# Patient Record
Sex: FEMALE | Race: WHITE | NOT HISPANIC OR LATINO | Employment: PART TIME | URBAN - NONMETROPOLITAN AREA
[De-identification: names, ages, dates, MRNs, and addresses within clinical notes are randomized per-mention and may not be internally consistent; named-entity substitution may affect disease eponyms.]

---

## 2017-01-03 ENCOUNTER — COMMUNICATION - GICH (OUTPATIENT)
Dept: FAMILY MEDICINE | Facility: OTHER | Age: 52
End: 2017-01-03

## 2017-01-03 DIAGNOSIS — G47.00 INSOMNIA: ICD-10-CM

## 2017-01-04 ENCOUNTER — HISTORY (OUTPATIENT)
Dept: FAMILY MEDICINE | Facility: OTHER | Age: 52
End: 2017-01-04

## 2017-01-04 ENCOUNTER — HOSPITAL ENCOUNTER (OUTPATIENT)
Dept: RADIOLOGY | Facility: OTHER | Age: 52
End: 2017-01-04
Attending: FAMILY MEDICINE

## 2017-01-04 ENCOUNTER — OFFICE VISIT - GICH (OUTPATIENT)
Dept: FAMILY MEDICINE | Facility: OTHER | Age: 52
End: 2017-01-04

## 2017-01-04 DIAGNOSIS — Z23 ENCOUNTER FOR IMMUNIZATION: ICD-10-CM

## 2017-01-04 DIAGNOSIS — R05.9 COUGH: ICD-10-CM

## 2017-01-04 DIAGNOSIS — R09.81 NASAL CONGESTION: ICD-10-CM

## 2017-02-14 ENCOUNTER — OFFICE VISIT - GICH (OUTPATIENT)
Dept: INTERNAL MEDICINE | Facility: OTHER | Age: 52
End: 2017-02-14

## 2017-02-14 ENCOUNTER — HISTORY (OUTPATIENT)
Dept: INTERNAL MEDICINE | Facility: OTHER | Age: 52
End: 2017-02-14

## 2017-02-14 DIAGNOSIS — J40 BRONCHITIS: ICD-10-CM

## 2017-02-14 DIAGNOSIS — F41.9 ANXIETY DISORDER: ICD-10-CM

## 2017-02-14 ASSESSMENT — PATIENT HEALTH QUESTIONNAIRE - PHQ9: SUM OF ALL RESPONSES TO PHQ QUESTIONS 1-9: 12

## 2017-03-07 ENCOUNTER — COMMUNICATION - GICH (OUTPATIENT)
Dept: INTERNAL MEDICINE | Facility: OTHER | Age: 52
End: 2017-03-07

## 2017-03-07 DIAGNOSIS — F41.9 ANXIETY DISORDER: ICD-10-CM

## 2017-05-19 ENCOUNTER — COMMUNICATION - GICH (OUTPATIENT)
Dept: FAMILY MEDICINE | Facility: OTHER | Age: 52
End: 2017-05-19

## 2017-05-19 DIAGNOSIS — M79.7 FIBROMYALGIA: ICD-10-CM

## 2017-05-24 ENCOUNTER — COMMUNICATION - GICH (OUTPATIENT)
Dept: FAMILY MEDICINE | Facility: OTHER | Age: 52
End: 2017-05-24

## 2017-05-24 DIAGNOSIS — F41.1 GENERALIZED ANXIETY DISORDER: ICD-10-CM

## 2017-05-24 DIAGNOSIS — G43.909 MIGRAINE WITHOUT STATUS MIGRAINOSUS, NOT INTRACTABLE: ICD-10-CM

## 2017-05-24 DIAGNOSIS — F33.1 MAJOR DEPRESSIVE DISORDER, RECURRENT, MODERATE (H): ICD-10-CM

## 2017-05-27 ENCOUNTER — COMMUNICATION - GICH (OUTPATIENT)
Dept: FAMILY MEDICINE | Facility: OTHER | Age: 52
End: 2017-05-27

## 2017-05-27 DIAGNOSIS — F33.1 MAJOR DEPRESSIVE DISORDER, RECURRENT, MODERATE (H): ICD-10-CM

## 2017-05-27 DIAGNOSIS — G43.909 MIGRAINE WITHOUT STATUS MIGRAINOSUS, NOT INTRACTABLE: ICD-10-CM

## 2017-05-27 DIAGNOSIS — F41.1 GENERALIZED ANXIETY DISORDER: ICD-10-CM

## 2017-06-07 ENCOUNTER — OFFICE VISIT - GICH (OUTPATIENT)
Dept: FAMILY MEDICINE | Facility: OTHER | Age: 52
End: 2017-06-07

## 2017-06-07 ENCOUNTER — HISTORY (OUTPATIENT)
Dept: FAMILY MEDICINE | Facility: OTHER | Age: 52
End: 2017-06-07

## 2017-06-07 DIAGNOSIS — F41.9 ANXIETY DISORDER: ICD-10-CM

## 2017-06-07 DIAGNOSIS — G43.C0 PERIODIC HEADACHE SYNDROME, NOT INTRACTABLE: ICD-10-CM

## 2017-06-07 DIAGNOSIS — J39.2 OTHER DISEASES OF PHARYNX (CODE): ICD-10-CM

## 2017-06-07 DIAGNOSIS — M79.7 FIBROMYALGIA: ICD-10-CM

## 2017-06-07 DIAGNOSIS — Z02.89 ENCOUNTER FOR OTHER ADMINISTRATIVE EXAMINATIONS: ICD-10-CM

## 2017-06-07 DIAGNOSIS — Z79.899 OTHER LONG TERM (CURRENT) DRUG THERAPY: ICD-10-CM

## 2017-06-07 LAB — STREP A ANTIGEN - HISTORICAL: NEGATIVE

## 2017-06-14 ENCOUNTER — HISTORY (OUTPATIENT)
Dept: FAMILY MEDICINE | Facility: OTHER | Age: 52
End: 2017-06-14

## 2017-06-14 ENCOUNTER — OFFICE VISIT - GICH (OUTPATIENT)
Dept: FAMILY MEDICINE | Facility: OTHER | Age: 52
End: 2017-06-14

## 2017-06-14 DIAGNOSIS — J20.9 ACUTE BRONCHITIS: ICD-10-CM

## 2017-06-14 DIAGNOSIS — J01.00 ACUTE MAXILLARY SINUSITIS: ICD-10-CM

## 2017-06-14 DIAGNOSIS — J01.10 ACUTE FRONTAL SINUSITIS: ICD-10-CM

## 2017-06-20 ENCOUNTER — OFFICE VISIT - GICH (OUTPATIENT)
Dept: FAMILY MEDICINE | Facility: OTHER | Age: 52
End: 2017-06-20

## 2017-06-20 ENCOUNTER — HISTORY (OUTPATIENT)
Dept: FAMILY MEDICINE | Facility: OTHER | Age: 52
End: 2017-06-20

## 2017-06-20 DIAGNOSIS — R51.9 HEADACHE: ICD-10-CM

## 2017-06-20 DIAGNOSIS — G44.89 OTHER HEADACHE SYNDROME: ICD-10-CM

## 2017-06-21 ENCOUNTER — HOSPITAL ENCOUNTER (OUTPATIENT)
Dept: RADIOLOGY | Facility: OTHER | Age: 52
End: 2017-06-21
Attending: FAMILY MEDICINE

## 2017-06-21 ENCOUNTER — HISTORY (OUTPATIENT)
Dept: FAMILY MEDICINE | Facility: OTHER | Age: 52
End: 2017-06-21

## 2017-06-21 DIAGNOSIS — R51.9 HEADACHE: ICD-10-CM

## 2017-07-17 ENCOUNTER — COMMUNICATION - GICH (OUTPATIENT)
Dept: FAMILY MEDICINE | Facility: OTHER | Age: 52
End: 2017-07-17

## 2017-07-17 DIAGNOSIS — K21.9 GASTRO-ESOPHAGEAL REFLUX DISEASE WITHOUT ESOPHAGITIS: ICD-10-CM

## 2017-07-28 ENCOUNTER — COMMUNICATION - GICH (OUTPATIENT)
Dept: FAMILY MEDICINE | Facility: OTHER | Age: 52
End: 2017-07-28

## 2017-07-28 DIAGNOSIS — F33.1 MAJOR DEPRESSIVE DISORDER, RECURRENT, MODERATE (H): ICD-10-CM

## 2017-07-28 DIAGNOSIS — G43.909 MIGRAINE WITHOUT STATUS MIGRAINOSUS, NOT INTRACTABLE: ICD-10-CM

## 2017-07-28 DIAGNOSIS — F41.1 GENERALIZED ANXIETY DISORDER: ICD-10-CM

## 2017-07-31 ENCOUNTER — HISTORY (OUTPATIENT)
Dept: FAMILY MEDICINE | Facility: OTHER | Age: 52
End: 2017-07-31

## 2017-07-31 ENCOUNTER — OFFICE VISIT - GICH (OUTPATIENT)
Dept: FAMILY MEDICINE | Facility: OTHER | Age: 52
End: 2017-07-31

## 2017-07-31 DIAGNOSIS — M77.9 ENTHESOPATHY: ICD-10-CM

## 2017-08-10 ENCOUNTER — HOSPITAL ENCOUNTER (OUTPATIENT)
Dept: RADIOLOGY | Facility: OTHER | Age: 52
End: 2017-08-10

## 2017-08-10 ENCOUNTER — HISTORY (OUTPATIENT)
Dept: RADIOLOGY | Facility: OTHER | Age: 52
End: 2017-08-10

## 2017-08-10 DIAGNOSIS — Z12.31 ENCOUNTER FOR SCREENING MAMMOGRAM FOR MALIGNANT NEOPLASM OF BREAST: ICD-10-CM

## 2017-10-22 ENCOUNTER — HISTORY (OUTPATIENT)
Dept: EMERGENCY MEDICINE | Facility: OTHER | Age: 52
End: 2017-10-22

## 2017-10-24 ENCOUNTER — COMMUNICATION - GICH (OUTPATIENT)
Dept: FAMILY MEDICINE | Facility: OTHER | Age: 52
End: 2017-10-24

## 2017-10-24 ENCOUNTER — AMBULATORY - GICH (OUTPATIENT)
Dept: FAMILY MEDICINE | Facility: OTHER | Age: 52
End: 2017-10-24

## 2017-10-25 ENCOUNTER — HISTORY (OUTPATIENT)
Dept: FAMILY MEDICINE | Facility: OTHER | Age: 52
End: 2017-10-25

## 2017-10-25 ENCOUNTER — OFFICE VISIT - GICH (OUTPATIENT)
Dept: FAMILY MEDICINE | Facility: OTHER | Age: 52
End: 2017-10-25

## 2017-10-25 DIAGNOSIS — S39.012D STRAIN OF MUSCLE, FASCIA AND TENDON OF LOWER BACK, SUBSEQUENT ENCOUNTER: ICD-10-CM

## 2017-11-01 ENCOUNTER — OFFICE VISIT - GICH (OUTPATIENT)
Dept: FAMILY MEDICINE | Facility: OTHER | Age: 52
End: 2017-11-01

## 2017-11-01 DIAGNOSIS — Z02.89 ENCOUNTER FOR OTHER ADMINISTRATIVE EXAMINATIONS: ICD-10-CM

## 2017-11-01 DIAGNOSIS — M54.6 PAIN IN THORACIC SPINE: ICD-10-CM

## 2017-11-01 DIAGNOSIS — S39.012D STRAIN OF MUSCLE, FASCIA AND TENDON OF LOWER BACK, SUBSEQUENT ENCOUNTER: ICD-10-CM

## 2017-11-17 ENCOUNTER — HOSPITAL ENCOUNTER (OUTPATIENT)
Dept: PHYSICAL THERAPY | Facility: OTHER | Age: 52
Setting detail: THERAPIES SERIES
End: 2017-11-17
Attending: FAMILY MEDICINE

## 2017-11-17 DIAGNOSIS — Z02.89 ENCOUNTER FOR OTHER ADMINISTRATIVE EXAMINATIONS: ICD-10-CM

## 2017-11-17 DIAGNOSIS — S39.012D STRAIN OF MUSCLE, FASCIA AND TENDON OF LOWER BACK, SUBSEQUENT ENCOUNTER: ICD-10-CM

## 2017-11-20 ENCOUNTER — AMBULATORY - GICH (OUTPATIENT)
Dept: FAMILY MEDICINE | Facility: OTHER | Age: 52
End: 2017-11-20

## 2017-11-20 ENCOUNTER — HISTORY (OUTPATIENT)
Dept: FAMILY MEDICINE | Facility: OTHER | Age: 52
End: 2017-11-20

## 2017-11-20 ENCOUNTER — OFFICE VISIT - GICH (OUTPATIENT)
Dept: FAMILY MEDICINE | Facility: OTHER | Age: 52
End: 2017-11-20

## 2017-11-20 DIAGNOSIS — M79.7 FIBROMYALGIA: ICD-10-CM

## 2017-11-20 DIAGNOSIS — Z56.9 PROBLEM RELATED TO EMPLOYMENT: ICD-10-CM

## 2017-11-20 DIAGNOSIS — G43.C0 PERIODIC HEADACHE SYNDROME, NOT INTRACTABLE: ICD-10-CM

## 2017-11-20 DIAGNOSIS — S39.012D STRAIN OF MUSCLE, FASCIA AND TENDON OF LOWER BACK, SUBSEQUENT ENCOUNTER: ICD-10-CM

## 2017-11-20 SDOH — ECONOMIC STABILITY - INCOME SECURITY: UNSPECIFIED PROBLEMS RELATED TO EMPLOYMENT: Z56.9

## 2017-11-30 ENCOUNTER — HOSPITAL ENCOUNTER (OUTPATIENT)
Dept: PHYSICAL THERAPY | Facility: OTHER | Age: 52
Setting detail: THERAPIES SERIES
End: 2017-11-30
Attending: FAMILY MEDICINE

## 2017-12-04 ENCOUNTER — HOSPITAL ENCOUNTER (OUTPATIENT)
Dept: PHYSICAL THERAPY | Facility: OTHER | Age: 52
Setting detail: THERAPIES SERIES
End: 2017-12-04
Attending: FAMILY MEDICINE

## 2017-12-11 ENCOUNTER — HOSPITAL ENCOUNTER (OUTPATIENT)
Dept: PHYSICAL THERAPY | Facility: OTHER | Age: 52
Setting detail: THERAPIES SERIES
End: 2017-12-11
Attending: FAMILY MEDICINE

## 2017-12-17 ENCOUNTER — HEALTH MAINTENANCE LETTER (OUTPATIENT)
Age: 52
End: 2017-12-17

## 2017-12-18 ENCOUNTER — HISTORY (OUTPATIENT)
Dept: FAMILY MEDICINE | Facility: OTHER | Age: 52
End: 2017-12-18

## 2017-12-18 ENCOUNTER — OFFICE VISIT - GICH (OUTPATIENT)
Dept: FAMILY MEDICINE | Facility: OTHER | Age: 52
End: 2017-12-18

## 2017-12-18 DIAGNOSIS — Z56.89 OTHER PROBLEMS RELATED TO EMPLOYMENT: ICD-10-CM

## 2017-12-18 DIAGNOSIS — S39.012D STRAIN OF MUSCLE, FASCIA AND TENDON OF LOWER BACK, SUBSEQUENT ENCOUNTER: ICD-10-CM

## 2017-12-21 ENCOUNTER — HOSPITAL ENCOUNTER (OUTPATIENT)
Dept: PHYSICAL THERAPY | Facility: OTHER | Age: 52
Setting detail: THERAPIES SERIES
End: 2017-12-21
Attending: FAMILY MEDICINE

## 2017-12-27 NOTE — PROGRESS NOTES
Patient Information     Patient Name MRN El Suggs N [id number] Female 1965      Progress Notes by Mirella Whyte at 8/10/2017 10:44 AM     Author:  Mirella Whyte Service:  (none) Author Type:  (none)     Filed:  8/10/2017 10:44 AM Date of Service:  8/10/2017 10:44 AM Status:  Signed     :  Mirella Whyte            Falls Risk Criteria:    Age 65 and older or under age 4        Sensory deficits    Poor vision    Use of ambulatory aides    Impaired judgment    Unable to walk independently    Meets High Risk criteria for falls:  no

## 2017-12-27 NOTE — PROGRESS NOTES
"Patient Information     Patient Name MRN Sex El Hernandez [id number] Female 1965      Progress Notes by Keri Greenwood MD at 2017 12:45 PM     Author:  Keri Greenwood MD Service:  (none) Author Type:  Physician     Filed:  2017  2:26 PM Encounter Date:  2017 Status:  Signed     :  Keri Greenwood MD (Physician)            OCCUPATIONAL HEALTH-Date of injury 10/21/2017    SUBJECTIVE:   El Joseph is a 52 y.o. female who sustained a low back strain on above date while transferring a resident. Pain is more on the left side now and at times radiates to coccyx/rectum but overall is somewhat better. She just started physical therapy on Friday and so has had only one session with plans to have sessions twice a week over the next month. She had some questions regarding the use of a \"back brace\" although what she is really asking about is a back or lumbar belts which her son uses while at work. I discouraged this and would recommend that she try to strengthen core at physical therapy. She also wondered about a TENS unit and I think PT should progress and consider that if needed.    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 We will update her workability form today and she has still been doing patient care.    OBJECTIVE:  /80  Pulse 72  Ht 1.57 m (5' 1.81\")  Wt 59.2 kg (130 lb 9.6 oz)  LMP 2016  BMI 24.03 kg/m2   Patient appears to be in mild discomfort and rates it at a 3. She is having no significant pain with movement today    ASSESSMENT:   1. Occupational health problem    2. Strain of lumbar region, subsequent encounter          PLAN:  Continue physical therapy. Hoping that she get some input in how she can improve lifting and care of residents as well.  Workability form updated. Patient transfers can only be with assistance. I kept her at a 10 pound weight restriction for the meantime. No " imaging indicated based on symptoms and improvement today.  Follow-up in a month.  Keri Greenwood MD  2:26 PM 11/20/2017   Portions of this dictation were created using the Dragon Nuance voice recognition system. Proofreading was completed but there may be errors in text.

## 2017-12-27 NOTE — PROGRESS NOTES
Patient Information     Patient Name MRN El Suggs [id number] Female 1965      Progress Notes by Keri Greenwood MD at 2017 11:00 AM     Author:  Keri Greenwood MD Service:  (none) Author Type:  Physician     Filed:  2017  1:23 PM Encounter Date:  2017 Status:  Signed     :  Keri Greenwood MD (Physician)            Occupational health follow up:  DOI 10/21/2017.    SUBJECTIVE:   El Joseph is a 51 y.o. female who complains of low back pain and had an injury with moving a resident on above noted date and was seen in ED. Refer to last 2 visits. She has been working but clearly not in the parameters of her workability form. She works 3-11 shift and was working last evening when they needed to transfer a patient using a transfer belt and the patient required 2 people and only 2 people were working including her which means that she had to help with the transfer. I asked her if there was someone in her facility that I could speak with regarding not following the parameters as outlined and she states that there is a change in management occurring over the next day or so and the person in charge will probably be someone new.I again encouraged her to advocate for herself.   Social History     Social History        Marital status:       Spouse name: N/A     Number of children:  N/A     Years of education:  N/A     Occupational History      Not on file.     Social History Main Topics          Smoking status:   Current Every Day Smoker      Packs/day:  0.25      Years:  0.00      Types:  Cigarettes      Smokeless tobacco:   Never Used      Alcohol use   0.0 oz/week     0 Standard drinks or equivalent per week        Comment: once in a while       Drug use:   No      Sexual activity:   Yes      Partners:  Male      Birth control/ protection:  Surgical       Comment: Hysterectomy, vasectomy       Other Topics   Concern     Seat Belt  Yes     100%      Social History  "Narrative     . Spouse, Adrian.    Children, Pradeep, 02/11/92; moving to [place] for school 2016 and was using heroin but went to treatment, then a suicide attempt fall 2016 and living in [place],   Dora, 09/09/97-will be going to nursing school fall 2016 and living in [place]; Clifford, 02/20/99, will graduate from high school 2017, considering school at [employer] or [employer].  (Current  is not father of Kyrie).    Working at [employer] in [employer] part time.                       OBJECTIVE:  /80  Pulse 60  Ht 1.57 m (5' 1.81\")  Wt 59.4 kg (130 lb 15.3 oz)  LMP 11/30/2016  BMI 24.1 kg/m2   Patient appears to be in mild pain and has deep lordosis of lumbar area and some tightness of muscles.   Tender with palpation of left scapular muscles also.       ASSESSMENT:   1. Visit for occupational health examination    2. Strain of lumbar region, subsequent encounter    3. Acute left-sided thoracic back pain          PLAN:  Continue Tylenol as needed for pain and Flexeril for muscle spasms. Ice and heat are recommended.  She would benefit from physical therapy for this and also has fibromyalgia which tends to get flared up when she gets some of these mild injuries.  Workability status again updated and sent with her. If I need to contact her employer I will do so however at this point it sounds like there is transition of management. Encouraged her to advocate for herself.  She will follow up in 2 weeks.  Keri Greenwood MD  1:22 PM 11/1/2017   Portions of this dictation were created using the Dragon Nuance voice recognition system. Proofreading was completed but there may be errors in text.          "
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
## 2017-12-28 NOTE — TELEPHONE ENCOUNTER
Patient Information     Patient Name MRN El Suggs N [id number] Female 1965      Telephone Encounter by Francesco Alvares at 10/24/2017 12:46 PM     Author:  Francesco Alvares Service:  (none) Author Type:  (none)     Filed:  10/24/2017 12:52 PM Encounter Date:  10/24/2017 Status:  Signed     :  Francesco Alvares

## 2017-12-28 NOTE — PROGRESS NOTES
Patient Information     Patient Name MRN Sex El Hernandez [id number] Female 1965      Progress Notes by Ghassan Terrazas PT at 2017 11:16 AM     Author:  Ghassan Terrazas PT Service:  (none) Author Type:  PT- Physical Therapist     Filed:  2017 12:56 PM Date of Service:  2017 11:16 AM Status:  Signed     :  Ghassan Terrazas PT (PT- Physical Therapist)            [facility]  Outpatient PT - Daily Note       Date of Service: 2017     Visit 2/10    Patient Name: El Joseph   YOB: 1965   Referring MD/Provider: Dr. Greenwood   Medical and Treatment Diagnosis: Strain of lumbar region  PT Treatment Diagnosis: Low back pain, impaired ROM,   Insurance: Work Comp  Start of Service: 17  Certification Dates: Start of Service: 17  Medicare/MA Re-Cert Due: 18               Subjective      Patient states back pain has improved some.       Rates pain: 4/10  Describes pain: achy, shooting, stabbing  Locates pain: across the low back, left thoracic region.                    Objective          Today's Intervention:      Trial of Estim next session     STM/MFR to the L3-S1 region and upper gluteal region B.     Supine PPT to relax the lumbar region and focus on a neutral spine x 10 reps       Home Exercise Program:    Supine PPT to relax the lumbar region and focus on a neutral spine x 10 reps             Assessment    Therapist Assessment / Clinical Impression:  Signs and symptoms consistent with Strain of lumbar region.  The patient is appropriate for physical therapy to address their pain, functional limitations, and physical impairments.      Functional Impairment(s): See subjective on initial evaluation and Functional Assessment / Summary Report from TO.    Physical Impairment(s):   Low back pain, impaired ROM,    Patient Specific Functional and Pain Scales (PSFS):    Clinician Instructions: Complete after the history and before  the exam.    Initial Assessment: We want to know what 3 activities in your life you are unable to perform, or are having the most difficulty performing, as a result of your chief problem. Please list and score at least 3 activities that you are unable to perform, or having the most difficulty performing, because of your chief problem.   Patient Specific Activity Scoring Scheme (score one number for each activity):   Activity Score (0-10)  0= Unable to perform activity  10= Able to perform activity at same level as before injury or problem   1. Lifting 20# 4/10   2. Bending  4/10   3. Carrying  4/10   4.     5.     Totals:  12/30 = 40 % ability which relates to 60% impairment    Patient verbally states that they understand that the information they have provided above is current and complete to the best of their knowledge.    Patient Specific Functional Scale Modifier Scale Conversion: (patient's modifier that correlates with pt's score on PSFS): 4-CL (60% Impaired).    G codes and Modifier taken from pt completing a PSFS: completed at initial evaluation   Initial Primary G Code and Modifier:    Per the Patient's intake and/or assessment the Primary G Code is: Mobility .   The Patient's Impairment, Limitation or Restriction Modifier would be best described as: CL - 60% - 80% Impairment.     Goal Primary G Code and Modifier:    The Patient's G Code Goal would be: Mobility    The Patient's Impairment, Limitation or Restriction Modifier goal would be best described as: CJ - 20% - 40% Impairment.         Goals:  Functional Short Term Goals (4 weeks):   Report 50% decrease in pain at rest  Report 50% improvement in bending  Report 50% improvement in carrying      Functional Long Term Goals (8 weeks):   Develop independent management.  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  Patient verbalized understanding of HEP.     Plan    Treatment Plan:      Frequency:   12 visits    Duration of Treatment: 8 weeks    Planned Interventions:    Home Exercise Program development  Therapeutic Exercise (ROM & Strengthening)  Therapeutic Activities  Neuromuscular Re-education  Manual Therapy  Ultrasound  E-Stim  Gait Training  Hot Packs / Cold Pack Modalities  Vasopneumatic Compression with Cold Therapy ( Game Ready )  Mechanical Traction    Plan for next visit:  Progress exercises as symptoms allow.  Manual therapy and modalities as indicated.

## 2017-12-28 NOTE — PROGRESS NOTES
Patient Information     Patient Name MRN Sex El Hernandez N [id number] Female 1965      Progress Notes by Saskia Daley at 2017  8:38 AM     Author:  Saskia Daley Service:  (none) Author Type:  Other Clinical Staff     Filed:  2017  8:38 AM Date of Service:  2017  8:38 AM Status:  Signed     :  Saskia Daley (Other Clinical Staff)            Falls Risk Criteria:    Age 65 and older or under age 4        Sensory deficits    Poor vision    Use of ambulatory aides    Impaired judgment    Unable to walk independently    Meets High Risk criteria for falls:  no

## 2017-12-28 NOTE — PROGRESS NOTES
Patient Information     Patient Name MRN Sex El Hernandez [id number] Female 1965      Progress Notes by Keri Greenwood MD at 2017 11:30 AM     Author:  Keri Greenwood MD Service:  (none) Author Type:  Physician     Filed:  2017 12:33 PM Encounter Date:  2017 Status:  Signed     :  Keri Greenwood MD (Physician)            SUBJECTIVE:    El Joseph is a 51 y.o. female who presents for pain management visit and is due for some refills as well as updating her controlled substance agreement. She really has used minimal hydrocodone since last seen. Lots of stress and still conflict with her spouse. Her youngest has graduated from  and her  said he would leave after that. Had tried buspar and did seem to help but had no refills.     Has some throat irritation the last few days and unsure if allergy related and would like a strep test today.     HPI    Allergies      Allergen   Reactions     Nsaids (Non-Steroidal Anti-Inflammatory Drug)  Ecchymosis     Other [Unlisted Allergen (Include Detail In Comments)]  Runny Nose     Hayfever    ,   Current Outpatient Prescriptions:      atenolol (TENORMIN) 25 mg tablet, Take 1 tablet by mouth once daily., Disp: 90 tablet, Rfl: 0     busPIRone (BUSPAR) 15 mg tablet, TAKE 1/2 TO 1 TABLET BY MOUTH 3 TIMES DAILY, Disp: 50 tablet, Rfl: 5     esomeprazole (NEXIUM) 40 mg capsule, Take 1 capsule by mouth once daily before a meal., Disp: 30 capsule, Rfl: 11     Flaxseed Powd, Sprinkle 1 Tbsp on foods daily, Disp: 1 Bottle, Rfl: 0     FLUoxetine (PROZAC) 40 mg capsule, Take 1 capsule by mouth every morning., Disp: 90 capsule, Rfl: 0     fluticasone (50 mcg per actuation) nasal solution (FLONASE), Inhale 2 Sprays into both nostrils once daily., Disp: 1 Bottle, Rfl: 0     HYDROcodone-acetaminophen, 5-325 mg, (NORCO) per tablet, Earliest Fill Date: 17 .TAKE 1 TABLET BY MOUTH EVER Y 6 HOURS IF NEEDED FOR WAYNE N * ACETAMINOPHEN SHOULD BE  "LIMITED TO 4000MG PER DAY*, Disp: 30 tablet, Rfl: 0     LORazepam (ATIVAN) 0.5 mg tab, Take one tablet 1/2 hour before plane flights., Disp: 10 tablet, Rfl: 0     pregabalin (LYRICA) 150 mg capsule, Take 1 capsule by mouth 2 times daily., Disp: 180 capsule, Rfl: 1     rizatriptan (MAXALT) 10 mg tablet, Take 1 tablet by mouth every 2 hours if needed for Migraine. Max dose: 30mg per 24 hrs., Disp: 12 tablet, Rfl: 4  Medications have been reviewed by me and are current to the best of my knowledge and ability. and   Patient Active Problem List       Diagnosis  Date Noted     Fixed drug eruption  04/21/2015     Constipation  07/30/2014     Fibromyalgia  07/30/2013     Pain medication agreement-hydrocodone #30 every 3 months  07/30/2013     Updated 6/7/2017        INSOMNIA, CHRONIC  08/10/2012     DEPRESSION, MAJOR, RECURRENT, MODERATE  05/24/2012     CHRONIC PAIN SYNDROME  05/24/2012     BRUXISM  05/24/2012     TMJ SYNDROME  05/24/2012     FATIGUE  05/24/2012     TOBACCO ABUSE, HX OF       ATROPHY OF VULVA  01/25/2012     ANXIETY, INTERMITTENT       OSTEOARTHRITIS       PELVIC PAIN, CHRONIC       MIGRAINE HEADACHE, INTERMITTENT         REVIEW OF SYSTEMS:  ROS    OBJECTIVE:  /80  Pulse 64  Ht 1.57 m (5' 1.81\")  Wt 58.5 kg (129 lb)  LMP 11/30/2016  BMI 23.74 kg/m2    EXAM:   Physical Exam   Constitutional: She is well-developed, well-nourished, and in no distress. No distress.   HENT:   Mouth/Throat: Oropharynx is clear and moist.   Lymphadenopathy:     She has no cervical adenopathy.   Psychiatric: Mood and affect normal.   More upbeat, interactive and smiling today.   Nursing note and vitals reviewed.    Results for orders placed or performed in visit on 06/07/17      RAPID STREP WITH REFLEX CULTURE      Result  Value Ref Range    STREP A ANTIGEN           Negative Negative     I have personally reviewed the labs listed above.      ASSESSMENT/PLAN:    ICD-10-CM    1. Controlled substance agreement signed " Z79.899    2. Fibromyalgia M79.7 HYDROcodone-acetaminophen, 5-325 mg, (NORCO) per tablet   3. Periodic headache syndrome, not intractable G43.C0 HYDROcodone-acetaminophen, 5-325 mg, (NORCO) per tablet      rizatriptan (MAXALT) 10 mg tablet   4. Pain medication agreement-hydrocodone #30 every 3 months Z02.89    5. Anxiety F41.9 busPIRone (BUSPAR) 15 mg tablet   6. Throat irritation J39.2 RAPID STREP WITH REFLEX CULTURE      RAPID STREP WITH REFLEX CULTURE        Plan:    Controlled substance agreement was updated and signed. She only uses hydrocodone at a rate of about 30 tablets every 3 months. Refill provided today.  Resume BuSpar at one half to one tablet up to 3 times a day and take sparingly.  Symptomatically treatment of throat discomfort. No evidence of strep or need for antibiotic.  All other refills appear to be up-to-date. Maxalt also refilled.  Keri Greenwood MD  12:33 PM 6/7/2017   I spent approximately 25 minutes with the patient (exclusive of separately billed services/procedures), with greater than 50% spent in Counseling, Prognosis, Risks and benefits of management or follow-up, Importance of compliance with chosen management options, Instructions of management (treatment) and/or follow-up, Risk factor reduction and Patient education and Coordinating Care, Establishing and/or reviewing the patient's medical record.

## 2017-12-28 NOTE — PROGRESS NOTES
Patient Information     Patient Name MRN Sex El Hernandez [id number] Female 1965      Progress Notes by Keri Greenwood MD at 2017 11:00 AM     Author:  Keri Greenwood MD Service:  (none) Author Type:  Physician     Filed:  2017 11:26 AM Encounter Date:  2017 Status:  Signed     :  Keri Greenwood MD (Physician)            SUBJECTIVE:  El Joseph is a 51 y.o. female who complains of headaches and has both migraines(likely) and tension type headaches and at times of stress will have increasing frequency and now daily. This is a actually listed as a visit for follow-up from rapid clinic visit but she was there for bronchitis which is improving. She just happened to mention the duration of headache occurrence to them. She takes tylenol and exedrin daily and has for a long time. Medications reviewed. Her youngest graduated from high school and the graduation party is this coming weekend. She has not had imaging of brain and is concerned about that so we will proceed.      Current Outpatient Prescriptions       Medication  Sig Dispense Refill     amoxicillin-clavulanate 875-125 mg tablet (AUGMENTIN) Take 1 tablet by mouth 2 times daily with meals for 10 days. 20 tablet 0     atenolol (TENORMIN) 25 mg tablet Take 1 tablet by mouth once daily. 90 tablet 0     busPIRone (BUSPAR) 15 mg tablet TAKE 1/2 TO 1 TABLET BY MOUTH 3 TIMES DAILY 50 tablet 5     CETIRIZINE HCL/PSEUDOEPHEDRINE (ZYRTEC-D ORAL) MAY TAKE ONCE TO TWICE DAILY. TAKE 10-12 HOURS APART.  0     cetirizine-pseudoephedrine, 5-120 mg, (ZYRTEC-D) 5-120 mg tablet May take once to twice daily.  Take at least 10-12 hours apart. 20 tablet 0     esomeprazole (NEXIUM) 40 mg capsule Take 1 capsule by mouth once daily before a meal. 30 capsule 11     Flaxseed Powd Sprinkle 1 Tbsp on foods daily 1 Bottle 0     FLUoxetine (PROZAC) 40 mg capsule Take 1 capsule by mouth every morning. 90 capsule 0     fluticasone (50 mcg per  actuation) nasal solution (FLONASE) Inhale 2 Sprays into both nostrils once daily. 1 Bottle 0     HYDROcodone-acetaminophen, 5-325 mg, (NORCO) per tablet Earliest Fill Date: 6/7/17 .TAKE 1 TABLET BY MOUTH EVER Y 6 HOURS IF NEEDED FOR WAYNE N * ACETAMINOPHEN SHOULD BE LIMITED TO 4000MG PER DAY* 30 tablet 0     LORazepam (ATIVAN) 0.5 mg tab Take one tablet 1/2 hour before plane flights. 10 tablet 0     pregabalin (LYRICA) 150 mg capsule Take 1 capsule by mouth 2 times daily. 180 capsule 1     rizatriptan (MAXALT) 10 mg tablet Take 1 tablet by mouth every 2 hours if needed for Migraine. Max dose: 30mg per 24 hrs. 12 tablet 4     No current facility-administered medications for this visit.      Medications have been reviewed by me and are current to the best of my knowledge and ability.      OBJECTIVE:  Appearance: healthy, alert and cooperative.  Neurological Exam: normal without focal findings, mental status, speech normal, alert and oriented x iii, BRIAN.    ASSESSMENT:   1. Chronic daily headache    2. Chronic mixed headache syndrome      PLAN:  Again reiterated that her chronic daily headaches are actually not migraine type but that she intermittently does get migraine headaches. She has induced some of the chronic daily pattern by her use of daily OTC analgesics. Consideration of visit to a headache clinic was again discussed such as going to the [facility].  Head CT will be obtained.  No changes made in current Rx listing.  Keri Greenwood MD  11:25 AM 6/20/2017

## 2017-12-28 NOTE — PROGRESS NOTES
"Patient Information     Patient Name MRN Sex El Hernandez [id number] Female 1965      Progress Notes by Keri Greenwood MD at 10/25/2017 10:15 AM     Author:  Keri Greenwood MD Service:  (none) Author Type:  Physician     Filed:  10/25/2017  1:51 PM Encounter Date:  10/25/2017 Status:  Signed     :  Keri Greenwood MD (Physician)            Occupational Health visit:  DOI 10/21/2017    SUBJECTIVE:   El Joseph is a 51 y.o. female who complains of an injury causing low back pain on the left side and was helping someone using a transfer belt and the person collapsed and was \"going down\" and she flexed forward at the waist to keep her from doing that and felt a pop in back and had immediate pain. This occurred on 10/21/2017 and went home and did cold/heat alternating, used a TENS unit and was still in pain on 10/22/2017 and went to ED. She was placed on a reduced or light duty with parameters from ED provider. 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 Her restrictions do not allow for that. I encouraged her to make certain that this incident has actually been filed as a work comp and that they are following parameters. I will reiterate her job restrictions today.      Her pain today is upper to mid lumbar paraspinous muscles on the left side and on the right side the rib margin at approximately the lower thoracic to upper lumbar region.    OBJECTIVE:  /84  Pulse 64  Ht 1.57 m (5' 1.81\")  Wt 59.4 kg (131 lb)  LMP 2016  BMI 24.11 kg/m2    Patient appears to be in mild discomfort and moves without difficulty. She is palpably tender along the right paralumbar's and into the rib margin at the lower rib cage and on the left in the spinal muscles of the bout L1 to L4 not lower. She is not having any extremely low back pain or SI " joint tenderness.    ASSESSMENT:   1. Low back strain, subsequent encounter        PLAN:  For acute pain, rest, intermittent application of cold packs (later, may switch to heat, but do not sleep on heating pad), analgesics and muscle relaxants are recommended.   Discussed longer term treatment plan of prn NSAID's and discussed a home back care exercise program with flexion exercise routine. No lifting at this time. Consider Physical Therapy and XRay studies if not improving. Call or return to clinic prn if these symptoms worsen or fail to improve as anticipated.  Work parameters were reiterated and an updated work status form given. She should be on light duty with restrictions of less than 10 pounds lifting and other restrictions per that form scanned to chart.  Follow-up in one week.  Keri Greenwood MD  1:49 PM 10/25/2017 \

## 2017-12-28 NOTE — PATIENT INSTRUCTIONS
Patient Information     Patient Name MREl Amato N [id number] Female 1965      Patient Instructions by Keri Greenwood MD at 2017 11:54 AM     Author:  Keri Greenwood MD  Service:  (none) Author Type:  Physician     Filed:  2017 11:54 AM  Encounter Date:  2017 Status:  Addendum     :  Keri Greenwood MD (Physician)        Related Notes: Original Note by Keri Greenwood MD (Physician) filed at 2017 11:54 AM               Index Algerian   Relaxation Techniques   What are relaxation techniques?   Relaxation techniques are ways to quiet your body and calm your mind. They help you deal with stress, anxiety, and the pressures of everyday life.  What is stress?   Stress is the body's way of responding to any kind of demand or change. We all have some stress in our lives and a little may even be good for us. But when it is too much or continues for too long, it can cause both physical and emotional problems. When you feel stressed, your body releases chemicals into your blood. These chemicals give you the energy to fight or to escape. This helps if you are in physical danger. But if you have stress caused by something you cannot fight or escape, the chemicals keep building up. This raises your blood pressure and makes your heart work harder. This kind of stress can affect your physical and mental health. Many office visits to healthcare providers are for problems related to stress, such as:    Back pain, headaches, or stomachaches    Change in appetite, heartburn or upset stomach    Low energy    Tense muscles    Trouble focusing or remembering things    Trouble sleeping  Learning to relax can:    Help you focus better on work or other activities    Help you sleep better    Take your mind off what is bothering you    Help with physical symptoms by decreasing heart rate, blood pressure, and muscle tension  What can I do to relax?   Relaxation skills include:    Deep  breathing: Focusing on taking slow deep breaths    Mental imaging: Picturing yourself in a calm place and letting your muscles relax    Mindfulness: Focusing only on the present moment, without judging, and not thinking of the past or future    Progressive muscle relaxation: Tensing and relaxing your body, one muscle group at a time  If you would like to learn more about relaxation techniques, check your local community college or community center. They may offer classes in relaxation techniques.  Developed by [employer].  Adult Advisor 2016.3 published by [employer].  Last modified: 2016-01-22  Last reviewed: 2016-01-21  This content is reviewed periodically and is subject to change as new health information becomes available. The information is intended to inform and educate and is not a replacement for medical evaluation, advice, diagnosis or treatment by a healthcare professional.  References   Adult Advisor 2016.3 Index    Copyright   2016 [employer], a division of [employer] All rights reserved.

## 2017-12-28 NOTE — PROGRESS NOTES
Patient Information     Patient Name MRN El Suggs [id number] Female 1965      Progress Notes by Damaso Gonzales MD at 2017  4:15 PM     Author:  Damaso Gonzales MD Service:  (none) Author Type:  Physician     Filed:  2017  8:28 AM Encounter Date:  2017 Status:  Signed     :  Damaso Gonzales MD (Physician)            SUBJECTIVE:    El Joseph is a 51 y.o. female who presents for wrist pain    HPI Comments: Patient arrives here for wrist pain. Started about 2 months ago. 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 Patient was falling back when she had grabbed the patient and fell into her hand. She's been having pain since that. Hurts to close her fist. Hurts to move her wrist.      Allergies      Allergen   Reactions     Nsaids (Non-Steroidal Anti-Inflammatory Drug)  Ecchymosis     Other [Unlisted Allergen (Include Detail In Comments)]  Runny Nose     Hayfever    ,   Family History       Problem   Relation Age of Onset     Cancer-breast  Mother 68     Diabetes  Mother      Heart Disease  Father      CHF, pacemaker,MI at 93       Hypertension  Father      Cancer-breast  Sister 40     Cancer  Sister      Cervical       Other  Sister      Fibromyalgia       Other  Sister      Fibromyalgia     ,   Current Outpatient Prescriptions on File Prior to Visit       Medication  Sig Dispense Refill     atenolol (TENORMIN) 25 mg tablet Take 1 tablet by mouth once daily. 90 tablet 3     busPIRone (BUSPAR) 15 mg tablet TAKE 1/2 TO 1 TABLET BY MOUTH 3 TIMES DAILY 50 tablet 5     CETIRIZINE HCL/PSEUDOEPHEDRINE (ZYRTEC-D ORAL) MAY TAKE ONCE TO TWICE DAILY. TAKE 10-12 HOURS APART.  0     cetirizine-pseudoephedrine, 5-120 mg, (ZYRTEC-D) 5-120 mg tablet May take once to twice daily.  Take at least 10-12 hours apart. 20 tablet 0     Flaxseed Powd Sprinkle 1 Tbsp on foods daily 1 Bottle 0     FLUoxetine (PROZAC) 40 mg capsule Take 1 capsule by mouth every morning.  "90 capsule 3     fluticasone (50 mcg per actuation) nasal solution (FLONASE) Inhale 2 Sprays into both nostrils once daily. 1 Bottle 0     HYDROcodone-acetaminophen, 5-325 mg, (NORCO) per tablet Earliest Fill Date: 6/7/17 .TAKE 1 TABLET BY MOUTH EVER Y 6 HOURS IF NEEDED FOR WAYNE N * ACETAMINOPHEN SHOULD BE LIMITED TO 4000MG PER DAY* 30 tablet 0     LORazepam (ATIVAN) 0.5 mg tab Take one tablet 1/2 hour before plane flights. 10 tablet 0     NEXIUM 40 mg capsule TAKE 1 CAPSULE BY MOUTH ONCE DAILY BEFORE A MEAL. 90 capsule 3     pregabalin (LYRICA) 150 mg capsule Take 1 capsule by mouth 2 times daily. 180 capsule 1     rizatriptan (MAXALT) 10 mg tablet Take 1 tablet by mouth every 2 hours if needed for Migraine. Max dose: 30mg per 24 hrs. 12 tablet 4     No current facility-administered medications on file prior to visit.    ,   Social History        Substance Use Topics          Smoking status:   Current Every Day Smoker      Packs/day:  0.25      Years:  0.00      Types:  Cigarettes      Smokeless tobacco:   Never Used      Alcohol use   0.0 oz/week     0 Standard drinks or equivalent per week        Comment: once in a while      and   Social History        Substance Use Topics          Smoking status:   Current Every Day Smoker      Packs/day:  0.25      Years:  0.00      Types:  Cigarettes      Smokeless tobacco:   Never Used      Alcohol use   0.0 oz/week     0 Standard drinks or equivalent per week        Comment: once in a while         REVIEW OF SYSTEMS:  ROS    OBJECTIVE:  /70  Temp 97.9  F (36.6  C) (Temporal)  Ht 1.56 m (5' 1.42\")  Wt 58.6 kg (129 lb 3.2 oz)  LMP 11/30/2016  Breastfeeding? No  BMI 24.08 kg/m2    EXAM:   Physical Exam   Constitutional: She is well-developed, well-nourished, and in no distress.   Musculoskeletal: She exhibits tenderness (pain is very localized to the extension tendons of the wrist left).   Neurological: She is alert.   Patient has pain along the dorsum of the wrist. " No pain in the snuffbox.       ASSESSMENT/PLAN:    ICD-10-CM    1. Tendonitis M77.9 methylPREDNISolone (MEDROL, VIJAY,) 4 mg tablet      WRIST SPLINT        Plan:  Patient is intolerant to nonsteroidals. Will start a Medrol taper. She has multiple questions concerning the taper. Also a wrist splint given. She is advised to not wear it during sleeping or sitting around the house. Follow-up if not improving.

## 2017-12-28 NOTE — TELEPHONE ENCOUNTER
Patient Information     Patient Name MREl Amato N [id number] Female 1965      Telephone Encounter by Doris Turner RN at 2017  1:20 PM     Author:  Doris Turner RN Service:  (none) Author Type:  NURS- Registered Nurse     Filed:  2017  1:23 PM Encounter Date:  2017 Status:  Signed     :  Doris Turner RN (NURS- Registered Nurse)            Beta Blockers     Office visit in the past 12 months or per provider note.    Last visit with RAMAKRISHNA GREER was on: 2017 in [facility]  Next visit with RAMAKRISHNA GREER is on: No future appointment listed with this provider  Next visit with Family Practice is on: No future appointment listed in this department    Max refill for 12 months from last office visit or per provider note.    Depression-in adults 18 and over  SSRI    Office visit in the past 12 months or as indicated in chart.  Should have clinic visit 1-2 months after initial prescription.    Last visit with RAMAKRISHNA GREER was on: 2017 in [facility]  Next visit with RAMAKRISHNA GREER is on: No future appointment listed with this provider  Next visit with Family Practice is on: No future appointment listed in this department    Max refills 12 months from last office visit or per providers notes.    MILY-7 ANXIETY SCREENING 2016   MILY date (doc flow) 2016   Nervous, anxious 3   Cannot stop worrying 3   Worry about different things 3   Cannot relax 3   Feeling restless 3   Easily annoyed/irritated 2   Afraid of awful event 3   Score 20   Severity severe anxiety   Some recent data might be hidden        PHQ Depression Screening 2017   Date of PHQ exam (doc flow) 2017   1. Lack of interest/pleasure 1 - Several days 0 - Not at all   2. Feeling down/depressed 2 - More than half the days 0 - Not at all   PHQ-2 TOTAL SCORE 3 0   3. Trouble sleeping 3 - Nearly every day -   4. Decreased  energy 3 - Nearly every day -   5. Appetite change 0 - Not at all -   6. Feelings of failure 1 - Several days -   7. Trouble concentrating 1 - Several days -   8. Activity level 1 - Several days -   9. Hurting yourself 0 - Not at all -   PHQ-9 TOTAL SCORE 12 -   PHQ-9 Severity Level moderate -   Functional Impairment somewhat difficult -   Some recent data might be hidden         Prescription refilled per RN Medication Refill Policy.................... Doris Turner RN ....................  7/28/2017   1:22 PM

## 2017-12-28 NOTE — PATIENT INSTRUCTIONS
Patient Information     Patient Name MRN El Suggs N [id number] Female 1965      Patient Instructions by Keri Greenwood MD at 10/25/2017 10:52 AM     Author:  Keri Greenwood MD  Service:  (none) Author Type:  Physician     Filed:  10/25/2017 10:52 AM  Encounter Date:  10/25/2017 Status:  Addendum     :  Keri Greenwood MD (Physician)        Related Notes: Original Note by Keri Greenwood MD (Physician) filed at 10/25/2017 10:52 AM               Index Upper sorbian All languages Exercises Related topics   Low Back Pain   ________________________________________________________________________  KEY POINTS    Pain and stiffness in the lower back is a common condition.    Low back pain can be treated with exercise, ice, moist heat, and sometimes with medicine or surgery.    Keeping your muscles strong, using good posture, and learning the correct way to lift heavy objects can help prevent problems.  ________________________________________________________________________  What is low back pain?   Pain and stiffness in the lower back is a common condition. It is one of the most common reasons people miss work.   In the center of your lower back are 5 bones in the spine called lumbar vertebrae. Muscles and ligaments help keep the vertebrae in their proper position. In between the vertebrae are gel-like shock absorbers called disks. Nerves that lead to the lower body pass through the bones of the lower back.   What is the cause?  You may have pain if any part of your back is injured, strained, or affected by illness.   The most common causes of back pain include:    Frequent lifting or carrying of heavy objects    Spending a lot of time sitting or standing in one position or bending over    Being overweight    A degenerative condition (a problem that causes the bones, joints, disks, or muscles to break down, like arthritis)  Less common causes of back pain include:    A disk that bulges or  is pushed out of place by injury or a severe strain. A bulging (herniated) disk can pinch the nerves that pass through the bones, leading to pain in the legs.    Injuries caused by a fall, unusually strenuous exercise, or even violent sneezing or coughing    Swelling and irritation from an infection or an immune system problem    A congenital condition (a problem that you were born with)  What are the symptoms?  Symptoms include:    Pain in the back, buttocks, or legs    Weakness in the legs    Tingling or numbness in the legs or feet    Stiffness, spasms, or limited motion  The pain may be constant or may happen only in certain positions. It may get worse when you cough, sneeze, bend, twist, or strain during a bowel movement. The pain may be in only one spot or it may spread to other areas, most commonly down the buttocks and into the back of the thigh.  How is it diagnosed?   Your healthcare provider will review your medical history and examine you. You may have X-rays or an MRI of your back.  How is it treated?   The treatment for low back pain depends on the cause. Your healthcare provider may recommend:    Rest. It's best to try to stay active, so try not to rest in bed longer than 1 to 2 days or the time your provider recommends.    Exercise. Your provider may recommend physical therapy or exercises that you can do at home.    Medicine. Several types of medicines may help lessen back pain. Take all medicine as recommended by your healthcare provider.    Surgery. Depending on the cause of your back pain and if you keep having symptoms, you may need to have surgery. However, most common causes of back pain don t need surgery.  How can I take care of myself?   To help relieve pain:    Put an ice pack, gel pack, or package of frozen vegetables, wrapped in a cloth on the painful area every 3 to 4 hours for up to 20 minutes at a time.    Take an anti-inflammatory medicine, such as ibuprofen, or other medicine as  directed by your healthcare provider. Nonsteroidal anti-inflammatory medicines (NSAIDs), such as ibuprofen, may cause stomach bleeding and other problems. These risks increase with age. Read the label and take as directed. Unless recommended by your healthcare provider, do not take for more than 10 days.    Put a hot water bottle or electric heating pad on your back. Cover the hot water bottle with a towel or set the heating pad on low so you don t burn your skin.   Try putting moist heat on the injured area for 10 to 15 minutes at a time before you do warm-up and stretching exercises. Moist heat may help relax your muscles and make it easier to move your body. Moist heat includes heat patches or moist heating pads that you can purchase at most drugstores, a wet washcloth or towel that has been heated in the dryer, or a hot shower.   Don t use heat if you have swelling.     Get a back massage by someone trained in giving massages.    Talk with a counselor if your back pain is related to tension caused by emotional problems.  Pain is the best way to  the pace you should set for increasing your activity and exercise. Minor discomfort, stiffness, soreness, and mild aches don t need to limit your activity.   Ask your healthcare provider:    How and when you will get your test results    How long it will take to recover from this condition    If there are activities you should avoid and when you can return to your normal activities    How to take care of yourself at home    What symptoms or problems you should watch for and what to do if you have them  Make sure you know when you should come back for a checkup.  How can I help prevent low back pain?   Here are some of the things you can do so there is less strain on your back:    Keep your abdominal and back muscles strong. Get some exercise every day and include stretching and warm-up exercises suggested by your provider or physical therapist. Exercising regularly  will not only help your back. It will also help keep you healthier overall.    Practice good posture.    Stand with your head up, shoulders straight, chest forward, weight balanced evenly on both feet, and pelvis tucked in.    Whenever you sit, sit in a straight-backed chair and hold your spine against the back of the chair. You may want to try a standing desk if you sit at a desk for a long time.    Use a footrest for one foot when you stand or sit in one spot for a long time. This keeps your back straight.    Protect your back.    When you need to move a heavy object, don't face the object and push with your arms. Turn around and use your back to push backwards so the strain is taken by your legs.    When you lift a heavy object, bend your knees and hips and keep your back straight. If you do a lot of heavy lifting, wear a belt designed to support your back. Avoid lifting heavy objects higher than your waist.    Carry packages close to your body, with your arms bent.    Lie on your side with your knees bent when you sleep or rest. It may help to put a pillow between your knees. Put a pillow under your knees when you sleep on your back. You may need to avoid sleeping on your stomach.    Lose weight if you are overweight.  Developed by [employer].  Adult Advisor 2016.3 published by [employer].  Last modified: 2016-05-19  Last reviewed: 2016-05-18  This content is reviewed periodically and is subject to change as new health information becomes available. The information is intended to inform and educate and is not a replacement for medical evaluation, advice, diagnosis or treatment by a healthcare professional.  References   Adult Advisor 2016.3 Index    Copyright   2016 [employer], a division of [employer] All rights reserved.

## 2017-12-28 NOTE — TELEPHONE ENCOUNTER
Patient Information     Patient Name MRN El Suggs N [id number] Female 1965      Telephone Encounter by Doris Turner RN at 2017 10:33 AM     Author:  Doris Turner RN Service:  (none) Author Type:  NURS- Registered Nurse     Filed:  2017 11:21 AM Encounter Date:  2017 Status:  Signed     :  Doris Turner RN (NURS- Registered Nurse)            Proton Pump Inhibitors    Office visit in the past 12 months or per provider note.    Last visit with RAMAKRISHNA GREER was on: 2017 in [facility]  Next visit with RAMAKRISHNA GREER is on: No future appointment listed with this provider  Next visit with Family Practice is on: No future appointment listed in this department    Max refill for 12 months from last office visit or per provider note.    Prescription refilled per RN Medication Refill Policy.................... Doris Turner RN ....................  2017   11:20 AM

## 2017-12-29 NOTE — PATIENT INSTRUCTIONS
Patient Information     Patient Name MREl Amato N [id number] Female 1965      Patient Instructions by Keri Greenwood MD at 2017 11:17 AM     Author:  Keri Greenwood MD  Service:  (none) Author Type:  Physician     Filed:  2017 11:18 AM  Encounter Date:  2017 Status:  Addendum     :  Keri Greenwood MD (Physician)        Related Notes: Original Note by Keri Greenwood MD (Physician) filed at 2017 11:17 AM               Index [place] Related topics   Tension Headache   ________________________________________________________________________  KEY POINTS    A tension headache is caused by tense muscles or stress. Most often the muscles are in your neck, shoulders, or scalp.    Treatment to relive pain may include rest, stretches and massage, nonprescription pain medicine, ice, or moist heat.    If taking care of yourself at home does not relieve a tension headache, your healthcare provider may recommend physical therapy, biofeedback, or a different pain reliever.  ________________________________________________________________________  What is a tension headache?  A tension headache is caused by tense muscles or stress. Most often the muscles are in your neck, shoulders, or scalp. Tension headaches are very common.  What is the cause?  Tension headaches may be caused by:    Stress, such as family, work, finances, or health problems    Anxiety or depression    Sitting or standing in one position for a long time    Injury such as from a car accident    Getting too little or too much sleep    Heavy smoking    Drinking too much alcohol, or going through alcohol withdrawal    Being somewhere that is too noisy    Pushing your body too hard at work or playing sports    Some medical conditions, such as sleep apnea or arthritis  Taking pain medicine too often for headaches can cause rebound headaches or drug-induced headaches. It can create a bad cycle: You have a  headache, so you take pain medicine. When the pain medicine wears off it causes another headache, which causes you to take more medicine, which causes another headache. Examples of nonprescription medicines that can cause rebound headaches are aspirin, acetaminophen, and ibuprofen. Some prescribed pain medicines can also cause this problem. If you often take medicine for headaches, talk with your healthcare provider.  To learn about possible triggers and help you to avoid them, it will help if you keep a record of:    When you have the headaches    What part of your head, body, or face is affected    What type of pain it is, such as sharp, dull, or pressure    How bad the pain is and how long it lasts    Other symptoms you have at the same time    What makes the headache better or worse  What are the symptoms?  Symptoms usually last a few hours to a day. Symptoms may include:    A feeling like a tight band is around your head    An ache or dull and steady pain felt at the temples or affecting the whole head that worsens through the day, sometimes with a sore or stiff neck    Trouble concentrating    Trouble sleeping    Your muscles might twitch or spasm. Sometimes your head may feel like it is throbbing.  How is it treated?  Most of the time, you don t need to see your healthcare provider for treatment. Here are some things you can do to relieve a tension headache:    Rest in a quiet, dark room until symptoms lessen or go away.    Stretch and massage your neck, shoulders, and back.    Put an ice pack, gel pack, or package of frozen vegetables wrapped in a cloth on your neck, shoulders, and back every 3 to 4 hours for up to 20 minutes at a time.    Put moist heat on tense muscles for up to 30 minutes to relieve pain. Moist heat includes heat patches or moist heating pads that you can buy at most drugstores, a warm wet washcloth, or a hot shower. To prevent burns to your skin, follow directions on the package and do  not lie on any type of hot pad. Don t use heat if you have swelling.    Take a warm bath or hot shower.    Take a walk or do some other type of physical exercise.    Take nonprescription pain medicine as soon as you notice symptoms. Recognizing early warning signs and starting treatment right away can lower the pain and shorten the time you have the headache.  If taking care of yourself at home does not relieve a tension headache, your healthcare provider may be able to help. Tell your healthcare provider about all the medicines and supplements that you take. Your provider may recommend:    Physical therapy    Biofeedback therapy, which uses a machine to help you learn to control muscle tension    A different pain reliever  Follow the full course of treatment prescribed by your healthcare provider. Ask your provider:    How long it will normally take to recover from each headache    If there are activities you should avoid and when you can return to your normal activities    How to take care of yourself at home    What symptoms or problems you should watch for and what to do if you have them  Make sure you know when you should come back for a checkup. Keep all appointments for provider visits or tests. Contact your healthcare provider if you have new or worsening symptoms.  How can I help prevent tension headaches?  A healthy lifestyle may help:    Eat a healthy diet. Don t skip meals.    Stay fit with the right kind of exercise for you.    Limit caffeine.    Learn to manage stress. Ask for help at home and work when the load is too great to handle. Find ways to relax. For example, take up a hobby, listen to music, watch movies, or take walks. Try yoga, meditation, or deep breathing exercises when you feel stressed.    If you smoke, try to quit. Talk to your healthcare provider about ways to quit smoking.    If you want to drink alcohol, ask your healthcare provider how much is safe for you to drink.    Try to get  at least 7 to 9 hours of sleep each night.  You are at risk for rebound headaches if you regularly take pain medicine for headaches 3 or more days every week or on more than 9 days per month. If you find that you are taking medicine for headaches this often, talk to your provider.  You can get more information from:    [employer] [place] for Headache Education (ACHE)  285-449- 6993  http://www.achenet.org/    National Headache Foundation  329.731.9754   http://www.headaches.org/  Developed by [employer].  Adult Advisor 2016.3 published by [employer].  Last modified: 2016-05-09  Last reviewed: 2016-05-03  This content is reviewed periodically and is subject to change as new health information becomes available. The information is intended to inform and educate and is not a replacement for medical evaluation, advice, diagnosis or treatment by a healthcare professional.  References   Adult Advisor 2016.3 Index    Copyright   2016 [employer], a division of [employer] All rights reserved.

## 2017-12-29 NOTE — PATIENT INSTRUCTIONS
Patient Information     Patient Name MRN Sex El Hernandez N [id number] Female 1965      Patient Instructions by Keri Greenwood MD at 2017 12:45 PM     Author:  Keri Greenwood MD Service:  (none) Author Type:  Physician     Filed:  2017  1:11 PM Encounter Date:  2017 Status:  Signed     :  Keri Greenwood MD (Physician)            Continue physical therapy and follow up in 4 weeks.

## 2017-12-29 NOTE — PATIENT INSTRUCTIONS
Patient Information     Patient Name MRN El Suggs N [id number] Female 1965      Patient Instructions by Keri Greenwood MD at 2017 11:25 AM     Author:  Keri Greenwood MD  Service:  (none) Author Type:  Physician     Filed:  2017 11:25 AM  Encounter Date:  2017 Status:  Addendum     :  Keri Greenwood MD (Physician)        Related Notes: Original Note by Keri Greenwood MD (Physician) filed at 2017 11:25 AM               Index [place] Exercises   Low Back Pain: Brief Version   ________________________________________________________________________  KEY POINTS    Low back pain is pain and stiffness in the lower back. You may have low back pain when you strain a muscle or if you have arthritis in your back.    Learn ways to put less strain on your back. Also make sure you know how to lift or move heavy objects without strain.  ________________________________________________________________________  What is low back pain?   Low back pain is pain and stiffness in the lower back. Most of the time, it happens if you strain a muscle in your back. This can happen when you lift a heavy object or when you sit or stand for a long time. Health problems, like arthritis, can also cause back pain.  What are the symptoms?  You may have pain in your lower back or buttocks. The pain may spread down to your legs.  How is it treated?   The treatment for low back pain depends on the cause. Your healthcare provider may suggest:    Rest. It's best to try to stay active. Try not to rest in bed longer than 1 to 2 days or as long as your provider tells you to.    Exercise. Your provider may send you to physical therapy or give you exercises to do at home.    Medicine. Take all medicine as directed by your healthcare provider.    Surgery. In some cases, you may need surgery. However, most common causes of back pain don t need surgery.  How can I take care of myself?   Here are some  ways to take care of low back pain:    Put an ice pack, gel pack, or package of frozen vegetables wrapped in a cloth on your back every 3 to 4 hours for up to 20 minutes at a time.    Put a hot water bottle or electric heating pad on your back. Cover the hot water bottle with a towel or set the heating pad on low so you don t burn your skin.    Take nonprescription pain medicine, such as acetaminophen, ibuprofen, or naproxen. Read the label and take as directed. Unless recommended by your healthcare provider, you should not take these medicines for more than 10 days.    Nonsteroidal anti-inflammatory medicines (NSAIDs), such as ibuprofen, naproxen, and aspirin, may cause stomach bleeding and other problems. These risks increase with age.    Acetaminophen may cause liver damage or other problems. Unless recommended by your provider, don't take more than 3000 milligrams (mg) in 24 hours. To make sure you don t take too much, check other medicines you take to see if they also contain acetaminophen. Ask your provider if you need to avoid drinking alcohol while taking this medicine.  When you sleep or lie down:    Lie on your side with your knees bent. It may help to put a pillow between your knees.    Put a pillow under your knees when you sleep on your back.  Here are some ways to put less strain on your back:    Lose weight if you are overweight.    Use good posture. Stand with your head up, shoulders straight, chest forward, your weight on both feet, and your pelvis tucked in.    Sit in a straight-backed chair and hold your spine against the back of the chair.    Sit close to the pedals when you drive. Use your seat belt and a hard backrest or pillow.    Use a footrest for one foot when you stand or sit in one spot for a long time. This keeps your back straight.  Here are tips when you need to lift or move heavy objects:    Don't push with your arms when you move a heavy object. Turn around and push backwards so your  legs take the strain.    Bend your knees and hips and keep your back straight when you lift something heavy.    Don't lift heavy objects higher than your waist.    Hold packages you carry close to your body, with your arms bent.  Developed by [employer].  Adult Advisor 2016.3 published by [employer].  Last modified: 2016-05-19  Last reviewed: 2016-05-18  This content is reviewed periodically and is subject to change as new health information becomes available. The information is intended to inform and educate and is not a replacement for medical evaluation, advice, diagnosis or treatment by a healthcare professional.  References   Adult Advisor 2016.3 Index    Copyright   2016 [employer], a division of [employer] All rights reserved.

## 2017-12-29 NOTE — PATIENT INSTRUCTIONS
Patient Information     Patient Name MRN Sex El Hernandez N [id number] Female 1965      Patient Instructions by Ammy Lovelace NP at 2017  1:30 PM     Author:  Ammy Lovelace NP  Service:  (none) Author Type:  PHYS- Nurse Practitioner     Filed:  2017  2:02 PM  Encounter Date:  2017 Status:  Addendum     :  Ammy Lovelace NP (PHYS- Nurse Practitioner)        Related Notes: Original Note by Ammy Lovelace NP (PHYS- Nurse Practitioner) filed at 2017  2:00 PM            Augmentin twice daily x 10 days for sinus infection    Zyrtec D - once or twice daily as needed    Follow up with primary provider next week as scheduled for persistent headaches

## 2017-12-30 NOTE — NURSING NOTE
Patient Information     Patient Name MRN El Suggs N [id number] Female 1965      Nursing Note by Janelle Norris at 2017  4:15 PM     Author:  Janelle Norris Service:  (none) Author Type:  (none)     Filed:  2017  4:38 PM Encounter Date:  2017 Status:  Signed     :  Janelle Norris            Patient presents to the clinic with right wrist pain, it's been bothering her for a couple of months.  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  Janelle Norris LPN....................2017 4:20 PM

## 2017-12-30 NOTE — NURSING NOTE
Patient Information     Patient Name MRN El Suggs [id number] Female 1965      Nursing Note by Leidy Doan at 2017 12:45 PM     Author:  Leidy Doan Service:  (none) Author Type:  (none)     Filed:  2017  1:03 PM Encounter Date:  2017 Status:  Signed     :  Leidy Doan            El Joseph is a 52 y.o. Female here for f/u work comp-back pain.  Adilene Doan LPN ...... 2017 12:56 PM

## 2017-12-30 NOTE — NURSING NOTE
Patient Information     Patient Name MRN El Suggs [id number] Female 1965      Nursing Note by Leidy Doan at 2017 11:00 AM     Author:  Leidy Doan Service:  (none) Author Type:  (none)     Filed:  2017 11:15 AM Encounter Date:  2017 Status:  Signed     :  Leidy Doan            El Joseph is a 51 y.o. Female here for work comp f/u- back pain.  Adilene Doan LPN ...... 2017 11:02 AM

## 2017-12-30 NOTE — NURSING NOTE
Patient Information     Patient Name MRN El Suggs N [id number] Female 1965      Nursing Note by Mia Cullen at 2017  1:30 PM     Author:  Mia Cullen Service:  (none) Author Type:  (none)     Filed:  2017  1:49 PM Encounter Date:  2017 Status:  Signed     :  Mia Cullen            Patient presents to clinic with cough and chest congestion x8 days  Mia Chacon ....................  2017   1:40 PM
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
## 2018-01-02 NOTE — PATIENT INSTRUCTIONS
Patient Information     Patient Name MRN El Suggs N [id number] Female 1965      Patient Instructions by Keri Greenwood MD at 2017  9:45 AM     Author:  Keri Greenwood MD Service:  (none) Author Type:  Physician     Filed:  2017 10:18 AM Encounter Date:  2017 Status:  Signed     :  Keri Greenwood MD (Physician)            Nasal saline as needed.  Humidifier recommended in bedroom.

## 2018-01-02 NOTE — PROGRESS NOTES
Patient Information     Patient Name MRN Sex El Hernandez [id number] Female 1965      Progress Notes by Keri Greenwood MD at 2017  9:45 AM     Author:  Keri Greenwood MD Service:  (none) Author Type:  Physician     Filed:  2017 12:46 PM Encounter Date:  2017 Status:  Signed     :  Keri Greenwood MD (Physician)            SUBJECTIVE:  El Joseph is a 51 y.o. female who complains of congestion or sinus pressure. Tends to have a drippy nose. They do not burn wood or have a wood fireplace.   She was seen in Nov and treated with Z pack with no changes. Having no purulent nasal discharge or blood-streaked sputum. She does note that sometimes she gets bloody noses but this is happening for her in the past during the winter months.   Has no humidifier in bedroom(has one but not hooked up). Smoking 5 cigs a day.   Has used flonase but not really having allergic issues. She does have 2 cats and a dog in her cat sleeps above her on another pillow.  Does state concerned about lung cancer due to smoking.    Current Outpatient Prescriptions:      atenolol (TENORMIN) 25 mg tablet, Take 1 tablet by mouth once daily., Disp: 90 tablet, Rfl: 4     esomeprazole (NEXIUM) 40 mg capsule, Take 1 capsule by mouth once daily before a meal., Disp: 30 capsule, Rfl: 11     Flaxseed Powd, Sprinkle 1 Tbsp on foods daily, Disp: 1 Bottle, Rfl: 0     FLUoxetine (PROZAC) 40 mg capsule, Take 1 capsule by mouth every morning., Disp: 90 capsule, Rfl: 4     fluticasone (50 mcg per actuation) nasal solution (FLONASE), Inhale 2 Sprays into both nostrils once daily., Disp: 1 Bottle, Rfl: 0     HYDROcodone-acetaminophen, 5-325 mg, (NORCO) per tablet, Earliest Fill Date: 16 .TAKE 1 TABLET BY MOUTH EVER Y 6 HOURS IF NEEDED FOR WAYNE N * ACETAMINOPHEN SHOULD BE LIMITED TO 4000MG PER DAY*, Disp: 30 tablet, Rfl: 0     lamoTRIgine (LAMICTAL) 25 mg tablet, Take 50 mg by mouth once daily., Disp: , Rfl: 0      LORazepam (ATIVAN) 0.5 mg tab, Take one tablet 1/2 hour before plane flights., Disp: 10 tablet, Rfl: 0     meclizine (ANTIVERT) 25 mg tablet, Take 1 tablet by mouth 3 times daily if needed for Nausea/Vomiting., Disp: 30 tablet, Rfl: 0     pregabalin (LYRICA) 150 mg capsule, Take 1 capsule by mouth 2 times daily., Disp: 180 capsule, Rfl: 1     rizatriptan (MAXALT) 10 mg tablet, Take 1 tablet by mouth every 2 hours if needed for Migraine. Max dose: 30mg per 24 hrs., Disp: 12 tablet, Rfl: 4     traZODone (DESYREL) 50 mg tablet, TAKE 1 TABLET BY MOUTH AT BEDTIME., Disp: 30 tablet, Rfl: 4  Medications have been reviewed by me and are current to the best of my knowledge and ability.    Allergies: Nsaids (non-steroidal anti-inflammatory drug) and Other [unlisted allergen (include detail in comments)]     OBJECTIVE:  Visit Vitals       BP 98/62     Pulse 51     Temp 96.7  F (35.9  C) (Temporal)     Wt 61.2 kg (135 lb)     LMP 11/30/2016     SpO2 96%     BMI 24.84 kg/m2     General appearance: healthy, alert and cooperative.    Left Ear: normal; external ear canal and TM clear, nontender.  Right Ear: normal; external ear canal and TM clear, nontender.  Nose: clear rhinorrhea  Sinuses: mild frontal pressure  Oropharynx: normal and no shedding   Neck: normal; supple with no masses or nodes.  Lungs: normal chest wall and respirations; clear to auscultation.  Heart: not examined  Xray:   PROCEDURE:  XR CHEST 2 VIEWS PA AND LATERAL    HISTORY: Cough.    COMPARISON:  03/21/2009, 06/17/2012.    FINDINGS:  The cardiomediastinal contours are unchanged, with perhaps minimal fullness of the pulmonary artery.  The trachea is midline.  No focal consolidation, effusion or pneumothorax.    No suspicious osseous lesion or subdiaphragmatic free air.    IMPRESSION:      No acute cardiopulmonary process.  Stable chest.    Electronically Signed By: Octaviano Henning on 1/4/2017 12:11 PM               Specimen Collected: 01/04/17 12:11 PM Last  Resulted: 01/04/17 12:11 PM                   Reviewed personally and with patient.      ASSESSMENT/PLAN:  Increase humidity in bedroom, nasal saline, stop flonase.  Symptomatic therapy suggested: use increased fluids and rest, acetaminophen, saline nasal spray OTC liberally and use humidifier prn.   Call or return to clinic if these symptoms worsen or fail to improve as anticipated.  Keri Greenwood MD  12:46 PM 1/4/2017

## 2018-01-02 NOTE — TELEPHONE ENCOUNTER
Patient Information     Patient Name MRN El Suggs N [id number] Female 1965      Telephone Encounter by Yissel Portillo RN at 1/3/2017  2:06 PM     Author:  Yissel Portillo RN Service:  (none) Author Type:  (none)     Filed:  1/3/2017  2:12 PM Encounter Date:  1/3/2017 Status:  Signed     :  Yissel Portillo RN (NURS- Registered Nurse)            This is a Refill request from: [facility]  Name of Medication: Trazodone   Quantity requested: #15 R-2  Last fill date: 2016  Due for refill: Yes  Last visit with RAMAKRISHNA GREENWOOD was on: 2016 in [facility]  PCP:  Ramakrishna Greenwood MD  Controlled Substance Agreement:  Signed 2016   Diagnosis r/t this medication request: Insomnia     Unable to complete prescription refill per RN Medication Refill Policy.................... Yissel Portillo ....................  1/3/2017   2:06 PM
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
## 2018-01-03 NOTE — NURSING NOTE
Patient Information     Patient Name MREl Amato N [id number] Female 1965      Nursing Note by Bryanna Nur LPN at 2017 10:00 AM     Author:  Bryanna Nur LPN Service:  (none) Author Type:  NURS- Licensed Practical Nurse     Filed:  2017 10:13 AM Encounter Date:  2017 Status:  Signed     :  Bryanna Nur LPN (NURS- Licensed Practical Nurse)            The patient is here today to discuss her trazodone medication. The patient also states her ears and throat have been painful, and has been coughing up thick mucus.  Bryanna Nur LPN......2017  10:06 AM

## 2018-01-03 NOTE — TELEPHONE ENCOUNTER
Patient Information     Patient Name MRN El Suggs N [id number] Female 1965      Telephone Encounter by Kezia Nevarez RN at 3/7/2017  1:39 PM     Author:  Kezia Nevarez RN Service:  (none) Author Type:  NURS- Registered Nurse     Filed:  3/7/2017  1:43 PM Encounter Date:  3/7/2017 Status:  Signed     :  Kezia Nevarez RN (NURS- Registered Nurse)            This is a Refill request from: [facility]  Name of Medication: buspirone  Quantity requested: 50  Last fill date: 17  Last visit with ANICETO CONNOLLY was on: 2017 in [facility]  PCP:  Keri Greenwood MD  Controlled Substance Agreement:  16   Diagnosis r/t this medication request: anxiety     Unable to complete prescription refill per RN Medication Refill Policy.................... Kezia Nevarez RN ....................  3/7/2017   1:39 PM

## 2018-01-05 ENCOUNTER — HOSPITAL ENCOUNTER (OUTPATIENT)
Dept: PHYSICAL THERAPY | Facility: OTHER | Age: 53
Setting detail: THERAPIES SERIES
End: 2018-01-05
Attending: FAMILY MEDICINE

## 2018-01-05 NOTE — TELEPHONE ENCOUNTER
Patient Information     Patient Name MREl Amato N [id number] Female 1965      Telephone Encounter by Ines Steele RN at 2017  9:28 AM     Author:  Ines Steele RN Service:  (none) Author Type:  NURS- Registered Nurse     Filed:  2017  9:30 AM Encounter Date:  2017 Status:  Signed     :  Ines Steele RN (NURS- Registered Nurse)            Atenolol and prozac were refilled 17, #90.  Unable to complete prescription refill per RN Medication Refill Policy.................... INES STEELE RN ....................  2017   9:29 AM

## 2018-01-05 NOTE — TELEPHONE ENCOUNTER
Patient Information     Patient Name MRN El Suggs N [id number] Female 1965      Telephone Encounter by Kezia Nevarez RN at 2017  3:48 PM     Author:  Kezia Nevarez RN Service:  (none) Author Type:  NURS- Registered Nurse     Filed:  2017  3:52 PM Encounter Date:  2017 Status:  Signed     :  Kezia Nevarez RN (NURS- Registered Nurse)            [facility]   Name of Medication: lyrica  Quantity requested: 180  Last fill date: 10/17/16  Due for refill: yes  Last visit with RAMAKRISHNA GREER was on: 2017 in [facility]  PCP:  Ramakrishna Greer MD  Controlled Substance Agreement:  16   Diagnosis r/t this medication request: fibromyalgia     Unable to complete prescription refill per RN Medication Refill Policy.................... Kezia Nevarez RN ....................  2017   3:48 PM

## 2018-01-05 NOTE — TELEPHONE ENCOUNTER
Patient Information     Patient Name El Al N [id number] Female 1965      Telephone Encounter by Yissel Portillo RN at 2017  4:28 PM     Author:  Yissel Portillo RN Service:  (none) Author Type:  (none)     Filed:  2017  4:33 PM Encounter Date:  2017 Status:  Signed     :  Yissel Portillo RN (NURS- Registered Nurse)            Beta Blockers     Office visit in the past 12 months or per provider note.    Last visit with RAMAKRISHNA GREER was on: 2017 in [facility]  Next visit with RAMAKRISHNA GREER is on: No future appointment listed with this provider  Next visit with Family Practice is on: No future appointment listed in this department    Max refill for 12 months from last office visit or per provider note.      Depression-in adults 18 and over  SSRI    Office visit in the past 12 months or as indicated in chart.  Should have clinic visit 1-2 months after initial prescription.    Last visit with RAMAKRISHNA GREER was on: 2017 in [facility]  Next visit with RAMAKRISHNA GREER is on: No future appointment listed with this provider  Next visit with Family Practice is on: No future appointment listed in this department    Max refills 12 months from last office visit or per providers notes.    PHQ Depression Screening 2016   Date of PHQ exam (doc flow) 2016   1. Lack of interest/pleasure 2 - More than half the days 1 - Several days   2. Feeling down/depressed 2 - More than half the days 2 - More than half the days   PHQ-2 TOTAL SCORE 4 3   3. Trouble sleeping 3 - Nearly every day 3 - Nearly every day   4. Decreased energy 2 - More than half the days 3 - Nearly every day   5. Appetite change 1 - Several days 0 - Not at all   6. Feelings of failure 2 - More than half the days 1 - Several days   7. Trouble concentrating 2 - More than half the days 1 - Several days   8. Activity level 1 -  Several days 1 - Several days   9. Hurting yourself 0 - Not at all 0 - Not at all   PHQ-9 TOTAL SCORE 15 12   PHQ-9 Severity Level moderately severe moderate   Functional Impairment somewhat difficult somewhat difficult     Patient is due for medication management appointment per last office visit with Lawrence Sherman MD. Limited refill provided at this time and letter sent for reminder to patient. Prescription refilled per RN Medication Refill Policy.................... Yissel Portillo ....................  5/24/2017   4:30 PM

## 2018-01-08 ENCOUNTER — COMMUNICATION - GICH (OUTPATIENT)
Dept: FAMILY MEDICINE | Facility: OTHER | Age: 53
End: 2018-01-08

## 2018-01-08 DIAGNOSIS — M79.7 FIBROMYALGIA: ICD-10-CM

## 2018-01-09 ENCOUNTER — HOSPITAL ENCOUNTER (OUTPATIENT)
Dept: PHYSICAL THERAPY | Facility: OTHER | Age: 53
Setting detail: THERAPIES SERIES
End: 2018-01-09
Attending: FAMILY MEDICINE

## 2018-01-23 ENCOUNTER — DOCUMENTATION ONLY (OUTPATIENT)
Dept: FAMILY MEDICINE | Facility: OTHER | Age: 53
End: 2018-01-23

## 2018-01-23 PROBLEM — Z87.891 PERSONAL HISTORY OF TOBACCO USE, PRESENTING HAZARDS TO HEALTH: Status: ACTIVE | Noted: 2018-01-23

## 2018-01-23 PROBLEM — G44.89 CHRONIC MIXED HEADACHE SYNDROME: Status: ACTIVE | Noted: 2017-06-20

## 2018-01-23 PROBLEM — R10.9 ABDOMINAL PAIN: Status: ACTIVE | Noted: 2018-01-23

## 2018-01-23 PROBLEM — M19.90 OSTEOARTHROSIS: Status: ACTIVE | Noted: 2018-01-23

## 2018-01-23 PROBLEM — F41.1 ANXIETY STATE: Status: ACTIVE | Noted: 2018-01-23

## 2018-01-23 RX ORDER — ACETAMINOPHEN 500 MG
1500 TABLET ORAL
COMMUNITY
End: 2019-11-21

## 2018-01-23 RX ORDER — BUSPIRONE HYDROCHLORIDE 15 MG/1
.5-1 TABLET ORAL 3 TIMES DAILY
COMMUNITY
Start: 2017-06-07 | End: 2018-08-21

## 2018-01-23 RX ORDER — PREGABALIN 150 MG/1
150 CAPSULE ORAL 2 TIMES DAILY
COMMUNITY
Start: 2018-01-08 | End: 2018-07-17

## 2018-01-23 RX ORDER — ESOMEPRAZOLE MAGNESIUM 40 MG/1
40 CAPSULE, DELAYED RELEASE ORAL DAILY
COMMUNITY
Start: 2017-07-20 | End: 2018-07-15

## 2018-01-23 RX ORDER — ATENOLOL 25 MG/1
25 TABLET ORAL DAILY
COMMUNITY
Start: 2017-07-28 | End: 2018-08-21

## 2018-01-23 RX ORDER — RIZATRIPTAN BENZOATE 10 MG/1
10 TABLET ORAL
COMMUNITY
Start: 2017-06-07 | End: 2019-11-21

## 2018-01-23 RX ORDER — FLUTICASONE PROPIONATE 50 MCG
2 SPRAY, SUSPENSION (ML) NASAL DAILY
COMMUNITY
Start: 2015-10-05 | End: 2018-08-21

## 2018-01-23 RX ORDER — LORAZEPAM 0.5 MG/1
1 TABLET ORAL
COMMUNITY
Start: 2015-08-26 | End: 2018-08-21

## 2018-01-23 RX ORDER — HYDROCODONE BITARTRATE AND ACETAMINOPHEN 5; 325 MG/1; MG/1
1 TABLET ORAL EVERY 6 HOURS PRN
COMMUNITY
Start: 2017-11-20 | End: 2018-03-22

## 2018-01-23 RX ORDER — CETIRIZINE HYDROCHLORIDE, PSEUDOEPHEDRINE HYDROCHLORIDE 5; 120 MG/1; MG/1
TABLET, FILM COATED, EXTENDED RELEASE ORAL
COMMUNITY
Start: 2017-06-14 | End: 2018-03-16

## 2018-01-23 RX ORDER — CETIRIZINE HYDROCHLORIDE, PSEUDOEPHEDRINE HYDROCHLORIDE 5; 120 MG/1; MG/1
TABLET, FILM COATED, EXTENDED RELEASE ORAL
COMMUNITY
Start: 2017-06-14 | End: 2021-02-08

## 2018-01-23 RX ORDER — CYCLOBENZAPRINE HCL 10 MG
10 TABLET ORAL 3 TIMES DAILY
COMMUNITY
Start: 2017-10-22 | End: 2018-04-22

## 2018-01-23 RX ORDER — FLAXSEED
1 POWDER (GRAM) ORAL
COMMUNITY
Start: 2012-09-05 | End: 2022-01-12

## 2018-01-23 RX ORDER — FLUOXETINE 40 MG/1
40 CAPSULE ORAL EVERY MORNING
COMMUNITY
Start: 2017-07-28 | End: 2018-08-21

## 2018-01-27 VITALS
HEIGHT: 62 IN | TEMPERATURE: 96.7 F | HEIGHT: 62 IN | SYSTOLIC BLOOD PRESSURE: 110 MMHG | BODY MASS INDEX: 24.1 KG/M2 | WEIGHT: 135 LBS | OXYGEN SATURATION: 96 % | DIASTOLIC BLOOD PRESSURE: 84 MMHG | HEART RATE: 60 BPM | WEIGHT: 131 LBS | SYSTOLIC BLOOD PRESSURE: 110 MMHG | SYSTOLIC BLOOD PRESSURE: 98 MMHG | HEART RATE: 64 BPM | DIASTOLIC BLOOD PRESSURE: 62 MMHG | HEART RATE: 51 BPM | DIASTOLIC BLOOD PRESSURE: 80 MMHG | BODY MASS INDEX: 24.69 KG/M2 | WEIGHT: 130.95 LBS | BODY MASS INDEX: 24.11 KG/M2

## 2018-01-27 VITALS
BODY MASS INDEX: 24.03 KG/M2 | HEIGHT: 62 IN | WEIGHT: 130.6 LBS | DIASTOLIC BLOOD PRESSURE: 80 MMHG | SYSTOLIC BLOOD PRESSURE: 120 MMHG | HEART RATE: 72 BPM

## 2018-01-27 VITALS
HEIGHT: 62 IN | HEART RATE: 60 BPM | BODY MASS INDEX: 24 KG/M2 | WEIGHT: 130.4 LBS | TEMPERATURE: 97 F | RESPIRATION RATE: 20 BRPM | DIASTOLIC BLOOD PRESSURE: 64 MMHG | SYSTOLIC BLOOD PRESSURE: 124 MMHG

## 2018-01-27 VITALS
SYSTOLIC BLOOD PRESSURE: 118 MMHG | WEIGHT: 129 LBS | HEART RATE: 64 BPM | BODY MASS INDEX: 24.32 KG/M2 | TEMPERATURE: 97.9 F | BODY MASS INDEX: 23.74 KG/M2 | HEIGHT: 61 IN | HEIGHT: 61 IN | HEIGHT: 62 IN | DIASTOLIC BLOOD PRESSURE: 70 MMHG | SYSTOLIC BLOOD PRESSURE: 120 MMHG | SYSTOLIC BLOOD PRESSURE: 110 MMHG | DIASTOLIC BLOOD PRESSURE: 80 MMHG | HEART RATE: 60 BPM | DIASTOLIC BLOOD PRESSURE: 80 MMHG | WEIGHT: 129.2 LBS | BODY MASS INDEX: 24.39 KG/M2 | WEIGHT: 128.8 LBS

## 2018-01-27 VITALS
WEIGHT: 135.8 LBS | SYSTOLIC BLOOD PRESSURE: 98 MMHG | OXYGEN SATURATION: 97 % | DIASTOLIC BLOOD PRESSURE: 64 MMHG | BODY MASS INDEX: 24.84 KG/M2 | HEART RATE: 60 BPM

## 2018-01-31 ASSESSMENT — PATIENT HEALTH QUESTIONNAIRE - PHQ9: SUM OF ALL RESPONSES TO PHQ QUESTIONS 1-9: 12

## 2018-02-09 VITALS
SYSTOLIC BLOOD PRESSURE: 122 MMHG | BODY MASS INDEX: 24.11 KG/M2 | WEIGHT: 131 LBS | HEART RATE: 60 BPM | HEIGHT: 62 IN | DIASTOLIC BLOOD PRESSURE: 82 MMHG

## 2018-02-12 NOTE — PROGRESS NOTES
"Patient Information     Patient Name MRN El Suggs [id number] Female 1965      Progress Notes by Ghassan Terrazas, PT at 2018  1:01 PM     Author:  Ghassan Terrazas PT Service:  (none) Author Type:  PT- Physical Therapist     Filed:  2018  1:45 PM Date of Service:  2018  1:01 PM Status:  Signed     :  Ghassan Terrazas PT (PT- Physical Therapist)            [facility]  Outpatient PT - Daily Note       Date of Service: 2018     Visit 7 (Evaluation and 12 visits approved by Work Comp.)    Patient Name: El Joseph   YOB: 1965   Referring MD/Provider: Dr. Greenwood   Medical and Treatment Diagnosis: Strain of lumbar region  PT Treatment Diagnosis: Low back pain, impaired ROM,   Insurance: Work Comp  Start of Service: 17  Certification Dates: Start of Service: 17  Medicare/MA Re-Cert Due: 18               Subjective        Patient reports symptoms vary depending on how active she is at work.        Rates pain: 2-3/10  Describes pain: achy, shooting, stabbing  Locates pain: across the low back, left thoracic region.            Objective          Today's Intervention:      Exercises:  -supine single knee to chest 3 x 30\" B  -supine PPT x 10, 5\" hold   -supine PPT with bilateral UE raise x 10  -supine PPT with alternating leg raise x 10 B    IFC x 20 minutes to the L2-upper gluteal region, cross pattern with electrodes, intensity = 9      Home Exercise Program:    -supine single knee to chest 3 x 30\" B  -supine PPT x 10, 5\" hold   -supine PPT with bilateral UE raise x 10  -supine PPT with alternating leg raise x 10 B            Assessment    Therapist Assessment / Clinical Impression:  Signs and symptoms consistent with Strain of lumbar region.  The patient is appropriate for physical therapy to address their pain, functional limitations, and physical impairments.      Functional Impairment(s): See subjective on initial evaluation " and Functional Assessment / Summary Report from [facility].    Physical Impairment(s):   Low back pain, impaired ROM,    Patient Specific Functional and Pain Scales (PSFS):    Clinician Instructions: Complete after the history and before the exam.    Initial Assessment: We want to know what 3 activities in your life you are unable to perform, or are having the most difficulty performing, as a result of your chief problem. Please list and score at least 3 activities that you are unable to perform, or having the most difficulty performing, because of your chief problem.   Patient Specific Activity Scoring Scheme (score one number for each activity):   Activity Score (0-10)  0= Unable to perform activity  10= Able to perform activity at same level as before injury or problem   1. Lifting 20# 4/10   2. Bending  4/10   3. Carrying  4/10   4.     5.     Totals:  12/30 = 40 % ability which relates to 60% impairment    Patient verbally states that they understand that the information they have provided above is current and complete to the best of their knowledge.    Patient Specific Functional Scale Modifier Scale Conversion: (patient's modifier that correlates with pt's score on PSFS): 4-CL (60% Impaired).    G codes and Modifier taken from pt completing a PSFS: completed at initial evaluation   Initial Primary G Code and Modifier:    Per the Patient's intake and/or assessment the Primary G Code is: Mobility .   The Patient's Impairment, Limitation or Restriction Modifier would be best described as: CL - 60% - 80% Impairment.     Goal Primary G Code and Modifier:    The Patient's G Code Goal would be: Mobility    The Patient's Impairment, Limitation or Restriction Modifier goal would be best described as: CJ - 20% - 40% Impairment.         Goals:  Functional Short Term Goals (4 weeks):   Report 50% decrease in pain at rest  Report 50% improvement in bending  Report 50% improvement in carrying      Functional Long Term  Goals (8 weeks):   Develop independent management.  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  Patient verbalized understanding of HEP.         Plan    Treatment Plan:      Frequency:   12 visits    Duration of Treatment: 8 weeks    Planned Interventions:    Home Exercise Program development  Therapeutic Exercise (ROM & Strengthening)  Therapeutic Activities  Neuromuscular Re-education  Manual Therapy  Ultrasound  E-Stim  Gait Training  Hot Packs / Cold Pack Modalities  Vasopneumatic Compression with Cold Therapy ( Game Ready )  Mechanical Traction    Plan for next visit:  Progress exercises as symptoms allow.  Manual therapy and modalities as indicated.

## 2018-02-12 NOTE — PROGRESS NOTES
Patient Information     Patient Name MRN Sex El Hernandez [id number] Female 1965      Progress Notes by Ghassan Terrazas, PT at 2017 10:30 AM     Author:  Ghassan Terrazas PT Service:  (none) Author Type:  PT- Physical Therapist     Filed:  2017 12:29 PM Date of Service:  2017 10:30 AM Status:  Signed     :  Ghassan Terrazas PT (PT- Physical Therapist)            [facility]  Outpatient PT - Daily Note       Date of Service: 2017     Visit 5  (Evaluation and 12 visits approved by Work Comp.)    Patient Name: El Joseph   YOB: 1965   Referring MD/Provider: Dr. Greenwood   Medical and Treatment Diagnosis: Strain of lumbar region  PT Treatment Diagnosis: Low back pain, impaired ROM,   Insurance: Work Comp  Start of Service: 17  Certification Dates: Start of Service: 17  Medicare/MA Re-Cert Due: 18               Subjective        Patient reports her back is more sore today after working last night.  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    Rates pain: 3-4/10  Describes pain: achy, shooting, stabbing  Locates pain: across the low back, left thoracic region.            Objective          Today's Intervention:      STM/MFR to the L3-S1 region and upper gluteal region bilaterally    IFC x 20 minutes to the L2-upper gluteal region, cross patter with electrodes, intensity = 9      Home Exercise Program:    Supine PPT to relax the lumbar region and focus on a neutral spine x 10 reps             Assessment    Therapist Assessment / Clinical Impression:  Signs and symptoms consistent with Strain of lumbar region.  The patient is appropriate for physical therapy to address their pain, functional limitations, and physical impairments.      Functional Impairment(s): See subjective on initial evaluation and Functional Assessment / Summary Report from [facility].    Physical Impairment(s):   Low back pain, impaired  ROM,    Patient Specific Functional and Pain Scales (PSFS):    Clinician Instructions: Complete after the history and before the exam.    Initial Assessment: We want to know what 3 activities in your life you are unable to perform, or are having the most difficulty performing, as a result of your chief problem. Please list and score at least 3 activities that you are unable to perform, or having the most difficulty performing, because of your chief problem.   Patient Specific Activity Scoring Scheme (score one number for each activity):   Activity Score (0-10)  0= Unable to perform activity  10= Able to perform activity at same level as before injury or problem   1. Lifting 20# 4/10   2. Bending  4/10   3. Carrying  4/10   4.     5.     Totals:  12/30 = 40 % ability which relates to 60% impairment    Patient verbally states that they understand that the information they have provided above is current and complete to the best of their knowledge.    Patient Specific Functional Scale Modifier Scale Conversion: (patient's modifier that correlates with pt's score on PSFS): 4-CL (60% Impaired).    G codes and Modifier taken from pt completing a PSFS: completed at initial evaluation   Initial Primary G Code and Modifier:    Per the Patient's intake and/or assessment the Primary G Code is: Mobility .   The Patient's Impairment, Limitation or Restriction Modifier would be best described as: CL - 60% - 80% Impairment.     Goal Primary G Code and Modifier:    The Patient's G Code Goal would be: Mobility    The Patient's Impairment, Limitation or Restriction Modifier goal would be best described as: CJ - 20% - 40% Impairment.         Goals:  Functional Short Term Goals (4 weeks):   Report 50% decrease in pain at rest  Report 50% improvement in bending  Report 50% improvement in carrying      Functional Long Term Goals (8 weeks):   Develop independent management.  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  Patient verbalized understanding of HEP.     Plan    Treatment Plan:      Frequency:   12 visits    Duration of Treatment: 8 weeks    Planned Interventions:    Home Exercise Program development  Therapeutic Exercise (ROM & Strengthening)  Therapeutic Activities  Neuromuscular Re-education  Manual Therapy  Ultrasound  E-Stim  Gait Training  Hot Packs / Cold Pack Modalities  Vasopneumatic Compression with Cold Therapy ( Game Ready )  Mechanical Traction    Plan for next visit:  Progress exercises as symptoms allow.  Manual therapy and modalities as indicated.

## 2018-02-12 NOTE — PROGRESS NOTES
Patient Information     Patient Name MRN Sex El Hernandez [id number] Female 1965      Progress Notes by Ghassan Terrazas PT at 2017 10:32 AM     Author:  Ghassan Terrazas PT Service:  (none) Author Type:  PT- Physical Therapist     Filed:  2017 12:30 PM Date of Service:  2017 10:32 AM Status:  Signed     :  Ghassan Terrazas PT (PT- Physical Therapist)            [facility]  Outpatient PT - Daily Note       Date of Service: 2017     Visit 3/10    Patient Name: El Joseph   YOB: 1965   Referring MD/Provider: Dr. Greenwood   Medical and Treatment Diagnosis: Strain of lumbar region  PT Treatment Diagnosis: Low back pain, impaired ROM,   Insurance: Work Comp  Start of Service: 17  Certification Dates: Start of Service: 17  Medicare/MA Re-Cert Due: 18               Subjective      Patient states back pain has improved some. She feels she more used to the pain now.  She reports benefit of last treatment.      Rates pain: 3/10  Describes pain: achy, shooting, stabbing  Locates pain: across the low back, left thoracic region.                    Objective          Today's Intervention:      STM/MFR to the L3-S1 region and upper gluteal region B.     IFC x 15 minutes to the L2-upper gluteal region, cross patter with electrodes, intensity = 8.2      Home Exercise Program:    Supine PPT to relax the lumbar region and focus on a neutral spine x 10 reps             Assessment    Therapist Assessment / Clinical Impression:  Signs and symptoms consistent with Strain of lumbar region.  The patient is appropriate for physical therapy to address their pain, functional limitations, and physical impairments.      Functional Impairment(s): See subjective on initial evaluation and Functional Assessment / Summary Report from [facility].    Physical Impairment(s):   Low back pain, impaired ROM,    Patient Specific Functional and Pain Scales (PSFS):     Clinician Instructions: Complete after the history and before the exam.    Initial Assessment: We want to know what 3 activities in your life you are unable to perform, or are having the most difficulty performing, as a result of your chief problem. Please list and score at least 3 activities that you are unable to perform, or having the most difficulty performing, because of your chief problem.   Patient Specific Activity Scoring Scheme (score one number for each activity):   Activity Score (0-10)  0= Unable to perform activity  10= Able to perform activity at same level as before injury or problem   1. Lifting 20# 4/10   2. Bending  4/10   3. Carrying  4/10   4.     5.     Totals:  12/30 = 40 % ability which relates to 60% impairment    Patient verbally states that they understand that the information they have provided above is current and complete to the best of their knowledge.    Patient Specific Functional Scale Modifier Scale Conversion: (patient's modifier that correlates with pt's score on PSFS): 4-CL (60% Impaired).    G codes and Modifier taken from pt completing a PSFS: completed at initial evaluation   Initial Primary G Code and Modifier:    Per the Patient's intake and/or assessment the Primary G Code is: Mobility .   The Patient's Impairment, Limitation or Restriction Modifier would be best described as: CL - 60% - 80% Impairment.     Goal Primary G Code and Modifier:    The Patient's G Code Goal would be: Mobility    The Patient's Impairment, Limitation or Restriction Modifier goal would be best described as: CJ - 20% - 40% Impairment.         Goals:  Functional Short Term Goals (4 weeks):   Report 50% decrease in pain at rest  Report 50% improvement in bending  Report 50% improvement in carrying      Functional Long Term Goals (8 weeks):   Develop independent management.  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  Patient verbalized understanding of HEP.     Plan    Treatment Plan:      Frequency:   12 visits    Duration of Treatment: 8 weeks    Planned Interventions:    Home Exercise Program development  Therapeutic Exercise (ROM & Strengthening)  Therapeutic Activities  Neuromuscular Re-education  Manual Therapy  Ultrasound  E-Stim  Gait Training  Hot Packs / Cold Pack Modalities  Vasopneumatic Compression with Cold Therapy ( Game Ready )  Mechanical Traction    Plan for next visit:  Progress exercises as symptoms allow.  Manual therapy and modalities as indicated.

## 2018-02-12 NOTE — NURSING NOTE
Patient Information     Patient Name MRN El Suggs [id number] Female 1965      Nursing Note by Leidy Doan at 2017 12:45 PM     Author:  Leidy Doan Service:  (none) Author Type:  (none)     Filed:  2017 12:54 PM Encounter Date:  2017 Status:  Signed     :  Leidy Doan            El Joseph is a 52 y.o. Female here for f/u work comp for back issue.  Adilene Doan LPN ...... 2017 12:48 PM

## 2018-02-12 NOTE — PROGRESS NOTES
Patient Information     Patient Name MRN Sex El Hernandez [id number] Female 1965      Progress Notes by Ghassan Terrazas PT at 2017 12:29 PM     Author:  Ghassan Terrazas PT Service:  (none) Author Type:  PT- Physical Therapist     Filed:  2017  1:12 PM Date of Service:  2017 12:29 PM Status:  Signed     :  Ghassan Terrazas PT (PT- Physical Therapist)            [facility]  Outpatient PT - Daily Note       Date of Service: 2017     Visit 4  (Evaluation and 12 visits approved by Work Comp.)    Patient Name: El Joseph   YOB: 1965   Referring MD/Provider: Dr. Greenwood   Medical and Treatment Diagnosis: Strain of lumbar region  PT Treatment Diagnosis: Low back pain, impaired ROM,   Insurance: Work Comp  Start of Service: 17  Certification Dates: Start of Service: 17  Medicare/MA Re-Cert Due: 18               Subjective      Patient reports benefit of last treatment.      Rates pain: 2/10  Describes pain: achy, shooting, stabbing  Locates pain: across the low back, left thoracic region.                    Objective          Today's Intervention:      STM/MFR to the L3-S1 region and upper gluteal region bilaterally    IFC x 20 minutes to the L2-upper gluteal region, cross patter with electrodes, intensity = 8.8      Home Exercise Program:    Supine PPT to relax the lumbar region and focus on a neutral spine x 10 reps             Assessment    Therapist Assessment / Clinical Impression:  Signs and symptoms consistent with Strain of lumbar region.  The patient is appropriate for physical therapy to address their pain, functional limitations, and physical impairments.      Functional Impairment(s): See subjective on initial evaluation and Functional Assessment / Summary Report from [facility].    Physical Impairment(s):   Low back pain, impaired ROM,    Patient Specific Functional and Pain Scales (PSFS):    Clinician Instructions:  Complete after the history and before the exam.    Initial Assessment: We want to know what 3 activities in your life you are unable to perform, or are having the most difficulty performing, as a result of your chief problem. Please list and score at least 3 activities that you are unable to perform, or having the most difficulty performing, because of your chief problem.   Patient Specific Activity Scoring Scheme (score one number for each activity):   Activity Score (0-10)  0= Unable to perform activity  10= Able to perform activity at same level as before injury or problem   1. Lifting 20# 4/10   2. Bending  4/10   3. Carrying  4/10   4.     5.     Totals:  12/30 = 40 % ability which relates to 60% impairment    Patient verbally states that they understand that the information they have provided above is current and complete to the best of their knowledge.    Patient Specific Functional Scale Modifier Scale Conversion: (patient's modifier that correlates with pt's score on PSFS): 4-CL (60% Impaired).    G codes and Modifier taken from pt completing a PSFS: completed at initial evaluation   Initial Primary G Code and Modifier:    Per the Patient's intake and/or assessment the Primary G Code is: Mobility .   The Patient's Impairment, Limitation or Restriction Modifier would be best described as: CL - 60% - 80% Impairment.     Goal Primary G Code and Modifier:    The Patient's G Code Goal would be: Mobility    The Patient's Impairment, Limitation or Restriction Modifier goal would be best described as: CJ - 20% - 40% Impairment.         Goals:  Functional Short Term Goals (4 weeks):   Report 50% decrease in pain at rest  Report 50% improvement in bending  Report 50% improvement in carrying      Functional Long Term Goals (8 weeks):   Develop independent management.  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  Patient verbalized understanding of HEP.     Plan    Treatment Plan:      Frequency:   12 visits    Duration of Treatment: 8 weeks    Planned Interventions:    Home Exercise Program development  Therapeutic Exercise (ROM & Strengthening)  Therapeutic Activities  Neuromuscular Re-education  Manual Therapy  Ultrasound  E-Stim  Gait Training  Hot Packs / Cold Pack Modalities  Vasopneumatic Compression with Cold Therapy ( Game Ready )  Mechanical Traction    Plan for next visit:  Progress exercises as symptoms allow.  Manual therapy and modalities as indicated.

## 2018-02-12 NOTE — PATIENT INSTRUCTIONS
Patient Information     Patient Name MRN El Suggs N [id number] Female 1965      Patient Instructions by Keri Greenwood MD at 2017 12:45 PM     Author:  Keri Greenwood MD Service:  (none) Author Type:  Physician     Filed:  2017  1:01 PM Encounter Date:  2017 Status:  Signed     :  Keri Greenwood MD (Physician)            Follow up visit in 4 weeks.  Continue physical therapy.

## 2018-02-12 NOTE — PROGRESS NOTES
Patient Information     Patient Name MRN El Suggs [id number] Female 1965      Progress Notes by Keri Greenwood MD at 2017 12:45 PM     Author:  Keri Greenwood MD Service:  (none) Author Type:  Physician     Filed:  2017  4:16 PM Encounter Date:  2017 Status:  Signed     :  Keri Greenwood MD (Physician)            Work Comp-DOI  10/21/2017    SUBJECTIVE:   El Joseph is a 52 y.o. female who complains of an injury causing low back pain and is now at PT. Has missed several sessions and discouraged that. She is finding physical therapy to be very helpful and especially the TENS unit use. I encouraged her to do her exercises at home. 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        Continues to have significant family stressors which we also discussed today.    Social History     Social History        Marital status:       Spouse name: N/A     Number of children:  N/A     Years of education:  N/A     Occupational History      Not on file.     Social History Main Topics          Smoking status:   Current Every Day Smoker      Packs/day:  0.25      Years:  0.00      Types:  Cigarettes      Smokeless tobacco:   Never Used      Alcohol use   0.0 oz/week     0 Standard drinks or equivalent per week        Comment: once in a while       Drug use:   No      Sexual activity:   Yes      Partners:  Male      Birth control/ protection:  Surgical       Comment: Hysterectomy, vasectomy       Other Topics   Concern     Seat Belt  Yes     100%      Social History Narrative     . Spouse, Adrian.    Children, Pradeep, 92; moving to [place] for school  and was using heroin but went to treatment, then a suicide attempt fall  and living in [place], then went to treatment, has mental health issues and applying for  "disability,   Dora, 09/09/97-will be going to nursing school fall 2016 and living in [place]; Clifford, 02/20/99, will graduate from high school 2017, considering school at [employer] or [employer].  (Current  is not father of Kyrie).    Working at [employer] in [employer] part time.                       OBJECTIVE:  /82 (Cuff Site: Right Arm, Position: Sitting, Cuff Size: Adult Regular)  Pulse 60  Ht 1.57 m (5' 1.81\")  Wt 59.4 kg (131 lb)  LMP 11/30/2016  BMI 24.11 kg/m2  Patient appears to be in no significant pain today although she still rates it as of 3 but has chronic underlying pain from fibromyalgia.  No imaging indicated. Notes from PT reviewed.      ASSESSMENT:   1. Work-related condition    2. Strain of lumbar region, subsequent encounter          PLAN:  Continue physical therapy and home program.  Work ability form was completed and restrictions are liberalized to increase weight she can lift a 25 pounds and allow transferring patients.  Follow up 1 month.  Keri Greenwood MD  4:16 PM 12/18/2017   Portions of this dictation were created using the Dragon Nuance voice recognition system. Proofreading was completed but there may be errors in text.            "

## 2018-02-12 NOTE — TELEPHONE ENCOUNTER
Patient Information     Patient Name MRN El Suggs N [id number] Female 1965      Telephone Encounter by Nenita Michael RN at 2018 11:52 AM     Author:  Nenita Michael RN Service:  (none) Author Type:  NURS- Registered Nurse     Filed:  2018 12:14 PM Encounter Date:  2018 Status:  Signed     :  Nenita Michael RN (NURS- Registered Nurse)            ,  Pharmacy is requesting a refill of Lyrica last filled 2017.  Pt was last seen 2017.  Please review and sign if appropriate.    This is a Refill request from:   Name of Medication: Lyrica/  Quantity requested: 180  Last fill date: 2017  Due for refill: yes   Last visit with RAMAKRISHNA GREENWOOD was on: 2017 in [facility]  PCP:  Ramakrishna Greenwood MD  Controlled Substance Agreement:  None signed  Diagnosis r/t this medication request: Fibromyalgia      Unable to complete prescription refill per RN Medication Refill Policy.................... Nenita Michael RN ....................  2018   11:53 AM

## 2018-02-12 NOTE — PROGRESS NOTES
"Patient Information     Patient Name MRN El Suggs [id number] Female 1965      Progress Notes by Ghassan Terrazas, PT at 2018 12:33 PM     Author:  Ghassan Terrazas PT Service:  (none) Author Type:  PT- Physical Therapist     Filed:  2018  1:21 PM Date of Service:  2018 12:33 PM Status:  Signed     :  Ghassan Terrazas PT (PT- Physical Therapist)            [facility]  Outpatient PT - Daily Note       Date of Service: 2018     Visit 6  (Evaluation and 12 visits approved by Work Comp.)    Patient Name: El Joseph   YOB: 1965   Referring MD/Provider: Dr. Greenwood   Medical and Treatment Diagnosis: Strain of lumbar region  PT Treatment Diagnosis: Low back pain, impaired ROM,   Insurance: Work Comp  Start of Service: 17  Certification Dates: Start of Service: 17  Medicare/MA Re-Cert Due: 18               Subjective        Patient reports back pain varies intensity.  States she has increased pain today.  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    Rates pain: 3/10  Describes pain: achy, shooting, stabbing  Locates pain: across the low back, left thoracic region.            Objective          Today's Intervention:      Exercises:  -supine single knee to chest 3 x 30\" B  -supine PPT x 10, 5\" hold   -supine PPT with bilateral UE raise x 10  -supine PPT with alternating leg raise x 10 B    IFC x 15 minutes to the L2-upper gluteal region, cross pattern with electrodes, intensity = 9      Home Exercise Program:    -supine single knee to chest 3 x 30\" B  -supine PPT x 10, 5\" hold   -supine PPT with bilateral UE raise x 10  -supine PPT with alternating leg raise x 10 B            Assessment    Therapist Assessment / Clinical Impression:  Signs and symptoms consistent with Strain of lumbar region.  The patient is appropriate for physical therapy to address their pain, functional limitations, and physical " impairments.      Functional Impairment(s): See subjective on initial evaluation and Functional Assessment / Summary Report from [facility].    Physical Impairment(s):   Low back pain, impaired ROM,    Patient Specific Functional and Pain Scales (PSFS):    Clinician Instructions: Complete after the history and before the exam.    Initial Assessment: We want to know what 3 activities in your life you are unable to perform, or are having the most difficulty performing, as a result of your chief problem. Please list and score at least 3 activities that you are unable to perform, or having the most difficulty performing, because of your chief problem.   Patient Specific Activity Scoring Scheme (score one number for each activity):   Activity Score (0-10)  0= Unable to perform activity  10= Able to perform activity at same level as before injury or problem   1. Lifting 20# 4/10   2. Bending  4/10   3. Carrying  4/10   4.     5.     Totals:  12/30 = 40 % ability which relates to 60% impairment    Patient verbally states that they understand that the information they have provided above is current and complete to the best of their knowledge.    Patient Specific Functional Scale Modifier Scale Conversion: (patient's modifier that correlates with pt's score on PSFS): 4-CL (60% Impaired).    G codes and Modifier taken from pt completing a PSFS: completed at initial evaluation   Initial Primary G Code and Modifier:    Per the Patient's intake and/or assessment the Primary G Code is: Mobility .   The Patient's Impairment, Limitation or Restriction Modifier would be best described as: CL - 60% - 80% Impairment.     Goal Primary G Code and Modifier:    The Patient's G Code Goal would be: Mobility    The Patient's Impairment, Limitation or Restriction Modifier goal would be best described as: CJ - 20% - 40% Impairment.         Goals:  Functional Short Term Goals (4 weeks):   Report 50% decrease in pain at rest  Report 50%  improvement in bending  Report 50% improvement in carrying      Functional Long Term Goals (8 weeks):   Develop independent management.  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  Patient verbalized understanding of HEP.         Plan    Treatment Plan:      Frequency:   12 visits    Duration of Treatment: 8 weeks    Planned Interventions:    Home Exercise Program development  Therapeutic Exercise (ROM & Strengthening)  Therapeutic Activities  Neuromuscular Re-education  Manual Therapy  Ultrasound  E-Stim  Gait Training  Hot Packs / Cold Pack Modalities  Vasopneumatic Compression with Cold Therapy ( Game Ready )  Mechanical Traction    Plan for next visit:  Progress exercises as symptoms allow.  Manual therapy and modalities as indicated.

## 2018-02-13 NOTE — DISCHARGE SUMMARY
Patient Information     Patient Name MRN Sex El Hernandez [id number] Female 1965      Discharge Summaries by Ghassan Terrazas, PT at 2018 10:03 AM     Author:  Ghassan Terrazas PT Service:  (none) Author Type:  PT- Physical Therapist     Filed:  2018 10:04 AM Date of Service:  2018 10:03 AM Status:  Signed     :  Ghassan Terrazas PT (PT- Physical Therapist)            [facility]  Outpatient PT - Discharge Summary    Patient Name: El Joseph   YOB: 1965   Referring MD/Provider: Dr. Greenwood   Medical and Treatment Diagnosis: Strain of lumbar region  PT Treatment Diagnosis: Low back pain, impaired ROM,   Insurance: Work Comp  Start of Service: 17  Certification Dates: Start of Service: 17                     Medicare/MA Re-Cert Due: 18     Date of discharge: 2018     Dates of Service: 17   to  18  Number of visits: 7          Reason for Discharge:  Patient cancelled PT appointment on 18.  No additional visits have been scheduled.     Progress from Initial to Discharge (if applicable):    Comments:  See progress note on or status at the time of final visit.     Further Recommendations:    Patient will continue with established home exercise program  Patient will return to physician for follow-up.      Patient is discharged from Physical Therapy    Thank you for your referral to [facility].  Please call with any questions, concerns or comments.  [phone]

## 2018-03-16 ENCOUNTER — HOSPITAL ENCOUNTER (OUTPATIENT)
Dept: GENERAL RADIOLOGY | Facility: OTHER | Age: 53
Discharge: HOME | End: 2018-03-16
Attending: FAMILY MEDICINE | Admitting: FAMILY MEDICINE
Payer: COMMERCIAL

## 2018-03-16 ENCOUNTER — OFFICE VISIT (OUTPATIENT)
Dept: FAMILY MEDICINE | Facility: OTHER | Age: 53
End: 2018-03-16
Attending: FAMILY MEDICINE
Payer: COMMERCIAL

## 2018-03-16 VITALS
OXYGEN SATURATION: 94 % | SYSTOLIC BLOOD PRESSURE: 112 MMHG | WEIGHT: 129 LBS | HEART RATE: 64 BPM | TEMPERATURE: 97.5 F | BODY MASS INDEX: 23.74 KG/M2 | DIASTOLIC BLOOD PRESSURE: 64 MMHG

## 2018-03-16 DIAGNOSIS — J20.9 ACUTE BRONCHITIS, UNSPECIFIED ORGANISM: ICD-10-CM

## 2018-03-16 DIAGNOSIS — R05.9 COUGH: ICD-10-CM

## 2018-03-16 DIAGNOSIS — R05.9 COUGH: Primary | ICD-10-CM

## 2018-03-16 LAB
ERYTHROCYTE [DISTWIDTH] IN BLOOD BY AUTOMATED COUNT: 12.3 % (ref 10–15)
HCT VFR BLD AUTO: 43.1 % (ref 35–47)
HGB BLD-MCNC: 15 G/DL (ref 11.7–15.7)
MCH RBC QN AUTO: 31 PG (ref 26.5–33)
MCHC RBC AUTO-ENTMCNC: 34.8 G/DL (ref 31.5–36.5)
MCV RBC AUTO: 89 FL (ref 78–100)
PLATELET # BLD AUTO: 203 10E9/L (ref 150–450)
RBC # BLD AUTO: 4.84 10E12/L (ref 3.8–5.2)
WBC # BLD AUTO: 6.5 10E9/L (ref 4–11)

## 2018-03-16 PROCEDURE — 85027 COMPLETE CBC AUTOMATED: CPT | Performed by: FAMILY MEDICINE

## 2018-03-16 PROCEDURE — 36415 COLL VENOUS BLD VENIPUNCTURE: CPT | Performed by: FAMILY MEDICINE

## 2018-03-16 PROCEDURE — 99213 OFFICE O/P EST LOW 20 MIN: CPT | Performed by: FAMILY MEDICINE

## 2018-03-16 PROCEDURE — 71046 X-RAY EXAM CHEST 2 VIEWS: CPT

## 2018-03-16 RX ORDER — AZITHROMYCIN 250 MG/1
TABLET, FILM COATED ORAL
Qty: 6 TABLET | Refills: 0 | Status: SHIPPED | OUTPATIENT
Start: 2018-03-16 | End: 2018-03-22

## 2018-03-16 RX ORDER — CODEINE PHOSPHATE AND GUAIFENESIN 10; 100 MG/5ML; MG/5ML
1 SOLUTION ORAL EVERY 4 HOURS PRN
Qty: 180 ML | Refills: 0 | Status: SHIPPED | OUTPATIENT
Start: 2018-03-16 | End: 2019-02-12

## 2018-03-16 ASSESSMENT — PAIN SCALES - GENERAL: PAINLEVEL: MODERATE PAIN (5)

## 2018-03-16 NOTE — MR AVS SNAPSHOT
After Visit Summary   3/16/2018    El Joseph    MRN: [id number]           Patient Information     Date Of Birth          1965        Visit Information        Provider Department      3/16/2018 10:45 AM Tejal Naranjo MD [facility]        Today's Diagnoses     Cough    -  1    Acute bronchitis, unspecified organism          Care Instructions      1.  Keep up on your fluids  2.  Pain medications through physician in which you have contract with, you can make an appointment [phone]   3.  zithromax is your antibiotic  4. Robitussin ac is cough syrup that contains codeine to help you reduce cough and pleuritic chest discomfort.   Please let Dr. Greenwood know I am aware of narcotic contract but as your are currently out of hydrocodone okay to use this cough syrup    Bronchitis, Antibiotic Treatment (Adult)    Bronchitis is an infection of the air passages (bronchial tubes) in your lungs. It often occurs when you have a cold. This illness is contagious during the first few days and is spread through the air by coughing and sneezing, or by direct contact (touching the sick person and then touching your own eyes, nose, or mouth).  Symptoms of bronchitis include cough with mucus (phlegm) and low-grade fever. Bronchitis usually lasts 7 to 14 days. Mild cases can be treated with simple home remedies. More severe infection is treated with an antibiotic.  Home care  Follow these guidelines when caring for yourself at home:    If your symptoms are severe, rest at home for the first 2 to 3 days. When you go back to your usual activities, don't let yourself get too tired.    Do not smoke. Also avoid being exposed to secondhand smoke.    You may use over-the-counter medicines to control fever or pain, unless another medicine was prescribed. (Note: If you have chronic liver or kidney disease or have ever had a stomach ulcer or gastrointestinal bleeding, talk with your healthcare  provider before using these medicines. Also talk to your provider if you are taking medicine to prevent blood clots.) Aspirin should never be given to anyone younger than 18 years of age who is ill with a viral infection or fever. It may cause severe liver or brain damage.    Your appetite may be poor, so a light diet is fine. Avoid dehydration by drinking 6 to 8 glasses of fluids per day (such as water, soft drinks, sports drinks, juices, tea, or soup). Extra fluids will help loosen secretions in the nose and lungs.    Over-the-counter cough, cold, and sore-throat medicines will not shorten the length of the illness, but they may be helpful to reduce symptoms. (Note: Do not use decongestants if you have high blood pressure.)    Finish all antibiotic medicine. Do this even if you are feeling better after only a few days.  Follow-up care  Follow up with your healthcare provider, or as advised. If you had an X-ray or ECG (electrocardiogram), a specialist will review it. You will be notified of any new findings that may affect your care.  Note: If you are age 65 or older, or if you have a chronic lung disease or condition that affects your immune system, or you smoke, talk to your healthcare provider about having pneumococcal vaccinations and a yearly influenza vaccination (flu shot).  When to seek medical advice  Call your healthcare provider right away if any of these occur:    Fever of 100.4 F (38 C) or higher    Coughing up increased amounts of colored sputum    Weakness, drowsiness, headache, facial pain, ear pain, or a stiff neck  Call 911, or get immediate medical care  Contact emergency services right away if any of these occur.    Coughing up blood    Worsening weakness, drowsiness, headache, or stiff neck    Trouble breathing, wheezing, or pain with breathing  Date Last Reviewed: 9/13/2015 2000-2017 [employer]. [address] [place] [address], [place] [address]. All rights reserved. This information  "is not intended as a substitute for professional medical care. Always follow your healthcare professional's instructions.                Follow-ups after your visit        Future tests that were ordered for you today     Open Future Orders        Priority Expected Expires Ordered    XR Chest 2 Views Routine 3/16/2018 3/16/2019 3/16/2018            Who to contact     If you have questions or need follow up information about today's clinic visit or your schedule please contact [facility] directly at [phone].  Normal or non-critical lab and imaging results will be communicated to you by [employer], letter or phone within 4 business days after the clinic has received the results. If you do not hear from us within 7 days, please contact the clinic through [employer] or phone. If you have a critical or abnormal lab result, we will notify you by phone as soon as possible.  Submit refill requests through [employer] or call your pharmacy and they will forward the refill request to us. Please allow 3 business days for your refill to be completed.          Additional Information About Your Visit        [employer] Information     [employer] lets you send messages to your doctor, view your test results, renew your prescriptions, schedule appointments and more. To sign up, go to [email or web address] . Click on \"Log in\" on the left side of the screen, which will take you to the Welcome page. Then click on \"Sign up Now\" on the right side of the page.     You will be asked to enter the access code listed below, as well as some personal information. Please follow the directions to create your username and password.     Your access code is: [email or web address]  Expires: 2018 12:20 PM     Your access code will  in 90 days. If you need help or a new code, please call your [place] clinic or [phone].        Care EveryWhere ID     This is your Care EveryWhere ID. This could be used by other organizations to access " your [facility] medical records  [id number]        Your Vitals Were     Pulse Temperature Pulse Oximetry Breastfeeding? BMI (Body Mass Index)       64 97.5  F (36.4  C) (Tympanic) 94% No 23.74 kg/m2        Blood Pressure from Last 3 Encounters:   03/16/18 112/64   12/18/17 122/82   11/20/17 120/80    Weight from Last 3 Encounters:   03/16/18 129 lb (58.5 kg)   12/18/17 131 lb (59.4 kg)   11/20/17 130 lb 9.6 oz (59.2 kg)              We Performed the Following     CBC with platelets          Today's Medication Changes          These changes are accurate as of 3/16/18 12:20 PM.  If you have any questions, ask your nurse or doctor.               Start taking these medicines.        Dose/Directions    azithromycin 250 MG tablet   Commonly known as:  ZITHROMAX   Used for:  Cough, Acute bronchitis, unspecified organism   Started by:  Tejal Naranjo MD        Two tablets first day, then one tablet daily for four days.   Quantity:  6 tablet   Refills:  0       guaiFENesin-codeine 100-10 MG/5ML Soln solution   Commonly known as:  ROBITUSSIN AC   Used for:  Acute bronchitis, unspecified organism   Started by:  Tejal Naranjo MD        Dose:  1 tsp.   Take 5 mLs by mouth every 4 hours as needed for cough   Quantity:  180 mL   Refills:  0            Where to get your medicines      These medications were sent to [id number] - [place], [place] - [address]  [address], [facility] [address]     Phone:  [phone]     azithromycin 250 MG tablet         Some of these will need a paper prescription and others can be bought over the counter.  Ask your nurse if you have questions.     Bring a paper prescription for each of these medications     guaiFENesin-codeine 100-10 MG/5ML Soln solution                Primary Care Provider Office Phone # Fax #    Venice Milligan [phone] [address] [facility] [address]        Equal Access to Services     [facility]  GAAR : Hadii aad ku hadleydi Velasquez, waaxda luqadaha, qaybta kaalmada ademorgan, kalyan juarezin hayaan morrisabhinva browning rajni . So [facility] [phone].    ATENCIÓN: Si habla español, tiene a frazier disposición servicios gratuitos de asistencia lingüística. Llame al [phone].    We comply with applicable federal civil rights laws and [place] laws. We do not discriminate on the basis of race, color, national origin, age, disability, sex, sexual orientation, or gender identity.            Thank you!     Thank you for choosing [facility]  for your care. Our goal is always to provide you with excellent care. Hearing back from our patients is one way we can continue to improve our services. Please take a few minutes to complete the written survey that you may receive in the mail after your visit with us. Thank you!             Your Updated Medication List - Protect others around you: Learn how to safely use, store and throw away your medicines at www.disposemymeds.org.          This list is accurate as of 3/16/18 12:20 PM.  Always use your most recent med list.                   Brand Name Dispense Instructions for use Diagnosis    acetaminophen 500 MG tablet    TYLENOL     Take 1,500 mg by mouth        atenolol 25 MG tablet    TENORMIN     Take 25 mg by mouth daily        azithromycin 250 MG tablet    ZITHROMAX    6 tablet    Two tablets first day, then one tablet daily for four days.    Cough, Acute bronchitis, unspecified organism       busPIRone 15 MG tablet    BUSPAR     Take 0.5-1 tablets by mouth 3 times daily        cetirizine-psuedoePHEDrine 5-120 MG per 12 hr tablet    zyrTEC-D     MAY TAKE ONCE TO TWICE DAILY. TAKE 10-12 HOURS APART.        cyclobenzaprine 10 MG tablet    FLEXERIL     Take 10 mg by mouth 3 times daily        Flax Seeds Powd      1 Tablespoonful Sprinkle on foods daily        FLUoxetine 40 MG capsule    PROzac     Take 40 mg by mouth every morning        fluticasone 50  MCG/ACT spray    FLONASE     Spray 2 sprays into both nostrils daily        guaiFENesin-codeine 100-10 MG/5ML Soln solution    ROBITUSSIN AC    180 mL    Take 5 mLs by mouth every 4 hours as needed for cough    Acute bronchitis, unspecified organism       HYDROcodone-acetaminophen 5-325 MG per tablet    NORCO     Take 1 tablet by mouth every 6 hours as needed for pain * ACETAMINOPHEN SHOULD BE LIMITED TO 4000MG PER DAY*        LORazepam 0.5 MG tablet    ATIVAN     Take 1 tablet by mouth Take 1 tablet 1/2 hour before plane flights.        nexIUM 40 MG CR capsule   Generic drug:  esomeprazole      Take 40 mg by mouth daily Take before meal.        pregabalin 150 MG capsule    LYRICA     Take 150 mg by mouth 2 times daily        rizatriptan 10 MG tablet    MAXALT     Take 10 mg by mouth every 2 hours as needed for Migraine. Max dose: 30mg per 24 hrs.

## 2018-03-16 NOTE — PATIENT INSTRUCTIONS
1.  Keep up on your fluids  2.  Pain medications through physician in which you have contract with, you can make an appointment [phone]   3.  zithromax is your antibiotic  4. Robitussin ac is cough syrup that contains codeine to help you reduce cough and pleuritic chest discomfort.   Please let Dr. Greenwood know I am aware of narcotic contract but as your are currently out of hydrocodone okay to use this cough syrup    Bronchitis, Antibiotic Treatment (Adult)    Bronchitis is an infection of the air passages (bronchial tubes) in your lungs. It often occurs when you have a cold. This illness is contagious during the first few days and is spread through the air by coughing and sneezing, or by direct contact (touching the sick person and then touching your own eyes, nose, or mouth).  Symptoms of bronchitis include cough with mucus (phlegm) and low-grade fever. Bronchitis usually lasts 7 to 14 days. Mild cases can be treated with simple home remedies. More severe infection is treated with an antibiotic.  Home care  Follow these guidelines when caring for yourself at home:    If your symptoms are severe, rest at home for the first 2 to 3 days. When you go back to your usual activities, don't let yourself get too tired.    Do not smoke. Also avoid being exposed to secondhand smoke.    You may use over-the-counter medicines to control fever or pain, unless another medicine was prescribed. (Note: If you have chronic liver or kidney disease or have ever had a stomach ulcer or gastrointestinal bleeding, talk with your healthcare provider before using these medicines. Also talk to your provider if you are taking medicine to prevent blood clots.) Aspirin should never be given to anyone younger than 18 years of age who is ill with a viral infection or fever. It may cause severe liver or brain damage.    Your appetite may be poor, so a light diet is fine. Avoid dehydration by drinking 6 to 8 glasses of fluids per day (such as  water, soft drinks, sports drinks, juices, tea, or soup). Extra fluids will help loosen secretions in the nose and lungs.    Over-the-counter cough, cold, and sore-throat medicines will not shorten the length of the illness, but they may be helpful to reduce symptoms. (Note: Do not use decongestants if you have high blood pressure.)    Finish all antibiotic medicine. Do this even if you are feeling better after only a few days.  Follow-up care  Follow up with your healthcare provider, or as advised. If you had an X-ray or ECG (electrocardiogram), a specialist will review it. You will be notified of any new findings that may affect your care.  Note: If you are age 65 or older, or if you have a chronic lung disease or condition that affects your immune system, or you smoke, talk to your healthcare provider about having pneumococcal vaccinations and a yearly influenza vaccination (flu shot).  When to seek medical advice  Call your healthcare provider right away if any of these occur:    Fever of 100.4 F (38 C) or higher    Coughing up increased amounts of colored sputum    Weakness, drowsiness, headache, facial pain, ear pain, or a stiff neck  Call 911, or get immediate medical care  Contact emergency services right away if any of these occur.    Coughing up blood    Worsening weakness, drowsiness, headache, or stiff neck    Trouble breathing, wheezing, or pain with breathing  Date Last Reviewed: 9/13/2015 2000-2017 [employer]. [address] [place] [address], [place] [address]. All rights reserved. This information is not intended as a substitute for professional medical care. Always follow your healthcare professional's instructions.

## 2018-03-16 NOTE — PROGRESS NOTES
Nursing Notes:   Saige Doyle LPN  3/16/2018 11:12 AM  Signed  Chest Congestion X over one week and Cough that is productive. Patient also requested us to fill her [place] if possible.  I did let patient know that that would need to be a separate apt and that she needs to get that from her PCP.   Saige Doyle LPN........................3/16/2018  10:57 AM   Subjective:  El Joseph is a 52 year old female who presents for upper respiratory infection    Patient is a 52-year-old white female who typically sees Dr. Greenwood who comes in today with URI type symptoms for over a month.  She states that the congestion has now gotten worse in the last week with tightness in her mid chest.  Cough has become more productive.  She notes that she has had sinus pain and pressure with increasing postnasal drip.  Her ears feel full.  Decreased appetite.  Cough will keep her awake at night.  She does not use a [place] pot.  Is trying to keep up on her fluids.  She apparently has a narcotics contract with Dr. Ca will be out of town next week and would like to have refill today.    No indigestion no nausea vomiting or diarrhea no rash.  No myalgias.  She does have headache off and on.  Immunization History   Administered Date(s) Administered     DTaP, Unspecified 02/28/2000     Flu, Unspecified 12/03/2010     HepB-Adult 01/28/2015     Influenza (IIV3) PF 09/05/2012     Influenza Vaccine IM 3yrs+ 4 Valent IIV4 11/11/2015, 01/04/2017     Influenza, Whole Virus 12/03/2010     MMR 02/28/2000, 02/28/2000     TDAP Vaccine (Boostrix) 09/05/2012     Td (Adult), Adsorbed 02/28/2000             Allergies   Allergen Reactions     Nsaids      Other reaction(s): Ecchymosis     Medications: reviewed in EMR  Problem List/PMH: reviewed in EMR    Social Hx:  Social History   Substance Use Topics     Smoking status: Current Every Day Smoker     Packs/day: 0.25     Years: 0.00     Types: Cigarettes     Smokeless tobacco: Never Used      Alcohol use 0.0 oz/week      Comment: Alcoholic Drinks/day: once in a while       Family Hx:   Family History   Problem Relation Age of Onset     Breast Cancer Mother 68     Cancer-breast     DIABETES Mother      Diabetes     HEART DISEASE Father      Heart Disease,CHF, pacemaker,MI at 93     Hypertension Father      Hypertension     Breast Cancer Sister      Cancer-breast     Breast Cancer Sister 40     Cancer-breast     CANCER Sister      Cancer,Cervical     Breast Cancer Sister      Cancer-breast     Ovarian Cancer Sister      Cancer-ovarian     Other - See Comments Sister      Fibromyalgia     Other - See Comments Sister      Fibromyalgia       Objective:  /64 (BP Location: Right arm, Patient Position: Sitting, Cuff Size: Adult Large)  Pulse 64  Temp 97.5  F (36.4  C) (Tympanic)  Wt 129 lb (58.5 kg)  SpO2 94%  Breastfeeding? No  BMI 23.74 kg/m2    Patient appears well, alert and oriented x 3, pleasant, cooperative.  BRIAN. TM's clear, Oral pharynx with good dentition, without lesion, erythema or exudate. Moist mucous membranes.Neck supple and free of adenopathy, or masses. No thyromegaly. Lungs are clear, without wheezes, rhonchi or rales.  Coughs with dry cough throughout exam.  Heart sounds are normal, no murmurs, clicks, gallops or rubs. Abdomen is soft, no tenderness, masses ororganomegaly. No CVAT.  Extremities are without edema. Peripheral pulses are normal. Screening neurological exam is normal without focal findings. Skin is normal without suspicious lesions noted.      Results for orders placed or performed in visit on 03/16/18   CBC with platelets   Result Value Ref Range    WBC 6.5 4.0 - 11.0 10e9/L    RBC Count 4.84 3.8 - 5.2 10e12/L    Hemoglobin 15.0 11.7 - 15.7 g/dL    Hematocrit 43.1 35.0 - 47.0 %    MCV 89 78 - 100 fl    MCH 31.0 26.5 - 33.0 pg    MCHC 34.8 31.5 - 36.5 g/dL    RDW 12.3 10.0 - 15.0 %    Platelet Count 203 150 - 450 10e9/L     PROCEDURE:  XR CHEST 2 VW     HISTORY:  ;  Cough.      COMPARISON:  1/4/2017     FINDINGS:   The cardiac silhouette is normal in size. The pulmonary vasculature is  normal.  The lungs are clear. No pleural effusion or pneumothorax.         IMPRESSION:  No acute cardiopulmonary disease.       DANNY EDUARDO MD       Assessment:    ICD-10-CM    1. Cough R05 CBC with platelets     XR Chest 2 Views     azithromycin (ZITHROMAX) 250 MG tablet   2. Acute bronchitis, unspecified organism J20.9 azithromycin (ZITHROMAX) 250 MG tablet     guaiFENesin-codeine (ROBITUSSIN AC) 100-10 MG/5ML SOLN solution     Patient was informed that she will need to see her primary care provider for hydrocodone.    Plan:   -- Expected clinical course discussed   -- Medications and their side effects discussed     Patient Instructions     1.  Keep up on your fluids  2.  Pain medications through physician in which you have contract with, you can make an appointment [phone]   3.  zithromax is your antibiotic  4. Robitussin ac is cough syrup that contains codeine to help you reduce cough and pleuritic chest discomfort.   Please let Dr. Greenwood know I am aware of narcotic contract but as your are currently out of hydrocodone okay to use this cough syrup    Bronchitis, Antibiotic Treatment (Adult)    Bronchitis is an infection of the air passages (bronchial tubes) in your lungs. It often occurs when you have a cold. This illness is contagious during the first few days and is spread through the air by coughing and sneezing, or by direct contact (touching the sick person and then touching your own eyes, nose, or mouth).  Symptoms of bronchitis include cough with mucus (phlegm) and low-grade fever. Bronchitis usually lasts 7 to 14 days. Mild cases can be treated with simple home remedies. More severe infection is treated with an antibiotic.  Home care  Follow these guidelines when caring for yourself at home:    If your symptoms are severe, rest at home for the first 2 to 3 days. When you go  back to your usual activities, don't let yourself get too tired.    Do not smoke. Also avoid being exposed to secondhand smoke.    You may use over-the-counter medicines to control fever or pain, unless another medicine was prescribed. (Note: If you have chronic liver or kidney disease or have ever had a stomach ulcer or gastrointestinal bleeding, talk with your healthcare provider before using these medicines. Also talk to your provider if you are taking medicine to prevent blood clots.) Aspirin should never be given to anyone younger than 18 years of age who is ill with a viral infection or fever. It may cause severe liver or brain damage.    Your appetite may be poor, so a light diet is fine. Avoid dehydration by drinking 6 to 8 glasses of fluids per day (such as water, soft drinks, sports drinks, juices, tea, or soup). Extra fluids will help loosen secretions in the nose and lungs.    Over-the-counter cough, cold, and sore-throat medicines will not shorten the length of the illness, but they may be helpful to reduce symptoms. (Note: Do not use decongestants if you have high blood pressure.)    Finish all antibiotic medicine. Do this even if you are feeling better after only a few days.  Follow-up care  Follow up with your healthcare provider, or as advised. If you had an X-ray or ECG (electrocardiogram), a specialist will review it. You will be notified of any new findings that may affect your care.  Note: If you are age 65 or older, or if you have a chronic lung disease or condition that affects your immune system, or you smoke, talk to your healthcare provider about having pneumococcal vaccinations and a yearly influenza vaccination (flu shot).  When to seek medical advice  Call your healthcare provider right away if any of these occur:    Fever of 100.4 F (38 C) or higher    Coughing up increased amounts of colored sputum    Weakness, drowsiness, headache, facial pain, ear pain, or a stiff neck  Call 911, or  get immediate medical care  Contact emergency services right away if any of these occur.    Coughing up blood    Worsening weakness, drowsiness, headache, or stiff neck    Trouble breathing, wheezing, or pain with breathing  Date Last Reviewed: 9/13/2015 2000-2017 [employer]. [address] [place] [address]. All rights reserved. This information is not intended as a substitute for professional medical care. Always follow your healthcare professional's instructions.

## 2018-03-16 NOTE — NURSING NOTE
Chest Congestion X over one week and Cough that is productive. Patient also requested us to fill her [place] if possible.  I did let patient know that that would need to be a separate apt and that she needs to get that from her PCP.   Saige Doyle LPN........................3/16/2018  10:57 AM

## 2018-03-22 ENCOUNTER — OFFICE VISIT (OUTPATIENT)
Dept: FAMILY MEDICINE | Facility: OTHER | Age: 53
End: 2018-03-22
Attending: FAMILY MEDICINE
Payer: COMMERCIAL

## 2018-03-22 VITALS
BODY MASS INDEX: 24.33 KG/M2 | HEART RATE: 60 BPM | SYSTOLIC BLOOD PRESSURE: 112 MMHG | HEIGHT: 62 IN | WEIGHT: 132.2 LBS | DIASTOLIC BLOOD PRESSURE: 70 MMHG

## 2018-03-22 DIAGNOSIS — M79.7 FIBROMYALGIA: ICD-10-CM

## 2018-03-22 DIAGNOSIS — M19.90 OSTEOARTHRITIS, UNSPECIFIED OSTEOARTHRITIS TYPE, UNSPECIFIED SITE: ICD-10-CM

## 2018-03-22 DIAGNOSIS — G89.29 OTHER CHRONIC PAIN: Primary | ICD-10-CM

## 2018-03-22 DIAGNOSIS — G44.89 CHRONIC MIXED HEADACHE SYNDROME: ICD-10-CM

## 2018-03-22 DIAGNOSIS — Z02.89 PAIN MEDICATION AGREEMENT: ICD-10-CM

## 2018-03-22 PROBLEM — R10.9 ABDOMINAL PAIN: Status: RESOLVED | Noted: 2018-01-23 | Resolved: 2018-03-22

## 2018-03-22 PROCEDURE — 99213 OFFICE O/P EST LOW 20 MIN: CPT | Performed by: FAMILY MEDICINE

## 2018-03-22 RX ORDER — HYDROCODONE BITARTRATE AND ACETAMINOPHEN 5; 325 MG/1; MG/1
1 TABLET ORAL EVERY 8 HOURS
Qty: 30 TABLET | Refills: 0 | Status: SHIPPED | OUTPATIENT
Start: 2018-03-22 | End: 2018-08-21

## 2018-03-22 ASSESSMENT — ENCOUNTER SYMPTOMS
PSYCHIATRIC NEGATIVE: 1
NEUROLOGICAL NEGATIVE: 1

## 2018-03-22 NOTE — MR AVS SNAPSHOT
After Visit Summary   3/22/2018    El Joseph    MRN: [id number]           Patient Information     Date Of Birth          1965        Visit Information        Provider Department      3/22/2018 1:15 PM Keri Greenwood MD [facility]        Today's Diagnoses     Other chronic pain    -  1    Pain medication agreement          Care Instructions      Chronic Pain  Pain serves an important role. It lets you know something is wrong that needs your attention. When the body heals, pain normally goes away.  When pain lasts longer than six months, it is called  chronic  pain. This is pain that is present even after the body has healed. Chronic pain can cause mood problems and get in the way of your relationships and your daily life.  A number of conditions can cause chronic pain. Some of the more common include:    Previous surgery    An old injury    Infection    Diseases such as diabetes    Nerve damage    Back injury    Arthritis    Migraine or other headaches    Fibromyalgia    Cancer  Depression and stress can make chronic pain symptoms worse. In some cases, a cause for the pain cannot be found.   Treatment  Treatment can greatly reduce pain. In many cases, pain can become less severe, occur less often, and interfere less with your daily life. Chronic pain is often treated with a combination of medicines, therapies, and lifestyle changes. You will work closely with your healthcare provider to find a treatment plan that works best for you.    Ask your healthcare provider for a referral to a pain management specialty center. These can provide the most recent and proven pain management strategies, along with emotional support and comprehensive services.    Several different types of medicines may be prescribed for chronic pain. Work with your healthcare provider to develop a medicine plan that helps manage your pain.    Physical therapy can be very effective in helping reduce  certain types of chronic pain.    Occupational therapy teaches you how to do routine tasks of daily living in ways that lessen your discomfort.    Psychological therapy can help you cope better with stress and pain.    Other therapies such as meditation, yoga, biofeedback, massage, and acupuncture can also help manage chronic pain.    Changing certain habits can help reduce chronic pain. They include:    Eating healthy    Developing an exercise routine    Getting enough sleep at night    Stopping smoking and limiting alcohol use    Losing excess weight  Follow-up care  Follow up with your healthcare provider as advised. Let your healthcare provider know if your current treatment plan is working or if changes are needed.  Resources  For more information, contact:    [employer] [place] [employer] Society www.achenet.org    American Chronic Pain Association www.theacpa.org [phone]  Date Last Reviewed: 7/26/2015 2000-2017 [employer]. [address] [place] [address]. All rights reserved. This information is not intended as a substitute for professional medical care. Always follow your healthcare professional's instructions.                Follow-ups after your visit        Who to contact     If you have questions or need follow up information about today's clinic visit or your schedule please contact [facility] directly at [phone].  Normal or non-critical lab and imaging results will be communicated to you by MyChart, letter or phone within 4 business days after the clinic has received the results. If you do not hear from us within 7 days, please contact the clinic through MyChart or phone. If you have a critical or abnormal lab result, we will notify you by phone as soon as possible.  Submit refill requests through [employer] or call your pharmacy and they will forward the refill request to us. Please allow 3 business days for your refill to be completed.           "Additional Information About Your Visit        MyChart Information     [employer] lets you send messages to your doctor, view your test results, renew your prescriptions, schedule appointments and more. To sign up, go to [email or web address] . Click on \"Log in\" on the left side of the screen, which will take you to the Welcome page. Then click on \"Sign up Now\" on the right side of the page.     You will be asked to enter the access code listed below, as well as some personal information. Please follow the directions to create your username and password.     Your access code is: [email or web address]  Expires: 2018 12:20 PM     Your access code will  in 90 days. If you need help or a new code, please call your [place] clinic or [phone].        Care EveryWhere ID     This is your Care EveryWhere ID. This could be used by other organizations to access your [place] medical records  [id number]        Your Vitals Were     Pulse Height BMI (Body Mass Index)             60 5' 2\" (1.575 m) 24.18 kg/m2          Blood Pressure from Last 3 Encounters:   18 112/70   18 112/64   17 122/82    Weight from Last 3 Encounters:   18 132 lb 3.2 oz (60 kg)   18 129 lb (58.5 kg)   17 131 lb (59.4 kg)              Today, you had the following     No orders found for display         Today's Medication Changes          These changes are accurate as of 3/22/18  1:41 PM.  If you have any questions, ask your nurse or doctor.               These medicines have changed or have updated prescriptions.        Dose/Directions    HYDROcodone-acetaminophen 5-325 MG per tablet   Commonly known as:  NORCO   This may have changed:    - when to take this  - reasons to take this   Used for:  Other chronic pain   Changed by:  Keri Greenwood MD        Dose:  1 tablet   Take 1 tablet by mouth every 8 hours * ACETAMINOPHEN SHOULD BE LIMITED TO 4000MG PER DAY*   Quantity:  30 tablet   Refills:  0          "   Where to get your medicines      Some of these will need a paper prescription and others can be bought over the counter.  Ask your nurse if you have questions.     Bring a paper prescription for each of these medications     HYDROcodone-acetaminophen 5-325 MG per tablet                Primary Care Provider Office Phone # Fax #    Keri Rosario Greenwood [phone]       [address] [facility] [address]        Equal Access to Services     YUSEF OROZCO : Hadii aad ku hadasho Soomaali, waaxda luqadaha, qaybta kaalmada adeegyada, waxay idiin hayaan adeeg odriszoniavaldemar urban . So [facility] [phone].    ATENCIÓN: Si habla español, tiene a frazier disposición servicios gratuitos de asistencia lingüística. Llame al [phone].    We comply with applicable federal civil rights laws and [place] laws. We do not discriminate on the basis of race, color, national origin, age, disability, sex, sexual orientation, or gender identity.            Thank you!     Thank you for choosing [facility] [place]  for your care. Our goal is always to provide you with excellent care. Hearing back from our patients is one way we can continue to improve our services. Please take a few minutes to complete the written survey that you may receive in the mail after your visit with us. Thank you!             Your Updated Medication List - Protect others around you: Learn how to safely use, store and throw away your medicines at www.disposemymeds.org.          This list is accurate as of 3/22/18  1:41 PM.  Always use your most recent med list.                   Brand Name Dispense Instructions for use Diagnosis    acetaminophen 500 MG tablet    TYLENOL     Take 1,500 mg by mouth        atenolol 25 MG tablet    TENORMIN     Take 25 mg by mouth daily        busPIRone 15 MG tablet    BUSPAR     Take 0.5-1 tablets by mouth 3 times daily        cetirizine-psuedoePHEDrine 5-120 MG per 12 hr tablet    zyrTEC-D     MAY TAKE  ONCE TO TWICE DAILY. TAKE 10-12 HOURS APART.        cyclobenzaprine 10 MG tablet    FLEXERIL     Take 10 mg by mouth 3 times daily        Flax Seeds Powd      1 Tablespoonful Sprinkle on foods daily        FLUoxetine 40 MG capsule    PROzac     Take 40 mg by mouth every morning        fluticasone 50 MCG/ACT spray    FLONASE     Spray 2 sprays into both nostrils daily        guaiFENesin-codeine 100-10 MG/5ML Soln solution    ROBITUSSIN AC    180 mL    Take 5 mLs by mouth every 4 hours as needed for cough    Acute bronchitis, unspecified organism       HYDROcodone-acetaminophen 5-325 MG per tablet    NORCO    30 tablet    Take 1 tablet by mouth every 8 hours * ACETAMINOPHEN SHOULD BE LIMITED TO 4000MG PER DAY*    Other chronic pain       LORazepam 0.5 MG tablet    ATIVAN     Take 1 tablet by mouth Take 1 tablet 1/2 hour before plane flights.        nexIUM 40 MG CR capsule   Generic drug:  esomeprazole      Take 40 mg by mouth daily Take before meal.        pregabalin 150 MG capsule    LYRICA     Take 150 mg by mouth 2 times daily        rizatriptan 10 MG tablet    MAXALT     Take 10 mg by mouth every 2 hours as needed for Migraine. Max dose: 30mg per 24 hrs.

## 2018-03-22 NOTE — PROGRESS NOTES
SUBJECTIVE:   El Joseph is a 52 year old female who presents to clinic today for the following health issues:  Follow-up of chronic pain management.  We will have her complete another controlled substance agreement in our new chart system.  She is currently using hydrocodone in very small amounts every 3 months.  She uses it intermittently for chronic headaches, neck pain or low back pain.  #30 lasts for at least 3 months.  She recently had some dental work and  is reviewed with her.  She is instructed that she should call if this happens in the future to let me know she is gotten outside medications.  She is accompanied today by her daughter.  She is not due yet for Lyrica refill and she takes that for fibromyalgia.    HPI    Patient Active Problem List   Diagnosis     Anxiety state     Bruxism     Chronic mixed headache syndrome     Chronic pain disorder     Constipation     Depression, major, recurrent, moderate (H)     Malaise and fatigue     Fibromyalgia     Fixed drug eruption     Chronic insomnia     Osteoarthrosis     Pain medication agreement     Temporomandibular joint disorder     Personal history of tobacco use, presenting hazards to health     Past Surgical History:   Procedure Laterality Date     COLONOSCOPY      4/2003,Normal, bleeding hemorrhoids     HYSTERECTOMY VAGINAL      5/2003,Dr Ramirez-[place], chronic pelvic pain/endometriosis     OTHER SURGICAL HISTORY      205138,DILATION AND CURETTAGE,After 4 miscarriages     OTHER SURGICAL HISTORY      1986,561406,OTHER,Ovarian cyst     OTHER SURGICAL HISTORY      6/2002,915453,OTHER,Chronic pelvic pain/hemorrhagic cyst     OTHER SURGICAL HISTORY      2/2009,671983,OTHER,Dr Ramirez-recurrent ovarian cysts       Social History   Substance Use Topics     Smoking status: Current Every Day Smoker     Packs/day: 0.25     Years: 0.00     Types: Cigarettes     Smokeless tobacco: Never Used     Alcohol use 0.0 oz/week      Comment: Alcoholic Drinks/day: once  in a while     Family History   Problem Relation Age of Onset     Breast Cancer Mother 68     Cancer-breast     DIABETES Mother      Diabetes     HEART DISEASE Father      Heart Disease,CHF, pacemaker,MI at 93     Hypertension Father      Hypertension     Breast Cancer Sister      Cancer-breast     Breast Cancer Sister 40     Cancer-breast     CANCER Sister      Cancer,Cervical     Breast Cancer Sister      Cancer-breast     Ovarian Cancer Sister      Cancer-ovarian     Other - See Comments Sister      Fibromyalgia     Other - See Comments Sister      Fibromyalgia         Current Outpatient Prescriptions   Medication Sig Dispense Refill     HYDROcodone-acetaminophen (NORCO) 5-325 MG per tablet Take 1 tablet by mouth every 8 hours * ACETAMINOPHEN SHOULD BE LIMITED TO 4000MG PER DAY* 30 tablet 0     guaiFENesin-codeine (ROBITUSSIN AC) 100-10 MG/5ML SOLN solution Take 5 mLs by mouth every 4 hours as needed for cough 180 mL 0     acetaminophen (TYLENOL) 500 MG tablet Take 1,500 mg by mouth       atenolol (TENORMIN) 25 MG tablet Take 25 mg by mouth daily       busPIRone (BUSPAR) 15 MG tablet Take 0.5-1 tablets by mouth 3 times daily       cetirizine-psuedoePHEDrine (ZYRTEC-D) 5-120 MG per 12 hr tablet MAY TAKE ONCE TO TWICE DAILY. TAKE 10-12 HOURS APART.       cyclobenzaprine (FLEXERIL) 10 MG tablet Take 10 mg by mouth 3 times daily       Flaxseed, Linseed, (FLAX SEEDS) POWD 1 Tablespoonful Sprinkle on foods daily       FLUoxetine (PROZAC) 40 MG capsule Take 40 mg by mouth every morning       fluticasone (FLONASE) 50 MCG/ACT spray Spray 2 sprays into both nostrils daily       LORazepam (ATIVAN) 0.5 MG tablet Take 1 tablet by mouth Take 1 tablet 1/2 hour before plane flights.       esomeprazole (NEXIUM) 40 MG CR capsule Take 40 mg by mouth daily Take before meal.       pregabalin (LYRICA) 150 MG capsule Take 150 mg by mouth 2 times daily       rizatriptan (MAXALT) 10 MG tablet Take 10 mg by mouth every 2 hours as needed  "for Migraine. Max dose: 30mg per 24 hrs.       Allergies   Allergen Reactions     Nsaids      Other reaction(s): Ecchymosis       Review of Systems   Neurological: Negative.    Psychiatric/Behavioral: Negative.         OBJECTIVE:     /70 (BP Location: Right arm, Patient Position: Sitting, Cuff Size: Adult Regular)  Pulse 60  Ht 5' 2\" (1.575 m)  Wt 132 lb 3.2 oz (60 kg)  BMI 24.18 kg/m2  Body mass index is 24.18 kg/(m^2).  Physical Exam   Constitutional: She appears well-developed. No distress.   Psychiatric: She has a normal mood and affect. Judgment normal.   Nursing note and vitals reviewed.      Diagnostic Test Results:  none     ASSESSMENT/PLAN:           ICD-10-CM    1. Other chronic pain G89.29 HYDROcodone-acetaminophen (NORCO) 5-325 MG per tablet   2. Pain medication agreement Z02.89    3. Chronic mixed headache syndrome G44.89    4. Osteoarthritis, unspecified osteoarthritis type, unspecified site M19.90    5. Fibromyalgia M79.7      Plan:  Pain management agreement is updated in scanned to chart.   reviewed and as noted had dental work so she did receive a small quantity of medications for pain in the last month and reviewed with her.  She is instructed to contact us if this happens in the future.  Follow-up in 3-4 months.    Keri Greenwood MD  [facility]  Portions of this dictation were created using the Dragon Nuance voice recognition system. Proofreading was completed but there may be errors in text.    "

## 2018-03-22 NOTE — PATIENT INSTRUCTIONS
Chronic Pain  Pain serves an important role. It lets you know something is wrong that needs your attention. When the body heals, pain normally goes away.  When pain lasts longer than six months, it is called  chronic  pain. This is pain that is present even after the body has healed. Chronic pain can cause mood problems and get in the way of your relationships and your daily life.  A number of conditions can cause chronic pain. Some of the more common include:    Previous surgery    An old injury    Infection    Diseases such as diabetes    Nerve damage    Back injury    Arthritis    Migraine or other headaches    Fibromyalgia    Cancer  Depression and stress can make chronic pain symptoms worse. In some cases, a cause for the pain cannot be found.   Treatment  Treatment can greatly reduce pain. In many cases, pain can become less severe, occur less often, and interfere less with your daily life. Chronic pain is often treated with a combination of medicines, therapies, and lifestyle changes. You will work closely with your healthcare provider to find a treatment plan that works best for you.    Ask your healthcare provider for a referral to a pain management specialty center. These can provide the most recent and proven pain management strategies, along with emotional support and comprehensive services.    Several different types of medicines may be prescribed for chronic pain. Work with your healthcare provider to develop a medicine plan that helps manage your pain.    Physical therapy can be very effective in helping reduce certain types of chronic pain.    Occupational therapy teaches you how to do routine tasks of daily living in ways that lessen your discomfort.    Psychological therapy can help you cope better with stress and pain.    Other therapies such as meditation, yoga, biofeedback, massage, and acupuncture can also help manage chronic pain.    Changing certain habits can help reduce chronic pain. They  include:    Eating healthy    Developing an exercise routine    Getting enough sleep at night    Stopping smoking and limiting alcohol use    Losing excess weight  Follow-up care  Follow up with your healthcare provider as advised. Let your healthcare provider know if your current treatment plan is working or if changes are needed.  Resources  For more information, contact:    [employer] [place] [employer] Society www.achenet.org    American Chronic Pain Association www.theacpa.org 093-362-6023  Date Last Reviewed: 7/26/2015 2000-2017 [employer]. [address] [place] [address]. All rights reserved. This information is not intended as a substitute for professional medical care. Always follow your healthcare professional's instructions.

## 2018-03-23 ASSESSMENT — PATIENT HEALTH QUESTIONNAIRE - PHQ9: SUM OF ALL RESPONSES TO PHQ QUESTIONS 1-9: 9

## 2018-04-22 ENCOUNTER — HOSPITAL ENCOUNTER (EMERGENCY)
Facility: OTHER | Age: 53
Discharge: HOME | End: 2018-04-22
Attending: EMERGENCY MEDICINE | Admitting: EMERGENCY MEDICINE
Payer: OTHER MISCELLANEOUS

## 2018-04-22 VITALS
OXYGEN SATURATION: 98 % | RESPIRATION RATE: 16 BRPM | TEMPERATURE: 97.9 F | DIASTOLIC BLOOD PRESSURE: 85 MMHG | SYSTOLIC BLOOD PRESSURE: 120 MMHG | WEIGHT: 132 LBS | BODY MASS INDEX: 24.29 KG/M2 | HEIGHT: 62 IN | HEART RATE: 55 BPM

## 2018-04-22 DIAGNOSIS — M54.50 ACUTE LEFT-SIDED LOW BACK PAIN WITHOUT SCIATICA: ICD-10-CM

## 2018-04-22 PROCEDURE — 25000128 H RX IP 250 OP 636: Performed by: EMERGENCY MEDICINE

## 2018-04-22 PROCEDURE — 25000132 ZZH RX MED GY IP 250 OP 250 PS 637: Performed by: EMERGENCY MEDICINE

## 2018-04-22 PROCEDURE — 99285 EMERGENCY DEPT VISIT HI MDM: CPT | Mod: 25 | Performed by: EMERGENCY MEDICINE

## 2018-04-22 PROCEDURE — 99285 EMERGENCY DEPT VISIT HI MDM: CPT | Mod: Z6 | Performed by: EMERGENCY MEDICINE

## 2018-04-22 PROCEDURE — 96372 THER/PROPH/DIAG INJ SC/IM: CPT | Performed by: EMERGENCY MEDICINE

## 2018-04-22 RX ORDER — MORPHINE SULFATE 4 MG/ML
4 INJECTION, SOLUTION INTRAMUSCULAR; INTRAVENOUS ONCE
Status: COMPLETED | OUTPATIENT
Start: 2018-04-22 | End: 2018-04-22

## 2018-04-22 RX ORDER — HYDROCODONE BITARTRATE AND ACETAMINOPHEN 5; 325 MG/1; MG/1
1 TABLET ORAL ONCE
Status: COMPLETED | OUTPATIENT
Start: 2018-04-22 | End: 2018-04-22

## 2018-04-22 RX ADMIN — HYDROCODONE BITARTRATE AND ACETAMINOPHEN 1 TABLET: 5; 325 TABLET ORAL at 22:13

## 2018-04-22 RX ADMIN — MORPHINE SULFATE 4 MG: 4 INJECTION, SOLUTION INTRAMUSCULAR; INTRAVENOUS at 22:13

## 2018-04-22 ASSESSMENT — ENCOUNTER SYMPTOMS: BACK PAIN: 1

## 2018-04-22 NOTE — ED AVS SNAPSHOT
[facility]    [address]    [place] [address]    Phone:  [phone]    Fax:  [phone]                                       El Joseph   MRN: [id number]    Department:  [facility]   Date of Visit:  4/22/2018           Patient Information     Date Of Birth          1965        Your diagnoses for this visit were:     Acute left-sided low back pain without sciatica        You were seen by Mazin Lujan MD.        Discharge Instructions       1.  Follow-up with your primary care physician in the next 1-2 days if the pain persists  2.  Having worsening pain, leg weakness, worsening altered sensation in the lower extremity or incontinence or urinary retention return to ER right away    24 Hour Appointment Hotline       To make an appointment at any [facility], call [phone]). If you don't have a family doctor or clinic, we will help you find one. [facility] are conveniently located to serve the needs of you and your family.             Review of your medicines      Our records show that you are taking the medicines listed below. If these are incorrect, please call your family doctor or clinic.        Dose / Directions Last dose taken    acetaminophen 500 MG tablet   Commonly known as:  TYLENOL   Dose:  1500 mg        Take 1,500 mg by mouth   Refills:  0        atenolol 25 MG tablet   Commonly known as:  TENORMIN   Dose:  25 mg        Take 25 mg by mouth daily   Refills:  0        busPIRone 15 MG tablet   Commonly known as:  BUSPAR   Dose:  0.5-1 tablet        Take 0.5-1 tablets by mouth 3 times daily   Refills:  0        cetirizine-psuedoePHEDrine 5-120 MG per 12 hr tablet   Commonly known as:  zyrTEC-D        MAY TAKE ONCE TO TWICE DAILY. TAKE 10-12 HOURS APART.   Refills:  0        Flax Seeds Powd   Dose:  1 Tablespoonful        1 Tablespoonful Sprinkle on foods daily   Refills:  0        FLUoxetine 40 MG  capsule   Commonly known as:  PROzac   Dose:  40 mg        Take 40 mg by mouth every morning   Refills:  0        fluticasone 50 MCG/ACT spray   Commonly known as:  FLONASE   Dose:  2 spray        Spray 2 sprays into both nostrils daily   Refills:  0        guaiFENesin-codeine 100-10 MG/5ML Soln solution   Commonly known as:  ROBITUSSIN AC   Dose:  1 tsp.   Quantity:  180 mL        Take 5 mLs by mouth every 4 hours as needed for cough   Refills:  0        HYDROcodone-acetaminophen 5-325 MG per tablet   Commonly known as:  NORCO   Dose:  1 tablet   Quantity:  30 tablet        Take 1 tablet by mouth every 8 hours * ACETAMINOPHEN SHOULD BE LIMITED TO 4000MG PER DAY*   Refills:  0        LORazepam 0.5 MG tablet   Commonly known as:  ATIVAN   Dose:  1 tablet        Take 1 tablet by mouth Take 1 tablet 1/2 hour before plane flights.   Refills:  0        nexIUM 40 MG CR capsule   Dose:  40 mg   Generic drug:  esomeprazole        Take 40 mg by mouth daily Take before meal.   Refills:  0        pregabalin 150 MG capsule   Commonly known as:  LYRICA   Dose:  150 mg        Take 150 mg by mouth 2 times daily   Refills:  0        rizatriptan 10 MG tablet   Commonly known as:  MAXALT   Dose:  10 mg        Take 10 mg by mouth every 2 hours as needed for Migraine. Max dose: 30mg per 24 hrs.   Refills:  0        VITAMIN D (CHOLECALCIFEROL) PO   Dose:  1 tablet        Take 1 tablet by mouth daily   Refills:  0                Orders Needing Specimen Collection     None      Pending Results     No orders found from 4/20/2018 to 4/23/2018.            Pending Culture Results     No orders found from 4/20/2018 to 4/23/2018.            Pending Results Instructions     If you had any lab results that were not finalized at the time of your Discharge, you can call the ED Lab Result RN at [phone]. You will be contacted by this team for any positive Lab results or changes in treatment. The nurses are available 7 days a week from 10A to  6:30P.  You can leave a message 24 hours per day and they will return your call.        Thank you for choosing [facility]       Thank you for choosing [facility] for your care. Our goal is always to provide you with excellent care. Hearing back from our patients is one way we can continue to improve our services. Please take a few minutes to complete the written survey that you may receive in the mail after you visit with us. Thank you!        [facility] Information     [facility] gives you secure access to your electronic health record. If you see a primary care provider, you can also send messages to your care team and make appointments. If you have questions, please call your primary care clinic.  If you do not have a primary care provider, please call [phone] and they will assist you.        Care EveryWhere ID     This is your Care EveryWhere ID. This could be used by other organizations to access your [facility] medical records  [id number]        Equal Access to Services     THIAGO OROZCO : Jeyson Velasquez, ryan lobo, kalyan aguiar . [facility] [phone].    ATENCIÓN: Si habla español, tiene a frazier disposición servicios gratuitos de asistencia lingüística. Llame al [phone].    We comply with applicable federal civil rights laws and [place] laws. We do not discriminate on the basis of race, color, national origin, age, disability, sex, sexual orientation, or gender identity.            After Visit Summary       This is your record. Keep this with you and show to your community pharmacist(s) and doctor(s) at your next visit.

## 2018-04-22 NOTE — ED AVS SNAPSHOT
[facility]    [address]    [place] [address]    Phone:  [phone]    Fax:  [phone]                                       El Joseph   MRN: [id number]    Department:  [facility]   Date of Visit:  4/22/2018           After Visit Summary Signature Page     I have received my discharge instructions, and my questions have been answered. I have discussed any challenges I see with this plan with the nurse or doctor.    ..........................................................................................................................................  Patient/Patient Representative Signature      ..........................................................................................................................................  Patient Representative Print Name and Relationship to Patient    ..................................................               ................................................  Date                                            Time    ..........................................................................................................................................  Reviewed by Signature/Title    ...................................................              ..............................................  Date                                                            Time

## 2018-04-23 NOTE — ED PROVIDER NOTES
History     Chief Complaint   Patient presents with     Back Pain     HPI Comments: This is a 52-year-old with history of work-related back injury coming back tonight with acute left lower back pain that she says seems to radiate to the upper left buttock which started earlier tonight.  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  Patient states in the process she twisted her lower back and started to have pain in the left side of her low back radiating to the left buttock.  These have been earlier tonight.  She denies any focal weakness or inability to walk a foot drop.  However she states when she walks the pain seems to get worse.  She states she had a similar injury involving at work while she was having another patient several months ago for which she had had physical therapy.       Problem List:    Patient Active Problem List    Diagnosis Date Noted     Anxiety state 01/23/2018     Priority: Medium     Osteoarthrosis 01/23/2018     Priority: Medium     Personal history of tobacco use, presenting hazards to health 01/23/2018     Priority: Medium     Chronic mixed headache syndrome 06/20/2017     Priority: Medium     Fixed drug eruption 04/21/2015     Priority: Medium     Constipation 07/30/2014     Priority: Medium     Fibromyalgia 07/30/2013     Priority: Medium     Pain medication agreement 07/30/2013     Priority: Medium     Overview:   Updated 3/22/2018  Hydrocodone 5/325 mg #30 every 2-3 months.                                   Lyrica 150mg   # 60 a month        Chronic insomnia 08/10/2012     Priority: Medium     Bruxism 05/24/2012     Priority: Medium     Chronic pain disorder 05/24/2012     Priority: Medium     Depression, major, recurrent, moderate (H) 05/24/2012     Priority: Medium     Malaise and fatigue 05/24/2012     Priority:  Medium     Temporomandibular joint disorder 05/24/2012     Priority: Medium        Past Medical History:    Past Medical History:   Diagnosis Date     Other constipation      Pain in knee      Personal history of other medical treatment (CODE)      Personal history of other medical treatment (CODE)      Radiculopathy        Past Surgical History:    Past Surgical History:   Procedure Laterality Date     COLONOSCOPY      4/2003,Normal, bleeding hemorrhoids     HYSTERECTOMY VAGINAL      5/2003,Dr [place], chronic pelvic pain/endometriosis     OTHER SURGICAL HISTORY      [id number],DILATION AND CURETTAGE,After 4 miscarriages     OTHER SURGICAL HISTORY      1986,524131,OTHER,Ovarian cyst     OTHER SURGICAL HISTORY      6/2002,741377,OTHER,Chronic pelvic pain/hemorrhagic cyst     OTHER SURGICAL HISTORY      2/2009,507151,OTHER,Dr Ramirez-recurrent ovarian cysts       Family History:    Family History   Problem Relation Age of Onset     Breast Cancer Mother 68     Cancer-breast     DIABETES Mother      Diabetes     HEART DISEASE Father      Heart Disease,CHF, pacemaker,MI at 93     Hypertension Father      Hypertension     Breast Cancer Sister      Cancer-breast     Breast Cancer Sister 40     Cancer-breast     CANCER Sister      Cancer,Cervical     Breast Cancer Sister      Cancer-breast     Ovarian Cancer Sister      Cancer-ovarian     Other - See Comments Sister      Fibromyalgia     Other - See Comments Sister      Fibromyalgia       Social History:  Marital Status:   [2]  Social History   Substance Use Topics     Smoking status: Current Every Day Smoker     Packs/day: 0.25     Years: 0.00     Types: Cigarettes     Smokeless tobacco: Never Used     Alcohol use 0.0 oz/week      Comment: Alcoholic Drinks/day: once in a while        Medications:      acetaminophen (TYLENOL) 500 MG tablet   atenolol (TENORMIN) 25 MG tablet   busPIRone (BUSPAR) 15 MG tablet   cetirizine-psuedoePHEDrine (ZYRTEC-D) 5-120 MG per 12 hr  "tablet   esomeprazole (NEXIUM) 40 MG CR capsule   Flaxseed, Linseed, (FLAX SEEDS) POWD   FLUoxetine (PROZAC) 40 MG capsule   fluticasone (FLONASE) 50 MCG/ACT spray   guaiFENesin-codeine (ROBITUSSIN AC) 100-10 MG/5ML SOLN solution   HYDROcodone-acetaminophen (NORCO) 5-325 MG per tablet   LORazepam (ATIVAN) 0.5 MG tablet   pregabalin (LYRICA) 150 MG capsule   rizatriptan (MAXALT) 10 MG tablet   VITAMIN D, CHOLECALCIFEROL, PO         Review of Systems   Musculoskeletal: Positive for back pain.   All other systems reviewed and are negative.      Physical Exam   BP: 112/79  Pulse: 53  Temp: 97.9  F (36.6  C)  Resp: 16  Height: 157.5 cm (5' 2\")  Weight: 59.9 kg (132 lb)  SpO2: 97 %      Physical Exam   Constitutional: She is oriented to person, place, and time. She appears well-developed and well-nourished. No distress.   Cardiovascular: Normal rate, regular rhythm and normal heart sounds.    Pulmonary/Chest: Effort normal and breath sounds normal. No respiratory distress.   Musculoskeletal:   Sitting right straight leg raise unremarkable.  However, on the left it is positive at approximately 60 .  There is mild left-sided low back tenderness on palpation without obvious anatomical abnormality or swelling.  No midline bony tenderness per   Neurological: She is oriented to person, place, and time.   Focal motor or sensory deficits       ED Course     She comes into the emergency room complaining acute left lower back pain radiating to the proximal aspect of the left buttock without focal weakness or footdrop or incontinent/urinary retention.  She has had a similar work-related symptoms.  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  She was trying to protect patient from falling she let patient sat briefly on her right thigh and in the process patient states she twisted her back and thus when the pain started.  " Patient has no focal neurologic findings on examination and straight leg raises are unremarkable except on the left is positive around 60 .  She is able to walk without foot drop or focal weakness but slowly.  Patient states she is allergic to all NSAIDs which is hard to imagine.  Received Norco 5/325 1 tablet along with morphine 4 mg IM which seems to control the pain quite well to the point where patient is asking to be discharged.  She is able to ambulate without difficulties and has no focal neurologic findings on reexamination.  Advised to consult with her primary care physician should the pain persists.  For worsening symptoms or signs of neurological red flags patient was advised to return to ER.     ED Course     Procedures               Critical Care time:  none               No results found for this or any previous visit (from the past 24 hour(s)).    Medications   HYDROcodone-acetaminophen (NORCO) 5-325 MG per tablet 1 tablet (1 tablet Oral Given 4/22/18 2213)   morphine (PF) injection 4 mg (4 mg Intramuscular Given 4/22/18 2213)       Assessments & Plan (with Medical Decision Making)     I have reviewed the nursing notes.    I have reviewed the findings, diagnosis, plan and need for follow up with the patient.       Discharge Medication List as of 4/22/2018 10:36 PM          Final diagnoses:   Acute left-sided low back pain without sciatica       4/22/2018   [facility]     Mazin Lujan MD  04/23/18 0151

## 2018-04-23 NOTE — DISCHARGE INSTRUCTIONS
1.  Follow-up with your primary care physician in the next 1-2 days if the pain persists  2.  Having worsening pain, leg weakness, worsening altered sensation in the lower extremity or incontinence or urinary retention return to ER right away

## 2018-04-23 NOTE — ED TRIAGE NOTES
Pt here with complaint of a work related injury to low back.  Pt works at [employer] and was transferring a patient and patient attempted to sit prior to getting to a chair and pulled on patients low back.  Pt also feeling pain in left buttock and left leg.  Pt has 3 tabs of Norco at home for arthritis.

## 2018-07-24 NOTE — PROGRESS NOTES
Patient Information     Patient Name  El Joseph MRN  [id number] Sex  Female   1965      Letter by Keri Greenwood MD at      Author:  Keri Greenwood MD Service:  (none) Author Type:  (none)    Filed:   Encounter Date:  2017 Status:  (Other)           El Joseph  [address]  [place] [address]          May 24, 2017    Dear Ms. Joseph:    This letter is to remind you that you are due for your annual exam with Keri Greenwood MD . Your last comprehensive medication visit was more than 12 months ago.     A LIMITED refill of atenolol (TENORMIN) 25 mg tablet and FLUoxetine (PROZAC) 40 mg capsule has been called into your pharmacy.    Additional refills require an annual medication management and depression follow-up appointment with Keri Greenwood MD. Please call the clinic at [phone] to schedule your appointment.    Please call to inform us if you no longer see Keri Greenwood MD for primary care, doing so will remove you from our call/contact list.    Thank you,    The Refill Nurse  [facility]

## 2018-08-21 ENCOUNTER — OFFICE VISIT (OUTPATIENT)
Dept: FAMILY MEDICINE | Facility: OTHER | Age: 53
End: 2018-08-21
Attending: FAMILY MEDICINE
Payer: COMMERCIAL

## 2018-08-21 VITALS
SYSTOLIC BLOOD PRESSURE: 136 MMHG | HEART RATE: 68 BPM | WEIGHT: 131.8 LBS | DIASTOLIC BLOOD PRESSURE: 80 MMHG | HEIGHT: 62 IN | BODY MASS INDEX: 24.25 KG/M2 | TEMPERATURE: 98 F

## 2018-08-21 DIAGNOSIS — G89.29 CHRONIC NONINTRACTABLE HEADACHE, UNSPECIFIED HEADACHE TYPE: ICD-10-CM

## 2018-08-21 DIAGNOSIS — J31.0 CHRONIC RHINITIS, UNSPECIFIED TYPE: ICD-10-CM

## 2018-08-21 DIAGNOSIS — G89.4 CHRONIC PAIN DISORDER: Primary | ICD-10-CM

## 2018-08-21 DIAGNOSIS — F41.1 GAD (GENERALIZED ANXIETY DISORDER): ICD-10-CM

## 2018-08-21 DIAGNOSIS — R51.9 CHRONIC NONINTRACTABLE HEADACHE, UNSPECIFIED HEADACHE TYPE: ICD-10-CM

## 2018-08-21 DIAGNOSIS — K21.9 GASTROESOPHAGEAL REFLUX DISEASE WITHOUT ESOPHAGITIS: ICD-10-CM

## 2018-08-21 DIAGNOSIS — M79.7 FIBROMYALGIA: ICD-10-CM

## 2018-08-21 DIAGNOSIS — Z71.6 ENCOUNTER FOR SMOKING CESSATION COUNSELING: ICD-10-CM

## 2018-08-21 DIAGNOSIS — G89.29 OTHER CHRONIC PAIN: ICD-10-CM

## 2018-08-21 DIAGNOSIS — F33.41 RECURRENT MAJOR DEPRESSIVE DISORDER, IN PARTIAL REMISSION (H): ICD-10-CM

## 2018-08-21 PROCEDURE — 99214 OFFICE O/P EST MOD 30 MIN: CPT | Performed by: FAMILY MEDICINE

## 2018-08-21 RX ORDER — ATENOLOL 25 MG/1
25 TABLET ORAL DAILY
Qty: 90 TABLET | Refills: 3 | Status: SHIPPED | OUTPATIENT
Start: 2018-08-21 | End: 2019-09-28

## 2018-08-21 RX ORDER — NICOTINE 21 MG/24HR
1 PATCH, TRANSDERMAL 24 HOURS TRANSDERMAL EVERY 24 HOURS
Qty: 14 PATCH | Refills: 0 | Status: SHIPPED | OUTPATIENT
Start: 2018-08-21 | End: 2019-11-21

## 2018-08-21 RX ORDER — FLUOXETINE 40 MG/1
40 CAPSULE ORAL EVERY MORNING
Qty: 90 CAPSULE | Refills: 3 | Status: SHIPPED | OUTPATIENT
Start: 2018-08-21 | End: 2019-09-28

## 2018-08-21 RX ORDER — HYDROCODONE BITARTRATE AND ACETAMINOPHEN 5; 325 MG/1; MG/1
1 TABLET ORAL EVERY 8 HOURS
Qty: 30 TABLET | Refills: 0 | Status: SHIPPED | OUTPATIENT
Start: 2018-08-21 | End: 2019-02-28

## 2018-08-21 RX ORDER — BUSPIRONE HYDROCHLORIDE 15 MG/1
7.5-15 TABLET ORAL 3 TIMES DAILY
Qty: 45 TABLET | Refills: 0 | Status: SHIPPED | OUTPATIENT
Start: 2018-08-21 | End: 2019-02-28

## 2018-08-21 RX ORDER — ESOMEPRAZOLE MAGNESIUM 40 MG/1
CAPSULE, DELAYED RELEASE ORAL
Qty: 90 CAPSULE | Refills: 3 | Status: SHIPPED | OUTPATIENT
Start: 2018-08-21 | End: 2019-09-28

## 2018-08-21 RX ORDER — PREGABALIN 150 MG/1
150 CAPSULE ORAL 2 TIMES DAILY
Qty: 60 CAPSULE | Refills: 5 | Status: CANCELLED | OUTPATIENT
Start: 2018-08-21

## 2018-08-21 RX ORDER — PREGABALIN 150 MG/1
150 CAPSULE ORAL 2 TIMES DAILY
Qty: 60 CAPSULE | Refills: 5 | Status: SHIPPED | OUTPATIENT
Start: 2018-08-21 | End: 2019-02-28

## 2018-08-21 RX ORDER — FLUTICASONE PROPIONATE 50 MCG
2 SPRAY, SUSPENSION (ML) NASAL DAILY
Qty: 1 BOTTLE | Refills: 3 | Status: SHIPPED | OUTPATIENT
Start: 2018-08-21 | End: 2020-04-21

## 2018-08-21 ASSESSMENT — ANXIETY QUESTIONNAIRES
5. BEING SO RESTLESS THAT IT IS HARD TO SIT STILL: MORE THAN HALF THE DAYS
2. NOT BEING ABLE TO STOP OR CONTROL WORRYING: NEARLY EVERY DAY
1. FEELING NERVOUS, ANXIOUS, OR ON EDGE: MORE THAN HALF THE DAYS
3. WORRYING TOO MUCH ABOUT DIFFERENT THINGS: MORE THAN HALF THE DAYS
7. FEELING AFRAID AS IF SOMETHING AWFUL MIGHT HAPPEN: MORE THAN HALF THE DAYS
IF YOU CHECKED OFF ANY PROBLEMS ON THIS QUESTIONNAIRE, HOW DIFFICULT HAVE THESE PROBLEMS MADE IT FOR YOU TO DO YOUR WORK, TAKE CARE OF THINGS AT HOME, OR GET ALONG WITH OTHER PEOPLE: SOMEWHAT DIFFICULT
6. BECOMING EASILY ANNOYED OR IRRITABLE: MORE THAN HALF THE DAYS
GAD7 TOTAL SCORE: 15

## 2018-08-21 ASSESSMENT — ENCOUNTER SYMPTOMS
STRIDOR: 0
CONSTITUTIONAL NEGATIVE: 1
ENDOCRINE NEGATIVE: 1
SLEEP DISTURBANCE: 1
NERVOUS/ANXIOUS: 1
DECREASED CONCENTRATION: 1
WHEEZING: 0
APNEA: 0
HYPERACTIVE: 0
CHEST TIGHTNESS: 0
COUGH: 1
SHORTNESS OF BREATH: 1

## 2018-08-21 ASSESSMENT — PAIN SCALES - GENERAL: PAINLEVEL: MODERATE PAIN (5)

## 2018-08-21 ASSESSMENT — PATIENT HEALTH QUESTIONNAIRE - PHQ9: 5. POOR APPETITE OR OVEREATING: MORE THAN HALF THE DAYS

## 2018-08-21 NOTE — NURSING NOTE
"Chief Complaint   Patient presents with     Refill Request     Smoking Cessation     Pt present to clinic today for medication refill and smoking cessation. She would like a chest x-ray today, she has been having thick, yellow sputum she has been coughing up for a couple months she states.  Stephanie Reilly LPN on 8/21/2018 at 11:28 AM   Initial /80 (BP Location: Right arm, Patient Position: Sitting, Cuff Size: Adult Regular)  Pulse 68  Temp 98  F (36.7  C) (Tympanic)  Ht 5' 1.5\" (1.562 m)  Wt 131 lb 12.8 oz (59.8 kg)  Breastfeeding? No  BMI 24.5 kg/m2 Estimated body mass index is 24.5 kg/(m^2) as calculated from the following:    Height as of this encounter: 5' 1.5\" (1.562 m).    Weight as of this encounter: 131 lb 12.8 oz (59.8 kg).  Medication Reconciliation: complete    Stephanie Reilly LPN  "

## 2018-08-21 NOTE — PROGRESS NOTES
SUBJECTIVE:   El Joseph is a 52 year old female who presents to clinic today for the following health issues:    Here for medication management visit and refills of medications.  queried and consistent with prescriptions from here.     Would like to quit smoking and is up to 1 PPD. She quit with pregnancies and for an interval of 7 years when children were growing up.  She has an a.m. cough that brings up some sputum and she is always worried about cancer and actually questions having another chest x-ray not realizing she had a chest x-ray in March.  Reviewed the chest x-rays are not a good screening tool for any cancer and also reinforced her need to quit.  She wondered about use of Chantix but she is on so many other meds I suggested we try a nicotine patch or product to try to wean her off cigarettes.  Her daughter is currently pregnant at about 26 weeks and wants her mom to quit smoking.      Recent increase in stress directly affects her increase in smoking and her son recently overdosed on multiple drugs and is again in outpatient treatment for his addiction.  She is disappointed because the court did not support inpatient treatment or mental health treatment.    HPI    Patient Active Problem List   Diagnosis     Anxiety state     Bruxism     Chronic mixed headache syndrome     Chronic pain disorder     Constipation     Depression, major, recurrent, moderate (H)     Malaise and fatigue     Fibromyalgia     Fixed drug eruption     Chronic insomnia     Osteoarthrosis     Pain medication agreement     Temporomandibular joint disorder     Personal history of tobacco use, presenting hazards to health     Past Surgical History:   Procedure Laterality Date     COLONOSCOPY      4/2003,Normal, bleeding hemorrhoids     HYSTERECTOMY VAGINAL      5/2003,Dr Morales, chronic pelvic pain/endometriosis     OTHER SURGICAL HISTORY      205138,DILATION AND CURETTAGE,After 4 miscarriages     OTHER SURGICAL HISTORY       1986,204756,OTHER,Ovarian cyst     OTHER SURGICAL HISTORY      6/2002,550118,OTHER,Chronic pelvic pain/hemorrhagic cyst     OTHER SURGICAL HISTORY      2/2009,953142,OTHER,Dr Ramirez-recurrent ovarian cysts       Social History   Substance Use Topics     Smoking status: Current Every Day Smoker     Packs/day: 0.25     Years: 0.00     Types: Cigarettes     Smokeless tobacco: Never Used     Alcohol use 0.0 oz/week      Comment: Alcoholic Drinks/day: once in a while     Family History   Problem Relation Age of Onset     Breast Cancer Mother 68     Cancer-breast     Diabetes Mother      Diabetes     HEART DISEASE Father      Heart Disease,CHF, pacemaker,MI at 93     Hypertension Father      Hypertension     Breast Cancer Sister      Cancer-breast     Breast Cancer Sister 40     Cancer-breast     Cancer Sister      Cancer,Cervical     Breast Cancer Sister      Cancer-breast     Ovarian Cancer Sister      Cancer-ovarian     Other - See Comments Sister      Fibromyalgia     Other - See Comments Sister      Fibromyalgia         Current Outpatient Prescriptions   Medication Sig Dispense Refill     acetaminophen (TYLENOL) 500 MG tablet Take 1,500 mg by mouth       atenolol (TENORMIN) 25 MG tablet Take 1 tablet (25 mg) by mouth daily 90 tablet 3     busPIRone (BUSPAR) 15 MG tablet Take 0.5-1 tablets (7.5-15 mg) by mouth 3 times daily 45 tablet 0     cetirizine-psuedoePHEDrine (ZYRTEC-D) 5-120 MG per 12 hr tablet MAY TAKE ONCE TO TWICE DAILY. TAKE 10-12 HOURS APART.       esomeprazole (NEXIUM) 40 MG CR capsule TAKE 1 CAPSULE BY MOUTH ONCE DAILY BEFORE A MEAL. 90 capsule 3     Flaxseed, Linseed, (FLAX SEEDS) POWD 1 Tablespoonful Sprinkle on foods daily       FLUoxetine (PROZAC) 40 MG capsule Take 1 capsule (40 mg) by mouth every morning 90 capsule 3     fluticasone (FLONASE) 50 MCG/ACT spray Spray 2 sprays into both nostrils daily 1 Bottle 3     guaiFENesin-codeine (ROBITUSSIN AC) 100-10 MG/5ML SOLN solution Take 5 mLs by mouth  "every 4 hours as needed for cough 180 mL 0     HYDROcodone-acetaminophen (NORCO) 5-325 MG per tablet Take 1 tablet by mouth every 8 hours * ACETAMINOPHEN SHOULD BE LIMITED TO 4000MG PER DAY* 30 tablet 0     nicotine (NICODERM CQ) 14 MG/24HR 24 hr patch Place 1 patch onto the skin every 24 hours 14 patch 0     nicotine (NICODERM CQ) 21 MG/24HR 24 hr patch Place 1 patch onto the skin every 24 hours 14 patch 0     nicotine (NICODERM CQ) 7 MG/24HR 24 hr patch Place 1 patch onto the skin every 24 hours 30 patch 0     pregabalin (LYRICA) 150 MG capsule Take 1 capsule (150 mg) by mouth 2 times daily 60 capsule 5     rizatriptan (MAXALT) 10 MG tablet Take 10 mg by mouth every 2 hours as needed for Migraine. Max dose: 30mg per 24 hrs.       [DISCONTINUED] atenolol (TENORMIN) 25 MG tablet Take 25 mg by mouth daily       [DISCONTINUED] FLUoxetine (PROZAC) 40 MG capsule Take 40 mg by mouth every morning       [DISCONTINUED] pregabalin (LYRICA) 150 MG capsule Take 1 capsule (150 mg) by mouth 2 times daily 60 capsule 5     Allergies   Allergen Reactions     Nsaids      Other reaction(s): Ecchymosis       Review of Systems   Constitutional: Negative.    Respiratory: Positive for cough and shortness of breath. Negative for apnea, chest tightness, wheezing and stridor.    Endocrine: Negative.    Psychiatric/Behavioral: Positive for decreased concentration and sleep disturbance. Negative for self-injury and suicidal ideas. The patient is nervous/anxious. The patient is not hyperactive.         OBJECTIVE:     /80 (BP Location: Right arm, Patient Position: Sitting, Cuff Size: Adult Regular)  Pulse 68  Temp 98  F (36.7  C) (Tympanic)  Ht 5' 1.5\" (1.562 m)  Wt 131 lb 12.8 oz (59.8 kg)  Breastfeeding? No  BMI 24.5 kg/m2  Body mass index is 24.5 kg/(m^2).  Physical Exam   Constitutional: She appears well-developed. No distress.   Cardiovascular: Normal rate and regular rhythm.    Pulmonary/Chest: Effort normal and breath sounds " normal. No respiratory distress. She has no wheezes.   No cough during visit   Nursing note and vitals reviewed.      Diagnostic Test Results:  none     ASSESSMENT/PLAN:           ICD-10-CM    1. Chronic pain disorder G89.4    2. Gastroesophageal reflux disease without esophagitis K21.9 esomeprazole (NEXIUM) 40 MG CR capsule   3. Fibromyalgia M79.7 pregabalin (LYRICA) 150 MG capsule   4. Chronic rhinitis, unspecified type J31.0 fluticasone (FLONASE) 50 MCG/ACT spray   5. MILY (generalized anxiety disorder) F41.1 busPIRone (BUSPAR) 15 MG tablet   6. Recurrent major depressive disorder, in partial remission (H) F33.41 FLUoxetine (PROZAC) 40 MG capsule   7. Chronic nonintractable headache, unspecified headache type R51 atenolol (TENORMIN) 25 MG tablet   8. Other chronic pain G89.29 HYDROcodone-acetaminophen (NORCO) 5-325 MG per tablet   9. Encounter for smoking cessation counseling Z71.6 nicotine (NICODERM CQ) 21 MG/24HR 24 hr patch    Z72.0 nicotine (NICODERM CQ) 14 MG/24HR 24 hr patch     nicotine (NICODERM CQ) 7 MG/24HR 24 hr patch     Plan:    queried and consistent with previous prescribing of Lyrica and hydrocodone.  She is using minimal pain medication and hydrocodone is refilled ×1.  Lyrica refilled for 6 month interval.  Smoking cessation counseling completed and encouraged and will start on nicotine patch at her convenience.  Encourage her to set a quit date.  All other medications that appeared to be need updating were renewed.    Keri Greenwood MD  [facility] spent approximately 25 minutes with the patient (exclusive of separately billed services/procedures), with greater than 50% spent in Counseling,Prognosis, Risks and benefits of management or follow-up, Importance of compliance with chosen management options, Instructions of management (treatment) and/or follow-up, Risk factor reduction and Patient education andCoordinating Care, Establishing and/or reviewing the  patient's medical record.    Greater than 3 minutes was spent inconsultation and education regarding tobacco cessation due to the associated long-term complications of continued tobacco use.

## 2018-08-21 NOTE — MR AVS SNAPSHOT
After Visit Summary   8/21/2018    El Joseph    MRN: [id number]           Patient Information     Date Of Birth          1965        Visit Information        Provider Department      8/21/2018 11:15 AM Keri Greenwood MD [facility]        Today's Diagnoses     Chronic pain disorder    -  1    Gastroesophageal reflux disease without esophagitis        Fibromyalgia        Chronic rhinitis, unspecified type        MILY (generalized anxiety disorder)        Recurrent major depressive disorder, in partial remission (H)        Chronic nonintractable headache, unspecified headache type        Other chronic pain        Encounter for smoking cessation counseling          Care Instructions      Coping with Smoking Withdrawal    For the first few days after you quit smoking, you may feel cranky, restless, depressed, or low on energy. These are symptoms of withdrawal. Your body needs time to recover from smoking. Your symptoms should lessen within a few days.  Coping with the urge to smoke    Deep-breathe. Inhale through your nose. Count to 5. Slowly exhale through your mouth.    Drink water. Try to drink 8 or more 8-ounce glasses of water a day.    Keep your hands busy. Wash your car. Draw. Do a puzzle. Build a [employer].    Delay. The urge to smoke lasts only 3 to 5 minutes.  Get support  Individual, group, and telephone counseling can help keep you on track. Ask your healthcare provider for more information about resources available to you.  Control stress  After you quit, you may feel irritable and stressed. Try taking a warm bath or shower. Listen to music. Go for a walk or to the gym. Try yoga or meditate. Call friends or talk with a professional.  Exercise  Exercise helps your body and mind feel better. There are many ways to be more active. Find something you enjoy doing. See if a friend will join you for a walk or a bike ride.  Sleep better  You may feel tired but have  trouble falling asleep. Try to relax before bed. Do a few stretching exercises. Read for a while. Also, avoid caffeine for at least a few hours before bedtime.  Get fit, not fat  You may notice an increased appetite. Many people who quit smoking gain a few pounds. To limit weight gain, try to watch what you eat. Cut back on fat in your diet. Snack on low-calorie foods, like fresh fruits and vegetables. Drink low-calorie liquids, especially water. Regular exercise can also help you stay fit. And remember: Your main goal is to be a nonsmoker. Stay focused on that goal.  Quit-smoking products  There are a number of products that can help you quit smoking including medicines and nicotine replacement products. They are available over-the-counter or by prescription. Ask your healthcare provider if any of these could help you quit smoking.        For more information    https://smokefree.gov/qsdr-ur-hq-expert    National [employer] [place] Smoking Quitline: 877-44U-QUIT (048-805-1473)   Date Last Reviewed: 2/1/2017 2000-2017 [employer]. [address] [place] [address]. All rights reserved. This information is not intended as a substitute for professional medical care. Always follow your healthcare professional's instructions.                Follow-ups after your visit        Who to contact     If you have questions or need follow up information about today's clinic visit or your schedule please contact [facility] directly at [phone].  Normal or non-critical lab and imaging results will be communicated to you by MyChart, letter or phone within 4 business days after the clinic has received the results. If you do not hear from us within 7 days, please contact the clinic through [employer] or phone. If you have a critical or abnormal lab result, we will notify you by phone as soon as possible.  Submit refill requests through [employer] or call your pharmacy and they will forward  "the refill request to us. Please allow 3 business days for your refill to be completed.          Additional Information About Your Visit        [employer] Information     [employer] gives you secure access to your electronic health record. If you see a primary care provider, you can also send messages to your care team and make appointments. If you have questions, please call your primary care clinic.  If you do not have a primary care provider, please call [phone] and they will assist you.        Care EveryWhere ID     This is your Care EveryWhere ID. This could be used by other organizations to access your [facility] medical records  [id number]        Your Vitals Were     Pulse Temperature Height Breastfeeding? BMI (Body Mass Index)       68 98  F (36.7  C) (Tympanic) 5' 1.5\" (1.562 m) No 24.5 kg/m2        Blood Pressure from Last 3 Encounters:   08/21/18 136/80   04/22/18 120/85   03/22/18 112/70    Weight from Last 3 Encounters:   08/21/18 131 lb 12.8 oz (59.8 kg)   04/22/18 132 lb (59.9 kg)   03/22/18 132 lb 3.2 oz (60 kg)              We Performed the Following     SMOKING CESSATION COUNSELING 3-10 MIN          Today's Medication Changes          These changes are accurate as of 8/21/18 12:37 PM.  If you have any questions, ask your nurse or doctor.               Start taking these medicines.        Dose/Directions    * nicotine 21 MG/24HR 24 hr patch   Commonly known as:  NICODERM CQ   Used for:  Encounter for smoking cessation counseling   Started by:  Keri Greenwood MD        Dose:  1 patch   Place 1 patch onto the skin every 24 hours   Quantity:  14 patch   Refills:  0       * nicotine 14 MG/24HR 24 hr patch   Commonly known as:  NICODERM CQ   Used for:  Encounter for smoking cessation counseling   Started by:  Keri Greenwood MD        Dose:  1 patch   Place 1 patch onto the skin every 24 hours   Quantity:  14 patch   Refills:  0       * nicotine 7 MG/24HR 24 hr patch   Commonly known as:  " NICODERM CQ   Used for:  Encounter for smoking cessation counseling   Started by:  Keri Greenwood MD        Dose:  1 patch   Place 1 patch onto the skin every 24 hours   Quantity:  30 patch   Refills:  0       * Notice:  This list has 3 medication(s) that are the same as other medications prescribed for you. Read the directions carefully, and ask your doctor or other care provider to review them with you.         Where to get your medicines      These medications were sent to [id number] - [place], [place] - [address]  [address], [facility] [id number]     Phone:  [phone]     atenolol 25 MG tablet    busPIRone 15 MG tablet    esomeprazole 40 MG CR capsule    FLUoxetine 40 MG capsule    fluticasone 50 MCG/ACT spray    nicotine 14 MG/24HR 24 hr patch    nicotine 21 MG/24HR 24 hr patch    nicotine 7 MG/24HR 24 hr patch         Some of these will need a paper prescription and others can be bought over the counter.  Ask your nurse if you have questions.     Bring a paper prescription for each of these medications     HYDROcodone-acetaminophen 5-325 MG per tablet    pregabalin 150 MG capsule               Information about OPIOIDS     PRESCRIPTION OPIOIDS: WHAT YOU NEED TO KNOW   We gave you an opioid (narcotic) pain medicine. It is important to manage your pain, but opioids are not always the best choice. You should first try all the other options your care team gave you. Take this medicine for as short a time (and as few doses) as possible.    Some activities can increase your pain, such as bandage changes or therapy sessions. It may help to take your pain medicine 30 to 60 minutes before these activities. Reduce your stress by getting enough sleep, working on hobbies you enjoy and practicing relaxation or meditation. Talk to your care team about ways to manage your pain beyond prescription opioids.    These medicines have risks:    DO NOT drive when on new or higher  doses of pain medicine. These medicines can affect your alertness and reaction times, and you could be arrested for driving under the influence (DUI). If you need to use opioids long-term, talk to your care team about driving.    DO NOT operate heavy machinery    DO NOT do any other dangerous activities while taking these medicines.    DO NOT drink any alcohol while taking these medicines.     If the opioid prescribed includes acetaminophen, DO NOT take with any other medicines that contain acetaminophen. Read all labels carefully. Look for the word  acetaminophen  or  Tylenol.  Ask your pharmacist if you have questions or are unsure.    You can get addicted to pain medicines, especially if you have a history of addiction (chemical, alcohol or substance dependence). Talk to your care team about ways to reduce this risk.    All opioids tend to cause constipation. Drink plenty of water and eat foods that have a lot of fiber, such as fruits, vegetables, prune juice, apple juice and high-fiber cereal. Take a laxative (Miralax, milk of magnesia, Colace, Senna) if you don t move your bowels at least every other day. Other side effects include upset stomach, sleepiness, dizziness, throwing up, tolerance (needing more of the medicine to have the same effect), physical dependence and slowed breathing.    Store your pills in a secure place, locked if possible. We will not replace any lost or stolen medicine. If you don t finish your medicine, please throw away (dispose) as directed by your pharmacist. The [place] Pollution Control Agency has more information about safe disposal: https://www.pca.[facility].mn.us/living-green/managing-unwanted-medications         Primary Care Provider Office Phone # Fax #    Keri Rosario Greenwood [phone] [address] [facility] [address]        Equal Access to Services     THIAGO OROZCO AH: ryan Nicholas qaybta kaalmada adeegyada,  kalyan caceresabhinav alonzo'aan ah. So [facility] [phone].    ATENCIÓN: Si jeffla fabrice, tiene a frazier disposición servicios gratuitos de asistencia lingüística. Ady hennessy [phone].    We comply with applicable federal civil rights laws and [place] laws. We do not discriminate on the basis of race, color, national origin, age, disability, sex, sexual orientation, or gender identity.            Thank you!     Thank you for choosing [facility]  for your care. Our goal is always to provide you with excellent care. Hearing back from our patients is one way we can continue to improve our services. Please take a few minutes to complete the written survey that you may receive in the mail after your visit with us. Thank you!             Your Updated Medication List - Protect others around you: Learn how to safely use, store and throw away your medicines at www.disposemymeds.org.          This list is accurate as of 8/21/18 12:37 PM.  Always use your most recent med list.                   Brand Name Dispense Instructions for use Diagnosis    acetaminophen 500 MG tablet    TYLENOL     Take 1,500 mg by mouth        atenolol 25 MG tablet    TENORMIN    90 tablet    Take 1 tablet (25 mg) by mouth daily    Chronic nonintractable headache, unspecified headache type       busPIRone 15 MG tablet    BUSPAR    45 tablet    Take 0.5-1 tablets (7.5-15 mg) by mouth 3 times daily    MILY (generalized anxiety disorder)       cetirizine-pseudoePHEDrine 5-120 MG per 12 hr tablet    zyrTEC-D     MAY TAKE ONCE TO TWICE DAILY. TAKE 10-12 HOURS APART.        esomeprazole 40 MG CR capsule    nexIUM    90 capsule    TAKE 1 CAPSULE BY MOUTH ONCE DAILY BEFORE A MEAL.    Gastroesophageal reflux disease without esophagitis       Flax Seeds Powd      1 Tablespoonful Sprinkle on foods daily        FLUoxetine 40 MG capsule    PROzac    90 capsule    Take 1 capsule (40 mg) by mouth every morning    Recurrent major depressive  disorder, in partial remission (H)       fluticasone 50 MCG/ACT spray    FLONASE    1 Bottle    Spray 2 sprays into both nostrils daily    Chronic rhinitis, unspecified type       guaiFENesin-codeine 100-10 MG/5ML Soln solution    ROBITUSSIN AC    180 mL    Take 5 mLs by mouth every 4 hours as needed for cough    Acute bronchitis, unspecified organism       HYDROcodone-acetaminophen 5-325 MG per tablet    NORCO    30 tablet    Take 1 tablet by mouth every 8 hours * ACETAMINOPHEN SHOULD BE LIMITED TO 4000MG PER DAY*    Other chronic pain       * nicotine 21 MG/24HR 24 hr patch    NICODERM CQ    14 patch    Place 1 patch onto the skin every 24 hours    Encounter for smoking cessation counseling       * nicotine 14 MG/24HR 24 hr patch    NICODERM CQ    14 patch    Place 1 patch onto the skin every 24 hours    Encounter for smoking cessation counseling       * nicotine 7 MG/24HR 24 hr patch    NICODERM CQ    30 patch    Place 1 patch onto the skin every 24 hours    Encounter for smoking cessation counseling       pregabalin 150 MG capsule    LYRICA    60 capsule    Take 1 capsule (150 mg) by mouth 2 times daily    Fibromyalgia       rizatriptan 10 MG tablet    MAXALT     Take 10 mg by mouth every 2 hours as needed for Migraine. Max dose: 30mg per 24 hrs.        * Notice:  This list has 3 medication(s) that are the same as other medications prescribed for you. Read the directions carefully, and ask your doctor or other care provider to review them with you.

## 2018-08-21 NOTE — PATIENT INSTRUCTIONS
Coping with Smoking Withdrawal    For the first few days after you quit smoking, you may feel cranky, restless, depressed, or low on energy. These are symptoms of withdrawal. Your body needs time to recover from smoking. Your symptoms should lessen within a few days.  Coping with the urge to smoke    Deep-breathe. Inhale through your nose. Count to 5. Slowly exhale through your mouth.    Drink water. Try to drink 8 or more 8-ounce glasses of water a day.    Keep your hands busy. Wash your car. Draw. Do a puzzle. Build a [employer].    Delay. The urge to smoke lasts only 3 to 5 minutes.  Get support  Individual, group, and telephone counseling can help keep you on track. Ask your healthcare provider for more information about resources available to you.  Control stress  After you quit, you may feel irritable and stressed. Try taking a warm bath or shower. Listen to music. Go for a walk or to the gym. Try yoga or meditate. Call friends or talk with a professional.  Exercise  Exercise helps your body and mind feel better. There are many ways to be more active. Find something you enjoy doing. See if a friend will join you for a walk or a bike ride.  Sleep better  You may feel tired but have trouble falling asleep. Try to relax before bed. Do a few stretching exercises. Read for a while. Also, avoid caffeine for at least a few hours before bedtime.  Get fit, not fat  You may notice an increased appetite. Many people who quit smoking gain a few pounds. To limit weight gain, try to watch what you eat. Cut back on fat in your diet. Snack on low-calorie foods, like fresh fruits and vegetables. Drink low-calorie liquids, especially water. Regular exercise can also help you stay fit. And remember: Your main goal is to be a nonsmoker. Stay focused on that goal.  Quit-smoking products  There are a number of products that can help you quit smoking including medicines and nicotine replacement products. They are available  over-the-counter or by prescription. Ask your healthcare provider if any of these could help you quit smoking.        For more information    https://smokefree.gov/dzjw-mv-yh-expert    National Cancer [place] Smoking Quitline: 877-44U-QUIT (819-735-0546)   Date Last Reviewed: 2/1/2017 2000-2017 [employer]. [address] [place] [address]. All rights reserved. This information is not intended as a substitute for professional medical care. Always follow your healthcare professional's instructions.

## 2018-08-22 ASSESSMENT — PATIENT HEALTH QUESTIONNAIRE - PHQ9: SUM OF ALL RESPONSES TO PHQ QUESTIONS 1-9: 10

## 2018-08-22 ASSESSMENT — ANXIETY QUESTIONNAIRES: GAD7 TOTAL SCORE: 15

## 2018-11-09 ENCOUNTER — HOSPITAL ENCOUNTER (OUTPATIENT)
Dept: MAMMOGRAPHY | Facility: OTHER | Age: 53
Discharge: HOME | End: 2018-11-09
Attending: FAMILY MEDICINE | Admitting: FAMILY MEDICINE
Payer: COMMERCIAL

## 2018-11-09 DIAGNOSIS — Z12.39 SCREENING BREAST EXAMINATION: ICD-10-CM

## 2018-11-09 PROCEDURE — 77067 SCR MAMMO BI INCL CAD: CPT

## 2019-01-31 DIAGNOSIS — M79.7 FIBROMYALGIA: ICD-10-CM

## 2019-01-31 RX ORDER — PREGABALIN 150 MG/1
150 CAPSULE ORAL 2 TIMES DAILY
Qty: 60 CAPSULE | OUTPATIENT
Start: 2019-01-31

## 2019-01-31 NOTE — LETTER
January 31, 2019      El Joseph  [address]   [facility] [address]        Dear El,       A refill ofpregabalin (LYRICA) 150 MG capsule has been requested by your pharmacy and we noticed that you will soon be due for a 6 month follow-up with Dr. Greenwood as your last visit was on 8/21/2018.  According to our records, you should have approximately 2-3 weeks of medication remaining at this time.      Your health is very important to us.  Please call the clinic at [phone] to schedule your medication management appointment.  This refill request can then be discussed at this visit.    Thank you for choosing [facility] and [facility] for your health care needs.    Sincerely,    Refill KINGSTON  [facility]

## 2019-01-31 NOTE — TELEPHONE ENCOUNTER
Refill request from [id number] for:  pregabalin (LYRICA) 150 MG capsule  Last filled 8/21/2018 for 60 with 5 refills-6 month supply    LOV with PCP 8/21/2018  Lyrica refilled for 6 month interval    No upcoming appt noted at this time.      Pt should have several weeks of medication left.      Will refuse at this time, sent a letter and add appt reminder note to Rx    Requested Prescriptions   Pending Prescriptions Disp Refills     pregabalin (LYRICA) 150 MG capsule 60 capsule      Sig: Take 1 capsule (150 mg) by mouth 2 times daily    There is no refill protocol information for this order        Unable to complete prescription refill per RN Medication Refill Policy.................... Kim Mclaughlin ....................  1/31/2019   9:05 AM

## 2019-02-12 ENCOUNTER — HOSPITAL ENCOUNTER (OUTPATIENT)
Dept: GENERAL RADIOLOGY | Facility: OTHER | Age: 54
Discharge: HOME | End: 2019-02-12
Attending: FAMILY MEDICINE | Admitting: FAMILY MEDICINE
Payer: COMMERCIAL

## 2019-02-12 ENCOUNTER — OFFICE VISIT (OUTPATIENT)
Dept: FAMILY MEDICINE | Facility: OTHER | Age: 54
End: 2019-02-12
Attending: FAMILY MEDICINE
Payer: COMMERCIAL

## 2019-02-12 VITALS
HEART RATE: 80 BPM | TEMPERATURE: 100.5 F | OXYGEN SATURATION: 97 % | DIASTOLIC BLOOD PRESSURE: 82 MMHG | HEIGHT: 62 IN | BODY MASS INDEX: 24.44 KG/M2 | WEIGHT: 132.8 LBS | SYSTOLIC BLOOD PRESSURE: 128 MMHG

## 2019-02-12 DIAGNOSIS — R05.9 COUGH: ICD-10-CM

## 2019-02-12 DIAGNOSIS — J10.1 INFLUENZA A: Primary | ICD-10-CM

## 2019-02-12 LAB
DEPRECATED S PYO AG THROAT QL EIA: NORMAL
FLUAV+FLUBV RNA SPEC QL NAA+PROBE: NEGATIVE
FLUAV+FLUBV RNA SPEC QL NAA+PROBE: POSITIVE
RSV RNA SPEC NAA+PROBE: NEGATIVE
SPECIMEN SOURCE: ABNORMAL
SPECIMEN SOURCE: NORMAL

## 2019-02-12 PROCEDURE — 87631 RESP VIRUS 3-5 TARGETS: CPT | Performed by: FAMILY MEDICINE

## 2019-02-12 PROCEDURE — 99213 OFFICE O/P EST LOW 20 MIN: CPT | Performed by: FAMILY MEDICINE

## 2019-02-12 PROCEDURE — 87880 STREP A ASSAY W/OPTIC: CPT | Performed by: FAMILY MEDICINE

## 2019-02-12 PROCEDURE — 71046 X-RAY EXAM CHEST 2 VIEWS: CPT

## 2019-02-12 RX ORDER — CODEINE PHOSPHATE AND GUAIFENESIN 10; 100 MG/5ML; MG/5ML
1-2 SOLUTION ORAL EVERY 6 HOURS PRN
Qty: 150 ML | Refills: 1 | Status: SHIPPED | OUTPATIENT
Start: 2019-02-12 | End: 2019-11-21

## 2019-02-12 ASSESSMENT — MIFFLIN-ST. JEOR: SCORE: 1170.63

## 2019-02-12 NOTE — NURSING NOTE
Patient presents to clinic with cough, sore throat, ear pain. She states that this started Saturday pm.  Mia Chacon ....................  2/12/2019   9:41 AM

## 2019-02-12 NOTE — PROGRESS NOTES
Nursing Notes:   Mia Cullen LPN  2/12/2019  9:51 AM  Signed  Patient presents to clinic with cough, sore throat, ear pain. She states that this started Saturday pm.  Mia Chacon ....................  2/12/2019   9:41 AM      SUBJECTIVE:  El Joseph is a 53 year old female who complains of a cough.  Duration: 3 days  Severity: moderate  Modifiers: Tried crystal seltzer  Trend of symptoms: Gradually getting worse.  Fever: Has not taken at home.   Cough is persistent, slightly productive  Other symptoms include: Headache, body aches and ears and throat hurt  Smoker but has not been smoking as much with this illness.  Ill contacts: Sibling had a cough and she was with her over the weekend when she was in the [facility] as her mother is currently ill with end-stage kidney disease    Current Outpatient Medications   Medication Sig Dispense Refill     acetaminophen (TYLENOL) 500 MG tablet Take 1,500 mg by mouth       atenolol (TENORMIN) 25 MG tablet Take 1 tablet (25 mg) by mouth daily 90 tablet 3     busPIRone (BUSPAR) 15 MG tablet Take 0.5-1 tablets (7.5-15 mg) by mouth 3 times daily 45 tablet 0     cetirizine-psuedoePHEDrine (ZYRTEC-D) 5-120 MG per 12 hr tablet MAY TAKE ONCE TO TWICE DAILY. TAKE 10-12 HOURS APART.       esomeprazole (NEXIUM) 40 MG CR capsule TAKE 1 CAPSULE BY MOUTH ONCE DAILY BEFORE A MEAL. 90 capsule 3     Flaxseed, Linseed, (FLAX SEEDS) POWD 1 Tablespoonful Sprinkle on foods daily       FLUoxetine (PROZAC) 40 MG capsule Take 1 capsule (40 mg) by mouth every morning 90 capsule 3     fluticasone (FLONASE) 50 MCG/ACT spray Spray 2 sprays into both nostrils daily 1 Bottle 3     guaiFENesin-codeine (ROBITUSSIN AC) 100-10 MG/5ML solution Take 5-10 mLs by mouth every 6 hours as needed for cough 150 mL 1     HYDROcodone-acetaminophen (NORCO) 5-325 MG per tablet Take 1 tablet by mouth every 8 hours * ACETAMINOPHEN SHOULD BE LIMITED TO 4000MG PER DAY* 30 tablet 0     nicotine (NICODERM CQ) 14  MG/24HR 24 hr patch Place 1 patch onto the skin every 24 hours 14 patch 0     nicotine (NICODERM CQ) 21 MG/24HR 24 hr patch Place 1 patch onto the skin every 24 hours 14 patch 0     nicotine (NICODERM CQ) 7 MG/24HR 24 hr patch Place 1 patch onto the skin every 24 hours 30 patch 0     pregabalin (LYRICA) 150 MG capsule Take 1 capsule (150 mg) by mouth 2 times daily 60 capsule 5     rizatriptan (MAXALT) 10 MG tablet Take 10 mg by mouth every 2 hours as needed for Migraine. Max dose: 30mg per 24 hrs.          Allergies   Allergen Reactions     Nsaids      Other reaction(s): Ecchymosis       Social History     Socioeconomic History     Marital status:      Spouse name: None     Number of children: None     Years of education: None     Highest education level: None   Social Needs     Financial resource strain: None     Food insecurity - worry: None     Food insecurity - inability: None     Transportation needs - medical: None     Transportation needs - non-medical: None   Occupational History     None   Tobacco Use     Smoking status: Current Every Day Smoker     Packs/day: 0.25     Years: 0.00     Pack years: 0.00     Types: Cigarettes     Smokeless tobacco: Never Used   Substance and Sexual Activity     Alcohol use: Yes     Alcohol/week: 0.0 oz     Comment: Alcoholic Drinks/day: once in a while     Drug use: No     Comment: Drug use: No     Sexual activity: Yes     Partners: Male     Birth control/protection: Surgical     Comment: Birth control method: Hysterectomy, vasectomy   Other Topics Concern     Parent/sibling w/ CABG, MI or angioplasty before 65F 55M? Not Asked   Social History Narrative    . Spouse, Adrian.  Children, Pradeep, 02/11/92; moving to [place] for school 2016 and was using heroin but went to treatment, then a suicide attempt fall 2016 and living in [place], then went to treatment, has mental health issues and applying for disability,   Dora, 09/09/97-living in  and expecting baby  "2018; Clifford, 02/20/99, will graduate from high school 2017, considering school at [employer] or [employer]  (Current  is not father of Kyrie).  Working at [employer] in [employer] part time.        OBJECTIVE:  /82   Pulse 80   Temp 100.5  F (38.1  C)   Ht 1.575 m (5' 2\")   Wt 60.2 kg (132 lb 12.8 oz)   SpO2 97%   Breastfeeding? No   BMI 24.29 kg/m    General appearance: alert and mild distress.  Appears fatigued.  Ear exam: External ears normal. Canals clear. TM's normal.  Nose: clear rhinorrhea  Sinuses: not tender  Oropharynx: moist, pink, mild erythema and no exudates present  Neck: supple, non-tender, free range of motion, no adenopathy  Lungs: clear to auscultation  Heart: regular rate and rhythm  CXR: Patient insisted on having chest x-ray completed.  HISTORY: Cough     COMPARISONS: 2016 2018.     TECHNIQUE: 2 views.     FINDINGS: Heart and pulmonary vasculature are normal. No acute  infiltrate or effusion is seen.                                                                        IMPRESSION: No acute disease.     AN FLETCHER MD  Results for orders placed or performed in visit on 02/12/19   Strep, Rapid Screen   Result Value Ref Range    Specimen Description Throat     Rapid Strep A Screen       Negative presumptive for Group A Beta Streptococcus   Influenza A and B and RSV PCR   Result Value Ref Range    Specimen Description Nasopharyngeal     Influenza A PCR Positive (A) NEG^Negative    Influenza B PCR Negative NEG^Negative    Resp Syncytial Virus Negative NEG^Negative     I have personally reviewed the labs listed above.    ASSESSMENT/PLAN:  1. Influenza A    2. Cough          Plan:  She is already 3-4 days into this illness of Tamiflu not recommended.  Symptomatic therapy suggested: use Tylenol or ibuprofen and given cough medication with codeine prn.  Avoid contact with granddaughter who is only 2 months old.  Call or return to clinic prn if these symptoms worsen or fail to improve as " anticipated.  Keri Greenwood MD  10:38 AM 2/12/2019     Portions of this dictation were created using the Dragon Nuance voice recognition system. Proofreading was completed but there may be errors in text.

## 2019-02-12 NOTE — PATIENT INSTRUCTIONS
Patient Education     Influenza (Adult)    Influenza is also called the flu. It is a viral illness that affects the air passages of your lungs. It is different from the common cold. The flu can easily be passed from one to person to another. It may be spread through the air by coughing and sneezing. Or it can be spread by touching the sick person and then touching your own eyes, nose, or mouth.  The flu starts 1 to 3 days after you are exposed to the flu virus. It may last for 1 to 2 weeks but many people feel tired or fatigued for many weeks afterward. You usually don t need to take antibiotics unless you have a complication. This might be an ear or sinus infection or pneumonia.  Symptoms of the flu may be mild or severe. They can include extreme tiredness (wanting to stay in bed all day), chills, fevers, muscle aches, soreness with eye movement, headache, and a dry, hacking cough.  Home care  Follow these guidelines when caring for yourself at home:    Avoid being around cigarette smoke, whether yours or other people s.    Acetaminophen or ibuprofen will help ease your fever, muscle aches, and headache. Don t give aspirin to anyone younger than 18 who has the flu. Aspirin can harm the liver.    Nausea and loss of appetite are common with the flu. Eat light meals. Drink 6 to 8 glasses of liquids every day. Good choices are water, sport drinks, soft drinks without caffeine, juices, tea, and soup. Extra fluids will also help loosen secretions in your nose and lungs.    Over-the-counter cold medicines will not make the flu go away faster. But the medicines may help with coughing, sore throat, and congestion in your nose and sinuses. Don t use a decongestant if you have high blood pressure.    Stay home until your fever has been gone for at least 24 hours without using medicine to reduce fever.  Follow-up care  Follow up with your healthcare provider, or as advised, if you are not getting better over the next  week.  If you are age 65 or older, talk with your provider about getting a pneumococcal vaccine every 5 years. You should also get this vaccine if you have chronic asthma or COPD. All adults should get a flu vaccine every fall. Ask your provider about this.  When to seek medical advice  Call your healthcare provider right away if any of these occur:    Cough with lots of colored mucus (sputum) or blood in your mucus    Chest pain, shortness of breath, wheezing, or trouble breathing    Severe headache, or face, neck, or ear pain    New rash with fever    Fever of 100.4 F (38 C) or higher, or as directed by your healthcare provider    Confusion, behavior change, or seizure    Severe weakness or dizziness    You get a new fever or cough after getting better for a few days  Date Last Reviewed: 1/1/2017 2000-2018 [employer]. [address] [place] [address], [place] [address]. All rights reserved. This information is not intended as a substitute for professional medical care. Always follow your healthcare professional's instructions.

## 2019-02-13 ENCOUNTER — TELEPHONE (OUTPATIENT)
Dept: FAMILY MEDICINE | Facility: OTHER | Age: 54
End: 2019-02-13

## 2019-02-13 NOTE — TELEPHONE ENCOUNTER
Notified patient of positive Influenza A results.  Mia Chacon ....................  2/13/2019   10:18 AM

## 2019-02-28 ENCOUNTER — OFFICE VISIT (OUTPATIENT)
Dept: FAMILY MEDICINE | Facility: OTHER | Age: 54
End: 2019-02-28
Attending: FAMILY MEDICINE
Payer: COMMERCIAL

## 2019-02-28 VITALS
RESPIRATION RATE: 20 BRPM | BODY MASS INDEX: 24.51 KG/M2 | SYSTOLIC BLOOD PRESSURE: 102 MMHG | WEIGHT: 134 LBS | TEMPERATURE: 97.1 F | DIASTOLIC BLOOD PRESSURE: 70 MMHG | HEART RATE: 62 BPM

## 2019-02-28 DIAGNOSIS — F41.1 GAD (GENERALIZED ANXIETY DISORDER): ICD-10-CM

## 2019-02-28 DIAGNOSIS — M79.7 FIBROMYALGIA: ICD-10-CM

## 2019-02-28 DIAGNOSIS — G89.29 OTHER CHRONIC PAIN: Primary | ICD-10-CM

## 2019-02-28 PROCEDURE — 99214 OFFICE O/P EST MOD 30 MIN: CPT | Performed by: PHYSICIAN ASSISTANT

## 2019-02-28 PROCEDURE — 80307 DRUG TEST PRSMV CHEM ANLYZR: CPT | Performed by: PHYSICIAN ASSISTANT

## 2019-02-28 RX ORDER — HYDROCODONE BITARTRATE AND ACETAMINOPHEN 5; 325 MG/1; MG/1
1 TABLET ORAL EVERY 8 HOURS
Qty: 30 TABLET | Refills: 0 | Status: SHIPPED | OUTPATIENT
Start: 2019-02-28 | End: 2019-11-21

## 2019-02-28 RX ORDER — BUSPIRONE HYDROCHLORIDE 15 MG/1
7.5-15 TABLET ORAL 3 TIMES DAILY
Qty: 45 TABLET | Refills: 1 | Status: SHIPPED | OUTPATIENT
Start: 2019-02-28 | End: 2019-11-21

## 2019-02-28 RX ORDER — PREGABALIN 150 MG/1
150 CAPSULE ORAL 2 TIMES DAILY
Qty: 60 CAPSULE | Refills: 5 | Status: SHIPPED | OUTPATIENT
Start: 2019-02-28 | End: 2019-09-05

## 2019-02-28 ASSESSMENT — PATIENT HEALTH QUESTIONNAIRE - PHQ9: SUM OF ALL RESPONSES TO PHQ QUESTIONS 1-9: 0

## 2019-02-28 NOTE — PROGRESS NOTES
Nursing Notes:   Leidy Doan LPN  2/28/2019  3:01 PM  Signed  Chief Complaint   Patient presents with     Medication Therapy Management     Cough         Medication Reconciliation: complete    Leidy Doan LPN      HPI:    El Joseph is a 53 year old female who presents for medication management and cough.    Patient was recently diagnosed with influenza A on 2/12/2019.  Patient states that her cough is slowly resolving.  No acute infection concerns at this time.  No further fevers or chills.  No sinus pain or pressure.  No sore throat, ear pain.  Keeping food and fluids down.    Hx buspar for anxiety.  Patient did not take a lot of this medication.  Patient's mother is currently passing away.  The family members are dealing with spending time with her and taking care of her at home.  She is wondering if the buspirone would be a good medication to take or if she should try different medication.  Patient has family history of drug abuse.  She wants to be very careful with medications.  No suicidal or homicidal ideation.    Patient has history of chronic pain disorder, fibromyalgia.  Last refill was given on 8/21/2018.  Patient was given hydrocodone/acetaminophen 5/325 mg tablets quantity 30 with 0 refills on 8/21/2018.  Patient was also given Lyrica 150 mg capsules quantity 60 with 5 refills on 8/21/2018.  Patient needs a refill of the hydrocodone and Lyrica.  Uses hydrocodone very sparingly.  Typically takes half a tablet when she has increased pain.  Taking Lyrica twice daily.  Tolerating medication well.  No acute concerns.  Patient needs to complete a drug toxassure for screening.  Patient tolerated the medications.  No acute concerns or side effects.    Past Medical History:   Diagnosis Date     Other constipation     No Comments Provided     Pain in knee     No Comments Provided     Personal history of other medical treatment (CODE)     G7, P3-0-4-3 (3 vag. 4 spon miscarriages-one  in the second trimester and one daughter was twin and lost the twin).     Personal history of other medical treatment (CODE)     in prior relationship and childhood     Radiculopathy     2011,Benign hypermobility per [facility]       Past Surgical History:   Procedure Laterality Date     COLONOSCOPY      4/2003,Normal, bleeding hemorrhoids     HYSTERECTOMY VAGINAL      5/2003,Dr Ramirez-Jean, chronic pelvic pain/endometriosis     OTHER SURGICAL HISTORY      705862,DILATION AND CURETTAGE,After 4 miscarriages     OTHER SURGICAL HISTORY      1986,688924,OTHER,Ovarian cyst     OTHER SURGICAL HISTORY      6/2002,916296,OTHER,Chronic pelvic pain/hemorrhagic cyst     OTHER SURGICAL HISTORY      2/2009,309395,OTHER,Dr Ramirez-recurrent ovarian cysts       Family History   Problem Relation Age of Onset     Breast Cancer Mother 68        Cancer-breast     Diabetes Mother         Diabetes     Heart Disease Father         Heart Disease,CHF, pacemaker,MI at 93     Hypertension Father         Hypertension     Breast Cancer Sister         Cancer-breast     Breast Cancer Sister 40        Cancer-breast     Cancer Sister         Cancer,Cervical     Breast Cancer Sister         Cancer-breast     Ovarian Cancer Sister         Cancer-ovarian     Other - See Comments Sister         Fibromyalgia     Other - See Comments Sister         Fibromyalgia       Social History     Tobacco Use     Smoking status: Current Every Day Smoker     Packs/day: 0.25     Years: 0.00     Pack years: 0.00     Types: Cigarettes     Smokeless tobacco: Never Used   Substance Use Topics     Alcohol use: Yes     Alcohol/week: 0.0 oz     Comment: Alcoholic Drinks/day: once in a while       Current Outpatient Medications   Medication Sig Dispense Refill     acetaminophen (TYLENOL) 500 MG tablet Take 1,500 mg by mouth       atenolol (TENORMIN) 25 MG tablet Take 1 tablet (25 mg) by mouth daily 90 tablet 3     busPIRone (BUSPAR) 15 MG tablet Take 0.5-1 tablets (7.5-15 mg) by  mouth 3 times daily 45 tablet 1     cetirizine-psuedoePHEDrine (ZYRTEC-D) 5-120 MG per 12 hr tablet MAY TAKE ONCE TO TWICE DAILY. TAKE 10-12 HOURS APART.       esomeprazole (NEXIUM) 40 MG CR capsule TAKE 1 CAPSULE BY MOUTH ONCE DAILY BEFORE A MEAL. 90 capsule 3     Flaxseed, Linseed, (FLAX SEEDS) POWD 1 Tablespoonful Sprinkle on foods daily       FLUoxetine (PROZAC) 40 MG capsule Take 1 capsule (40 mg) by mouth every morning 90 capsule 3     fluticasone (FLONASE) 50 MCG/ACT spray Spray 2 sprays into both nostrils daily 1 Bottle 3     guaiFENesin-codeine (ROBITUSSIN AC) 100-10 MG/5ML solution Take 5-10 mLs by mouth every 6 hours as needed for cough 150 mL 1     HYDROcodone-acetaminophen (NORCO) 5-325 MG tablet Take 1 tablet by mouth every 8 hours * ACETAMINOPHEN SHOULD BE LIMITED TO 4000MG PER DAY* 30 tablet 0     nicotine (NICODERM CQ) 14 MG/24HR 24 hr patch Place 1 patch onto the skin every 24 hours 14 patch 0     nicotine (NICODERM CQ) 21 MG/24HR 24 hr patch Place 1 patch onto the skin every 24 hours 14 patch 0     nicotine (NICODERM CQ) 7 MG/24HR 24 hr patch Place 1 patch onto the skin every 24 hours 30 patch 0     pregabalin (LYRICA) 150 MG capsule Take 1 capsule (150 mg) by mouth 2 times daily 60 capsule 5     rizatriptan (MAXALT) 10 MG tablet Take 10 mg by mouth every 2 hours as needed for Migraine. Max dose: 30mg per 24 hrs.         Allergies   Allergen Reactions     Nsaids      Other reaction(s): Ecchymosis       REVIEW OF SYSTEMS:  Refer to HPI.    EXAM:   Vitals:    /70 (BP Location: Right arm, Patient Position: Sitting, Cuff Size: Adult Regular)   Pulse 62   Temp 97.1  F (36.2  C)   Resp 20   Wt 60.8 kg (134 lb)   BMI 24.51 kg/m      General Appearance: Pleasant, alert, appropriate appearance for age. No acute distress  Chest/Respiratory Exam: Normal chest wall and respirations. Clear to auscultation.  Cardiovascular Exam: Regular rate and rhythm. S1, S2, no murmur, click, gallop, or  rubs.  Musculoskeletal Exam: Back is straight and non-tender, full ROM of upper and lower extremities.  Skin: no rash or abnormalities  Neurologic Exam: Nonfocal, normal gross motor, tone coordination and no tremor.  Psychiatric Exam: Alert and oriented - appropriate affect.    PHQ Depression Screen  PHQ-9 SCORE 3/22/2018 8/21/2018 2/28/2019   PHQ-9 Total Score 9 10 0       ASSESSMENT AND PLAN:      ICD-10-CM    1. Other chronic pain G89.29 HYDROcodone-acetaminophen (NORCO) 5-325 MG tablet     Drug  Screen Comprehensive , Urine with Reported Meds ([facility]) (Pain Care Package)   2. Fibromyalgia M79.7 pregabalin (LYRICA) 150 MG capsule     Drug  Screen Comprehensive , Urine with Reported Meds ([facility]) (Pain Care Package)   3. MILY (generalized anxiety disorder) F41.1 busPIRone (BUSPAR) 15 MG tablet     Drug  Screen Comprehensive , Urine with Reported Meds ([facility]) (Pain Care Package)       Recheck in 6 months with Dr. Greenwood or sooner as needed.      Chronic pain and fibromyalgia:  Refilled hydrocodone 5/325 mg quantity 30 with 0 refills.  Refilled Lyrica 150 mg quantity 60 with 5 refills.  Patient was refills of the controlled medication to use prior to having a medication management appointment with the primary care provider.   Patient was given the side effect profile and the safety concerns with using the controlled medication prescription.   Patient should not share the controlled medication with other people.   Patient was given a random urine drug test to ensure that they are appropriately using their medication.   I did not have the patient sign a new drug contract today since I will not be managing the medication in the future. The patient was told that a new contract agreement needs to be signed with the primary care provider prior to further refills.    website was reviewed and printed.     General anxiety disorder:  Discussed symptoms at length.  Patient previously received a prescription for buspirone.   Encouraged her to start taking the medication to calm down her anxiety.  Return if symptoms are not calming down or worsening as needed.    Greater than 25 minutes were spent in counseling and coordination of care.     Patient Instructions   Recheck in 6 months with Dr. Greenwood or sooner as needed.      Charlee Cordero PA-C..................2/28/2019 2:50 PM

## 2019-02-28 NOTE — NURSING NOTE
Chief Complaint   Patient presents with     Medication Therapy Management     Cough         Medication Reconciliation: complete    Leidy Doan, LPN

## 2019-03-07 LAB — PAIN DRUG SCR UR W RPTD MEDS: NORMAL

## 2019-04-22 ENCOUNTER — HOSPITAL ENCOUNTER (EMERGENCY)
Facility: OTHER | Age: 54
Discharge: HOME | End: 2019-04-23
Attending: FAMILY MEDICINE | Admitting: FAMILY MEDICINE
Payer: COMMERCIAL

## 2019-04-22 VITALS
OXYGEN SATURATION: 98 % | DIASTOLIC BLOOD PRESSURE: 74 MMHG | BODY MASS INDEX: 23 KG/M2 | SYSTOLIC BLOOD PRESSURE: 143 MMHG | HEART RATE: 65 BPM | WEIGHT: 125 LBS | HEIGHT: 62 IN | TEMPERATURE: 96.9 F | RESPIRATION RATE: 16 BRPM

## 2019-04-22 DIAGNOSIS — R07.89 CHEST WALL PAIN: ICD-10-CM

## 2019-04-22 PROCEDURE — 99285 EMERGENCY DEPT VISIT HI MDM: CPT | Mod: 25 | Performed by: FAMILY MEDICINE

## 2019-04-22 PROCEDURE — 93010 ELECTROCARDIOGRAM REPORT: CPT | Performed by: INTERNAL MEDICINE

## 2019-04-22 PROCEDURE — 96374 THER/PROPH/DIAG INJ IV PUSH: CPT | Performed by: FAMILY MEDICINE

## 2019-04-22 PROCEDURE — 93005 ELECTROCARDIOGRAM TRACING: CPT | Performed by: FAMILY MEDICINE

## 2019-04-22 PROCEDURE — 99284 EMERGENCY DEPT VISIT MOD MDM: CPT | Mod: Z6 | Performed by: FAMILY MEDICINE

## 2019-04-22 ASSESSMENT — MIFFLIN-ST. JEOR: SCORE: 1127.31

## 2019-04-22 NOTE — ED AVS SNAPSHOT
[facility]  [address]  [place] [address]  Phone:  [phone]  Fax:  [phone]                                    El Joseph   MRN: [id number]    Department:  [facility]   Date of Visit:  4/22/2019           After Visit Summary Signature Page    I have received my discharge instructions, and my questions have been answered. I have discussed any challenges I see with this plan with the nurse or doctor.    ..........................................................................................................................................  Patient/Patient Representative Signature      ..........................................................................................................................................  Patient Representative Print Name and Relationship to Patient    ..................................................               ................................................  Date                                   Time    ..........................................................................................................................................  Reviewed by Signature/Title    ...................................................              ..............................................  Date                                               Time          22EPIC Rev 08/18

## 2019-04-23 ENCOUNTER — APPOINTMENT (OUTPATIENT)
Dept: GENERAL RADIOLOGY | Facility: OTHER | Age: 54
End: 2019-04-23
Attending: FAMILY MEDICINE
Payer: COMMERCIAL

## 2019-04-23 LAB
ALBUMIN SERPL-MCNC: 4.3 G/DL (ref 3.5–5.7)
ALP SERPL-CCNC: 70 U/L (ref 34–104)
ALT SERPL W P-5'-P-CCNC: 23 U/L (ref 7–52)
ANION GAP SERPL CALCULATED.3IONS-SCNC: 6 MMOL/L (ref 3–14)
AST SERPL W P-5'-P-CCNC: 21 U/L (ref 13–39)
BASOPHILS # BLD AUTO: 0 10E9/L (ref 0–0.2)
BASOPHILS NFR BLD AUTO: 0.4 %
BILIRUB SERPL-MCNC: 0.3 MG/DL (ref 0.3–1)
BUN SERPL-MCNC: 16 MG/DL (ref 7–25)
CALCIUM SERPL-MCNC: 9.5 MG/DL (ref 8.6–10.3)
CHLORIDE SERPL-SCNC: 102 MMOL/L (ref 98–107)
CO2 SERPL-SCNC: 28 MMOL/L (ref 21–31)
CREAT SERPL-MCNC: 0.69 MG/DL (ref 0.6–1.2)
CRP SERPL-MCNC: 0.7 MG/L
D DIMER PPP DDU-MCNC: <200 NG/ML D-DU (ref 0–230)
DIFFERENTIAL METHOD BLD: NORMAL
EOSINOPHIL # BLD AUTO: 0.4 10E9/L (ref 0–0.7)
EOSINOPHIL NFR BLD AUTO: 5.1 %
ERYTHROCYTE [DISTWIDTH] IN BLOOD BY AUTOMATED COUNT: 12.2 % (ref 10–15)
ERYTHROCYTE [SEDIMENTATION RATE] IN BLOOD BY WESTERGREN METHOD: 6 MM/H (ref 1–15)
GFR SERPL CREATININE-BSD FRML MDRD: 90 ML/MIN/{1.73_M2}
GLUCOSE SERPL-MCNC: 91 MG/DL (ref 70–105)
HCG UR QL: NEGATIVE
HCT VFR BLD AUTO: 42.4 % (ref 35–47)
HGB BLD-MCNC: 14.6 G/DL (ref 11.7–15.7)
IMM GRANULOCYTES # BLD: 0 10E9/L (ref 0–0.4)
IMM GRANULOCYTES NFR BLD: 0.4 %
LIPASE SERPL-CCNC: 9 U/L (ref 11–82)
LYMPHOCYTES # BLD AUTO: 1.5 10E9/L (ref 0.8–5.3)
LYMPHOCYTES NFR BLD AUTO: 19.6 %
MCH RBC QN AUTO: 30.5 PG (ref 26.5–33)
MCHC RBC AUTO-ENTMCNC: 34.4 G/DL (ref 31.5–36.5)
MCV RBC AUTO: 89 FL (ref 78–100)
MONOCYTES # BLD AUTO: 0.6 10E9/L (ref 0–1.3)
MONOCYTES NFR BLD AUTO: 8.4 %
NEUTROPHILS # BLD AUTO: 4.9 10E9/L (ref 1.6–8.3)
NEUTROPHILS NFR BLD AUTO: 66.1 %
PLATELET # BLD AUTO: 208 10E9/L (ref 150–450)
POTASSIUM SERPL-SCNC: 4.8 MMOL/L (ref 3.5–5.1)
PROT SERPL-MCNC: 6.6 G/DL (ref 6.4–8.9)
RBC # BLD AUTO: 4.78 10E12/L (ref 3.8–5.2)
SODIUM SERPL-SCNC: 136 MMOL/L (ref 134–144)
TROPONIN I SERPL-MCNC: <0.03 UG/L (ref 0–0.03)
TROPONIN I SERPL-MCNC: <0.03 UG/L (ref 0–0.03)
TSH SERPL DL<=0.05 MIU/L-ACNC: 4.8 IU/ML (ref 0.34–5.6)
WBC # BLD AUTO: 7.5 10E9/L (ref 4–11)

## 2019-04-23 PROCEDURE — 85652 RBC SED RATE AUTOMATED: CPT | Performed by: FAMILY MEDICINE

## 2019-04-23 PROCEDURE — 25000128 H RX IP 250 OP 636: Performed by: FAMILY MEDICINE

## 2019-04-23 PROCEDURE — 36415 COLL VENOUS BLD VENIPUNCTURE: CPT | Performed by: FAMILY MEDICINE

## 2019-04-23 PROCEDURE — 85025 COMPLETE CBC W/AUTO DIFF WBC: CPT | Performed by: FAMILY MEDICINE

## 2019-04-23 PROCEDURE — 86140 C-REACTIVE PROTEIN: CPT | Performed by: FAMILY MEDICINE

## 2019-04-23 PROCEDURE — 25000132 ZZH RX MED GY IP 250 OP 250 PS 637: Performed by: FAMILY MEDICINE

## 2019-04-23 PROCEDURE — 84484 ASSAY OF TROPONIN QUANT: CPT | Performed by: FAMILY MEDICINE

## 2019-04-23 PROCEDURE — 96374 THER/PROPH/DIAG INJ IV PUSH: CPT | Performed by: FAMILY MEDICINE

## 2019-04-23 PROCEDURE — 80053 COMPREHEN METABOLIC PANEL: CPT | Performed by: FAMILY MEDICINE

## 2019-04-23 PROCEDURE — 81025 URINE PREGNANCY TEST: CPT | Performed by: FAMILY MEDICINE

## 2019-04-23 PROCEDURE — 71045 X-RAY EXAM CHEST 1 VIEW: CPT

## 2019-04-23 PROCEDURE — 83690 ASSAY OF LIPASE: CPT | Performed by: FAMILY MEDICINE

## 2019-04-23 PROCEDURE — 85379 FIBRIN DEGRADATION QUANT: CPT | Performed by: FAMILY MEDICINE

## 2019-04-23 PROCEDURE — 84443 ASSAY THYROID STIM HORMONE: CPT | Performed by: FAMILY MEDICINE

## 2019-04-23 RX ORDER — CYCLOBENZAPRINE HCL 10 MG
10 TABLET ORAL 3 TIMES DAILY PRN
Qty: 12 TABLET | Refills: 0 | Status: SHIPPED | OUTPATIENT
Start: 2019-04-23 | End: 2020-04-21

## 2019-04-23 RX ORDER — CYCLOBENZAPRINE HCL 10 MG
10 TABLET ORAL ONCE
Status: COMPLETED | OUTPATIENT
Start: 2019-04-23 | End: 2019-04-23

## 2019-04-23 RX ORDER — KETOROLAC TROMETHAMINE 30 MG/ML
30 INJECTION, SOLUTION INTRAMUSCULAR; INTRAVENOUS ONCE
Status: COMPLETED | OUTPATIENT
Start: 2019-04-23 | End: 2019-04-23

## 2019-04-23 RX ADMIN — KETOROLAC TROMETHAMINE 30 MG: 30 INJECTION, SOLUTION INTRAMUSCULAR at 00:55

## 2019-04-23 RX ADMIN — CYCLOBENZAPRINE HYDROCHLORIDE 10 MG: 10 TABLET, FILM COATED ORAL at 01:34

## 2019-04-23 NOTE — ED TRIAGE NOTES
States chest pain stated 2210 tonight.  Has never had anything like this before.  States also now has a headache, not a migraine.

## 2019-04-23 NOTE — DISCHARGE INSTRUCTIONS
Recommend avoid heavy lifting , household chores ,  Ice to sore area may be beneficial , Muscle relaxer for muscle spasm .

## 2019-04-23 NOTE — ED PROVIDER NOTES
History   No chief complaint on file.    HPI  El Joseph is a 53 year old female who presents with concern of chest pain that started at 10 pm this evening  . Patient was just laying down . Patient describes the pain as sharp.  Constant . Has not changes. Has tried walking , tried different breathing , different postitions Nothing changes the symptoms. 8/10 on pain scale. Did not try taking ibuprofen or tylenol . Has had a slight cough , not major per patient. Thinks allergies are acting up .   Nothing seems to make the pain worse or better.   Carried some heavy bags today but did not feel anything at the time.  NO fever NO chills. .  Family history of heart diease in parents but when they were older. No personal history of heart disease     Allergies:  Allergies   Allergen Reactions     Nsaids      Other reaction(s): Ecchymosis       Problem List:    Patient Active Problem List    Diagnosis Date Noted     Anxiety state 01/23/2018     Priority: Medium     Osteoarthrosis 01/23/2018     Priority: Medium     Personal history of tobacco use, presenting hazards to health 01/23/2018     Priority: Medium     Chronic mixed headache syndrome 06/20/2017     Priority: Medium     Fixed drug eruption 04/21/2015     Priority: Medium     Constipation 07/30/2014     Priority: Medium     Fibromyalgia 07/30/2013     Priority: Medium     Pain medication agreement 07/30/2013     Priority: Medium     Overview:   Updated 3/22/2018  Hydrocodone 5/325 mg #30 every 2-3 months.                                   Lyrica 150mg   # 60 a month        Chronic insomnia 08/10/2012     Priority: Medium     Bruxism 05/24/2012     Priority: Medium     Chronic pain disorder 05/24/2012     Priority: Medium     Depression, major, recurrent, moderate (H) 05/24/2012     Priority: Medium     Malaise and fatigue 05/24/2012     Priority: Medium     Temporomandibular joint disorder 05/24/2012     Priority: Medium        Past Medical History:    Past  Medical History:   Diagnosis Date     Other constipation      Pain in knee      Personal history of other medical treatment (CODE)      Personal history of other medical treatment (CODE)      Radiculopathy        Past Surgical History:    Past Surgical History:   Procedure Laterality Date     COLONOSCOPY      4/2003,Normal, bleeding hemorrhoids     HYSTERECTOMY VAGINAL      5/2003,Dr Ramirez-Hornell, chronic pelvic pain/endometriosis     OTHER SURGICAL HISTORY      [id number],DILATION AND CURETTAGE,After 4 miscarriages     OTHER SURGICAL HISTORY      1986,600000,OTHER,Ovarian cyst     OTHER SURGICAL HISTORY      6/2002,600000,OTHER,Chronic pelvic pain/hemorrhagic cyst     OTHER SURGICAL HISTORY      2/2009,223498,OTHER,Dr Ramirez-recurrent ovarian cysts       Family History:    Family History   Problem Relation Age of Onset     Breast Cancer Mother 68        Cancer-breast     Diabetes Mother         Diabetes     Heart Disease Father         Heart Disease,CHF, pacemaker,MI at 93     Hypertension Father         Hypertension     Breast Cancer Sister         Cancer-breast     Breast Cancer Sister 40        Cancer-breast     Cancer Sister         Cancer,Cervical     Breast Cancer Sister         Cancer-breast     Ovarian Cancer Sister         Cancer-ovarian     Other - See Comments Sister         Fibromyalgia     Other - See Comments Sister         Fibromyalgia       Social History:  Marital Status:   [2]  Social History     Tobacco Use     Smoking status: Current Every Day Smoker     Packs/day: 0.25     Years: 0.00     Pack years: 0.00     Types: Cigarettes     Smokeless tobacco: Never Used   Substance Use Topics     Alcohol use: Yes     Alcohol/week: 0.0 oz     Comment: Alcoholic Drinks/day: once in a while     Drug use: No     Comment: Drug use: No        Medications:      atenolol (TENORMIN) 25 MG tablet   busPIRone (BUSPAR) 15 MG tablet   esomeprazole (NEXIUM) 40 MG CR capsule   FLUoxetine (PROZAC) 40 MG capsule  "  fluticasone (FLONASE) 50 MCG/ACT spray   HYDROcodone-acetaminophen (NORCO) 5-325 MG tablet   nicotine (NICODERM CQ) 14 MG/24HR 24 hr patch   nicotine (NICODERM CQ) 21 MG/24HR 24 hr patch   nicotine (NICODERM CQ) 7 MG/24HR 24 hr patch   pregabalin (LYRICA) 150 MG capsule   rizatriptan (MAXALT) 10 MG tablet   acetaminophen (TYLENOL) 500 MG tablet   cetirizine-psuedoePHEDrine (ZYRTEC-D) 5-120 MG per 12 hr tablet   Flaxseed, Linseed, (FLAX SEEDS) POWD   guaiFENesin-codeine (ROBITUSSIN AC) 100-10 MG/5ML solution         Review of Systems   Constitutional: Negative.    HENT: Negative.    Eyes: Negative.    Respiratory: Negative for apnea, cough, choking, chest tightness, shortness of breath, wheezing and stridor.    Cardiovascular: Positive for chest pain.   Gastrointestinal: Negative.    Endocrine: Negative.    Genitourinary: Negative.    Musculoskeletal: Negative.    Neurological: Negative.    Hematological: Negative.    Psychiatric/Behavioral: Negative.        Physical Exam   BP: 143/74  Pulse: 65  Temp: 96.9  F (36.1  C)  Resp: 16  Height: 156.2 cm (5' 1.5\")  Weight: 56.7 kg (125 lb)  SpO2: 98 %      Physical Exam   Constitutional: She is oriented to person, place, and time. She appears well-developed and well-nourished.   Uncomfortable female holding the front of her chest    HENT:   Head: Normocephalic and atraumatic.   Eyes: Pupils are equal, round, and reactive to light.   Cardiovascular: Normal rate, regular rhythm and normal heart sounds. Exam reveals no gallop and no friction rub.   No murmur heard.  Pulmonary/Chest: Effort normal and breath sounds normal. No stridor. No respiratory distress. She has no wheezes. She has no rales. She exhibits no tenderness.   Abdominal: Soft.   Musculoskeletal: Normal range of motion.   Neurological: She is alert and oriented to person, place, and time. No cranial nerve deficit.   Skin: Skin is warm. She is diaphoretic.   Psychiatric: She has a normal mood and affect. "   Nursing note and vitals reviewed.      ED Course        Procedures       Patient presents to ER with concern of chest pain for the last hour which started at rest Patient triaged to exam room . Vital signs reviewed. History and exam completed. Intial EKG showing some t wave inversions that when compared to previous EKG from 2012 were not significantly changed. Toradol given with slight improvement in pain from 10-6 . Labs and diagnostics ordered. Lab results reassuring . Patient admits to lifting lots of heavy bags today so expect etiology is musculoskeltal . Flexeril 10 mg given with almost complete resolution of symptoms  SEcond of serial troponin negative. Discussed with patient CT is possible for further work up but patient feels comfortable with discharge given pain significantly improved. She will return if symptoms should improve at which time would recommend CTA to further evaluate   Results for orders placed or performed during the hospital encounter of 04/22/19   XR Chest Port 1 View    Narrative    PROCEDURE:  XR CHEST PORT 1 VW    HISTORY: chest pain. .    COMPARISON:  2/12/19    FINDINGS:    The cardiomediastinal contours are stable.  No focal consolidation, effusion or pneumothorax.      Impression    IMPRESSION:  Stable portable chest.      ESCOBAR GILL MD   CBC with platelets differential   Result Value Ref Range    WBC 7.5 4.0 - 11.0 10e9/L    RBC Count 4.78 3.8 - 5.2 10e12/L    Hemoglobin 14.6 11.7 - 15.7 g/dL    Hematocrit 42.4 35.0 - 47.0 %    MCV 89 78 - 100 fl    MCH 30.5 26.5 - 33.0 pg    MCHC 34.4 31.5 - 36.5 g/dL    RDW 12.2 10.0 - 15.0 %    Platelet Count 208 150 - 450 10e9/L    Diff Method Automated Method     % Neutrophils 66.1 %    % Lymphocytes 19.6 %    % Monocytes 8.4 %    % Eosinophils 5.1 %    % Basophils 0.4 %    % Immature Granulocytes 0.4 %    Absolute Neutrophil 4.9 1.6 - 8.3 10e9/L    Absolute Lymphocytes 1.5 0.8 - 5.3 10e9/L    Absolute Monocytes 0.6 0.0 - 1.3 10e9/L     Absolute Eosinophils 0.4 0.0 - 0.7 10e9/L    Absolute Basophils 0.0 0.0 - 0.2 10e9/L    Abs Immature Granulocytes 0.0 0 - 0.4 10e9/L   Troponin I   Result Value Ref Range    Troponin I ES <0.030 0.000 - 0.034 ug/L   Comprehensive metabolic panel   Result Value Ref Range    Sodium 136 134 - 144 mmol/L    Potassium 4.8 3.5 - 5.1 mmol/L    Chloride 102 98 - 107 mmol/L    Carbon Dioxide 28 21 - 31 mmol/L    Anion Gap 6 3 - 14 mmol/L    Glucose 91 70 - 105 mg/dL    Urea Nitrogen 16 7 - 25 mg/dL    Creatinine 0.69 0.60 - 1.20 mg/dL    GFR Estimate 90 >60 mL/min/[1.73_m2]    GFR Estimate If Black >90 >60 mL/min/[1.73_m2]    Calcium 9.5 8.6 - 10.3 mg/dL    Bilirubin Total 0.3 0.3 - 1.0 mg/dL    Albumin 4.3 3.5 - 5.7 g/dL    Protein Total 6.6 6.4 - 8.9 g/dL    Alkaline Phosphatase 70 34 - 104 U/L    ALT 23 7 - 52 U/L    AST 21 13 - 39 U/L   Lipase   Result Value Ref Range    Lipase 9 (L) 11 - 82 U/L   TSH Reflex GH   Result Value Ref Range    TSH Reflex 4.80 0.34 - 5.60 IU/mL   CRP inflammation   Result Value Ref Range    CRP Inflammation 0.7 (H) <0.5 mg/L   Erythrocyte sedimentation rate auto   Result Value Ref Range    Sed Rate 6 1 - 15 mm/h   D-Dimer (HI,GH)   Result Value Ref Range    D-Dimer ng/mL <200 0 - 230 ng/ml D-DU   HCG qualitative urine   Result Value Ref Range    HCG Qual Urine Negative NEG^Negative   Troponin I (now)   Result Value Ref Range    Troponin I ES <0.030 0.000 - 0.034 ug/L      Results for orders placed or performed during the hospital encounter of 04/22/19   XR Chest Port 1 View    Narrative    PROCEDURE:  XR CHEST PORT 1 VW    HISTORY: chest pain. .    COMPARISON:  2/12/19    FINDINGS:    The cardiomediastinal contours are stable.  No focal consolidation, effusion or pneumothorax.      Impression    IMPRESSION:  Stable portable chest.      ESCOBAR GILL MD   CBC with platelets differential   Result Value Ref Range    WBC 7.5 4.0 - 11.0 10e9/L    RBC Count 4.78 3.8 - 5.2 10e12/L     Hemoglobin 14.6 11.7 - 15.7 g/dL    Hematocrit 42.4 35.0 - 47.0 %    MCV 89 78 - 100 fl    MCH 30.5 26.5 - 33.0 pg    MCHC 34.4 31.5 - 36.5 g/dL    RDW 12.2 10.0 - 15.0 %    Platelet Count 208 150 - 450 10e9/L    Diff Method Automated Method     % Neutrophils 66.1 %    % Lymphocytes 19.6 %    % Monocytes 8.4 %    % Eosinophils 5.1 %    % Basophils 0.4 %    % Immature Granulocytes 0.4 %    Absolute Neutrophil 4.9 1.6 - 8.3 10e9/L    Absolute Lymphocytes 1.5 0.8 - 5.3 10e9/L    Absolute Monocytes 0.6 0.0 - 1.3 10e9/L    Absolute Eosinophils 0.4 0.0 - 0.7 10e9/L    Absolute Basophils 0.0 0.0 - 0.2 10e9/L    Abs Immature Granulocytes 0.0 0 - 0.4 10e9/L   Troponin I   Result Value Ref Range    Troponin I ES <0.030 0.000 - 0.034 ug/L   Comprehensive metabolic panel   Result Value Ref Range    Sodium 136 134 - 144 mmol/L    Potassium 4.8 3.5 - 5.1 mmol/L    Chloride 102 98 - 107 mmol/L    Carbon Dioxide 28 21 - 31 mmol/L    Anion Gap 6 3 - 14 mmol/L    Glucose 91 70 - 105 mg/dL    Urea Nitrogen 16 7 - 25 mg/dL    Creatinine 0.69 0.60 - 1.20 mg/dL    GFR Estimate 90 >60 mL/min/[1.73_m2]    GFR Estimate If Black >90 >60 mL/min/[1.73_m2]    Calcium 9.5 8.6 - 10.3 mg/dL    Bilirubin Total 0.3 0.3 - 1.0 mg/dL    Albumin 4.3 3.5 - 5.7 g/dL    Protein Total 6.6 6.4 - 8.9 g/dL    Alkaline Phosphatase 70 34 - 104 U/L    ALT 23 7 - 52 U/L    AST 21 13 - 39 U/L   Lipase   Result Value Ref Range    Lipase 9 (L) 11 - 82 U/L   TSH Reflex GH   Result Value Ref Range    TSH Reflex 4.80 0.34 - 5.60 IU/mL   CRP inflammation   Result Value Ref Range    CRP Inflammation 0.7 (H) <0.5 mg/L   Erythrocyte sedimentation rate auto   Result Value Ref Range    Sed Rate 6 1 - 15 mm/h   D-Dimer (HI,GH)   Result Value Ref Range    D-Dimer ng/mL <200 0 - 230 ng/ml D-DU   HCG qualitative urine   Result Value Ref Range    HCG Qual Urine Negative NEG^Negative   Troponin I (now)   Result Value Ref Range    Troponin I ES <0.030 0.000 - 0.034 ug/L              Results for orders placed or performed during the hospital encounter of 04/22/19 (from the past 24 hour(s))   CBC with platelets differential   Result Value Ref Range    WBC 7.5 4.0 - 11.0 10e9/L    RBC Count 4.78 3.8 - 5.2 10e12/L    Hemoglobin 14.6 11.7 - 15.7 g/dL    Hematocrit 42.4 35.0 - 47.0 %    MCV 89 78 - 100 fl    MCH 30.5 26.5 - 33.0 pg    MCHC 34.4 31.5 - 36.5 g/dL    RDW 12.2 10.0 - 15.0 %    Platelet Count 208 150 - 450 10e9/L    Diff Method Automated Method     % Neutrophils 66.1 %    % Lymphocytes 19.6 %    % Monocytes 8.4 %    % Eosinophils 5.1 %    % Basophils 0.4 %    % Immature Granulocytes 0.4 %    Absolute Neutrophil 4.9 1.6 - 8.3 10e9/L    Absolute Lymphocytes 1.5 0.8 - 5.3 10e9/L    Absolute Monocytes 0.6 0.0 - 1.3 10e9/L    Absolute Eosinophils 0.4 0.0 - 0.7 10e9/L    Absolute Basophils 0.0 0.0 - 0.2 10e9/L    Abs Immature Granulocytes 0.0 0 - 0.4 10e9/L   D-Dimer (HI,GH)   Result Value Ref Range    D-Dimer ng/mL <200 0 - 230 ng/ml D-DU       Medications - No data to display    Assessments & Plan (with Medical Decision Making)     I have reviewed the nursing notes.    I have reviewed the findings, diagnosis, plan and need for follow up with the patient.         Medication List      There are no discharge medications for this visit.         Final diagnoses:   None   (R07.89) Chest wall pain        4/22/2019   [facility] Iliana Loja MD  04/24/19 [id number]

## 2019-04-23 NOTE — ED NOTES
Pt asleep when RN into room. When awoken, pt reports he pain level is 2/10, she feels comfortable being discharged to home.

## 2019-04-24 ASSESSMENT — ENCOUNTER SYMPTOMS
GASTROINTESTINAL NEGATIVE: 1
CHOKING: 0
MUSCULOSKELETAL NEGATIVE: 1
APNEA: 0
NEUROLOGICAL NEGATIVE: 1
COUGH: 0
WHEEZING: 0
STRIDOR: 0
HEMATOLOGIC/LYMPHATIC NEGATIVE: 1
ENDOCRINE NEGATIVE: 1
CHEST TIGHTNESS: 0
PSYCHIATRIC NEGATIVE: 1
EYES NEGATIVE: 1
SHORTNESS OF BREATH: 0
CONSTITUTIONAL NEGATIVE: 1

## 2019-08-07 NOTE — NURSING NOTE
Patient presents to clinic for medication management.  Adilene Doan LPN ...... 3/22/2018 1:21 PM      
Detail Level: Simple

## 2019-08-29 DIAGNOSIS — M79.7 FIBROMYALGIA: ICD-10-CM

## 2019-08-29 NOTE — LETTER
September 5, 2019      El Joseph  [address] [facility] [address]        A refill request was received from your pharmacy for Lyrica.    Additional refills require an office. Dr. Greenwood is no longer seeing patients in clinic.  Please call and schedule appointment with one of our other physicians to establish care.    Please call [phone] to schedule appointment.      Sincerely,      Refill Nurse

## 2019-09-05 RX ORDER — PREGABALIN 150 MG/1
150 CAPSULE ORAL 2 TIMES DAILY
Qty: 60 CAPSULE | Refills: 0 | Status: SHIPPED | OUTPATIENT
Start: 2019-09-05 | End: 2019-10-23

## 2019-09-05 NOTE — TELEPHONE ENCOUNTER
Routing refill request to provider for review/approval because:  Drug not on the [facility] refill protocol     Patients PCP was Dr. Greenwood.  LOV: 2/28/19 with Charlee Cordero  Patient to follow up in 6 months    letter and my chart message sent.  Michaela Townsend RN on 9/5/2019 at 2:20 PM

## 2019-09-05 NOTE — TELEPHONE ENCOUNTER
Refilled for 1 month. Needs to be seen for recheck prior to future refills.   Charlee Cordero PA-C PA-C..................9/5/2019 3:47 PM

## 2019-09-28 DIAGNOSIS — K21.9 GASTROESOPHAGEAL REFLUX DISEASE WITHOUT ESOPHAGITIS: ICD-10-CM

## 2019-09-28 DIAGNOSIS — F33.41 RECURRENT MAJOR DEPRESSIVE DISORDER, IN PARTIAL REMISSION (H): ICD-10-CM

## 2019-09-28 DIAGNOSIS — G89.29 CHRONIC NONINTRACTABLE HEADACHE, UNSPECIFIED HEADACHE TYPE: ICD-10-CM

## 2019-09-28 DIAGNOSIS — R51.9 CHRONIC NONINTRACTABLE HEADACHE, UNSPECIFIED HEADACHE TYPE: ICD-10-CM

## 2019-09-28 NOTE — LETTER
October 2, 2019      El Garciaalexandriasawyer  [address]  [facility] [address]        Dear Ms. Joseph,    Your pharmacy has requested a refill of some medications.  A limited supply of these medications was sent.     According to our records, you are overdue for annual medication management and labs. Your last medication management visit and labs were on 8/21/2018 with Dr. Greenwood.  Since she is no longer working in the clinic, it is recommended you choose another provider.    Your health is very important to us. Please contact our scheduling line at [phone] to set up this appointment at your earliest convenience, and before your next medication refills are needed.     Thank you for choosing [facility] for your health care needs.     Sincerely,        The Refill Nurse  [facility]

## 2019-10-02 RX ORDER — FLUOXETINE 40 MG/1
CAPSULE ORAL
Qty: 90 CAPSULE | Refills: 0 | Status: SHIPPED | OUTPATIENT
Start: 2019-10-02 | End: 2019-12-20

## 2019-10-02 RX ORDER — ATENOLOL 25 MG/1
TABLET ORAL
Qty: 90 TABLET | Refills: 0 | Status: SHIPPED | OUTPATIENT
Start: 2019-10-02 | End: 2019-12-20

## 2019-10-02 RX ORDER — ESOMEPRAZOLE MAGNESIUM 40 MG/1
CAPSULE, DELAYED RELEASE ORAL
Qty: 90 CAPSULE | Refills: 0 | Status: SHIPPED | OUTPATIENT
Start: 2019-10-02 | End: 2019-12-20

## 2019-10-02 NOTE — TELEPHONE ENCOUNTER
Prescription approved per [facility] Refill Protocol.  Patient is now due for annual medication management and labs.   Reminder letter sent, and herve refill given. Elevated BP in ED with pain. Last PHQ9 0 per flowsheet.  Kezia Shahid RN on 10/2/2019 at 1:09 PM

## 2019-10-23 DIAGNOSIS — M79.7 FIBROMYALGIA: ICD-10-CM

## 2019-10-23 RX ORDER — PREGABALIN 150 MG/1
150 CAPSULE ORAL 2 TIMES DAILY
Qty: 60 CAPSULE | Refills: 0 | Status: SHIPPED | OUTPATIENT
Start: 2019-10-23 | End: 2019-11-21

## 2019-10-23 NOTE — TELEPHONE ENCOUNTER
Please call.  Refill for 30 days.  Needs to be seen for clinic recheck prior to future refills.  Charlee Cordero PA-C.......... 10/23/2019 3:12 PM

## 2019-11-11 ENCOUNTER — TELEPHONE (OUTPATIENT)
Dept: FAMILY MEDICINE | Facility: OTHER | Age: 54
End: 2019-11-11

## 2019-11-11 NOTE — TELEPHONE ENCOUNTER
Patient requesting a refill through the [facility] for pregabalin 150 mg cap bid. The last refill patient was informed to schedule appointment prior to further refills. Left message for patient to call back. Angle Johnston LPN .......................11/11/2019  4:01 PM

## 2019-11-13 NOTE — TELEPHONE ENCOUNTER
Patient scheduled appointment 11/21/2019. Angle Johnston LPN .......................11/13/2019  8:05 AM

## 2019-11-21 ENCOUNTER — OFFICE VISIT (OUTPATIENT)
Dept: FAMILY MEDICINE | Facility: OTHER | Age: 54
End: 2019-11-21
Attending: FAMILY MEDICINE
Payer: COMMERCIAL

## 2019-11-21 VITALS
HEART RATE: 74 BPM | OXYGEN SATURATION: 98 % | DIASTOLIC BLOOD PRESSURE: 80 MMHG | TEMPERATURE: 97.6 F | SYSTOLIC BLOOD PRESSURE: 138 MMHG | WEIGHT: 132.8 LBS | HEIGHT: 61 IN | BODY MASS INDEX: 25.07 KG/M2 | RESPIRATION RATE: 16 BRPM

## 2019-11-21 DIAGNOSIS — M79.7 FIBROMYALGIA: Primary | ICD-10-CM

## 2019-11-21 DIAGNOSIS — Z82.49 FAMILY HISTORY OF ANEURYSM OF BLOOD VESSEL OF BRAIN: ICD-10-CM

## 2019-11-21 DIAGNOSIS — G89.29 OTHER CHRONIC PAIN: ICD-10-CM

## 2019-11-21 DIAGNOSIS — F41.1 GAD (GENERALIZED ANXIETY DISORDER): ICD-10-CM

## 2019-11-21 PROCEDURE — 99214 OFFICE O/P EST MOD 30 MIN: CPT | Performed by: FAMILY MEDICINE

## 2019-11-21 RX ORDER — PREGABALIN 150 MG/1
150 CAPSULE ORAL 2 TIMES DAILY
Qty: 60 CAPSULE | Refills: 2 | Status: SHIPPED | OUTPATIENT
Start: 2019-11-21 | End: 2020-02-14

## 2019-11-21 RX ORDER — BUSPIRONE HYDROCHLORIDE 15 MG/1
7.5-15 TABLET ORAL 3 TIMES DAILY PRN
Qty: 45 TABLET | Refills: 3 | Status: SHIPPED | OUTPATIENT
Start: 2019-11-21 | End: 2022-01-21

## 2019-11-21 RX ORDER — HYDROCODONE BITARTRATE AND ACETAMINOPHEN 5; 325 MG/1; MG/1
1 TABLET ORAL EVERY 8 HOURS PRN
Qty: 30 TABLET | Refills: 0 | Status: SHIPPED | OUTPATIENT
Start: 2019-11-21 | End: 2020-05-20

## 2019-11-21 ASSESSMENT — ANXIETY QUESTIONNAIRES
7. FEELING AFRAID AS IF SOMETHING AWFUL MIGHT HAPPEN: SEVERAL DAYS
5. BEING SO RESTLESS THAT IT IS HARD TO SIT STILL: MORE THAN HALF THE DAYS
GAD7 TOTAL SCORE: 15
2. NOT BEING ABLE TO STOP OR CONTROL WORRYING: NEARLY EVERY DAY
1. FEELING NERVOUS, ANXIOUS, OR ON EDGE: MORE THAN HALF THE DAYS
6. BECOMING EASILY ANNOYED OR IRRITABLE: SEVERAL DAYS
IF YOU CHECKED OFF ANY PROBLEMS ON THIS QUESTIONNAIRE, HOW DIFFICULT HAVE THESE PROBLEMS MADE IT FOR YOU TO DO YOUR WORK, TAKE CARE OF THINGS AT HOME, OR GET ALONG WITH OTHER PEOPLE: SOMEWHAT DIFFICULT
3. WORRYING TOO MUCH ABOUT DIFFERENT THINGS: NEARLY EVERY DAY

## 2019-11-21 ASSESSMENT — PATIENT HEALTH QUESTIONNAIRE - PHQ9
SUM OF ALL RESPONSES TO PHQ QUESTIONS 1-9: 15
5. POOR APPETITE OR OVEREATING: NEARLY EVERY DAY

## 2019-11-21 ASSESSMENT — PAIN SCALES - GENERAL: PAINLEVEL: SEVERE PAIN (6)

## 2019-11-21 ASSESSMENT — MIFFLIN-ST. JEOR: SCORE: 1149.76

## 2019-11-21 NOTE — NURSING NOTE
"Chief Complaint   Patient presents with     Recheck Medication     Hydrocodone & Lyrica     Headache     has chronic migraines and wants to be checked     Wants to be checked for a brain aneurysm.    Initial /80   Pulse 74   Temp 97.6  F (36.4  C) (Temporal)   Resp 16   Ht 1.549 m (5' 1\")   Wt 60.2 kg (132 lb 12.8 oz)   SpO2 98%   BMI 25.09 kg/m   Estimated body mass index is 25.09 kg/m  as calculated from the following:    Height as of this encounter: 1.549 m (5' 1\").    Weight as of this encounter: 60.2 kg (132 lb 12.8 oz).    Medication Reconciliation: complete      Norma J. Gosselin, LPN  "

## 2019-11-21 NOTE — PROGRESS NOTES
"  SUBJECTIVE:   El Joseph is a 54 year old female who presents to clinic today for the following health issues:    HPI  Fibromyalgia:  Diagnosed at the [facility], approximately 10 years ago.  Reports diffuse pain, throughout her back, hips, ankles, neck, shoulders, elbows, and wrists, but states that her legs are the worst.  Describes pain as diffuse muscle cramping, \"like a julisa horse.\"  Symptoms are made worse by rainy and cold weather.  Sleep improves her symptoms, though she reports poor sleep (typically 3-5 hours per night).  Symptoms are also improved by stress relief, massage, heating pads, and strengthening exercises.  She takes care of her 1-year-old granddaughter and gets some physical activity throughout the day due to this but does not do any formal exercise.  She drinks a small amount of caffeine in the morning.  She tries to go to bed at 10 PM every night but usually does not fall asleep until around 3 AM.  She wakes between 5 and 6 AM every morning.  She does not nap throughout the day.  Melatonin has not helped with her sleep issues.  Prior to going to bed she will watch TV or play games on her phone.  If she struggles to fall back asleep she will read books or anamaria.  Lyrica has greatly helped controlled her fibromyalgia symptoms.  Norco used as needed for very severe pain has also helped.  One prescription for Norco typically lasts 6 months.    Family History of Brain Aneurysm: Reports that 3 maternal aunts, 2 maternal uncles, and 3 siblings have had brain aneurysms.  Another 1 of her siblings is currently being tested.  She denies headaches and vision changes.    Anxiety:  Feels that her symptoms are currently inadequately controlled.  Her symptoms have worsened since the death of her mother.  She is having anxiety attacks approximately 1-2 times per week.  She has only used 1/3 of a tablet of her Buspar and has not noticed an improvement in her anxiety.  She has had Xanax " before for anxiety and is interested in trying this medication again.  She has done family counseling in the past but has not recently participated in counseling for herself.    Past Medical History:   Diagnosis Date     Other constipation     No Comments Provided     Pain in knee     No Comments Provided     Personal history of other medical treatment (CODE)     G7, P3-0-4-3 (3 vag. 4 spon miscarriages-one in the second trimester and one daughter was twin and lost the twin).     Personal history of other medical treatment (CODE)     in prior relationship and childhood     Radiculopathy     2011,Benign hypermobility per [facility]      Past Surgical History:   Procedure Laterality Date     COLONOSCOPY      4/2003,Normal, bleeding hemorrhoids     HYSTERECTOMY VAGINAL      5/2003,Dr [place], chronic pelvic pain/endometriosis     OTHER SURGICAL HISTORY      040513,DILATION AND CURETTAGE,After 4 miscarriages     OTHER SURGICAL HISTORY      1986,[id number],OTHER,Ovarian cyst     OTHER SURGICAL HISTORY      6/2002,[id number],OTHER,Chronic pelvic pain/hemorrhagic cyst     OTHER SURGICAL HISTORY      2/2009,[id number],OTHER,Dr Ramirez-recurrent ovarian cysts     Family History   Problem Relation Age of Onset     Breast Cancer Mother 68        Cancer-breast     Diabetes Mother         Diabetes     Heart Disease Father         Heart Disease,CHF, pacemaker,MI at 93     Hypertension Father         Hypertension     Aneurysm Sister         Brain      Fibromyalgia Sister      Arthritis Sister      Migraines Sister         Chronic     Arthritis Brother      Aneurysm Sister         Brain     Migraines Sister      Migraines Sister      Breast Cancer Sister         Cancer-breast     Tumor Sister         Brain     Breast Cancer Sister      Ovarian Cancer Sister      Arthritis Sister      Aneurysm Brother         Brain     Arthritis Brother      Arthritis Brother      Social History     Tobacco Use     Smoking status: Current Every Day Smoker      Packs/day: 0.25     Years: 0.00     Pack years: 0.00     Types: Cigarettes     Smokeless tobacco: Never Used   Substance Use Topics     Alcohol use: Yes     Alcohol/week: 0.0 standard drinks     Comment: Alcoholic Drinks/day: once in a while     Current Outpatient Medications   Medication Sig Dispense Refill     aspirin-acetaminophen-caffeine (EXCEDRIN MIGRAINE) 250-250-65 MG tablet Take 2 tablets by mouth every 8 hours as needed for headaches       atenolol (TENORMIN) 25 MG tablet TAKE 1 TABLET (25MG) BY MOUTH EVERY DAY 90 tablet 0     busPIRone (BUSPAR) 15 MG tablet Take 0.5-1 tablets (7.5-15 mg) by mouth 3 times daily as needed (Anxiety) 45 tablet 3     cetirizine-psuedoePHEDrine (ZYRTEC-D) 5-120 MG per 12 hr tablet MAY TAKE ONCE TO TWICE DAILY. TAKE 10-12 HOURS APART.       cyclobenzaprine (FLEXERIL) 10 MG tablet Take 1 tablet (10 mg) by mouth 3 times daily as needed for muscle spasms 12 tablet 0     esomeprazole (NEXIUM) 40 MG DR capsule TAKE 1 CAPSULE BY MOUTH EVERY DAY BEFORE A MEAL 90 capsule 0     FLUoxetine (PROZAC) 40 MG capsule TAKE 1 CAPSULE BY MOUTH EVERY MORNING 90 capsule 0     fluticasone (FLONASE) 50 MCG/ACT spray Spray 2 sprays into both nostrils daily 1 Bottle 3     HYDROcodone-acetaminophen (NORCO) 5-325 MG tablet Take 1 tablet by mouth every 8 hours as needed for severe pain * ACETAMINOPHEN SHOULD BE LIMITED TO 4000MG PER DAY* 30 tablet 0     pregabalin (LYRICA) 150 MG capsule Take 1 capsule (150 mg) by mouth 2 times daily 60 capsule 2     Flaxseed, Linseed, (FLAX SEEDS) POWD 1 Tablespoonful Sprinkle on foods daily       Allergies   Allergen Reactions     Nsaids      Other reaction(s): Ecchymosis       Review of Systems   Constitutional: Negative for chills and fever.   Respiratory: Negative for shortness of breath.    Cardiovascular: Negative for chest pain.   Musculoskeletal: Positive for myalgias.   Psychiatric/Behavioral: Positive for sleep disturbance.      OBJECTIVE:     /80    "Pulse 74   Temp 97.6  F (36.4  C) (Temporal)   Resp 16   Ht 1.549 m (5' 1\")   Wt 60.2 kg (132 lb 12.8 oz)   SpO2 98%   BMI 25.09 kg/m    Body mass index is 25.09 kg/m .  Physical Exam  Constitutional:       General: She is not in acute distress.     Appearance: Normal appearance. She is not ill-appearing.   HENT:      Head: Normocephalic and atraumatic.      Right Ear: Tympanic membrane normal.      Left Ear: Tympanic membrane normal.      Nose: Nose normal.      Mouth/Throat:      Mouth: Mucous membranes are moist.      Pharynx: Oropharynx is clear. No oropharyngeal exudate or posterior oropharyngeal erythema.   Eyes:      General:         Right eye: No discharge.         Left eye: No discharge.      Pupils: Pupils are equal, round, and reactive to light.   Neck:      Musculoskeletal: Neck supple.   Cardiovascular:      Rate and Rhythm: Normal rate and regular rhythm.      Heart sounds: No murmur. No gallop.    Pulmonary:      Effort: Pulmonary effort is normal.      Breath sounds: Normal breath sounds.   Musculoskeletal: Normal range of motion.         General: No swelling or deformity.   Lymphadenopathy:      Cervical: No cervical adenopathy.   Neurological:      General: No focal deficit present.      Mental Status: She is alert.      Motor: No weakness.   Psychiatric:         Mood and Affect: Mood normal.         ASSESSMENT/PLAN:     1. Fibromyalgia  Symptoms not optimally controlled but greatly improved on Lyrica.  Discussed the importance of the roles of good sleep hygiene, exercise, stress reduction, and smoking cessation incomprehensively treating fibromyalgia.  Detailed sleep hygiene techniques and discussed need for moderate intensity aerobic exercise for 150 minutes/week.  She would likely also benefit from strengthening and stretching exercises.  Encouraged her to continue consideration of smoking cessation.  Continue stress reduction techniques.  Refill Lyrica at current dose and frequency.  - " pregabalin (LYRICA) 150 MG capsule; Take 1 capsule (150 mg) by mouth 2 times daily  Dispense: 60 capsule; Refill: 2    2. Other chronic pain  Used for breakthrough fibromyalgia pain.  Discussed abuse potential with opiate medications as they can be habit forming and addicting.  Counseled on comprehensive fibromyalgia treatment as above.   reviewed and appropriate.  Refill Norco #30.  - HYDROcodone-acetaminophen (NORCO) 5-325 MG tablet; Take 1 tablet by mouth every 8 hours as needed for severe pain * ACETAMINOPHEN SHOULD BE LIMITED TO 4000MG PER DAY*  Dispense: 30 tablet; Refill: 0    3. MILY (generalized anxiety disorder)  Uncontrolled with recent stressor of her mother's death.  Discussed that benzodiazepines are both habit-forming and addicting and not safe for long-term use.  Will attempt to control symptoms through use of SSRI and BuSpar as needed this is a safer therapy.  Continue Fluoxetine at current dose.  Dictated her on taking BuSpar 0.5-1 tablet up to 3 times daily as needed for anxiety.  Discussed counseling.  She would like to pursue this with a [facility].  Reassess on follow-up.  - busPIRone (BUSPAR) 15 MG tablet; Take 0.5-1 tablets (7.5-15 mg) by mouth 3 times daily as needed (Anxiety)  Dispense: 45 tablet; Refill: 3    4. Family history of aneurysm of blood vessel of brain  Given multiple family members with history of brain aneurysms will order screening with MRI.        Marisa Dubon, [facility]

## 2019-11-21 NOTE — PATIENT INSTRUCTIONS
Patient Education     Managing Fibromyalgia    SMALL: BIG: Fibromyalgia is a chronic illness that causes pain in specific points on the body, stiffness, and constant tiredness (fatigue). But it doesn t have to keep you from doing what you enjoy. You can feel better. You and your healthcare provider can work together to develop a plan.  Take medicines as directed  You may be given medicines to help reduce pain and improve sleep.  Several medications are approved to treat fibromyalgia. Two were originally designed to treat depression. They are duloxetine, and milnacipran. A third, called pregaballin, was developed to treat nerve pain. Other medicines include pain relievers such as acetaminophen or stronger opioids. These may be prescribed short term.  Nonsteroidal anti-inflammatory drugs (NSAIDs) are used to relieve pain. These include ibuprofen and naproxen.  Get exercise  Gentle exercise can help lessen your pain. Try these tips:    Choose activities that are gentle on your joints, such as walking, biking, and swimming or other water exercises.    Don t push yourself too hard. Build up your strength and endurance slowly, over time.    Stick to it. For the most relief, exercise should become part of your daily life.  Get a good night s rest  To help you get more sleep, try the tips below:    Sleep only in a bed, not on a couch or chair.    Don t watch TV, read, or work in bed.    Go to bed and get up at the same time each day.    Try not to take naps.    Don t use alcohol, caffeine, or tobacco for at least 3 hours before going to bed.    Don t drink fluids in the evening to avoid having to get up to urinate.  Other things you can do  No one knows what causes fibromyalgia. But stress, poor eating habits, and extra weight can make it worse. These tips may help you feel better:    Eat a balanced diet with plenty of fruits and vegetables, whole grains, lean protein, and low-fat or nonfat dairy products.    Maintain a  healthy weight.    Learn ways to reduce or manage the stress in your life.    Ask your healthcare provider for resources to help you make changes. Or check out the resources below.  Resources    Arthritis Foundation  www.arthritis.org    National Fibromyalgia Association  www.fmaware.org    American Fibromyalgia Syndrome Association  www.afsafund.org  Date Last Reviewed: 6/1/2018 2000-2018 [employer]. [address] [place] [address], [place] [address]. All rights reserved. This information is not intended as a substitute for professional medical care. Always follow your healthcare professional's instructions.

## 2019-11-22 ASSESSMENT — ENCOUNTER SYMPTOMS
SLEEP DISTURBANCE: 1
SHORTNESS OF BREATH: 0
CHILLS: 0
FEVER: 0
MYALGIAS: 1

## 2019-11-22 ASSESSMENT — ANXIETY QUESTIONNAIRES: GAD7 TOTAL SCORE: 15

## 2019-12-03 ENCOUNTER — HOSPITAL ENCOUNTER (OUTPATIENT)
Dept: MRI IMAGING | Facility: OTHER | Age: 54
Discharge: HOME | End: 2019-12-03
Attending: FAMILY MEDICINE | Admitting: FAMILY MEDICINE
Payer: COMMERCIAL

## 2019-12-03 DIAGNOSIS — Z82.49 FAMILY HISTORY OF ANEURYSM OF BLOOD VESSEL OF BRAIN: ICD-10-CM

## 2019-12-03 PROCEDURE — 70544 MR ANGIOGRAPHY HEAD W/O DYE: CPT

## 2019-12-17 DIAGNOSIS — R51.9 CHRONIC NONINTRACTABLE HEADACHE, UNSPECIFIED HEADACHE TYPE: ICD-10-CM

## 2019-12-17 DIAGNOSIS — F33.41 RECURRENT MAJOR DEPRESSIVE DISORDER, IN PARTIAL REMISSION (H): ICD-10-CM

## 2019-12-17 DIAGNOSIS — K21.9 GASTROESOPHAGEAL REFLUX DISEASE WITHOUT ESOPHAGITIS: ICD-10-CM

## 2019-12-17 DIAGNOSIS — G89.29 CHRONIC NONINTRACTABLE HEADACHE, UNSPECIFIED HEADACHE TYPE: ICD-10-CM

## 2019-12-20 RX ORDER — FLUOXETINE 40 MG/1
CAPSULE ORAL
Qty: 90 CAPSULE | Refills: 0 | Status: SHIPPED | OUTPATIENT
Start: 2019-12-20 | End: 2020-03-19

## 2019-12-20 RX ORDER — ESOMEPRAZOLE MAGNESIUM 40 MG/1
CAPSULE, DELAYED RELEASE ORAL
Qty: 90 CAPSULE | Refills: 3 | Status: SHIPPED | OUTPATIENT
Start: 2019-12-20 | End: 2020-11-25

## 2019-12-20 RX ORDER — ATENOLOL 25 MG/1
TABLET ORAL
Qty: 90 TABLET | Refills: 3 | Status: SHIPPED | OUTPATIENT
Start: 2019-12-20 | End: 2020-05-20

## 2019-12-20 NOTE — TELEPHONE ENCOUNTER
[id number] sent Rx request for the following:      ATENOLOL 25MG TABLET  Sig: TAKE 1 TABLET (25MG) BY MOUTH EVERY DAY     Last Prescription Date:   10/2/2019  Last Fill Qty/Refills:         90, R-0    Last Office Visit:              11/21/2019   Future Office visit:           none    ESOMEPRAZOLE 40MG DR CAPSULE   Sig: TAKE 1 CAPSULE BY MOUTH EVERY DAY BEFORE A MEAL     Last Prescription Date:   10/2/2019  Last Fill Qty/Refills:         90, R-0      Prescription refilled per RN Medication Refill Policy.................... Prateek Harvey RN ....................  12/20/2019   8:36 AM      FLUOXETINE 40MG CAPSULE  Sig: TAKE 1 CAPSULE BY MOUTH EVERY MORNING    Last Prescription Date:   10/2/2019  Last Fill Qty/Refills:         90, R-0       SSRIs Protocol [id number] 1:03 AM   PHQ-9 score less than 5 in past 6 months    Medication is active on med list    Patient is age 18 or older    No active pregnancy on record    No positive pregnancy test in last 12 months    Recent (6 mo) or future (30 days) visit within the authorizing provider's specialty        Routing refill request to provider for review/approval because:  Fluoxetine did not meet refill protocol, therefore will route to PCP for further review. Prateek Harvey RN, BSN  ....................  12/20/2019   8:37 AM

## 2020-03-06 ENCOUNTER — OFFICE VISIT (OUTPATIENT)
Dept: FAMILY MEDICINE | Facility: OTHER | Age: 55
End: 2020-03-06
Attending: NURSE PRACTITIONER
Payer: COMMERCIAL

## 2020-03-06 VITALS
DIASTOLIC BLOOD PRESSURE: 78 MMHG | RESPIRATION RATE: 16 BRPM | SYSTOLIC BLOOD PRESSURE: 126 MMHG | BODY MASS INDEX: 24.62 KG/M2 | OXYGEN SATURATION: 98 % | TEMPERATURE: 98.6 F | HEIGHT: 62 IN | HEART RATE: 73 BPM | WEIGHT: 133.8 LBS

## 2020-03-06 DIAGNOSIS — H66.002 NON-RECURRENT ACUTE SUPPURATIVE OTITIS MEDIA OF LEFT EAR WITHOUT SPONTANEOUS RUPTURE OF TYMPANIC MEMBRANE: Primary | ICD-10-CM

## 2020-03-06 DIAGNOSIS — J02.9 SORE THROAT: ICD-10-CM

## 2020-03-06 LAB
SPECIMEN SOURCE: NORMAL
STREP GROUP A PCR: NOT DETECTED

## 2020-03-06 PROCEDURE — 99214 OFFICE O/P EST MOD 30 MIN: CPT | Performed by: PHYSICIAN ASSISTANT

## 2020-03-06 PROCEDURE — 87651 STREP A DNA AMP PROBE: CPT | Mod: ZL | Performed by: PHYSICIAN ASSISTANT

## 2020-03-06 ASSESSMENT — MIFFLIN-ST. JEOR: SCORE: 1162.22

## 2020-03-06 ASSESSMENT — PAIN SCALES - GENERAL: PAINLEVEL: WORST PAIN (10)

## 2020-03-07 NOTE — PROGRESS NOTES
SUBJECTIVE:  El Joseph is a 54 year old female presents to [facility] for evaluation of left ear pain, sore throat. Ear pain is 10/10. She felt her ear pop and has had severe pain since this time. Muffled hearing.   Onset a few hours PTA  Associated symptoms: runny nose, congestion onset about a week ago,     OM history: no prior exposures  Exposure to strep - her grand daughter was diagnosed last week  Treatments: tylenol 3 hours PTA  Eating and drinking    Past Medical History:   Diagnosis Date     Other constipation     No Comments Provided     Pain in knee     No Comments Provided     Personal history of other medical treatment (CODE)     G7, P3-0-4-3 (3 vag. 4 spon miscarriages-one in the second trimester and one daughter was twin and lost the twin).     Personal history of other medical treatment (CODE)     in prior relationship and childhood     Radiculopathy     2011,Benign hypermobility per [facility]     Current Outpatient Medications   Medication     atenolol (TENORMIN) 25 MG tablet     busPIRone (BUSPAR) 15 MG tablet     cyclobenzaprine (FLEXERIL) 10 MG tablet     esomeprazole (NEXIUM) 40 MG DR capsule     FLUoxetine (PROZAC) 40 MG capsule     pregabalin (LYRICA) 150 MG capsule     aspirin-acetaminophen-caffeine (EXCEDRIN MIGRAINE) 250-250-65 MG tablet     cetirizine-psuedoePHEDrine (ZYRTEC-D) 5-120 MG per 12 hr tablet     Flaxseed, Linseed, (FLAX SEEDS) POWD     fluticasone (FLONASE) 50 MCG/ACT spray     HYDROcodone-acetaminophen (NORCO) 5-325 MG tablet     No current facility-administered medications for this visit.         Allergies   Allergen Reactions     Nsaids      Other reaction(s): Ecchymosis       ROS  General: no fever  HENT: sinus congestion, left ear pain  Respiratory barky, productive cough  Abdomen - negative  Skin - negative    OBJECTIVE:  Vitals:    03/06/20 1914   BP: 126/78   Pulse: 73   Resp: 16   Temp: 98.6  F (37  C)   TempSrc: Tympanic   SpO2: 98%   Weight: 60.7 kg (133 lb 12.8  "oz)   Height: 1.562 m (5' 1.5\")     General appearance: healthy, alert, mild distress and mild distress,crying.    Eye: no injection or drainage  Ears: abnormal: R TM mild effusion; L TM erythematous and bulging  Nose: mucosal erythema, no sinus tenderness  Oropharynx: moderate erythema  Neck: supple and mild adenopathy  Cardiac: normal RR, no murmur  Lungs: normal respiration, clear to ausculation  Abdomen: soft, non tender  Skin: no rash    Results for orders placed or performed in visit on 03/06/20   Group A Streptococcus PCR Throat Swab     Status: None   Result Value Ref Range    Specimen Description Throat     Strep Group A PCR Not Detected NDET^Not Detected       ASSESSMENT:  (H66.002) Non-recurrent acute suppurative otitis media of left ear without spontaneous rupture of tympanic membrane  (primary encounter diagnosis)  Plan: amoxicillin-clavulanate (AUGMENTIN) 875-125 MG         tablet      (J02.9) Sore throat  Plan: Group A Streptococcus PCR Throat Swab    Sinus congestion and left ear pain, sore throat  Strep exposure - requesting strep test today - this was negative  On exam left ear is erythematous and bulging  Will treat for suppurative OM left ear  Start augmentin 875 mg oral tablet, take twice daily for 7 days  Symptomatic treatments per AVS  Follow-up with primary care if symptoms persist or worsen  Patient received verbal and written instruction including review of warning signs    Karine Banerjee PA-C on 3/6/2020 at 8:12 PM      "

## 2020-03-07 NOTE — NURSING NOTE
"Chief Complaint   Patient presents with     Otalgia     Patient is here for pain in the left ear that has worsened in the last couple hours. Patient states she also has pain in the neck. Patient states her ear popped when she was swallowing. Patient has tried Tylenol with the last dose at 4:30pm with no relief. Patient would also like flonase if possible. Patient states she had strep exposure last week.     Initial /78   Pulse 73   Temp 98.6  F (37  C) (Tympanic)   Resp 16   Ht 1.562 m (5' 1.5\")   Wt 60.7 kg (133 lb 12.8 oz)   SpO2 98%   BMI 24.87 kg/m   Estimated body mass index is 24.87 kg/m  as calculated from the following:    Height as of this encounter: 1.562 m (5' 1.5\").    Weight as of this encounter: 60.7 kg (133 lb 12.8 oz).  Medication Reconciliation: complete    Jessica Allen LPN  "

## 2020-03-07 NOTE — PATIENT INSTRUCTIONS
Sinus congestion and left ear pain, sore throat  Strep exposure - requesting strep test today  Left ear infection, will treat when I known results of strep test    Middle ear infection  Water precautions, do not submerge head in pool or tub until symptoms are resolved and medication is completed.   Rest and fluids  Ibuprofen or tylenol as needed for discomfort or fever  For cough - humidified air, warm fluids, honey, throat lozenges, OTC robitussin   Return to clinic if symptoms persist or worsen      Patient Education     Middle Ear Infection (Otitis Media) in Adults  What is a middle ear infection?  A middle ear infection occurs behind the eardrum. It is most often caused by a virus or bacteria. Most kids have at least one middle ear infection by the time they are 3 years old. But adults can also get them.  What causes middle ear infections?  Inflammation in the middle ear most often starts after you ve had a sore throat, cold, or other upper respiratory problem. The infection spreads to the middle ear and causes fluid buildup behind the eardrum.   What are the symptoms of a middle ear infection?  These are the most common symptoms of middle ear infections in adults:    Ear pain    Feeling of fullness in the hear    Fluid draining from the ear    Fever    Hearing loss  These symptoms may look like other conditions or health problems. Always talk with your healthcare provider for a diagnosis.  How is a middle ear infection diagnosed?  Your healthcare provider will review your health history and do a physical exam. He or she will check the outer ear and the eardrum using an otoscope. The otoscope is a lighted tool that lets the healthcare provider see inside the ear. A pneumatic otoscope blows a puff of air into the ear to test eardrum movement. When there is fluid or infection in the middle ear, movement is decreased.  Your provider may also do a tympanometry. This is a test that directs air and sound to the middle  ear.  If you have ear infections often, your healthcare provider may suggest having a hearing test.  How is a middle ear infection treated?  Treatment will depend on your symptoms, age, and general health. It will also depend on how severe the condition is.  Treatment may include:    Antibiotics    Pain relievers    Placing small tubes in the eardrum for chronic ear infections   What are possible complications of a middle ear infection?  Untreated ear infections can lead to:    Infection in other parts of the head    Lasting (permanent) hearing loss    Speech and language problems  Can middle ear infections be prevented?  Cold and allergy medicines don't seem to prevent ear infections. And currently there is no vaccine that can prevent the disease. But check with your healthcare provider and make sure your vaccines are up-to-date. Living in a home where cigarettes are smoked can increase the chances of ear infections.  Key points about middle ear infections    Middle ear infections can affect both children and adults.    Pain and fever can be the most common symptoms.    Without treatment, permanent hearing loss may happen.    Take antibiotics as prescribed and finish all of the prescription. This can help prevent antibiotic-resistant infections or incomplete treatment with the infection returning.    Next steps  Tips to help you get the most from a visit to your healthcare provider:    Know the reason for your visit and what you want to happen.    Before your visit, write down questions you want answered.    Bring someone with you to help you ask questions and remember what your provider tells you.    At the visit, write down the name of a new diagnosis, and any new medicines, treatments, or tests. Also write down any new instructions your provider gives you.    Know why a new medicine or treatment is prescribed, and how it will help you. Also know what the side effects are.    Ask if your condition can be treated  in other ways.    Know why a test or procedure is recommended and what the results could mean.    Know what to expect if you do not take the medicine or have the test or procedure.    If you have a follow-up appointment, write down the date, time, and purpose for that visit.    Know how you can contact your provider if you have questions.      0562-6131 [employer]. [address] [place] [address], [place] [address]. All rights reserved. This information is not intended as a substitute for professional medical care. Always follow your healthcare professional's instructions.

## 2020-03-20 ENCOUNTER — NURSE TRIAGE (OUTPATIENT)
Dept: FAMILY MEDICINE | Facility: OTHER | Age: 55
End: 2020-03-20

## 2020-03-20 ENCOUNTER — ALLIED HEALTH/NURSE VISIT (OUTPATIENT)
Dept: FAMILY MEDICINE | Facility: OTHER | Age: 55
End: 2020-03-20
Payer: COMMERCIAL

## 2020-03-20 VITALS
HEART RATE: 70 BPM | SYSTOLIC BLOOD PRESSURE: 130 MMHG | DIASTOLIC BLOOD PRESSURE: 90 MMHG | OXYGEN SATURATION: 97 % | TEMPERATURE: 98.2 F

## 2020-03-20 DIAGNOSIS — J01.21 ACUTE RECURRENT ETHMOIDAL SINUSITIS: Primary | ICD-10-CM

## 2020-03-20 DIAGNOSIS — J01.11 ACUTE RECURRENT FRONTAL SINUSITIS: ICD-10-CM

## 2020-03-20 DIAGNOSIS — J01.01 ACUTE RECURRENT MAXILLARY SINUSITIS: ICD-10-CM

## 2020-03-20 PROCEDURE — 99213 OFFICE O/P EST LOW 20 MIN: CPT

## 2020-03-20 RX ORDER — CEFDINIR 300 MG/1
300 CAPSULE ORAL 2 TIMES DAILY
Qty: 20 CAPSULE | Refills: 0 | Status: SHIPPED | OUTPATIENT
Start: 2020-03-20 | End: 2020-04-21

## 2020-03-20 RX ORDER — FLUTICASONE PROPIONATE 50 MCG
2 SPRAY, SUSPENSION (ML) NASAL DAILY
Qty: 16 G | Refills: 1 | Status: SHIPPED | OUTPATIENT
Start: 2020-03-20 | End: 2023-08-28

## 2020-03-20 NOTE — PATIENT INSTRUCTIONS
Sinus infection - Antibiotic has been sent to pharmacy. Please take full course of antibiotic even if symptoms have completely resolved. This helps prevent against antibiotic resistance.     May use symptomatic care with tylenol or ibuprofen. May use cough syrup or cough drops. Using a humidifier works well to break up the congestion. You can also sleep propped up on a couple pillows to decrease symptoms at night.     Use a Neti Pot/sinusflush ([employer] Med Sinus Rinse) 3 times daily to irrigate sinuses/mucosal tissue.     Sudafed or mucinex work well for congestion.   If you choose pseudoephedrine, use for only 5-7 days AS DIRECTED. Speak to your pharmacist if you have any concerns about your medications. Mayalso use decongestant nasal spray, but only for 3 days MAXIMUM.    Please take tylenol as needed up to 4 times daily.  Treat symptomatically with warm salt water gargles.  Lozenges, Tylenol, Advil or Aleve as needed. Frequent swallows of cool liquid.  Oatmeal coats the throat and some patients find it soothes the pain.     Monitor for any fevers or chills. Return in 7-10 days if not feeling better. Please call clinic with any questions or concerns. Please take in a lot of fluids and get rest.     You will need to be evaluated if you start to experience:  Fever higher than 102.5 F (39.2 C)   Sudden and severe pain in the face and head   Trouble seeing or seeing double   Trouble thinking clearly   Swelling or redness around 1 or both eyes   Trouble breathing or a stiff neck    * If you are a smoker, try to quit *    Call 9-1-1 or go to the emergency room if you:  Have trouble breathing   Are drooling because you cannot swallow your saliva   Have swelling of the neck or tongue   Cannot move your neck or have trouble opening your mouth

## 2020-03-20 NOTE — PROGRESS NOTES
There are no exam notes on file for this visit.    HPI:    El Joseph is a 54 year old female who presents for persistent sinus symptoms.  Patient was recently diagnosed with a left ear infection.  Treated with Augmentin.  She was also having sinus symptoms at the time.  Diagnosed on 3/6/2020.  Completed a course of Augmentin.  She is still having a left earache and sinus pain and pressure throughout the sinuses.  She also now has a dry cough.  Coughing up phlegm.  Having some wheezing and rattling in the chest.  History of pneumonia.  Having some chest congestion and sore throat.  Exposure to strep throat.  Keeping food and fluids down.  No dehydration concerns.  No GI or urinary symptoms.  No known coronavirus exposures or recent travel.      Past Medical History:   Diagnosis Date     Other constipation     No Comments Provided     Pain in knee     No Comments Provided     Personal history of other medical treatment (CODE)     G7, P3-0-4-3 (3 vag. 4 spon miscarriages-one in the second trimester and one daughter was twin and lost the twin).     Personal history of other medical treatment (CODE)     in prior relationship and childhood     Radiculopathy     2011,Benign hypermobility per [facility]       Past Surgical History:   Procedure Laterality Date     COLONOSCOPY      4/2003,Normal, bleeding hemorrhoids     HYSTERECTOMY VAGINAL      5/2003,Dr Ramirez-Sunol, chronic pelvic pain/endometriosis     OTHER SURGICAL HISTORY      132192,DILATION AND CURETTAGE,After 4 miscarriages     OTHER SURGICAL HISTORY      1986,693940,OTHER,Ovarian cyst     OTHER SURGICAL HISTORY      6/2002,894648,OTHER,Chronic pelvic pain/hemorrhagic cyst     OTHER SURGICAL HISTORY      2/2009,[id number],OTHER,Dr Ramirez-recurrent ovarian cysts       Family History   Problem Relation Age of Onset     Breast Cancer Mother 68        Cancer-breast     Diabetes Mother         Diabetes     Heart Disease Father         Heart Disease,CHF, pacemaker,MI at 93      Hypertension Father         Hypertension     Aneurysm Sister         Brain      Fibromyalgia Sister      Arthritis Sister      Migraines Sister         Chronic     Arthritis Brother      Aneurysm Sister         Brain     Migraines Sister      Migraines Sister      Breast Cancer Sister         Cancer-breast     Tumor Sister         Brain     Breast Cancer Sister      Ovarian Cancer Sister      Arthritis Sister      Aneurysm Brother         Brain     Arthritis Brother      Arthritis Brother        Social History     Tobacco Use     Smoking status: Current Every Day Smoker     Packs/day: 0.25     Years: 0.00     Pack years: 0.00     Types: Cigarettes     Smokeless tobacco: Never Used   Substance Use Topics     Alcohol use: Yes     Alcohol/week: 0.0 standard drinks     Comment: Alcoholic Drinks/day: once in a while       Current Outpatient Medications   Medication Sig Dispense Refill     cefdinir (OMNICEF) 300 MG capsule Take 1 capsule (300 mg) by mouth 2 times daily for 10 days 20 capsule 0     fluticasone (FLONASE) 50 MCG/ACT nasal spray Spray 2 sprays into both nostrils daily 16 g 1     amoxicillin-clavulanate (AUGMENTIN) 875-125 MG tablet Take 1 tablet by mouth 2 times daily 14 tablet 0     aspirin-acetaminophen-caffeine (EXCEDRIN MIGRAINE) 250-250-65 MG tablet Take 2 tablets by mouth every 8 hours as needed for headaches       atenolol (TENORMIN) 25 MG tablet TAKE 1 TABLET (25MG) BY MOUTH EVERY DAY 90 tablet 3     busPIRone (BUSPAR) 15 MG tablet Take 0.5-1 tablets (7.5-15 mg) by mouth 3 times daily as needed (Anxiety) 45 tablet 3     cetirizine-psuedoePHEDrine (ZYRTEC-D) 5-120 MG per 12 hr tablet MAY TAKE ONCE TO TWICE DAILY. TAKE 10-12 HOURS APART.       cyclobenzaprine (FLEXERIL) 10 MG tablet Take 1 tablet (10 mg) by mouth 3 times daily as needed for muscle spasms 12 tablet 0     esomeprazole (NEXIUM) 40 MG DR capsule TAKE 1 CAPSULE BY MOUTH EVERY DAY BEFORE A MEAL 90 capsule 3     Flaxseed, Linseed, (FLAX SEEDS)  POWD 1 Tablespoonful Sprinkle on foods daily       FLUoxetine (PROZAC) 40 MG capsule TAKE 1 CAPSULE BY MOUTH EVERY MORNING 90 capsule 0     fluticasone (FLONASE) 50 MCG/ACT spray Spray 2 sprays into both nostrils daily (Patient not taking: Reported on 3/6/2020) 1 Bottle 3     HYDROcodone-acetaminophen (NORCO) 5-325 MG tablet Take 1 tablet by mouth every 8 hours as needed for severe pain * ACETAMINOPHEN SHOULD BE LIMITED TO 4000MG PER DAY* (Patient not taking: Reported on 3/6/2020) 30 tablet 0     pregabalin (LYRICA) 150 MG capsule Take 1 capsule (150 mg) by mouth 2 times daily 60 capsule 2       Allergies   Allergen Reactions     Nsaids      Other reaction(s): Ecchymosis       REVIEW OF SYSTEMS:  Refer to HPI.    EXAM:   Vitals:    BP (!) 130/90 (BP Location: Left arm, Patient Position: Sitting, Cuff Size: Adult Regular)   Pulse 70   Temp 98.2  F (36.8  C) (Tympanic)   SpO2 97%     General Appearance: Pleasant, alert, appropriate appearance for age. No acute distress  Ear Exam: Normal TM's bilaterally, grey, translucent, bony landmarks appreciated.   Left/Right TM: Effusion is not present. TM is not bulging. There is no pus appreciated.    Normal auditory canals and external ears. Non-tender.  OroPharynx Exam:  Erythematous posterior pharynx with no exudates. No sinus pain upon palpation of frontal, ethmoid, and maxillary sinuses.  Chest/Respiratory Exam: Normal chest wall and respirations. Clear to auscultation.  No wheezing or crackling appreciated.  No retractions appreciated.  Cardiovascular Exam: Regular rate and rhythm. S1, S2, no murmur, click, gallop, or rubs.  Lymphatic Exam: ACLN.  Skin: no rash or abnormalities  Psychiatric Exam: Alert and oriented - appropriate affect.    PHQ Depression Screen  PHQ-9 SCORE 8/21/2018 2/28/2019 11/21/2019   PHQ-9 Total Score 10 0 15       LABS:    No results found for any visits on 03/20/20.      ASSESSMENT AND PLAN:      ICD-10-CM    1. Acute recurrent ethmoidal  sinusitis  J01.21 fluticasone (FLONASE) 50 MCG/ACT nasal spray     cefdinir (OMNICEF) 300 MG capsule   2. Acute recurrent maxillary sinusitis  J01.01 fluticasone (FLONASE) 50 MCG/ACT nasal spray     cefdinir (OMNICEF) 300 MG capsule   3. Acute recurrent frontal sinusitis  J01.11 fluticasone (FLONASE) 50 MCG/ACT nasal spray     cefdinir (OMNICEF) 300 MG capsule         Patient was started on cefdinir for an acute recurrent frontal, ethmoidal and maxillary sinus infection.  Also started on Flonase nasal spray.  Encourage fluids and rest.  Vital signs are stable.  No imaging is warranted at this time.  Gave warning signs and symptoms.  Return if symptoms are changing or worsening.  Gave coronavirus information.  Encouraged to self quarantine for 14 days due to current cough and chest symptoms.    Patient Instructions   Sinus infection - Antibiotic has been sent to pharmacy. Please take full course of antibiotic even if symptoms have completely resolved. This helps prevent against antibiotic resistance.     May use symptomatic care with tylenol or ibuprofen. May use cough syrup or cough drops. Using a humidifier works well to break up the congestion. You can also sleep propped up on a couple pillows to decrease symptoms at night.     Use a Neti Pot/sinusflush ([employer] Med Sinus Rinse) 3 times daily to irrigate sinuses/mucosal tissue.     Sudafed or mucinex work well for congestion.   If you choose pseudoephedrine, use for only 5-7 days AS DIRECTED. Speak to your pharmacist if you have any concerns about your medications. Mayalso use decongestant nasal spray, but only for 3 days MAXIMUM.    Please take tylenol as needed up to 4 times daily.  Treat symptomatically with warm salt water gargles.  Lozenges, Tylenol, Advil or Aleve as needed. Frequent swallows of cool liquid.  Oatmeal coats the throat and some patients find it soothes the pain.     Monitor for any fevers or chills. Return in 7-10 days if not feeling better.  Please call clinic with any questions or concerns. Please take in a lot of fluids and get rest.     You will need to be evaluated if you start to experience:  Fever higher than 102.5 F (39.2 C)   Sudden and severe pain in the face and head   Trouble seeing or seeing double   Trouble thinking clearly   Swelling or redness around 1 or both eyes   Trouble breathing or a stiff neck    * If you are a smoker, try to quit *    Call 9-1-1 or go to the emergency room if you:  Have trouble breathing   Are drooling because you cannot swallow your saliva   Have swelling of the neck or tongue   Cannot move your neck or have trouble opening your mouth         Charlee Cordero PA-C PA-C..................3/20/2020 2:03 PM

## 2020-03-20 NOTE — TELEPHONE ENCOUNTER
"Pt has concerns of ongoing sore throat, sinus congestion/drainage, sinus pressure, cough-dry and more constant, left ear pain and requesting medication over the phone as she does not have a compute.    Pt was seen in  3/6/2020 and prescribed Augmentin for supportive otitis media of left ear.  She stated that she did not feel any better after completing abx regimen      States she baby sits granddaughter and is concerned of transferring illness to her.  States her daughter tested positive for strep from what she could recall but was not sure.      Symptoms:    Left ear pain-describes \"pounding\" and \"muffled\" sounding    Mild SOB with mild intermittent wheezing    Afebrile-states she rarely gets a fever and normally runs around 96.1    Intermittent chills    \"sinus\" type headache-pressure in cheeks and behind eyes    Denies:    Chest pain    V/D     Normal Urine output     visit 3/6/2020: Pt was advised to return to clinic if symptoms did not improve/worsened.    Advised Pt to present to front of clinic to be assessed by clinic staff.  Pt in agreement with plan and will present to clinic    Kim Mclaughlin RN  ....................  3/20/2020   2:09 PM      "

## 2020-04-21 ENCOUNTER — VIRTUAL VISIT (OUTPATIENT)
Dept: FAMILY MEDICINE | Facility: OTHER | Age: 55
End: 2020-04-21
Attending: PHYSICIAN ASSISTANT
Payer: COMMERCIAL

## 2020-04-21 ENCOUNTER — TELEPHONE (OUTPATIENT)
Dept: FAMILY MEDICINE | Facility: OTHER | Age: 55
End: 2020-04-21

## 2020-04-21 VITALS — HEIGHT: 62 IN | BODY MASS INDEX: 23.74 KG/M2 | WEIGHT: 129 LBS

## 2020-04-21 DIAGNOSIS — G44.89 CHRONIC MIXED HEADACHE SYNDROME: ICD-10-CM

## 2020-04-21 DIAGNOSIS — G89.29 OTHER CHRONIC PAIN: ICD-10-CM

## 2020-04-21 DIAGNOSIS — G43.009 MIGRAINE WITHOUT AURA AND WITHOUT STATUS MIGRAINOSUS, NOT INTRACTABLE: Primary | ICD-10-CM

## 2020-04-21 PROCEDURE — 99213 OFFICE O/P EST LOW 20 MIN: CPT | Mod: 95 | Performed by: PHYSICIAN ASSISTANT

## 2020-04-21 RX ORDER — CYCLOBENZAPRINE HCL 10 MG
10 TABLET ORAL 3 TIMES DAILY PRN
Qty: 12 TABLET | Refills: 0 | Status: SHIPPED | OUTPATIENT
Start: 2020-04-21 | End: 2020-05-20

## 2020-04-21 RX ORDER — RIZATRIPTAN BENZOATE 10 MG/1
10 TABLET ORAL
Qty: 20 TABLET | Refills: 1 | Status: SHIPPED | OUTPATIENT
Start: 2020-04-21 | End: 2020-05-20

## 2020-04-21 RX ORDER — RIZATRIPTAN BENZOATE 10 MG/1
10 TABLET ORAL
COMMUNITY
End: 2020-04-21

## 2020-04-21 ASSESSMENT — PAIN SCALES - GENERAL: PAINLEVEL: EXTREME PAIN (8)

## 2020-04-21 ASSESSMENT — ANXIETY QUESTIONNAIRES
5. BEING SO RESTLESS THAT IT IS HARD TO SIT STILL: NEARLY EVERY DAY
2. NOT BEING ABLE TO STOP OR CONTROL WORRYING: NEARLY EVERY DAY
IF YOU CHECKED OFF ANY PROBLEMS ON THIS QUESTIONNAIRE, HOW DIFFICULT HAVE THESE PROBLEMS MADE IT FOR YOU TO DO YOUR WORK, TAKE CARE OF THINGS AT HOME, OR GET ALONG WITH OTHER PEOPLE: SOMEWHAT DIFFICULT
3. WORRYING TOO MUCH ABOUT DIFFERENT THINGS: NEARLY EVERY DAY
GAD7 TOTAL SCORE: 16
1. FEELING NERVOUS, ANXIOUS, OR ON EDGE: NEARLY EVERY DAY
7. FEELING AFRAID AS IF SOMETHING AWFUL MIGHT HAPPEN: NOT AT ALL
6. BECOMING EASILY ANNOYED OR IRRITABLE: SEVERAL DAYS

## 2020-04-21 ASSESSMENT — PATIENT HEALTH QUESTIONNAIRE - PHQ9
SUM OF ALL RESPONSES TO PHQ QUESTIONS 1-9: 16
5. POOR APPETITE OR OVEREATING: NEARLY EVERY DAY

## 2020-04-21 ASSESSMENT — MIFFLIN-ST. JEOR: SCORE: 1140.45

## 2020-04-21 NOTE — TELEPHONE ENCOUNTER
Patient has not had her medication for migraines for about a year. She is trying to get a refill on the medication she had in the past for migraines. Dr. Dubon was the last physician to fill her prescription. Response to last telephone message, was that could not be refilled due to RN refill policy. Patient leaving town this afternoon for  and would like to speak to [facility].     Kassandra Jones on 2020 at 10:32 AM

## 2020-04-21 NOTE — PROGRESS NOTES
"El Joseph is a 54 year old female who is being evaluated via a billable telephone visit.      The patient has been notified of following:     \"This telephone visit will be conducted via a call between you and your physician/provider. We have found that certain health care needs can be provided without the need for a physical exam.  This service lets us provide the care you need with a short phone conversation.  If a prescription is necessary we can send it directly to your pharmacy.  If lab work is needed we can place an order for that and you can then stop by our lab to have the test done at a later time.    Telephone visits are billed at different rates depending on your insurance coverage. During this emergency period, for some insurers they may be billed the same as an in-person visit.  Please reach out to your insurance provider with any questions.    If during the course of the call the physician/provider feels a telephone visit is not appropriate, you will not be charged for this service.\"    Patient has given verbal consent for Telephone visit?  Yes    How would you like to obtain your AVS? MyChart    Subjective     El Joseph is a 54 year old female who presents to clinic today for the following health issues:    HPI     Patient is following up regarding migraine medications. Has been taking atenolol for migraine prophylaxis but states this is no longer working. Has taken rizatriptan in the past with some improvement. Requesting to try that again.  States she has been having more frequent migraines up to 3 times a week. Describes her migraines as bilateral pain behind her eyes associated with photophobia, phonophobia, and vomiting. Has tried using essential oils with minimal relief. Has had previous head CT and MRI which were both negative.      Also requesting a refill of Flexeril for chronic muscle spasms. States it is mostly in her legs and notices it mostly at night when lying down. Has been " using Flexeril for a while and that seems to help with her symptoms. Notes no side effects.        PAST MEDICAL HISTORY:   Past Medical History:   Diagnosis Date     Other constipation     No Comments Provided     Pain in knee     No Comments Provided     Personal history of other medical treatment (CODE)     G7, P3-0-4-3 (3 vag. 4 spon miscarriages-one in the second trimester and one daughter was twin and lost the twin).     Personal history of other medical treatment (CODE)     in prior relationship and childhood     Radiculopathy     2011,Benign hypermobility per [facility]       PAST SURGICAL HISTORY:   Past Surgical History:   Procedure Laterality Date     COLONOSCOPY      4/2003,Normal, bleeding hemorrhoids     HYSTERECTOMY VAGINAL      5/2003,Dr Ramirez-Jean, chronic pelvic pain/endometriosis     OTHER SURGICAL HISTORY      [id number],DILATION AND CURETTAGE,After 4 miscarriages     OTHER SURGICAL HISTORY      1986,221031,OTHER,Ovarian cyst     OTHER SURGICAL HISTORY      6/2002,027385,OTHER,Chronic pelvic pain/hemorrhagic cyst     OTHER SURGICAL HISTORY      2/2009,024788,OTHER,Dr Ramirez-recurrent ovarian cysts       FAMILY HISTORY:   Family History   Problem Relation Age of Onset     Breast Cancer Mother 68        Cancer-breast     Diabetes Mother         Diabetes     Heart Disease Father         Heart Disease,CHF, pacemaker,MI at 93     Hypertension Father         Hypertension     Aneurysm Sister         Brain      Fibromyalgia Sister      Arthritis Sister      Migraines Sister         Chronic     Arthritis Brother      Aneurysm Sister         Brain     Migraines Sister      Migraines Sister      Breast Cancer Sister         Cancer-breast     Tumor Sister         Brain     Breast Cancer Sister      Ovarian Cancer Sister      Arthritis Sister      Aneurysm Brother         Brain     Arthritis Brother      Arthritis Brother        SOCIAL HISTORY:   Social History     Tobacco Use     Smoking status: Current Every Day  "Smoker     Packs/day: 0.25     Years: 0.00     Pack years: 0.00     Types: Cigarettes     Smokeless tobacco: Never Used   Substance Use Topics     Alcohol use: Not Currently     Alcohol/week: 0.0 standard drinks        Allergies   Allergen Reactions     Nsaids      Other reaction(s): Ecchymosis     Current Outpatient Medications   Medication     aspirin-acetaminophen-caffeine (EXCEDRIN MIGRAINE) 250-250-65 MG tablet     atenolol (TENORMIN) 25 MG tablet     busPIRone (BUSPAR) 15 MG tablet     esomeprazole (NEXIUM) 40 MG DR capsule     FLUoxetine (PROZAC) 40 MG capsule     fluticasone (FLONASE) 50 MCG/ACT nasal spray     HYDROcodone-acetaminophen (NORCO) 5-325 MG tablet     pregabalin (LYRICA) 150 MG capsule     rizatriptan (MAXALT) 10 MG tablet     cetirizine-psuedoePHEDrine (ZYRTEC-D) 5-120 MG per 12 hr tablet     cyclobenzaprine (FLEXERIL) 10 MG tablet     Flaxseed, Linseed, (FLAX SEEDS) POWD     No current facility-administered medications for this visit.          Reviewed and updated as needed this visit by Provider         Review of Systems   ROS COMP: Constitutional, HEENT, cardiovascular, pulmonary, gi and gu systems are negative, except as otherwise noted.       Objective   Reported vitals:  Ht 1.562 m (5' 1.5\")   Wt 58.5 kg (129 lb)   BMI 23.98 kg/m     healthy, alert and no distress  PSYCH: Alert and oriented times 3; coherent speech, normal   rate and volume, able to articulate logical thoughts, able   to abstract reason, no tangential thoughts, no hallucinations   or delusions  Her affect is normal  RESP: No cough, no audible wheezing, able to talk in full sentences  Remainder of exam unable to be completed due to telephone visits            Assessment/Plan:  1. Migraine without aura and without status migrainosus, not intractable    2. Chronic mixed headache syndrome    3. Other chronic pain      1, 2. Discussed with patient that her symptoms do seem to fit with a migraine but that she also might be " suffering from tension headaches as well. Prescribed rizatriptan as that has helped control her migraines well in the past. Educated on medication, use, and side effects. Discussed neck and shoulder massages and relaxation to help with tension headaches. If no improvement then consider referral to neurology or headache specialist.   3. Refilled Flexeril for muscle spasms.     Call or return to the clinic if symptoms persist, worsen, or new symptoms arise.         Phone call duration:  12 minutes    Lisa Crisostomo PA-C

## 2020-04-21 NOTE — TELEPHONE ENCOUNTER
After verifying pts name and date of birth with pt, pt offered appointment today @ 1600 with Dr. Burns but pt declined wants something sooner. Writer offered appointment today with Lisa AVILA @ 1240 and accepted..  Ivory Looney LPN

## 2020-04-21 NOTE — NURSING NOTE
"Chief Complaint   Patient presents with     Recheck Medication     Patient following up on medications. She states she was put on Atenolol for migraine maintenance but that is not working. She has taken Rizatriptan in the past and would like a prescription sent for that. She would also like a refill on Cyclobenzaprine. She states her depression has been worse due to her mother's passing. They are in the process of cleaning out her house.     Initial Ht 1.562 m (5' 1.5\")   Wt 58.5 kg (129 lb)   BMI 23.98 kg/m   Estimated body mass index is 23.98 kg/m  as calculated from the following:    Height as of this encounter: 1.562 m (5' 1.5\").    Weight as of this encounter: 58.5 kg (129 lb).  Medication Reconciliation: complete    Lenore Campa MA  "

## 2020-04-22 ASSESSMENT — ANXIETY QUESTIONNAIRES: GAD7 TOTAL SCORE: 16

## 2020-05-17 DIAGNOSIS — M79.7 FIBROMYALGIA: Primary | ICD-10-CM

## 2020-05-19 NOTE — TELEPHONE ENCOUNTER
[id number] sent Rx request for the following:    From pharmacy: Patient enrolled in our Rx Med Sync service to improveadherence. We are requesting a refill authorization inadvance to ensure an active prescription is on file.      PREGABALIN 150MG CAPSULE     Sig: TAKE 1 CAPSULE (150MG) BY MOUTH TWICE A DAY    Last Prescription Date:   2/17/20  Last Fill Qty/Refills:         60, R-2    Last Office Visit:              4/21/20 (Lisa SAMUELS)  Future Office visit:           None  Routing refill request to provider for review/approval because:  Drug not on the [facility], [facility] or [facility] refill protocol or controlled substance    Unable to complete prescription refill per RN Medication Refill Policy. Kezia Gale RN .............. 5/19/2020  11:25 AM   32y  s/p  PPD #1 of a viable female infant.   Post-partum CBC pending collection this AM. VSS.    Plan:  1. Routine post-partum care.  2. Encourage ambulation - if pt is not ambulating please use SCDs for DVT ppx.  3. Regular diet.  4. Encourage mother-baby interaction.  5. Plan for discharge today pending Attending's approval.

## 2020-05-20 ENCOUNTER — VIRTUAL VISIT (OUTPATIENT)
Dept: FAMILY MEDICINE | Facility: OTHER | Age: 55
End: 2020-05-20
Attending: FAMILY MEDICINE
Payer: COMMERCIAL

## 2020-05-20 VITALS — HEIGHT: 66 IN | WEIGHT: 130 LBS | BODY MASS INDEX: 20.89 KG/M2

## 2020-05-20 DIAGNOSIS — G44.89 CHRONIC MIXED HEADACHE SYNDROME: ICD-10-CM

## 2020-05-20 DIAGNOSIS — G89.29 OTHER CHRONIC PAIN: ICD-10-CM

## 2020-05-20 DIAGNOSIS — G43.009 MIGRAINE WITHOUT AURA AND WITHOUT STATUS MIGRAINOSUS, NOT INTRACTABLE: ICD-10-CM

## 2020-05-20 DIAGNOSIS — M79.7 FIBROMYALGIA: ICD-10-CM

## 2020-05-20 DIAGNOSIS — F33.41 RECURRENT MAJOR DEPRESSIVE DISORDER, IN PARTIAL REMISSION (H): ICD-10-CM

## 2020-05-20 PROCEDURE — 99212 OFFICE O/P EST SF 10 MIN: CPT | Mod: 95 | Performed by: FAMILY MEDICINE

## 2020-05-20 RX ORDER — FLUOXETINE 40 MG/1
CAPSULE ORAL
Qty: 90 CAPSULE | Refills: 3 | Status: SHIPPED | OUTPATIENT
Start: 2020-05-20 | End: 2021-06-14

## 2020-05-20 RX ORDER — PREGABALIN 150 MG/1
CAPSULE ORAL
Qty: 60 CAPSULE | Refills: 4 | OUTPATIENT
Start: 2020-05-20

## 2020-05-20 RX ORDER — PREGABALIN 150 MG/1
150 CAPSULE ORAL 2 TIMES DAILY
Qty: 180 CAPSULE | Refills: 1 | Status: SHIPPED | OUTPATIENT
Start: 2020-05-20 | End: 2022-01-21

## 2020-05-20 RX ORDER — HYDROCODONE BITARTRATE AND ACETAMINOPHEN 5; 325 MG/1; MG/1
1 TABLET ORAL EVERY 8 HOURS PRN
Qty: 30 TABLET | Refills: 0 | Status: SHIPPED | OUTPATIENT
Start: 2020-05-20 | End: 2020-12-18

## 2020-05-20 RX ORDER — RIZATRIPTAN BENZOATE 10 MG/1
10 TABLET ORAL
Qty: 18 TABLET | Refills: 1 | Status: SHIPPED | OUTPATIENT
Start: 2020-05-20 | End: 2020-12-18

## 2020-05-20 RX ORDER — CYCLOBENZAPRINE HCL 10 MG
10 TABLET ORAL 3 TIMES DAILY PRN
Qty: 90 TABLET | Refills: 1 | Status: SHIPPED | OUTPATIENT
Start: 2020-05-20 | End: 2020-12-18

## 2020-05-20 ASSESSMENT — MIFFLIN-ST. JEOR: SCORE: 1208.49

## 2020-05-20 ASSESSMENT — PAIN SCALES - GENERAL: PAINLEVEL: MODERATE PAIN (5)

## 2020-05-20 NOTE — TELEPHONE ENCOUNTER
Attempt made to reach Pt, to assist her in scheduling appointment. Left message on machine to call back. Kezia Gale RN .............. 5/20/2020  1:47 PM

## 2020-05-20 NOTE — TELEPHONE ENCOUNTER
called stating Pt scheduled the following appointment:    Next 5 appointments (look out 90 days)    May 20, 2020  3:40 PM CDT  Telephone Visit with Venice Milligan MD  [facility] ([facility] [address]  [place] [id number] [phone]        Patient was unsure why medication refill was needed, but wanted a call back from Dr. Milligan regarding this. According to last fill information, Pt would be due for refill of her Lyrica.  states Pt was unable to answer any of the questions she asked. Routing to Dr. Milligan, as TRINO.    Kezia Gale RN .............. 5/20/2020  2:18 PM

## 2020-05-20 NOTE — PROGRESS NOTES
"El Joseph is a 54 year old female who is being evaluated via a billable telephone visit.      The patient has been notified of following:     \"This telephone visit will be conducted via a call between you and your physician/provider. We have found that certain health care needs can be provided without the need for a physical exam.  This service lets us provide the care you need with a short phone conversation.  If a prescription is necessary we can send it directly to your pharmacy.  If lab work is needed we can place an order for that and you can then stop by our lab to have the test done at a later time.    Telephone visits are billed at different rates depending on your insurance coverage. During this emergency period, for some insurers they may be billed the same as an in-person visit.  Please reach out to your insurance provider with any questions.    If during the course of the call the physician/provider feels a telephone visit is not appropriate, you will not be charged for this service.\"    Patient has given verbal consent for Telephone visit?  Yes    What phone number would you like to be contacted at? [phone]    How would you like to obtain your AVS? Emiliehart    Subjective     El Joseph is a 54 year old female who presents via phone visit today for the following health issues:      HPI    #Migraines  Needs refill on rizatriptan. Concerned that the last fill she got only had 18.  She has been getting migraines \"almost every day\". She notes that she has been getting migraines since 3rd grade when she fractured her skull. The headache is behind the eyes with \"flickery\" light sensation as a warning. Sometimes no warning. She will then need quiet, dark space. She has associated nausea. Her last migraine lasted from Sunday to Tuesday. If she takes something right away, the medication usually works, but other times it just takes the edge off. Previously had been on atenolol for migraine prevention " "and for many years it had not made a difference so she recently stopped it. She has never worked with PT for headaches. Per chart review--has had MRI done in Dec 2019 and wnl.  She used to take Lortab (1/2 tablet) when pain is really bad.       [facility] 01/01/2020- 5/20/2020   Fill Date ID Written Drug Qty Days Prescriber Rx # Pharmacy Refill Daily Dose * Pymt Type     04/26/2020 1 02/17/2020 Pregabalin 150 Mg Capsule  60.00 30  Dave [id number] Thr (9128) 2/2 2.01 [facility] [place]    03/26/2020 1 02/17/2020 Pregabalin 150 Mg Capsule  60.00 30  Dave [id number] Thr (9128) 1/2 2.01 [place]    02/23/2020 1 02/17/2020 Pregabalin 150 Mg Capsule  60.00 30  Dave [id number] Thr (9128) 0/2 2.01 [place]    01/30/2020 1 01/30/2020 Tramadol Hcl 50 Mg Tablet  4.00 1 [place] [id number] Thr (9128) 0/0 20.00 [facility] [place]    01/24/2020 1 11/21/2019 Pregabalin 150 Mg Capsule  60.00 30  Dave [id number] Thr (9128) 2/2 2.01 [place] [place]           Review of Systems   Constitutional, HEENT, cardiovascular, pulmonary, gi and gu systems are negative, except as otherwise noted.       Objective   Reported vitals:  Ht 1.664 m (5' 5.5\")   Wt 59 kg (130 lb)   LMP  (LMP Unknown)   Breastfeeding No   BMI 21.30 kg/m     healthy, alert and no distress  PSYCH: Alert and oriented times 3; coherent speech, normal   rate and volume, able to articulate logical thoughts, able   to abstract reason, no tangential thoughts, no hallucinations   or delusions  Her affect is normal  RESP: No cough, no audible wheezing, able to talk in full sentences  Remainder of exam unable to be completed due to telephone visits    Diagnostic Test Results: none         Assessment/Plan:  1. Other chronic pain  - cyclobenzaprine (FLEXERIL) 10 MG tablet; Take 1 tablet (10 mg) by mouth 3 times daily as needed for muscle spasms  Dispense: 90 tablet; Refill: 1  - HYDROcodone-acetaminophen (NORCO) 5-325 MG tablet; Take 1 tablet by mouth every 8 hours as needed for severe " pain * ACETAMINOPHEN SHOULD BE LIMITED TO 4000MG PER DAY*  Dispense: 30 tablet; Refill: 0    2. Recurrent major depressive disorder, in partial remission (H)  - FLUoxetine (PROZAC) 40 MG capsule; TAKE 1 CAPSULE BY MOUTH EVERY MORNING  Dispense: 90 capsule; Refill: 3    3. Fibromyalgia  - pregabalin (LYRICA) 150 MG capsule; Take 1 capsule (150 mg) by mouth 2 times daily  Dispense: 180 capsule; Refill: 1    4. Chronic mixed headache syndrome  - rizatriptan (MAXALT) 10 MG tablet; Take 1 tablet (10 mg) by mouth every 2 hours as needed for migraine  Dispense: 18 tablet; Refill: 1  - PHYSICAL THERAPY REFERRAL; Future    5. Migraine without aura and without status migrainosus, not intractable  - rizatriptan (MAXALT) 10 MG tablet; Take 1 tablet (10 mg) by mouth every 2 hours as needed for migraine  Dispense: 18 tablet; Refill: 1  - PHYSICAL THERAPY REFERRAL; Future    --Discussed that migraine management and goals for the future like attending physical therapy, neurology consult in future, trying a different preventative medications. Plan to start with physical therapy. Patient would also like to pursue accupuncture.   --Refilled medication. Reviewed [place] .     1557 to 1607  Phone call duration:  10 minutes    Note has been created by Resident Yissel Zambrano MD PGY2. Please see additional comments below by attending Dr. Milligan. Note has been sent for her review.     Yissel Zambrano MD on 5/20/2020 at 4:07 PM      Above reviewed and discussed with 2nd year FR Resident, Dr. Yissel Zambrano. Patient interviewed and examined by myself. Agree with above plan.       Venice Milligan MD

## 2020-06-16 ENCOUNTER — HOSPITAL ENCOUNTER (OUTPATIENT)
Dept: PHYSICAL THERAPY | Facility: OTHER | Age: 55
Setting detail: THERAPIES SERIES
End: 2020-06-16
Attending: FAMILY MEDICINE
Payer: COMMERCIAL

## 2020-06-16 DIAGNOSIS — G43.009 MIGRAINE WITHOUT AURA AND WITHOUT STATUS MIGRAINOSUS, NOT INTRACTABLE: ICD-10-CM

## 2020-06-16 DIAGNOSIS — G44.89 CHRONIC MIXED HEADACHE SYNDROME: ICD-10-CM

## 2020-06-16 PROCEDURE — 97140 MANUAL THERAPY 1/> REGIONS: CPT | Mod: GP

## 2020-06-16 PROCEDURE — 97161 PT EVAL LOW COMPLEX 20 MIN: CPT | Mod: GP

## 2020-06-16 NOTE — PROGRESS NOTES
"   06/16/20 0900   General Information   Type of Visit Initial OP Ortho PT Evaluation   Start of Care Date 06/16/20   Referring Physician Venice Milligan MD    Patient/Family Goals Statement To reduce her headaches/migraines   Orders Evaluate and Treat   Date of Order 05/20/20   Certification Required? No   Medical Diagnosis Chronic mixed headache syndrome G44.89, Migraine without aura and without status migrainosus, not intractable G43.009     Surgical/Medical history reviewed Yes   Precautions/Limitations no known precautions/limitations   General Information Comments Patient is a 54 year old female referred to physical therapy with migraines. She reports that she has been getting migraines about 4-5 times per week. She fractured her skull back in 3rd grade and knows that they started around that time. They weren't always this bad but in the past year they have gotten much more frequent. She has to find a quiet, dark room to help it. She has a prescription for migrains and if she takes this soon enough it will help her pain. At times, she will have to go into the ER for a \"migraine cocktail\". Notes that she will get most of her pain primarily behind her eyes. Has gotten some relief with work to the back of her neck. 5 years ago, her father passed away. 1 year ago her mother passed asway as well and most recently she has sold their house. Has been an emotional time lately. Does smoke.         Present No   Body Part(s)   Body Part(s) Cervical Spine   Presentation and Etiology   Pertinent history of current problem (include personal factors and/or comorbidities that impact the POC) Reports past medical history of depression, fibromyalgia, menopause, arthritis and tobacco use. Also notes that she has had surgeries due to pregnancies, endometriosis, miscarriages, and a hysterectomy.    Impairments A. Pain;E. Decreased flexibility;N. Headaches   Functional Limitations perform activities of daily " "living;perform desired leisure / sports activities   Symptom Location head/neck   How/Where did it occur From insidious onset   Onset date of current episode/exacerbation 05/20/20  (\"All my life\")   Chronicity Chronic   Pain rating (0-10 point scale) Best (/10);Worst (/10)   Best (/10) 2   Worst (/10) 10   Pain quality A. Sharp;C. Aching;E. Shooting;F. Stabbing   Frequency of pain/symptoms D. Other   Pain frequency comment Gets migraines 4-5 times per day   Pain/symptoms are: Other   Pain symptoms comment Variability when and where these migraines will occur   Pain/symptoms exacerbated by M. Other   Pain exacerbation comment \"Anything\" - reports that not one thing in particular sets her migrains off   Pain/symptoms eased by K. 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 No   Have you fallen and had an injury in the past year? No   Is patient a fall risk? No   Cervical Spine   Observation Patient resting comfortably in chair. Notes that she does not have a headache or migraine at this time   Integumentary  No significant findings   Posture Mildly protracted shoulders, slightly slouched   Cervical Flexion ROM WFL - pulling through her neck and mid back   Cervical Extension ROM Min limited with pulling in the front of her neck   Cervical Right Side Bending ROM Min limited secondary to " "stiffness and pain on the left   Cervical Left Side Bending ROM Min limted secondary to pain on the right   Cervical Right Rotation ROM WFL   Cervical Left Rotation ROM WFL with pulling on the right   Shoulder Shrug (C2-C4) Strength 5/5   Shoulder Abd (C5) Strength 5/5   Shoulder ER (C5, C6) Strength 5/5   Shoulder IR (C5, C6) Strength 5/5   Elbow Flexion (C5, C6) Strength 5/5   Elbow Extension (C7) Strength 5/5   Upper Trapezius Flexibility Mod limited on left, min imited on right   Levator Scapula Flexibility Min limited bilaterally   Spurling Test Negative   Cervical Distraction Test \"feels good\" with cervical manual distraction   Neer Impingement Test negative   [place] Impingement Test negative   Sulcus Sign negative   Segmental Mobility-Cervical Stiffness noted with PA glides to spinous and tranvserse processes primarily in upper cervical regon   Palpation Discomfort noted with palpation to cervical paraspinals, suboccipital muscles, SCM (R>L), upper trapezius (L>R) and levator scap bilaterally   Dermatome/Sensory Testing intact to light touch   Neurological Testing Comments Negative   Planned Therapy Interventions   Planned Therapy Interventions joint mobilization;manual therapy;neuromuscular re-education;ROM;strengthening;stretching   Planned Modality Interventions   Planned Modality Interventions Cryotherapy;Ultrasound;Traction;Hot packs   Clinical Impression   Criteria for Skilled Therapeutic Interventions Met yes, treatment indicated   PT Diagnosis Impaired mobility, migraines effecting function   Influenced by the following impairments migraines, headaches   Functional limitations due to impairments any activity when migraine is present   Clinical Presentation Stable/Uncomplicated   Clinical Presentation Rationale Clinical judgement   Clinical Decision Making (Complexity) Low complexity   Therapy Frequency other (see comments)  (1-2 times per week)   Predicted Duration of Therapy Intervention (days/wks) " "8 weeks   Risk & Benefits of therapy have been explained Yes   Patient, Family & other staff in agreement with plan of care Yes   Clinical Impression Comments Signs and symptoms consistent with migrains with no consistency when coming on. Notes that she has been dealing with them for quite some time and have worsened most recently. She reports having relief with previous treatments such as chiropractic and what she described as \"applied kinesiology\". She would benefit from skilled PT services in order to reduce her frequency and intensity of her migraines.    Education Assessment   Barriers to Learning No barriers   ORTHO GOALS   PT Ortho Eval Goals 1;3;2   Ortho Goal 1   Goal Identifier HA/Migraines   Goal Description Patient will report a reduction in her headaches/migraines from 4-5 a week to 1-2 per week in order to improve her function and quality of life.    Target Date 07/14/20   Ortho Goal 2   Goal Identifier Migraines   Goal Description Patient will reports a reduction of insensity and duration of her migraines when they do arise in order to improve her pain and symptoms   Target Date 08/11/20   Ortho Goal 3   Goal Identifier house/yard work   Goal Description Patient will be able to complete all house and yard work consistently without increasing or creating new migraine   Target Date 08/11/20   Total Evaluation Time   PT Jennifer, Low Complexity Minutes ([id number]) 40     "

## 2020-06-23 ENCOUNTER — HOSPITAL ENCOUNTER (OUTPATIENT)
Dept: PHYSICAL THERAPY | Facility: OTHER | Age: 55
Setting detail: THERAPIES SERIES
End: 2020-06-23
Attending: FAMILY MEDICINE
Payer: COMMERCIAL

## 2020-06-23 PROCEDURE — 97140 MANUAL THERAPY 1/> REGIONS: CPT | Mod: GP

## 2020-06-23 PROCEDURE — 97110 THERAPEUTIC EXERCISES: CPT | Mod: GP

## 2020-06-23 NOTE — PROGRESS NOTES
Outpatient Physical Therapy Progress Note     Patient: El Joseph  : 1965    Beginning/End Dates of Reporting Period:  2020 to 2020    Referring Provider: Venice Milligan MD    Therapy Diagnosis: Impaired mobility, migraines effecting function. Hx of multiple pelvic surgeries     Client Self Report: El is seen for continued PT for migraine headaches, chronic mixed headache syndrome. She was evaluated by Zachariah Wu and is seen today by this therapist for further manual assessment and treatment. Refer to evaluation note dated 2020. Notes headache frequency is daily. Migraine frequency: 4-5 times per weeks. Severity of migraines: severe. Periodicity: Prescription medication will make her headache worse before it gets better-goes away. Notes if she cannot take it right away, it will only ease her headache. Location behind eyes, in her neck up over her ears to her eyes. Gets really tense with the migraine.  Worse on the right. Will get an aura sometimes with lights flashing. Has sensitivity to noise, light and smells. Interventions patient has tried to help her headaches: essential oils, prescription meds (Rizatriptan, anti-nausea medication), Benadryl and excedrin, lying in dark quiet room, lying down helps sometimes, squeezing head. Having someone press on her upper neck.   Used to take atenolol and this did not really help. Has tried meditation. Has not tried any diet changes. Would travel to the [facility] to see a chiropractor/kinesiologist Dr. Lai to have treatments which really helped. Does have a headache today. Did take Excedrin and Benadryl today. Pain today 5/10. Worse pain is 10/10. Has a strong family hx of migraines.  Did see a neurologist many years ago. Did recently have and MRA completed as she has 3 siblings with brain aneurysms-findings were negative. Has had increased stresses in her life with her Mom passing away a year ago, her dad 5 years ago and the recent sale  of her parents' home. Notes she has had her headaches since she was a child, after a skull fracture. Notes that she has a past medical hx of chronic constipation, lower back pain, hysterectomy, surgeries related to pregnancies, miscarriages and endometriosis.  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 HIT-6 scorin/78.    Objective Measurements:  Objective Measure: Postural loading  Details: General listening to the L lateral cranium with extended listening to the upper thoracic region and the pelvis. Postural loading: Head decreased L/L and L/R. Shoulders decreased in general with most noteable R/R. Pelvis decreased R/R adn L/R.  Objective Measure: Cervical segmental mobility  Details: Good motion C12. Limited R to L lateral glides C0-1 in extension more than flexion, C23 to C67 R to L lateral glides in both flexion and extension. L to R lateral glide C45, C56.   Objective Measure: Myofascial  Details: Local listening at the abdomen and pelvis: upper ascending colon and liver, lower descending colon, rectum, deep to the R side of the bladder with specific fascial restricitons noted in the fascia of toldt R and L, endopelvic fascia--will require further assessment. Significant spinal dural tightness noted bilaterally--not able to perform pelvic clock 12<>6: significant limit in available motion and painful. Restriction in the thoracic inlet with limited inferior loading of the thorax as well. Significant tightness in the R>L deep and middle anterior cervical fascia, pharyngobasilar fascia. Generalized cervical fascial tightness noted, decreased distraction.   Objective Measure: Cranial assessment  Details: Listening at the vertex: posterolateral L cranium. Limited motion noted in general with cranial  screen: Restriction posterior L and anterior R at the coronal suture, anterior R at the sagittal suture, all planes with frontal bone assessment.  Limited temporal motion in general with  almost no IR/ER, superior traction and PA lift. Sphenobasilar synchondrosis (SBS) noted to be in L lateral strain pattern with limited decompression. Mild limit in parietal decompression L. Frontal lift limited bilaterally. Limited occipital mobility. Restricted in both cranial and spinal dura with assessment at the rectus capitus posterior minor with generalized tightness noted. Further cranial assessment deferred due to increased migraine symptoms.        Outcome Measures (most recent score):  HIT-6: 66/78    Goals:  Goal Identifier HA   Goal Description Patient will report a reduction in her non-migraine headaches from daily to 1-2 per week in order to improve her function and quality of life.    Target Date 08/11/20   Date Met      Progress:     Goal Identifier Migraines   Goal Description Patient will decreased intensity of migraines to all improvement of 50 % or better in her ability to concentrate when headache is present   Target Date 08/11/20   Date Met      Progress:     Goal Identifier house/yard work   Goal Description Patient to display improved myofascial/dural and joint mobility to allow her to complete house and yard work consistently with minimal headache 50-75% of the time   Target Date 08/11/20   Date Met      Progress:     Goal Identifier Pelvic mobility   Goal Description Pt to display symmetrical alignment, improved myofascial mobilty at the pelvis and good sacral distraction to allow for better L-S mobility and less influence on headaches through regional interdependence.    Target Date 08/11/20   Date Met      Progress:     Goal Identifier HEP   Goal Description Pt to display independence and compliance with a home exercise program to assist in self management.    Target Date 08/11/20   Date Met      Progress:       Progress Toward Goals:   Goals updated on this date after further assessment.      Plan:  Continue therapy per current plan of care. Continue per POC established on 6-.  Will also need to address thoracic spine, lumbosacral and abdominal/pelvic areas due to regional interdependence. Continued assessment, Manual therapy, N-M re-ed, therapeutic exercise and home program.    Discharge:  Samantha Srinivasan, PT on 6/23/2020 at 2:34 PM

## 2020-06-30 ENCOUNTER — HOSPITAL ENCOUNTER (OUTPATIENT)
Dept: PHYSICAL THERAPY | Facility: OTHER | Age: 55
Setting detail: THERAPIES SERIES
End: 2020-06-30
Attending: FAMILY MEDICINE
Payer: COMMERCIAL

## 2020-06-30 PROCEDURE — 97140 MANUAL THERAPY 1/> REGIONS: CPT | Mod: GP

## 2020-07-06 ENCOUNTER — HOSPITAL ENCOUNTER (OUTPATIENT)
Dept: PHYSICAL THERAPY | Facility: OTHER | Age: 55
Setting detail: THERAPIES SERIES
End: 2020-07-06
Attending: FAMILY MEDICINE
Payer: COMMERCIAL

## 2020-07-06 PROCEDURE — 97140 MANUAL THERAPY 1/> REGIONS: CPT | Mod: GP

## 2020-07-06 PROCEDURE — 97112 NEUROMUSCULAR REEDUCATION: CPT | Mod: GP

## 2020-07-06 PROCEDURE — 97110 THERAPEUTIC EXERCISES: CPT | Mod: GP

## 2020-07-08 ENCOUNTER — HOSPITAL ENCOUNTER (OUTPATIENT)
Dept: PHYSICAL THERAPY | Facility: OTHER | Age: 55
Setting detail: THERAPIES SERIES
End: 2020-07-08
Attending: FAMILY MEDICINE
Payer: COMMERCIAL

## 2020-07-08 PROCEDURE — 97140 MANUAL THERAPY 1/> REGIONS: CPT | Mod: GP

## 2020-07-08 PROCEDURE — 97112 NEUROMUSCULAR REEDUCATION: CPT | Mod: GP

## 2020-08-06 ENCOUNTER — HOSPITAL ENCOUNTER (OUTPATIENT)
Dept: PHYSICAL THERAPY | Facility: OTHER | Age: 55
Setting detail: THERAPIES SERIES
End: 2020-08-06
Attending: FAMILY MEDICINE
Payer: COMMERCIAL

## 2020-08-06 PROCEDURE — 97140 MANUAL THERAPY 1/> REGIONS: CPT | Mod: GP

## 2020-08-06 PROCEDURE — 97112 NEUROMUSCULAR REEDUCATION: CPT | Mod: GP

## 2020-08-06 NOTE — PROGRESS NOTES
"Outpatient Physical Therapy Progress Note     Patient: El Joseph  : 1965    Beginning/End Dates of Reporting Period:  20 to 2020    Referring Provider: Venice Milligan MD    Therapy Diagnosis:  Impaired mobility, migraines effecting function. Hx of multiple pelvic surgeries     Client Self Report: Pt returns after last being seen 4 weeks ago. This is her 6th PT session for migraines, mixed headache syndrome. Notes that she has not been in as one of her family members  was exposed to COVID so she chose to self isolate.  Stress level is very high with this as well as other family issues. Notes that she is struggling with her headaches. Notes that they are daily for the last 2 1/2 weeks--does not matter what she does or eats. \"I cannot get away from them.\" Notes the last 3 days she has had her granduaguhter  so she has not more than  3 hours sleep per night. Has a mild headache right now--just started between the eyes, above the ears and in the neck. Still struggling with her GI issues, constipation with report of a weekly painful bowel movement. Notes that her lower back is sore as well.      Objective Measurements:  Objective Measure: Postural loading  Details: General listening to the L posterolateral cranium. Postural loading: Head decreased L/L and L/R. Shoulders decreased  in all planes but worse R/L, R/R. Pelvis decreased L/L and L/R.  Objective Measure: Cervical segmental mobility  Details: Neural restriction R C3, C4, C5 with associated posterior tender points noted. Noted to have significant stiffness throughout the cervical spine.  Objective Measure: Myofascial  Details: Significant increased tightness noted in the cervical region in general with much difficulty relaxing neck muscles. Very restricted B phayrngobasilar fascia, deep anterior cervical fascia, cervical paraspinals, SCM, UT, LS  Objective Measure: Cranial assessment  Details: Listening at the vertex posterolateral L. Restriction " in temporal motion, most notable L superior traction. ER/IR limited as well R and L. Restrictions at the L parietal notch, occipitomastoid suture. L tentorium cerebelli tightness noted.   Objective Measure: abdominal/pelvic screen  Details: Not completed on this date but at last assessment noted to have limited sacral mobility, significant fascial tightness associated with the colon.      Goals:  Goal Identifier HA   Goal Description Patient will report a reduction in her non-migraine headaches from daily to 1-2 per week in order to improve her function and quality of life.    Target Date 02/03/21   Date Met      Progress: headaches daily in the last several weeks due to increased stress levels.      Goal Identifier Migraines   Goal Description Patient will decreased intensity of migraines to all improvement of 50 % or better in her ability to concentrate when headache is present   Target Date 02/03/21   Date Met      Progress:  in progress with flare in headaches the last several weeks so no functional improvement yet.     Goal Identifier house/yard work   Goal Description Patient to display improved myofascial/dural and joint mobility to allow her to complete house and yard work consistently with minimal headache 50-75% of the time   Target Date 02/03/21   Date Met      Progress: in progress with flare in headaches the last several weeks so no functional improvement yet.     Goal Identifier Pelvic mobility   Goal Description Pt to display symmetrical alignment, improved myofascial mobilty at the pelvis and good sacral distraction to allow for better L-S mobility and less influence on headaches through regional interdependence.    Target Date 02/03/21   Date Met      Progress: limited treatment to lumbo pelvic area and abdominal area to date     Goal Identifier HEP   Goal Description Pt to display independence and compliance with a home exercise program to assist in self management.    Target Date 12/05/20   Date  Met      Progress: continue to build HEP           Progress Toward Goals:   Limited attendance in PT due to family/COVID-19 issues.      Plan:  Changes to therapy plan of care: Continue PT 1-2 times per week for 4-6 additional months, tapering frequency as indicated. Continue to address upper back, neck and cranium as well as lumbopelvic and abdominal regions due to regional interdependence. Continue with manual therapy, N-M re-ed, therapeutic exercise, home program and modalities as indicated.     Discharge:  Samantha Srinivasan, PT on 8/6/2020 at 3:09 PM

## 2020-08-11 ENCOUNTER — HOSPITAL ENCOUNTER (OUTPATIENT)
Dept: PHYSICAL THERAPY | Facility: OTHER | Age: 55
Setting detail: THERAPIES SERIES
End: 2020-08-11
Attending: FAMILY MEDICINE
Payer: COMMERCIAL

## 2020-08-11 PROCEDURE — 97112 NEUROMUSCULAR REEDUCATION: CPT | Mod: GP

## 2020-08-11 PROCEDURE — 97140 MANUAL THERAPY 1/> REGIONS: CPT | Mod: GP

## 2020-08-13 ENCOUNTER — HOSPITAL ENCOUNTER (OUTPATIENT)
Dept: PHYSICAL THERAPY | Facility: OTHER | Age: 55
Setting detail: THERAPIES SERIES
End: 2020-08-13
Attending: FAMILY MEDICINE
Payer: COMMERCIAL

## 2020-08-13 ENCOUNTER — TELEPHONE (OUTPATIENT)
Dept: PHYSICAL THERAPY | Facility: OTHER | Age: 55
End: 2020-08-13

## 2020-08-13 DIAGNOSIS — G44.89 CHRONIC MIXED HEADACHE SYNDROME: Primary | ICD-10-CM

## 2020-08-13 PROCEDURE — 97140 MANUAL THERAPY 1/> REGIONS: CPT | Mod: GP

## 2020-08-13 PROCEDURE — 97112 NEUROMUSCULAR REEDUCATION: CPT | Mod: GP

## 2020-08-13 NOTE — TELEPHONE ENCOUNTER
Dr. Milligan--  I have been working with El in PT for her migraines for several weeks now. She continues to deal with almost daily headaches. She is using the generic for Excedrin and benadryl most days and will use Maxalt if she is able to just rest/sleep. Notes this week that she had a severe migraine from Monday afternoon until early this morning. It looks like she may benefit from some type of preventative medication. Notes that she did use atenolol in the past. I would suggest a neurology consult as she states she has never been followed in neurology for her headaches. She was going to talk to some family members in the [facility] area to see if anyone has suggestions as to whom she should see. I talked with her about Dr. Morales at [facility] or Dr. Gabriela Hill at the [facility] as I think either one would be a good fit for El. Do you want to go ahead with the referral or would you like to see her again in clinic?  Thanks for the referral--  Coco Srinivasan, PT

## 2020-08-14 NOTE — TELEPHONE ENCOUNTER
Coco: thanks for the update. I will place a neurology referral and patient can decide who she wants to see. Our schedulers will call her to sort this out. Thanks! JENNIFERT

## 2020-08-25 ENCOUNTER — HOSPITAL ENCOUNTER (OUTPATIENT)
Dept: PHYSICAL THERAPY | Facility: OTHER | Age: 55
Setting detail: THERAPIES SERIES
End: 2020-08-25
Attending: FAMILY MEDICINE
Payer: COMMERCIAL

## 2020-08-25 PROCEDURE — 97112 NEUROMUSCULAR REEDUCATION: CPT | Mod: GP

## 2020-08-25 PROCEDURE — 97140 MANUAL THERAPY 1/> REGIONS: CPT | Mod: GP

## 2020-08-27 ENCOUNTER — HOSPITAL ENCOUNTER (OUTPATIENT)
Dept: PHYSICAL THERAPY | Facility: OTHER | Age: 55
Setting detail: THERAPIES SERIES
End: 2020-08-27
Attending: FAMILY MEDICINE
Payer: COMMERCIAL

## 2020-08-27 PROCEDURE — 97140 MANUAL THERAPY 1/> REGIONS: CPT | Mod: GP

## 2020-08-27 PROCEDURE — 97112 NEUROMUSCULAR REEDUCATION: CPT | Mod: GP

## 2020-09-23 NOTE — PROGRESS NOTES
Outpatient Physical Therapy Discharge Note     Patient: El Joseph  : 1965    Beginning/End Dates of Reporting Period:  2020 to 2020    Referring Provider: Venice Milligan MD    Therapy Diagnosis: Impaired mobility, migraines effecting function. Hx of multiple pelvic surgeries     Client Self Report: Pt was last seen in PT on 2020. She has failed to schedule further PT since that time. Current status unknown. Subjective report at the time of her last session as follows:    Notes that she is having a bad day-- severe headache. Notes that this started about an hour ago. Had her grandaughter last night, overnight and this went ok. Not sure of the trigger. Did not take anything yet as she has to drive home.     Objective Measurements: Current status unknown. Clinical findings on 20 as follows:    Objective Measure: Postural loading  Details: General listening to the cranium.  Postural loading: Limited excursion in general. Head decreased, absent loading in all planes. Shoulders decreased  in all planes but worse R/L, R/R. Pelvis decreased L/L and L/R.  Objective Measure: Cervical segmental mobility  Details: not tested  Objective Measure: Myofascial  Details: Significant increase in overall myofascial tightness. Very guarded in the neck and upper back. Tight at the pelvis. Poor neural mobility noted at the superficial cardiac plexus, celiac plexus.   Objective Measure: Cranial assessment  Details: Listening at the vertex: deep to cranium. Restriction in general and poor tolerance to palpation. Absent mobility at the temporal bones, very limited at the occiput. Both spinal dural and cranial dural tightness. Restriction at the pelvis, thoracic inlet with limited C-S rhythm noted.   Objective Measure: abdominal/pelvic screen  Not tested      Goals:  Goal Identifier HA   Goal Description Patient will report a reduction in her non-migraine headaches from daily to 1-2 per week in order to improve  her function and quality of life.    Target Date 02/03/21   Date Met      Progress:     Goal Identifier Migraines   Goal Description Patient will decreased intensity of migraines to all improvement of 50 % or better in her ability to concentrate when headache is present   Target Date 02/03/21   Date Met      Progress:     Goal Identifier house/yard work   Goal Description Patient to display improved myofascial/dural and joint mobility to allow her to complete house and yard work consistently with minimal headache 50-75% of the time   Target Date 02/03/21   Date Met      Progress:     Goal Identifier Pelvic mobility   Goal Description Pt to display symmetrical alignment, improved myofascial mobilty at the pelvis and good sacral distraction to allow for better L-S mobility and less influence on headaches through regional interdependence.    Target Date 02/03/21   Date Met      Progress:     Goal Identifier HEP   Goal Description Pt to display independence and compliance with a home exercise program to assist in self management.    Target Date 12/05/20   Date Met      Progress:     Progress Toward Goals:  Current progress toward goals unknown as pt has failed to return.     Home program: Shoulder retraction: x10, three times per day, cervical rotation supported: 2 minutes, twice per day, LTR, supine/seated chin nods, supine thoracic extension stretch progressing to towel roll. Self release suboccipital with racquet balls. Deep breathing supine        Plan:  Discharge from therapy.    Discharge:    Reason for Discharge: Patient has failed to schedule further appointments.    Equipment Issued: racquet balls    Discharge Plan: Patient to continue home program.  Other services: Pt did note that she had planned to see a chiropractor in the [facility] area: family members have had good headache management with his intervention.    Coco Srinivasan, PT on 9/23/2020 at 10:58 AM

## 2020-11-24 DIAGNOSIS — K21.9 GASTROESOPHAGEAL REFLUX DISEASE WITHOUT ESOPHAGITIS: ICD-10-CM

## 2020-11-25 RX ORDER — ESOMEPRAZOLE MAGNESIUM 40 MG/1
CAPSULE, DELAYED RELEASE ORAL
Qty: 90 CAPSULE | Refills: 1 | Status: SHIPPED | OUTPATIENT
Start: 2020-11-25 | End: 2020-12-18

## 2020-11-25 NOTE — TELEPHONE ENCOUNTER
"Requested Prescriptions   Pending Prescriptions Disp Refills     esomeprazole (NEXIUM) 40 MG DR capsule [Pharmacy Med Name: ESOMEPRAZOLE 40MG DR CAPSULE] 90 capsule 3     Sig: TAKE 1 CAPSULE BY MOUTH EVERY DAY BEFORE A MEAL       PPI Protocol Passed - 11/24/2020  2:32 PM        Passed - Not on Clopidogrel (unless Pantoprazole ordered)        Passed - No diagnosis of osteoporosis on record        Passed - Recent (12 mo) or future (30 days) visit within the authorizing provider's specialty     Patient has had an office visit with the authorizing provider or a provider within the authorizing providers department within the previous 12 mos or has a future within next 30 days. See \"Patient Info\" tab in inbasket, or \"Choose Columns\" in Meds & Orders section of the refill encounter.              Passed - Medication is active on med list        Passed - Patient is age 18 or older        Passed - No active pregnacy on record        Passed - No positive pregnancy test in past 12 months           LOV 4/21/20 virtual visit [place]  Prescription approved per [facility] Refill Protocol.  Brenda J. Goodell, RN on 11/25/2020 at 11:02 AM    "

## 2020-12-17 DIAGNOSIS — G89.29 OTHER CHRONIC PAIN: ICD-10-CM

## 2020-12-18 ENCOUNTER — VIRTUAL VISIT (OUTPATIENT)
Dept: FAMILY MEDICINE | Facility: OTHER | Age: 55
End: 2020-12-18
Attending: FAMILY MEDICINE
Payer: COMMERCIAL

## 2020-12-18 VITALS — TEMPERATURE: 96.4 F

## 2020-12-18 DIAGNOSIS — F41.1 ANXIETY STATE: ICD-10-CM

## 2020-12-18 DIAGNOSIS — G43.009 MIGRAINE WITHOUT AURA AND WITHOUT STATUS MIGRAINOSUS, NOT INTRACTABLE: ICD-10-CM

## 2020-12-18 DIAGNOSIS — K21.9 GASTROESOPHAGEAL REFLUX DISEASE WITHOUT ESOPHAGITIS: ICD-10-CM

## 2020-12-18 DIAGNOSIS — G89.29 OTHER CHRONIC PAIN: Primary | ICD-10-CM

## 2020-12-18 PROCEDURE — 99214 OFFICE O/P EST MOD 30 MIN: CPT | Mod: 95 | Performed by: FAMILY MEDICINE

## 2020-12-18 RX ORDER — TOPIRAMATE 25 MG/1
TABLET, FILM COATED ORAL
Qty: 21 TABLET | Refills: 0 | Status: SHIPPED | OUTPATIENT
Start: 2021-01-01 | End: 2021-04-08

## 2020-12-18 RX ORDER — ESOMEPRAZOLE MAGNESIUM 40 MG/1
CAPSULE, DELAYED RELEASE ORAL
Qty: 90 CAPSULE | Refills: 1 | Status: SHIPPED | OUTPATIENT
Start: 2020-12-18 | End: 2021-02-11

## 2020-12-18 RX ORDER — HYDROCODONE BITARTRATE AND ACETAMINOPHEN 5; 325 MG/1; MG/1
1 TABLET ORAL EVERY 8 HOURS PRN
Qty: 30 TABLET | Refills: 0 | OUTPATIENT
Start: 2020-12-18

## 2020-12-18 RX ORDER — TOPIRAMATE 50 MG/1
50 TABLET, FILM COATED ORAL 2 TIMES DAILY
Qty: 14 TABLET | Refills: 0 | Status: SHIPPED | OUTPATIENT
Start: 2021-01-09 | End: 2021-01-19

## 2020-12-18 RX ORDER — HYDROCODONE BITARTRATE AND ACETAMINOPHEN 5; 325 MG/1; MG/1
1 TABLET ORAL EVERY 8 HOURS PRN
Qty: 30 TABLET | Refills: 0 | Status: SHIPPED | OUTPATIENT
Start: 2020-12-18 | End: 2021-04-08

## 2020-12-18 RX ORDER — TOPIRAMATE 25 MG/1
TABLET, FILM COATED ORAL
Qty: 21 TABLET | Refills: 0 | Status: SHIPPED | OUTPATIENT
Start: 2020-12-18 | End: 2021-01-04

## 2020-12-18 RX ORDER — CYCLOBENZAPRINE HCL 10 MG
10 TABLET ORAL 3 TIMES DAILY PRN
Qty: 90 TABLET | Refills: 1 | Status: SHIPPED | OUTPATIENT
Start: 2020-12-18 | End: 2021-04-08

## 2020-12-18 RX ORDER — RIZATRIPTAN BENZOATE 10 MG/1
10 TABLET ORAL
Qty: 18 TABLET | Refills: 1 | Status: SHIPPED | OUTPATIENT
Start: 2020-12-18 | End: 2021-04-08

## 2020-12-18 RX ORDER — HYDROXYZINE HYDROCHLORIDE 10 MG/1
10 TABLET, FILM COATED ORAL EVERY 8 HOURS PRN
Qty: 90 TABLET | Refills: 0 | Status: SHIPPED | OUTPATIENT
Start: 2020-12-18 | End: 2022-10-25

## 2020-12-18 ASSESSMENT — ENCOUNTER SYMPTOMS
CHILLS: 0
FEVER: 0
CONSTIPATION: 1

## 2020-12-18 NOTE — TELEPHONE ENCOUNTER
[id number] sent Rx request for the following:      Requested Prescriptions   Pending Prescriptions Disp Refills     HYDROcodone-acetaminophen (NORCO) 5-325 MG tablet [Pharmacy Med Name: HYDROCODONE/APAP 5MG-325MG TAB] 30 tablet      Sig: TAKE 1 TABLET BY MOUTH EVERY 8 HOURS AS NEEDED FOR SEVERE PAIN * ACETAMINOPHEN SHOULD BE LIMITED TO 4000MG PER DAY*       There is no refill protocol information for this order          Last Prescription Date:   5/20/20  Last Fill Qty/Refills:         30, R-0  Last Office Visit:              5/20/20 (VV, AET)   Future Office visit:            Next 5 appointments (look out 90 days)    Dec 18, 2020  3:20 PM  Office Visit with Marisa Dubon DO  [facility] ([facility] [address]  [place] [address] [phone]           Routing refill request to provider for review/approval because:  Drug not on the [facility], [facility] or  Health refill protocol or controlled substance. Assisted Pt in scheduling appointmnt noted above. Unable to complete prescription refill per RN Medication Refill Policy.   Rosio Meyers RN on 12/18/2020 at 1:22 PM

## 2020-12-18 NOTE — PROGRESS NOTES
"El Joseph is a 55 year old female who is being evaluated via a billable telephone visit.      The patient has been notified of following:     \"This telephone visit will be conducted via a call between you and your physician/provider. We have found that certain health care needs can be provided without the need for a physical exam.  This service lets us provide the care you need with a short phone conversation.  If a prescription is necessary we can send it directly to your pharmacy.  If lab work is needed we can place an order for that and you can then stop by our lab to have the test done at a later time.    Telephone visits are billed at different rates depending on your insurance coverage. During this emergency period, for some insurers they may be billed the same as an in-person visit.  Please reach out to your insurance provider with any questions.    If during the course of the call the physician/provider feels a telephone visit is not appropriate, you will not be charged for this service.\"    Patient has given verbal consent for Telephone visit?  Yes    What phone number would you like to be contacted at? [phone]    How would you like to obtain your AVS? Shubham Riggs     El Joseph is a 55 year old female who presents via phone visit today for the following health issues:    HPI  GERD:  Reports accidentally misplacing her medication.  She was provided with a short supply from her pharmacy but was told she would need a new prescription for any additional medication.  Since she has had a gap in her medication she has noticed acid reflux and indigestion, regardless of what she is eating.  This was well-controlled when she was taking the medication.    Migraine Headaches:  Requesting a refill of her Rizatriptan.  She had been seeing a physical therapist for her migraines but therapist did not feel this was significantly helping her.  She went to see a specialist in the [facility] and was " receiving acupuncture which significantly helped.  She contracted COVID-19 which triggered her migraines and made them worse again.  She would like to follow-up with the specialist again for repeat treatment but has not yet been able to schedule this appointment.  Currently experiencing frequent migraines.  She reports migraines 8/10 days.  She has been on Atenolol in the past for prophylaxis which did not help her.  She would be willing to try an alternative prophylactic medication.    Anxiety:  Symptoms have been worse during the pandemic and the loss of some loved ones.  She has been having some episodes of anxiety involving heart racing, shortness of breath, and sensation of hyperventilation.  She has been taking Buspirone as needed but reports that this made her drowsy without seeming to help significantly with her symptoms.  She does not want to be on a daily medication to help with her anxiety.    Chronic Pain:  History of fibromyalgia and muscle spasms.  She reports diffuse pain, located throughout her back, hips, ankles, neck, shoulders, elbows, wrists, and legs.  She describes her pain as severe muscle cramping and will also experience muscle twitching.  She has been managed on Norco without complications, and she uses this medication sparingly.  Her last prescription was filled in May.  She also uses Cyclobenzaprine as needed for muscle spasms and twitches.  Her last prescription of this medication was also in May.    Review of Systems   Constitutional: Negative for chills and fever.   Cardiovascular: Negative for chest pain.   Gastrointestinal: Positive for constipation.     Objective      Vitals:  No vitals were obtained today due to virtual visit.  alert and no distress  PSYCH: Alert and oriented times 3; coherent speech, normal   rate and volume, able to articulate logical thoughts, able   to abstract reason, no tangential thoughts, no hallucinations   or delusions  Her affect is normal  RESP: No  cough, no audible wheezing, able to talk in full sentences  Remainder of exam unable to be completed due to telephone visits    Assessment/Plan:    Assessment & Plan     Other chronic pain   reviewed and appropriate.  She uses these medications sparingly and denies adverse effects.  Refills provided.  - cyclobenzaprine (FLEXERIL) 10 MG tablet; Take 1 tablet (10 mg) by mouth 3 times daily as needed for muscle spasms  - HYDROcodone-acetaminophen (NORCO) 5-325 MG tablet; Take 1 tablet by mouth every 8 hours as needed for severe pain * ACETAMINOPHEN SHOULD BE LIMITED TO 4000MG PER DAY*    Migraine without aura and without status migrainosus, not intractable  Migraines poorly controlled on abortive therapy.  Discussed prophylactic medications, and she would be willing to try Topiramate.  Start Topiramate and ramp to goal dose of 50 mg twice daily over the course of four weeks.  Will refill abortive therapy to be used while ramping up prophylactic therapy.  Follow-up in 4 weeks for reassessment.  - rizatriptan (MAXALT) 10 MG tablet; Take 1 tablet (10 mg) by mouth every 2 hours as needed for migraine  - topiramate (TOPAMAX) 25 MG tablet; Take 1 tablet (25 mg) by mouth At Bedtime for 7 days, THEN 1 tablet (25 mg) 2 times daily for 7 days.  - topiramate (TOPAMAX) 25 MG tablet; Take 1 tablet (25 mg) by mouth every morning AND 2 tablets (50 mg) At Bedtime. Do all this for 7 days.  - topiramate (TOPAMAX) 50 MG tablet; Take 1 tablet (50 mg) by mouth 2 times daily for 7 days    Gastroesophageal reflux disease without esophagitis  Well-controlled on current medication regimen.  Refill provided.  - esomeprazole (NEXIUM) 40 MG DR capsule; Take 30-60 minutes before eating.    Anxiety state  Buspirone has not provided significant relief.  She does not want to be on a daily medication.  Trial Hydroxyzine.  - hydrOXYzine (ATARAX) 10 MG tablet; Take 1 tablet (10 mg) by mouth every 8 hours as needed for anxiety       Follow-up in 1  month for reassessment of migraines.    DO [facility]    Phone call duration:  31 minutes

## 2021-01-04 ENCOUNTER — OFFICE VISIT (OUTPATIENT)
Dept: FAMILY MEDICINE | Facility: OTHER | Age: 56
End: 2021-01-04
Attending: NURSE PRACTITIONER
Payer: COMMERCIAL

## 2021-01-04 ENCOUNTER — HOSPITAL ENCOUNTER (OUTPATIENT)
Dept: GENERAL RADIOLOGY | Facility: OTHER | Age: 56
End: 2021-01-04
Attending: NURSE PRACTITIONER
Payer: COMMERCIAL

## 2021-01-04 VITALS
SYSTOLIC BLOOD PRESSURE: 132 MMHG | RESPIRATION RATE: 18 BRPM | BODY MASS INDEX: 20.32 KG/M2 | DIASTOLIC BLOOD PRESSURE: 86 MMHG | HEART RATE: 63 BPM | TEMPERATURE: 98.5 F | OXYGEN SATURATION: 97 % | WEIGHT: 124 LBS

## 2021-01-04 DIAGNOSIS — J01.90 ACUTE SINUSITIS WITH SYMPTOMS > 10 DAYS: Primary | ICD-10-CM

## 2021-01-04 DIAGNOSIS — R05.8 PRODUCTIVE COUGH: ICD-10-CM

## 2021-01-04 DIAGNOSIS — Z87.891 PERSONAL HISTORY OF TOBACCO USE, PRESENTING HAZARDS TO HEALTH: ICD-10-CM

## 2021-01-04 PROCEDURE — 99214 OFFICE O/P EST MOD 30 MIN: CPT | Performed by: NURSE PRACTITIONER

## 2021-01-04 PROCEDURE — 71046 X-RAY EXAM CHEST 2 VIEWS: CPT

## 2021-01-04 RX ORDER — CETIRIZINE HYDROCHLORIDE, PSEUDOEPHEDRINE HYDROCHLORIDE 5; 120 MG/1; MG/1
1 TABLET, FILM COATED, EXTENDED RELEASE ORAL 2 TIMES DAILY PRN
Qty: 14 TABLET | Refills: 0 | Status: SHIPPED | OUTPATIENT
Start: 2021-01-04 | End: 2022-01-28

## 2021-01-04 ASSESSMENT — PAIN SCALES - GENERAL: PAINLEVEL: SEVERE PAIN (6)

## 2021-01-04 NOTE — PROGRESS NOTES
HPI:    El Joseph is a 55 year old female  who presents to [facility] today for     Started with sneezing, sinus pressure, and cough a week ago.  Productive cough of colored phlegm.  Sore throat, ear pain, loss of taste and smell the past 2 days.  Chronic headaches.  Light and noise sensitivity with headaches.  Productive cough.  Chest tightness and heaviness.  Mild shortness of breath.  Rattling in chest when laying down. Decreased energy ongoing.  Appetite decreased some due to loss of taste and smell.  No nausea or vomiting.  No diarrhea.  No known fevers but has had chills for the past week.   Taking Mucinex, Nyquil.  She has used Zyrtec D in the past but none with current symptoms.    Daily smoker about 0.3 ppd      Past Medical History:   Diagnosis Date     Other constipation     No Comments Provided     Pain in knee     No Comments Provided     Personal history of other medical treatment (CODE)     G7, P3-0-4-3 (3 vag. 4 spon miscarriages-one in the second trimester and one daughter was twin and lost the twin).     Personal history of other medical treatment (CODE)     in prior relationship and childhood     Radiculopathy     2011,Benign hypermobility per [facility]     Past Surgical History:   Procedure Laterality Date     COLONOSCOPY      4/2003,Normal, bleeding hemorrhoids     HYSTERECTOMY VAGINAL      5/2003,Dr Ramirez[place], chronic pelvic pain/endometriosis     OTHER SURGICAL HISTORY      590957,DILATION AND CURETTAGE,After 4 miscarriages     OTHER SURGICAL HISTORY      1986,639413,OTHER,Ovarian cyst     OTHER SURGICAL HISTORY      6/2002,241673,OTHER,Chronic pelvic pain/hemorrhagic cyst     OTHER SURGICAL HISTORY      2/2009,238938,OTHER,Dr Ramirez-recurrent ovarian cysts     Social History     Tobacco Use     Smoking status: Current Every Day Smoker     Packs/day: 0.25     Years: 0.00     Pack years: 0.00     Types: Cigarettes     Smokeless tobacco: Never Used   Substance Use Topics     Alcohol use: Not  Currently     Alcohol/week: 0.0 standard drinks     Current Outpatient Medications   Medication Sig Dispense Refill     aspirin-acetaminophen-caffeine (EXCEDRIN MIGRAINE) 250-250-65 MG tablet Take 2 tablets by mouth every 8 hours as needed for headaches       busPIRone (BUSPAR) 15 MG tablet Take 0.5-1 tablets (7.5-15 mg) by mouth 3 times daily as needed (Anxiety) 45 tablet 3     cetirizine-psuedoePHEDrine (ZYRTEC-D) 5-120 MG per 12 hr tablet MAY TAKE ONCE TO TWICE DAILY. TAKE 10-12 HOURS APART.       esomeprazole (NEXIUM) 40 MG DR capsule Take 30-60 minutes before eating. 90 capsule 1     Flaxseed, Linseed, (FLAX SEEDS) POWD 1 Tablespoonful Sprinkle on foods daily       FLUoxetine (PROZAC) 40 MG capsule TAKE 1 CAPSULE BY MOUTH EVERY MORNING 90 capsule 3     fluticasone (FLONASE) 50 MCG/ACT nasal spray Spray 2 sprays into both nostrils daily 16 g 1     HYDROcodone-acetaminophen (NORCO) 5-325 MG tablet Take 1 tablet by mouth every 8 hours as needed for severe pain * ACETAMINOPHEN SHOULD BE LIMITED TO 4000MG PER DAY* 30 tablet 0     hydrOXYzine (ATARAX) 10 MG tablet Take 1 tablet (10 mg) by mouth every 8 hours as needed for anxiety 90 tablet 0     pregabalin (LYRICA) 150 MG capsule Take 1 capsule (150 mg) by mouth 2 times daily 180 capsule 1     rizatriptan (MAXALT) 10 MG tablet Take 1 tablet (10 mg) by mouth every 2 hours as needed for migraine 18 tablet 1     topiramate (TOPAMAX) 25 MG tablet Take 1 tablet (25 mg) by mouth every morning AND 2 tablets (50 mg) At Bedtime. Do all this for 7 days. 21 tablet 0     [START ON 1/9/2021] topiramate (TOPAMAX) 50 MG tablet Take 1 tablet (50 mg) by mouth 2 times daily for 7 days 14 tablet 0     cyclobenzaprine (FLEXERIL) 10 MG tablet Take 1 tablet (10 mg) by mouth 3 times daily as needed for muscle spasms (Patient not taking: Reported on 1/4/2021) 90 tablet 1     Allergies   Allergen Reactions     Nsaids      Other reaction(s): Ecchymosis         Past medical history, past  surgical history, current medications and allergies reviewed and accurate to the best of my knowledge.        ROS:  Refer to HPI    /86   Pulse 63   Temp 98.5  F (36.9  C) (Tympanic)   Resp 18   Wt 56.2 kg (124 lb)   LMP  (LMP Unknown)   SpO2 97%   BMI 20.32 kg/m      EXAM:  General Appearance: Miserable appearing adult female, appears in pain, appropriate appearance for age. No acute distress  Ears: Left TM intact, translucent with bony landmarks appreciated, no erythema, no effusion, no bulging, no purulence.  Right TM intact, translucent with bony landmarks appreciated, no erythema, no effusion, no bulging, no purulence.  Left auditory canal clear.  Right auditory canal clear.  Normal external ears, non tender.  Eyes: light sensitivity, shielding eyes against the light, no drainage or erythema   Orophayrnx: moist mucous membranes, posterior pharynx without erythema, tonsils without hypertrophy, no erythema, no exudates or petechiae, no post nasal drip seen, no trismus, voice clear.    Sinuses:  Significant bilateral sinus tenderness upon palpation of the frontal and maxillary sinuses  Nose:  Bilateral nares: no erythema, no edema, no noted drainage   Neck: supple without adenopathy  Respiratory: normal chest wall and respirations.  Normal effort.  Clear to auscultation bilaterally, no wheezing, crackles or rhonchi.  No increased work of breathing.  Occasional congested cough appreciated.  Cardiac: RRR with no murmurs  Musculoskeletal:  Equal movement of bilateral upper extremities.  Equal movement of bilateral lower extremities.  Normal gait.   Psychological: normal affect, alert, oriented, and pleasant.           Xray:  Results for orders placed or performed during the hospital encounter of 01/04/21   XR Chest 2 Views     Status: None    Narrative    Procedure:XR CHEST 2 VW    Clinical history:Female, 55 years, Productive cough    Technique: Two views are submitted.    Comparison:  4/23/2019    Findings: The cardiac silhouette is normal. The pulmonary vasculature  is normal.    The lungs are clear. Bony structures are unremarkable.      Impression    Impression:   No acute abnormality. No evidence of acute or active cardiopulmonary  disease.    JESSICA DONNELLY MD           ASSESSMENT/PLAN:    I have reviewed the nursing notes.  I have reviewed the findings, diagnosis, plan and need for follow up with the patient.    1. Productive cough    - XR Chest 2 Views; Future    2. Personal history of tobacco use, presenting hazards to health    CXR completed and personally reviewed, no obvious infiltrates appreciated, radiologist over read:  No acute abnormality.  No evidence of acute or active cardiopulmonary disease.    Continue Mucinex PRN    3. Acute sinusitis with symptoms > 10 days    - amoxicillin-clavulanate (AUGMENTIN) 875-125 MG tablet; Take 1 tablet by mouth 2 times daily for 7 days  Dispense: 14 tablet; Refill: 0    - cetirizine-pseudoePHEDrine ER (ZYRTEC-D) 5-120 MG 12 hr tablet; Take 1 tablet by mouth 2 times daily as needed for congestion  Dispense: 14 tablet; Refill: 0    Symptomatic treatment - Encouraged fluids, saline nasal spray, salt water gargles, honey, elevation, humidifier, sinus rinse/netti pot, lozenges, tea, topical vapor rub, popsicles, rest, etc     May use over-the-counter Tylenol PRN    Discussed warning signs/symptoms indicative of need to f/u  Follow up if symptoms persist or worsen or concerns      I explained my diagnostic considerations and recommendations to the patient, who voiced understanding and agreement with the treatment plan. All questions were answered. We discussed potential side effects of any prescribed or recommended therapies, as well as expectations for response to treatments.    Disclaimer:  This note consists of words and symbols derived from keyboarding, dictation, or using voice recognition software. As a result, there may be errors in the script  that have gone undetected. Please consider this when interpreting information found in this note.

## 2021-01-04 NOTE — NURSING NOTE
"Chief Complaint   Patient presents with     Nasal Congestion     Nasal congestion, sinus pressure, ear pain , sore throat, loss of taste and smell since Saturday. She states that her ears hurt more when she lays down.  tested positive around 2 months ago she states that she had symptoms first prior to .     Initial LMP  (LMP Unknown)  Estimated body mass index is 21.3 kg/m  as calculated from the following:    Height as of 5/20/20: 1.664 m (5' 5.5\").    Weight as of 5/20/20: 59 kg (130 lb).         Medication Reconciliation: Complete      Rafa Sanchez LPN     "

## 2021-01-09 DIAGNOSIS — G43.009 MIGRAINE WITHOUT AURA AND WITHOUT STATUS MIGRAINOSUS, NOT INTRACTABLE: ICD-10-CM

## 2021-01-11 ENCOUNTER — ALLIED HEALTH/NURSE VISIT (OUTPATIENT)
Dept: FAMILY MEDICINE | Facility: OTHER | Age: 56
End: 2021-01-11
Attending: FAMILY MEDICINE
Payer: COMMERCIAL

## 2021-01-11 DIAGNOSIS — R05.9 COUGH: Primary | ICD-10-CM

## 2021-01-11 PROCEDURE — U0005 INFEC AGEN DETEC AMPLI PROBE: HCPCS | Mod: ZL | Performed by: FAMILY MEDICINE

## 2021-01-11 PROCEDURE — C9803 HOPD COVID-19 SPEC COLLECT: HCPCS

## 2021-01-11 PROCEDURE — U0003 INFECTIOUS AGENT DETECTION BY NUCLEIC ACID (DNA OR RNA); SEVERE ACUTE RESPIRATORY SYNDROME CORONAVIRUS 2 (SARS-COV-2) (CORONAVIRUS DISEASE [COVID-19]), AMPLIFIED PROBE TECHNIQUE, MAKING USE OF HIGH THROUGHPUT TECHNOLOGIES AS DESCRIBED BY CMS-2020-01-R: HCPCS | Mod: ZL | Performed by: FAMILY MEDICINE

## 2021-01-11 RX ORDER — TOPIRAMATE 25 MG/1
TABLET, FILM COATED ORAL
Qty: 21 TABLET | Refills: 0 | OUTPATIENT
Start: 2021-01-11 | End: 2021-01-18

## 2021-01-11 NOTE — PROGRESS NOTES
Chief Complaint   Patient presents with     Covid 19 Testing     Cough    Medication Reconciliation: complete    Caroline Rosario, FARIBAN

## 2021-01-11 NOTE — TELEPHONE ENCOUNTER
[id number] of [place] sent Rx request for the following:      Requested Prescriptions   Pending Prescriptions Disp Refills   topiramate (TOPAMAX) 25 MG tablet [Pharmacy Med Name: TOPIRAMATE 25MG TABLET] 21 tablet 0    Sig: TAKE 1 TABLET (25 MG) BY MOUTH EVERY MORNING AND 2 TABLETS (50 MG) AT BEDTIME. DO ALL THIS FOR 7 DAYS.   Last Prescription Date:   1/9/21  Last Fill Qty/Refills:         14, R-0 (End: 1/16/21)    Last Office Visit:              12/18/20 (VV)  Future Office visit:           None     Anti-Seizure Meds Protocol  Failed - 1/11/2021  9:01 AM       Failed - Review Authorizing provider's last note.      Patient was to take for 7 days; between 1/9 and 1/16. Today is 1/11. Refill request refused, with note to pharmacy. Unable to complete prescription refill per RN Medication Refill Policy. Kezia Gale RN .............. 1/11/2021  9:03 AM

## 2021-01-12 LAB
LABORATORY COMMENT REPORT: NORMAL
SARS-COV-2 RNA RESP QL NAA+PROBE: NEGATIVE
SARS-COV-2 RNA RESP QL NAA+PROBE: NORMAL
SPECIMEN SOURCE: NORMAL
SPECIMEN SOURCE: NORMAL

## 2021-01-19 DIAGNOSIS — G43.009 MIGRAINE WITHOUT AURA AND WITHOUT STATUS MIGRAINOSUS, NOT INTRACTABLE: Primary | ICD-10-CM

## 2021-01-19 RX ORDER — TOPIRAMATE 50 MG/1
50 TABLET, FILM COATED ORAL 2 TIMES DAILY
Qty: 14 TABLET | Refills: 0 | Status: SHIPPED | OUTPATIENT
Start: 2021-01-19 | End: 2021-04-08

## 2021-01-19 NOTE — TELEPHONE ENCOUNTER
[id number] sent Rx request for the following:      Requested Prescriptions   Pending Prescriptions Disp Refills   topiramate (TOPAMAX) 25 MG tablet [Pharmacy Med Name: TOPIRAMATE 25MG TABLET] 21 tablet 0      Sig: TAKE 1 TABLET (25 MG) BY MOUTH EVERY MORNING AND 2 TABLETS (50 MG) AT BEDTIME. DO ALL THIS FOR 7 DAYS.    Last Prescription Date:   1/9/21  Last Fill Qty/Refills:         14, R-0 (End: 1/16/21)    Last Office Visit:              12/18/20 (VV)  Future Office visit:           None  Routing refill request to provider for review/approval because:  Patient was to take for 7 days; between 1/9 and 1/16. Previously refused, as was prescribed for limited time. 2nd request received with note stating Pt is in need of refill.      Anti-Seizure Meds Protocol  Failed - 1/11/2021  9:01 AM          Failed - Review Authorizing provider's last note.       Unable to complete prescription refill per RN Medication Refill Policy. Kezia Gale RN .............. 1/19/2021  9:24 AM

## 2021-01-20 ENCOUNTER — MYC MEDICAL ADVICE (OUTPATIENT)
Dept: FAMILY MEDICINE | Facility: OTHER | Age: 56
End: 2021-01-20

## 2021-02-02 DIAGNOSIS — G43.009 MIGRAINE WITHOUT AURA AND WITHOUT STATUS MIGRAINOSUS, NOT INTRACTABLE: ICD-10-CM

## 2021-02-02 RX ORDER — TOPIRAMATE 50 MG/1
50 TABLET, FILM COATED ORAL 2 TIMES DAILY
Qty: 14 TABLET | Refills: 0 | OUTPATIENT
Start: 2021-02-02

## 2021-02-02 NOTE — TELEPHONE ENCOUNTER
"[id number] of [place] sent Rx request for the following:      Requested Prescriptions   Pending Prescriptions Disp Refills   topiramate (TOPAMAX) 50 MG tablet 14 tablet 0    Sig: Take 1 tablet (50 mg) by mouth 2 times daily   Last Prescription Date:   1/19/21  Last Fill Qty/Refills:         14, R-0    Last Office Visit:              12/18/20 (VV)  Future Office visit:           None     Anti-Seizure Meds Protocol  Failed - 2/2/2021  8:36 AM       Failed - Review Authorizing provider's last note.      Per [employer] message, dated 1/20/21, Pt was informed that Dr. Dubon noted Pt needed to be seen before she ran out of medication. Pt scheduled appointment, and then was a \"no show.\" Unable to complete prescription refill per RN Medication Refill Policy. Kezia Gale RN .............. 2/2/2021  8:40 AM    "

## 2021-02-04 ENCOUNTER — TELEPHONE (OUTPATIENT)
Dept: FAMILY MEDICINE | Facility: OTHER | Age: 56
End: 2021-02-04

## 2021-02-04 DIAGNOSIS — G43.009 MIGRAINE WITHOUT AURA AND WITHOUT STATUS MIGRAINOSUS, NOT INTRACTABLE: ICD-10-CM

## 2021-02-04 RX ORDER — TOPIRAMATE 50 MG/1
50 TABLET, FILM COATED ORAL 2 TIMES DAILY
Qty: 14 TABLET | Refills: 0 | OUTPATIENT
Start: 2021-02-04

## 2021-02-04 NOTE — TELEPHONE ENCOUNTER
Patient requested a refill for Topiramate and it was denied. Patient would like to know why?    Kassandra Jones on 2/4/2021 at 12:09 PM

## 2021-02-04 NOTE — TELEPHONE ENCOUNTER
Patient would like to be seen in person to also have her ears checked too.    Patient placed with Dr. Dubon tomorrow at 1120.      Michelle Llanes LPN 2/4/2021 1:41 PM

## 2021-02-04 NOTE — TELEPHONE ENCOUNTER
Patient needs a follow up visit, does it have to be in person or is a telephone/video visit okay?    Michelle Llanes LPN 2/4/2021 12:57 PM

## 2021-02-04 NOTE — TELEPHONE ENCOUNTER
[id number] sent Rx request for the following:      Requested Prescriptions   Pending Prescriptions Disp Refills   topiramate (TOPAMAX) 50 MG tablet [Pharmacy Med Name: TOPIRAMATE 50MG TABLET] 14 tablet 0    Sig: TAKE 1 TABLET (50 MG) BY MOUTH 2 TIMES DAILY   Last Prescription Date:   1/19/21  Last Fill Qty/Refills:         14, R-0    Last Office Visit:              12/18/20 (VV)  Future Office visit:           None     Anti-Seizure Meds Protocol  Failed - 2/4/2021 11:16 AM       Failed - Review Authorizing provider's last note.      See refill encounter, dated 2/2/21. Unable to complete prescription refill per RN Medication Refill Policy. PATIENT NEEDS APPOINTMENT. Unable to complete prescription refill per RN Medication Refill Policy. Kezia Gale RN .............. 2/4/2021  11:17 AM

## 2021-02-08 ENCOUNTER — OFFICE VISIT (OUTPATIENT)
Dept: FAMILY MEDICINE | Facility: OTHER | Age: 56
End: 2021-02-08
Attending: FAMILY MEDICINE
Payer: COMMERCIAL

## 2021-02-08 VITALS
RESPIRATION RATE: 16 BRPM | OXYGEN SATURATION: 99 % | HEART RATE: 63 BPM | HEIGHT: 61 IN | BODY MASS INDEX: 23.9 KG/M2 | SYSTOLIC BLOOD PRESSURE: 120 MMHG | WEIGHT: 126.6 LBS | TEMPERATURE: 97.8 F | DIASTOLIC BLOOD PRESSURE: 78 MMHG

## 2021-02-08 DIAGNOSIS — J32.0 CHRONIC MAXILLARY SINUSITIS: Primary | ICD-10-CM

## 2021-02-08 DIAGNOSIS — G43.009 MIGRAINE WITHOUT AURA AND WITHOUT STATUS MIGRAINOSUS, NOT INTRACTABLE: ICD-10-CM

## 2021-02-08 PROCEDURE — 99212 OFFICE O/P EST SF 10 MIN: CPT | Performed by: FAMILY MEDICINE

## 2021-02-08 RX ORDER — AZITHROMYCIN 500 MG/1
500 TABLET, FILM COATED ORAL DAILY
Qty: 3 TABLET | Refills: 0 | Status: SHIPPED | OUTPATIENT
Start: 2021-02-08 | End: 2021-02-11

## 2021-02-08 RX ORDER — ECHINACEA PURPUREA EXTRACT 125 MG
1 TABLET ORAL DAILY
Qty: 104 ML | Refills: 0 | Status: SHIPPED | OUTPATIENT
Start: 2021-02-08 | End: 2021-04-08

## 2021-02-08 ASSESSMENT — MIFFLIN-ST. JEOR: SCORE: 1116.63

## 2021-02-08 ASSESSMENT — ENCOUNTER SYMPTOMS
CHILLS: 0
FEVER: 0

## 2021-02-08 ASSESSMENT — PAIN SCALES - GENERAL: PAINLEVEL: MODERATE PAIN (4)

## 2021-02-08 NOTE — NURSING NOTE
"Chief Complaint   Patient presents with     RECHECK     Headache/ears       Patient presented with recheck of Headaches and Ears.  Sinuses, ears, throat have been throbbing at night.  Whistling and wheezing at night. Amoxicllin having no change on patient.      Initial /78 (BP Location: Right arm, Patient Position: Sitting, Cuff Size: Adult Regular)   Pulse 63   Temp 97.8  F (36.6  C) (Temporal)   Resp 16   Ht 1.549 m (5' 1\")   Wt 57.4 kg (126 lb 9.6 oz)   LMP  (LMP Unknown)   SpO2 99%   BMI 23.92 kg/m   Estimated body mass index is 23.92 kg/m  as calculated from the following:    Height as of this encounter: 1.549 m (5' 1\").    Weight as of this encounter: 57.4 kg (126 lb 9.6 oz).  Medication Reconciliation: complete    Caroline Rosario LPN  "

## 2021-02-08 NOTE — PROGRESS NOTES
"  Assessment & Plan     Chronic maxillary sinusitis  Symptoms persistent, lasting > 7-10 days.  No significant change in her symptoms with Amoxicillin, but she not has some wheezing in her lungs.  Will use Azithromycin for sinusitis and concomitant benefit of being anti-inflammatory for her lungs.  Counseled on nasal saline rinse use.  Continue OTC medications as needed.  Follow-up if symptoms worsening or failing to improve.  - azithromycin (ZITHROMAX) 500 MG tablet; Take 1 tablet (500 mg) by mouth daily for 3 days  - sodium chloride (OCEAN) 0.65 % nasal spray; Spray 1 spray in nostril daily    Migraine without aura and without status migrainosus, not intractable  No significant improvement on Topiramate for prophylactic therapy, and she reports adverse effect of feeling \"spaced out.\"  Discussed other potential medication therapies for prophylaxis, but she is hesitant to try another medication at this time.  Encouraged follow-up with her acupuncture specialist; she has seen great benefit from this for treatment of her headaches in the past.  Follow-up if she changes her mind and would like to try an alternative medication for prophylaxis.    Marisa Dubon, [facility] is a 55 year old who presents to clinic today for the following health issues:    HPI   Migraine Headaches:  Followed ramping instructions for Topiramate and did not notice a significant difference.  She reports having the same number of headaches, which were still as intense and lasted the typical length with the usual symptoms.  She reports adverse effect of feeling \"spaced out\" on the medication.  Her  and daughter could notice a change in her behavior too.  She last took the medication one week ago.  She did not experience any side effects with stopping the medicine.  She has not been able to go back to see the acupuncture specialist as of yet.  She is hoping to make the trip once the " "weather warms some.  She is hesitant to try any other prophylactic medications at this time.    Chronic Sinusitis:  She has been having pressure behind her eyes, nose, and throughout her ears.  Symptoms are worse when lying down.  She has noticed some wheezing in her lungs.  She is trying to quit smoking, and has only smoked about three cigarettes in the past couple of weeks.  She was evaluated for similar symptoms in [facility] at the beginning of January.  She was treated with Zyrtec-D and Amoxicillin at that time, but her symptoms have persisted.  She has been using Benadryl with a decongestant and Nyquil as needed.  She has been using Flonase regularly.    Review of Systems   Constitutional: Negative for chills and fever.         Objective    /78 (BP Location: Right arm, Patient Position: Sitting, Cuff Size: Adult Regular)   Pulse 63   Temp 97.8  F (36.6  C) (Temporal)   Resp 16   Ht 1.549 m (5' 1\")   Wt 57.4 kg (126 lb 9.6 oz)   LMP  (LMP Unknown)   SpO2 99%   BMI 23.92 kg/m    Body mass index is 23.92 kg/m .  Physical Exam  Constitutional:       General: She is not in acute distress.     Appearance: Normal appearance. She is not ill-appearing.   HENT:      Right Ear: Tympanic membrane normal.      Left Ear: Tympanic membrane normal.      Nose: Congestion present.      Mouth/Throat:      Mouth: Mucous membranes are moist.      Pharynx: Oropharynx is clear. No posterior oropharyngeal erythema.   Neck:      Musculoskeletal: Neck supple. No muscular tenderness.   Cardiovascular:      Rate and Rhythm: Normal rate and regular rhythm.      Heart sounds: No murmur. No friction rub. No gallop.    Pulmonary:      Effort: Pulmonary effort is normal.      Breath sounds: Wheezing present.   Lymphadenopathy:      Cervical: No cervical adenopathy.   Neurological:      Mental Status: She is alert.   Psychiatric:         Mood and Affect: Mood normal.           "

## 2021-02-22 ENCOUNTER — PATIENT OUTREACH (OUTPATIENT)
Dept: FAMILY MEDICINE | Facility: OTHER | Age: 56
End: 2021-02-22

## 2021-02-22 DIAGNOSIS — F11.90 CHRONIC, CONTINUOUS USE OF OPIOIDS: Primary | ICD-10-CM

## 2021-02-22 NOTE — LETTER
[facility]  [address]  [place] [id number] [phone]       February 22, 2021    El Joseph  [address]  [place] [address]    Dear El,    We care about your health and have reviewed your health plan and are making recommendations based on this review, to optimize your health.    You are in particular need of attention regarding:  -Depression  -Breast Cancer Screening  -Colon Cancer Screening  -Wellness (Physical) Visit   -Tobacco use  -Anxiety    We are recommending that you:  -schedule a FOLLOWUP OFFICE APPOINTMENT with Marisa Dubon.       -schedule a WELLNESS (Physical) APPOINTMENT with Marisa Dubon.   I will check fasting labs the same day - nothing to eat except water and meds for 8-10 hours prior.    -schedule a MAMMOGRAM which is due.    1 in 8 women will develop invasive breast cancer during her lifetime and it is the most common non-skin cancer in American women.  EARLY detection, new treatments, and a better understanding of the disease have increased survival rates - the 5 year survival rate in the 1960s was 63% and today it is close to 90%.    If you are under/uninsured, we recommend you contact the [facility]. They offer mammograms at no charge or on a sliding fee charge. You can schedule with them at [phone]. Please have them send us the results.      Please disregard this reminder if you have had this exam elsewhere within the last year.  It would be helpful for us to have a copy of your mammogram report in your file so that we can best coordinate your care - please contact us with when your test was done so we can update your record.             -schedule a COLONOSCOPY to look for colon cancer (due every 10 years or 5 years in higher risk situations.)        Colon cancer is now the second leading cause of cancer-related deaths in the [place] for both men and women and there are over 130,000 new cases and 50,000 deaths  per year from colon cancer.  Colonoscopies can prevent 90-95% of these deaths.  Problem lesions can be removed before they ever become cancer.  This test is not only looking for cancer, but also getting rid of precancerious lesions.    If you are under/uninsured, we recommend you contact the [employer] program. [employer] is a free colorectal cancer screening program that provides colonoscopies for eligible under/uninsured [place] men and women. If you are interested in receiving a free colonoscopy, please call [employer] at [phone] (mention code [employer]) to see if you re eligible.      If you do not wish to do a colonoscopy or cannot afford to do one, at this time, there is another option. It is called a FIT test or Fecal Immunochemical Occult Blood Test (take home stool sample kit).  It does not replace the colonoscopy for colorectal cancer screening, but it can detect hidden bleeding in the lower colon.  It does need to be repeated every year and if a positive result is obtained, you would be referred for a colonoscopy.          If you have completed either one of these tests at another facility, please call with the details of when and where the tests were done and if they were normal or not. Or have the records sent to our clinic so that we can best coordinate your care.    Also, if you are smoking still and would like some help, then please contact us or make an appointment to discuss your options ([email or web address] has very good free information.)                                                               In addition, here is a list of due or overdue Health Maintenance reminders.    Health Maintenance Due   Topic Date Due     Preventive Care Visit  1965     Discuss Advance Care Planning  1965     Pneumococcal Vaccine (1 of 2 - PPSV23) 11/12/1973     HIV Screening  11/12/1982     Hepatitis C Screening  11/12/1985     Cholesterol Lab  11/12/2012     Colorectal Cancer Screening   04/01/2013     Zoster (Shingles) Vaccine (1 of 2) 11/12/2017     Mammogram  11/09/2019     URINE DRUG SCREEN  02/28/2020     Flu Vaccine (1) 09/01/2020       To address the above recommendations, we encourage you to contact us at [phone] or via [employer]. They will assist you with finding the most convenient time and location.    Thank you for trusting [facility] AND [facility] and we appreciate the opportunity to serve you.  We look forward to supporting your healthcare needs in the future.    Healthy Regards,    Your [facility] HOSPITAL Team

## 2021-02-22 NOTE — TELEPHONE ENCOUNTER
Patient Quality Outreach      Summary:    Patient has the following on her problem list/HM:   Depression / Dysthymia review    6 Month Remission: 4-8 month window range:   12 Month Remission: 10-14 month window range:        PHQ-9 SCORE 2/28/2019 11/21/2019 4/21/2020   PHQ-9 Total Score 0 15 16       If PHQ-9 recheck is 5 or more, route to provider for next steps.    Immunizations       Health Maintenance Due   Topic     Flu Vaccine (1)         Patient is due/failing the following:   Cologuard, Breast Cancer Screening - Mammogram, PHQ-9 Needed and Depression follow-up visit, Adult/Adolescent physical, date due: now and Immunizations    Type of outreach:    Sent letter.    Questions for provider review:    Chronic pain agreement and urine drug test.                                                                                                                                     Norma J. Gosselin, LPN       Chart routed to Provider.

## 2021-03-04 ENCOUNTER — OFFICE VISIT (OUTPATIENT)
Dept: FAMILY MEDICINE | Facility: OTHER | Age: 56
End: 2021-03-04
Attending: FAMILY MEDICINE
Payer: COMMERCIAL

## 2021-03-04 VITALS
BODY MASS INDEX: 24.58 KG/M2 | WEIGHT: 130.2 LBS | SYSTOLIC BLOOD PRESSURE: 126 MMHG | TEMPERATURE: 98.4 F | HEIGHT: 61 IN | HEART RATE: 76 BPM | DIASTOLIC BLOOD PRESSURE: 70 MMHG | OXYGEN SATURATION: 98 % | RESPIRATION RATE: 16 BRPM

## 2021-03-04 DIAGNOSIS — J20.9 ACUTE BRONCHITIS, UNSPECIFIED ORGANISM: Primary | ICD-10-CM

## 2021-03-04 PROCEDURE — 99213 OFFICE O/P EST LOW 20 MIN: CPT | Performed by: FAMILY MEDICINE

## 2021-03-04 RX ORDER — ALBUTEROL SULFATE 90 UG/1
2 AEROSOL, METERED RESPIRATORY (INHALATION) EVERY 6 HOURS
Qty: 1 INHALER | Refills: 1 | Status: SHIPPED | OUTPATIENT
Start: 2021-03-04 | End: 2021-10-05

## 2021-03-04 RX ORDER — DOXYCYCLINE 100 MG/1
100 CAPSULE ORAL 2 TIMES DAILY
Qty: 20 CAPSULE | Refills: 0 | Status: SHIPPED | OUTPATIENT
Start: 2021-03-04 | End: 2021-03-14

## 2021-03-04 RX ORDER — PREDNISONE 10 MG/1
40 TABLET ORAL DAILY
Qty: 20 TABLET | Refills: 0 | Status: SHIPPED | OUTPATIENT
Start: 2021-03-04 | End: 2021-03-09

## 2021-03-04 SDOH — HEALTH STABILITY: MENTAL HEALTH: HOW OFTEN DO YOU HAVE 6 OR MORE DRINKS ON ONE OCCASION?: NOT ASKED

## 2021-03-04 SDOH — HEALTH STABILITY: MENTAL HEALTH: HOW MANY STANDARD DRINKS CONTAINING ALCOHOL DO YOU HAVE ON A TYPICAL DAY?: NOT ASKED

## 2021-03-04 SDOH — HEALTH STABILITY: MENTAL HEALTH: HOW OFTEN DO YOU HAVE A DRINK CONTAINING ALCOHOL?: MONTHLY OR LESS

## 2021-03-04 ASSESSMENT — ENCOUNTER SYMPTOMS
SINUS PAIN: 0
CHILLS: 0
RHINORRHEA: 0
COUGH: 1
FEVER: 0
SORE THROAT: 0
SHORTNESS OF BREATH: 1
SINUS PRESSURE: 0

## 2021-03-04 ASSESSMENT — ANXIETY QUESTIONNAIRES
3. WORRYING TOO MUCH ABOUT DIFFERENT THINGS: MORE THAN HALF THE DAYS
6. BECOMING EASILY ANNOYED OR IRRITABLE: NOT AT ALL
IF YOU CHECKED OFF ANY PROBLEMS ON THIS QUESTIONNAIRE, HOW DIFFICULT HAVE THESE PROBLEMS MADE IT FOR YOU TO DO YOUR WORK, TAKE CARE OF THINGS AT HOME, OR GET ALONG WITH OTHER PEOPLE: NOT DIFFICULT AT ALL
GAD7 TOTAL SCORE: 11
5. BEING SO RESTLESS THAT IT IS HARD TO SIT STILL: NEARLY EVERY DAY
1. FEELING NERVOUS, ANXIOUS, OR ON EDGE: SEVERAL DAYS
7. FEELING AFRAID AS IF SOMETHING AWFUL MIGHT HAPPEN: NOT AT ALL
2. NOT BEING ABLE TO STOP OR CONTROL WORRYING: MORE THAN HALF THE DAYS

## 2021-03-04 ASSESSMENT — PATIENT HEALTH QUESTIONNAIRE - PHQ9
5. POOR APPETITE OR OVEREATING: NEARLY EVERY DAY
SUM OF ALL RESPONSES TO PHQ QUESTIONS 1-9: 0

## 2021-03-04 ASSESSMENT — PAIN SCALES - GENERAL: PAINLEVEL: MODERATE PAIN (4)

## 2021-03-04 ASSESSMENT — MIFFLIN-ST. JEOR: SCORE: 1132.96

## 2021-03-04 NOTE — PROGRESS NOTES
"Assessment & Plan     Acute bronchitis, unspecified organism  History and physical exam consistent with acute bronchitis.  Will treat with Doxycycline for anti-inflammatory effect as well as steroid burst.  Albuterol inhaler provided for use as needed.  Follow-up if symptoms worsening or failing to improve.  Encouraged continued attempts towards smoking cessation.  - doxycycline hyclate (VIBRAMYCIN) 100 MG capsule; Take 1 capsule (100 mg) by mouth 2 times daily for 10 days  - predniSONE (DELTASONE) 10 MG tablet; Take 4 tablets (40 mg) by mouth daily for 5 days  - albuterol (PROAIR HFA/PROVENTIL HFA/VENTOLIN HFA) 108 (90 Base) MCG/ACT inhaler; Inhale 2 puffs into the lungs every 6 hours    20 minutes spent on the date of the encounter doing chart review, history and exam, documentation and further activities as noted above    Marisa Dubon, [facility]    Oneil Gallegos is a 55 year old who presents for the following health issues:    HPI   She has been having difficulty breathing.  She has had bronchitis and pneumonia in the past and current symptoms feel similar.  She reports an intermittent cough productive of phlegm which is associated with chest \"heaviness.\"  Symptoms started over the past couple of weeks and have worsened.  She has tried Mucinex, Nyquil, and another over the counter cough and cold medicine without significant improvement.  She is still trying to quit smoking.  Currently smoking about one pack per week.    Review of Systems   Constitutional: Negative for chills and fever.   HENT: Positive for congestion. Negative for ear pain, postnasal drip, rhinorrhea, sinus pressure, sinus pain and sore throat.    Respiratory: Positive for cough and shortness of breath.          Objective    /70   Pulse 76   Temp 98.4  F (36.9  C) (Tympanic)   Resp 16   Ht 1.549 m (5' 1\")   Wt 59.1 kg (130 lb 3.2 oz)   LMP  (LMP Unknown)   SpO2 98%   BMI 24.60 kg/m    Body mass " index is 24.6 kg/m .  Physical Exam  Constitutional:       General: She is not in acute distress.     Appearance: Normal appearance. She is not ill-appearing.   HENT:      Right Ear: Tympanic membrane normal.      Left Ear: Tympanic membrane normal.      Nose: Congestion present.      Mouth/Throat:      Mouth: Mucous membranes are moist.      Pharynx: Oropharynx is clear. No oropharyngeal exudate or posterior oropharyngeal erythema.   Neck:      Musculoskeletal: Neck supple. No muscular tenderness.   Cardiovascular:      Rate and Rhythm: Normal rate and regular rhythm.      Heart sounds: No murmur. No friction rub. No gallop.    Pulmonary:      Effort: Pulmonary effort is normal.      Breath sounds: Wheezing present. No rhonchi or rales.   Lymphadenopathy:      Cervical: No cervical adenopathy.   Neurological:      Mental Status: She is alert.

## 2021-03-04 NOTE — NURSING NOTE
"Chief Complaint   Patient presents with     Cough     wheezing, SOB x 2 weeks     Headache         Initial /70   Pulse 76   Temp 98.4  F (36.9  C) (Tympanic)   Resp 16   Ht 1.549 m (5' 1\")   Wt 59.1 kg (130 lb 3.2 oz)   LMP  (LMP Unknown)   SpO2 98%   BMI 24.60 kg/m   Estimated body mass index is 24.6 kg/m  as calculated from the following:    Height as of this encounter: 1.549 m (5' 1\").    Weight as of this encounter: 59.1 kg (130 lb 3.2 oz).         Medication Reconciliation: Complete      Norma J. Gosselin, LPN   "

## 2021-03-05 ASSESSMENT — ANXIETY QUESTIONNAIRES: GAD7 TOTAL SCORE: 11

## 2021-03-31 DIAGNOSIS — F40.243 ANXIETY WITH FLYING: Primary | ICD-10-CM

## 2021-03-31 NOTE — TELEPHONE ENCOUNTER
[facility] of [place] sent Rx request for the following:      Requested Prescriptions   Pending Prescriptions Disp Refills   LORazepam (ATIVAN) 0.5 MG tablet [Pharmacy Med Name: LORAZEPAM 0.5MG TAB] 10 tablet     Sig: TAKE 1 TABLET BY MOUTH 1/2 HOUR BEFORE PLANE FLIGHTS.   Last Office Visit:              3/4/21  Future Office visit:           None  Routing refill request to provider for review/approval because:    Drug not on the [facility] or [facility] refill protocol or controlled substance    Previously historically reported 8/26/15 and discontinued 8/21/18.    Not on active medication list.    Unable to complete prescription refill per RN Medication Refill Policy. Kezia Gale RN .............. 3/31/2021  3:24 PM

## 2021-04-02 RX ORDER — LORAZEPAM 0.5 MG/1
TABLET ORAL
Qty: 10 TABLET | Refills: 0 | OUTPATIENT
Start: 2021-04-02

## 2021-04-05 NOTE — TELEPHONE ENCOUNTER
Patient notified to schedule an appointment to discuss medications prior to refills.   Angle Johnston LPN     4/5/2021 10:37 AM

## 2021-04-06 ENCOUNTER — TELEPHONE (OUTPATIENT)
Dept: FAMILY MEDICINE | Facility: OTHER | Age: 56
End: 2021-04-06

## 2021-04-06 DIAGNOSIS — G89.29 OTHER CHRONIC PAIN: ICD-10-CM

## 2021-04-06 RX ORDER — HYDROCODONE BITARTRATE AND ACETAMINOPHEN 5; 325 MG/1; MG/1
1 TABLET ORAL EVERY 8 HOURS PRN
Qty: 30 TABLET | Refills: 0 | Status: CANCELLED | OUTPATIENT
Start: 2021-04-06

## 2021-04-06 NOTE — TELEPHONE ENCOUNTER
[facility] sent Rx request for the following:      Requested Prescriptions   Pending Prescriptions Disp Refills     HYDROcodone-acetaminophen (NORCO) 5-325 MG tablet 30 tablet 0     Sig: Take 1 tablet by mouth every 8 hours as needed for severe pain * ACETAMINOPHEN SHOULD BE LIMITED TO 4000MG PER DAY*       There is no refill protocol information for this order          Last Prescription Date:   12/18/20  Last Fill Qty/Refills:         30, R-0    Last Office Visit:              3/4/21  Future Office visit:           None  Routing refill request to provider for review/approval because:  Drug not on the [facility] or [facility] refill protocol or controlled substance.     Does not meet RN refill protocol criteria. Will route to MD for review and consideration. Rochelle Rivas RN on 4/6/2021 at 12:02 PM

## 2021-04-06 NOTE — TELEPHONE ENCOUNTER
Pt would like to get a refill on her Hydrocodone Rx. States she will be heading out of town on Thursday.

## 2021-04-07 ENCOUNTER — TELEPHONE (OUTPATIENT)
Dept: FAMILY MEDICINE | Facility: OTHER | Age: 56
End: 2021-04-07

## 2021-04-07 NOTE — TELEPHONE ENCOUNTER
Patient would like a refill of her HYDROcodone (1 left), cyclobenzaprine (4 left) and rizatriptan (2 left).  She states she is sick with some kind of flu and they are leaving for [place] tomorrow around 3:00    She uses [facility], [place]    Please call to advise

## 2021-04-07 NOTE — TELEPHONE ENCOUNTER
Transferred to schedule a telephone visit.  Norma J. Gosselin, LPN .......  4/7/2021  12:02 PM

## 2021-04-08 ENCOUNTER — VIRTUAL VISIT (OUTPATIENT)
Dept: FAMILY MEDICINE | Facility: OTHER | Age: 56
End: 2021-04-08
Attending: FAMILY MEDICINE
Payer: COMMERCIAL

## 2021-04-08 DIAGNOSIS — G89.29 OTHER CHRONIC PAIN: ICD-10-CM

## 2021-04-08 DIAGNOSIS — G43.009 MIGRAINE WITHOUT AURA AND WITHOUT STATUS MIGRAINOSUS, NOT INTRACTABLE: ICD-10-CM

## 2021-04-08 PROCEDURE — 99212 OFFICE O/P EST SF 10 MIN: CPT | Mod: 95 | Performed by: FAMILY MEDICINE

## 2021-04-08 RX ORDER — HYDROCODONE BITARTRATE AND ACETAMINOPHEN 5; 325 MG/1; MG/1
1 TABLET ORAL EVERY 8 HOURS PRN
Qty: 30 TABLET | Refills: 0 | Status: SHIPPED | OUTPATIENT
Start: 2021-04-08 | End: 2022-01-28

## 2021-04-08 RX ORDER — RIZATRIPTAN BENZOATE 10 MG/1
10 TABLET ORAL
Qty: 18 TABLET | Refills: 1 | Status: SHIPPED | OUTPATIENT
Start: 2021-04-08 | End: 2022-01-21

## 2021-04-08 RX ORDER — CYCLOBENZAPRINE HCL 10 MG
10 TABLET ORAL 3 TIMES DAILY PRN
Qty: 90 TABLET | Refills: 1 | Status: SHIPPED | OUTPATIENT
Start: 2021-04-08 | End: 2022-01-21

## 2021-04-08 ASSESSMENT — ANXIETY QUESTIONNAIRES
6. BECOMING EASILY ANNOYED OR IRRITABLE: NOT AT ALL
GAD7 TOTAL SCORE: 9
IF YOU CHECKED OFF ANY PROBLEMS ON THIS QUESTIONNAIRE, HOW DIFFICULT HAVE THESE PROBLEMS MADE IT FOR YOU TO DO YOUR WORK, TAKE CARE OF THINGS AT HOME, OR GET ALONG WITH OTHER PEOPLE: SOMEWHAT DIFFICULT
5. BEING SO RESTLESS THAT IT IS HARD TO SIT STILL: SEVERAL DAYS
3. WORRYING TOO MUCH ABOUT DIFFERENT THINGS: NEARLY EVERY DAY
2. NOT BEING ABLE TO STOP OR CONTROL WORRYING: MORE THAN HALF THE DAYS
1. FEELING NERVOUS, ANXIOUS, OR ON EDGE: SEVERAL DAYS
7. FEELING AFRAID AS IF SOMETHING AWFUL MIGHT HAPPEN: SEVERAL DAYS

## 2021-04-08 ASSESSMENT — ENCOUNTER SYMPTOMS
FEVER: 0
CHILLS: 0

## 2021-04-08 ASSESSMENT — PATIENT HEALTH QUESTIONNAIRE - PHQ9
SUM OF ALL RESPONSES TO PHQ QUESTIONS 1-9: 1
5. POOR APPETITE OR OVEREATING: SEVERAL DAYS

## 2021-04-08 NOTE — PROGRESS NOTES
El is a 55 year old who is being evaluated via a billable telephone visit.      What phone number would you like to be contacted at? [phone]  How would you like to obtain your AVS? MyChart    Assessment & Plan     Other chronic pain  Symptoms historically well-controlled with Flexeril and Norco used sparingly, though have been less well-controlled recently.  PDMP reviewed and appropriate.  Continue current medications; refills provided.  If symptoms persistent or worsening, she will follow-up in clinic for assessment.  - HYDROcodone-acetaminophen (NORCO) 5-325 MG tablet; Take 1 tablet by mouth every 8 hours as needed for severe pain * ACETAMINOPHEN SHOULD BE LIMITED TO 4000MG PER DAY*  - cyclobenzaprine (FLEXERIL) 10 MG tablet; Take 1 tablet (10 mg) by mouth 3 times daily as needed for muscle spasms    Migraine without aura and without status migrainosus, not intractable  Controlled with acupuncture.  Follow-up as able.  Continue Rizatriptan as needed for abortive therapy.  Refill provided.  - rizatriptan (MAXALT) 10 MG tablet; Take 1 tablet (10 mg) by mouth every 2 hours as needed for migraine    Marisa Dubon DO  [facility]    Subjective   El is a 55 year old who presents for the following health issues:    HPI     Chronic Pain:  Secondary to a history of fibromyalgia and muscle spasms.  She has a has diffuse pain located throughout her back, hips, ankles, neck, shoulders, elbows, and wrists.  Symptoms have been worse over the past four months.  She reports experiencing extreme cramping and muscle twitching.  She cannot pinpoint any diet or lifestyle changes which can account for her worsening symptoms and has attributed it to age.  She has continued to take Norco and Flexeril as needed without complications and uses these medications sparingly.    Migraine Headaches:  She has been seen by her acupuncture provider, which relieved her migraines for two weeks.  She just started  getting the headaches again which she attributes to an upper respiratory viral infection she contracted.  She reports that she will not be able to make it back to him for some time.  She uses Rizatriptan and Benadryl as needed for her headaches with good relief.    Review of Systems   Constitutional: Negative for chills and fever.          Objective       Vitals:  No vitals were obtained today due to virtual visit.  Physical Exam   alert and no distress  PSYCH: Alert and oriented times 3; coherent speech, normal   rate and volume, able to articulate logical thoughts, able   to abstract reason, no tangential thoughts, no hallucinations   or delusions  Her affect is normal  RESP: No cough, no audible wheezing, able to talk in full sentences  Remainder of exam unable to be completed due to telephone visits    Phone call duration: 9 minutes and 55 seconds

## 2021-04-09 ASSESSMENT — ANXIETY QUESTIONNAIRES: GAD7 TOTAL SCORE: 9

## 2021-06-12 DIAGNOSIS — F33.41 RECURRENT MAJOR DEPRESSIVE DISORDER, IN PARTIAL REMISSION (H): Primary | ICD-10-CM

## 2021-06-14 NOTE — TELEPHONE ENCOUNTER
[id number] sent Rx request for the following:    Patient enrolled in our Rx Med Sync service to improveadherence. We are requesting a refill authorization inadvance to ensure an active prescription is on file.     Requested Prescriptions   Pending Prescriptions Disp Refills   FLUoxetine (PROZAC) 40 MG capsule [Pharmacy Med Name: FLUOXETINE 40MG CAPSULE] 90 capsule 3    Sig: TAKE 1 CAPSULE BY MOUTH EVERY MORNING   Last Prescription Date:   5/20/20  Last Fill Qty/Refills:         90, R-3    Last Office Visit:              4/8/21 (VV)  Future Office visit:           None    Prescription approved per [facility] Refill Protocol for 90 days at this time. Kezia Gale RN .............. 6/14/2021  9:17 AM

## 2021-06-16 RX ORDER — FLUOXETINE 40 MG/1
CAPSULE ORAL
Qty: 90 CAPSULE | Refills: 0 | Status: SHIPPED | OUTPATIENT
Start: 2021-06-16 | End: 2021-09-17

## 2021-06-22 DIAGNOSIS — K21.9 GASTROESOPHAGEAL REFLUX DISEASE WITHOUT ESOPHAGITIS: ICD-10-CM

## 2021-06-24 RX ORDER — ESOMEPRAZOLE MAGNESIUM 40 MG/1
CAPSULE, DELAYED RELEASE ORAL
Qty: 90 CAPSULE | Refills: 1 | Status: SHIPPED | OUTPATIENT
Start: 2021-06-24 | End: 2021-06-25

## 2021-06-24 NOTE — TELEPHONE ENCOUNTER
[id number] sent Rx request for the following:      ESOMEPRAZOLE 40MG DR CAPSULE  Sig: Take 30-60 minutes before eating.      Last Prescription Date:   2/11/2021  Last Fill Qty/Refills:         90, R-1    Last Office Visit:              4/8/2021   Future Office visit:           none    Prescription refilled per RN Medication Refill Policy.................... Prateek Harvey RN ....................  6/24/2021   8:24 AM

## 2021-06-25 ENCOUNTER — TELEPHONE (OUTPATIENT)
Dept: FAMILY MEDICINE | Facility: OTHER | Age: 56
End: 2021-06-25

## 2021-06-25 DIAGNOSIS — K21.9 GASTROESOPHAGEAL REFLUX DISEASE WITHOUT ESOPHAGITIS: ICD-10-CM

## 2021-06-25 RX ORDER — ESOMEPRAZOLE MAGNESIUM 40 MG/1
CAPSULE, DELAYED RELEASE ORAL
Qty: 90 CAPSULE | Refills: 1 | Status: SHIPPED | OUTPATIENT
Start: 2021-06-25 | End: 2022-01-03

## 2021-07-01 NOTE — TELEPHONE ENCOUNTER
After verifying patient's name and date of birth, pharmacy was given the below information.  Neha Berger....7/1/2021 2:05 PM

## 2021-08-04 ENCOUNTER — TELEPHONE (OUTPATIENT)
Dept: FAMILY MEDICINE | Facility: OTHER | Age: 56
End: 2021-08-04

## 2021-08-04 ENCOUNTER — OFFICE VISIT (OUTPATIENT)
Dept: FAMILY MEDICINE | Facility: OTHER | Age: 56
End: 2021-08-04
Attending: NURSE PRACTITIONER
Payer: COMMERCIAL

## 2021-08-04 VITALS
TEMPERATURE: 98.1 F | OXYGEN SATURATION: 96 % | DIASTOLIC BLOOD PRESSURE: 76 MMHG | SYSTOLIC BLOOD PRESSURE: 124 MMHG | HEIGHT: 62 IN | WEIGHT: 129 LBS | BODY MASS INDEX: 23.74 KG/M2 | RESPIRATION RATE: 16 BRPM | HEART RATE: 70 BPM

## 2021-08-04 DIAGNOSIS — R05.9 COUGH: ICD-10-CM

## 2021-08-04 DIAGNOSIS — G43.009 MIGRAINE WITHOUT AURA AND WITHOUT STATUS MIGRAINOSUS, NOT INTRACTABLE: ICD-10-CM

## 2021-08-04 DIAGNOSIS — H65.02 NON-RECURRENT ACUTE SEROUS OTITIS MEDIA OF LEFT EAR: Primary | ICD-10-CM

## 2021-08-04 LAB — SARS-COV-2 RNA RESP QL NAA+PROBE: NEGATIVE

## 2021-08-04 PROCEDURE — C9803 HOPD COVID-19 SPEC COLLECT: HCPCS

## 2021-08-04 PROCEDURE — 99213 OFFICE O/P EST LOW 20 MIN: CPT | Performed by: PHYSICIAN ASSISTANT

## 2021-08-04 PROCEDURE — U0003 INFECTIOUS AGENT DETECTION BY NUCLEIC ACID (DNA OR RNA); SEVERE ACUTE RESPIRATORY SYNDROME CORONAVIRUS 2 (SARS-COV-2) (CORONAVIRUS DISEASE [COVID-19]), AMPLIFIED PROBE TECHNIQUE, MAKING USE OF HIGH THROUGHPUT TECHNOLOGIES AS DESCRIBED BY CMS-2020-01-R: HCPCS | Mod: ZL | Performed by: PHYSICIAN ASSISTANT

## 2021-08-04 RX ORDER — CEFDINIR 300 MG/1
300 CAPSULE ORAL 2 TIMES DAILY
Qty: 14 CAPSULE | Refills: 0 | Status: SHIPPED | OUTPATIENT
Start: 2021-08-04 | End: 2021-08-11

## 2021-08-04 RX ORDER — RIZATRIPTAN BENZOATE 10 MG/1
10 TABLET ORAL
Qty: 10 TABLET | Refills: 0 | Status: SHIPPED | OUTPATIENT
Start: 2021-08-04 | End: 2021-09-24

## 2021-08-04 ASSESSMENT — MIFFLIN-ST. JEOR: SCORE: 1135.45

## 2021-08-04 ASSESSMENT — PAIN SCALES - GENERAL: PAINLEVEL: EXTREME PAIN (8)

## 2021-08-04 NOTE — TELEPHONE ENCOUNTER
Patient states she was just seen in the [facility] and prescribed amoxicillin. She states the last couple times she was put on amoxicillin it hasn't worked for her and she has needed to come back in. She was wondering if something else could be prescribed for her? She hasn't picked up the prescription yet.

## 2021-08-04 NOTE — PROGRESS NOTES
ASSESSMENT/PLAN:    I have reviewed the nursing notes.  I have reviewed the findings, diagnosis, plan and need for follow up with the patient.    (H65.02) Non-recurrent acute serous otitis media of left ear  (primary encounter diagnosis)  (R05) Cough  Comment: see below  Plan: Symptomatic COVID-19 Virus (Coronavirus) by PCR  Nasopharyngeal, amoxicillin-clavulanate (AUGMENTIN) 875-125 MG tablet        Vital signs stable. PE consistent with OME/ear infection of left ears. Treatment of choice includes antibiotic regimen (Augmentin, alternating tylenol and ibuprofen every 4-6 hours as needed (if able, daily limits reviewed in AVS and verbally with patient), warm compresses, other symptomatic remedies. COVID test pending, if positive, quarantine 10-14 days from symptom onset, if negative, quarantine until symptom free. Avoid trauma to ear(s) such as Q-tips. If symptoms change or worsen, recommend follow up for reevaluation (high fevers, worsening pain, abnormal drainage or odor from ear, etc.). Discussed if ear infections become increasingly common, as this point, a referral to ENT can be made, typically by PCP. Patient is in agreement and understanding of the above treatment plan. All questions and concerns were addressed and answered to patient's satisfaction. AVS reviewed with patient.     (G43.009) Migraine without aura and without status migrainosus, not intractable  Comment: see below  Plan: rizatriptan (MAXALT) 10 MG tablet        Discussed only short term refill of medication as unable to get in to see PCP. All further refills to come from family practice.     Discussed warning signs/symptoms indicative of need to f/u    Follow up if symptoms persist or worsen or concerns    I explained my diagnostic considerations and recommendations to the patient, who voiced understanding and agreement with the treatment plan. All questions were answered. We discussed potential side effects of any prescribed or recommended  therapies, as well as expectations for response to treatments.    Stephanie Lewis PA-C  8/4/2021  3:22 PM    HPI:    El Joseph is a 55 year old female  who presents to [facility] today for concerns of URI, x a few days ago    Symptoms:  No fevers or chills.  Yes sore throat/pharyngitis/tonsillitis.   Yes allergy/URI Symptoms  No Muffled Sounds/Change in Hearing  Yes Sensation of Fullness in Ear(s)  No Ringing in Ears/Tinnitus  No Balance Changes  No Dizziness  Yes Congestion (head/nasal/chest)  Yes Cough/Productive Cough - productive - thick, yellow  Yes Post Nasal Drip  Yes Headache  Yes Sinus Pain/Pressure  Yes Myalgias  Yes Otalgia  Activity Level Changes: Yes: fatigue  Appetite/Liquid Intake Changes: Yes: less  Changes to Bowel Habits: No  Changes to Bladder Habits: No  Additional Symptoms to Report: No  History of similar symptoms: No  Prior workup: No    Treatments tried: Tylenol/Ibuprofen    Site of exposure: son  Type of exposure: colds    Cardiopulmonary History:  Smoker: Yes  Asthma: No  COPD: No    Other Pertinent History: no recurrent OME, tonsillitis, or other HEENT history    PCP: DO Jagdish    Past Medical History:   Diagnosis Date     Other constipation     No Comments Provided     Pain in knee     No Comments Provided     Personal history of other medical treatment (CODE)     G7, P3-0-4-3 (3 vag. 4 spon miscarriages-one in the second trimester and one daughter was twin and lost the twin).     Personal history of other medical treatment (CODE)     in prior relationship and childhood     Radiculopathy     2011,Benign hypermobility per [facility]     Past Surgical History:   Procedure Laterality Date     COLONOSCOPY      4/2003,Normal, bleeding hemorrhoids     HYSTERECTOMY VAGINAL      5/2003,Dr Morales, chronic pelvic pain/endometriosis     OTHER SURGICAL HISTORY      [id number],DILATION AND CURETTAGE,After 4 miscarriages     OTHER SURGICAL HISTORY      1986,600000,OTHER,Ovarian cyst     OTHER  SURGICAL HISTORY      6/2002,209269,OTHER,Chronic pelvic pain/hemorrhagic cyst     OTHER SURGICAL HISTORY      2/2009,[id number],OTHER,Dr Ramirez-recurrent ovarian cysts     Social History     Tobacco Use     Smoking status: Current Every Day Smoker     Packs/day: 0.25     Years: 10.00     Pack years: 2.50     Types: Cigarettes     Smokeless tobacco: Never Used   Substance Use Topics     Alcohol use: Yes     Comment: occ     Current Outpatient Medications   Medication Sig Dispense Refill     albuterol (PROAIR HFA/PROVENTIL HFA/VENTOLIN HFA) 108 (90 Base) MCG/ACT inhaler Inhale 2 puffs into the lungs every 6 hours 1 Inhaler 1     cyclobenzaprine (FLEXERIL) 10 MG tablet Take 1 tablet (10 mg) by mouth 3 times daily as needed for muscle spasms 90 tablet 1     esomeprazole (NEXIUM) 40 MG DR capsule Take 30-60 minutes before eating. 90 capsule 1     Flaxseed, Linseed, (FLAX SEEDS) POWD 1 Tablespoonful Sprinkle on foods daily       FLUoxetine (PROZAC) 40 MG capsule TAKE 1 CAPSULE BY MOUTH EVERY MORNING 90 capsule 0     fluticasone (FLONASE) 50 MCG/ACT nasal spray Spray 2 sprays into both nostrils daily 16 g 1     HYDROcodone-acetaminophen (NORCO) 5-325 MG tablet Take 1 tablet by mouth every 8 hours as needed for severe pain * ACETAMINOPHEN SHOULD BE LIMITED TO 4000MG PER DAY* 30 tablet 0     hydrOXYzine (ATARAX) 10 MG tablet Take 1 tablet (10 mg) by mouth every 8 hours as needed for anxiety 90 tablet 0     pregabalin (LYRICA) 150 MG capsule Take 1 capsule (150 mg) by mouth 2 times daily 180 capsule 1     rizatriptan (MAXALT) 10 MG tablet Take 1 tablet (10 mg) by mouth every 2 hours as needed for migraine 18 tablet 1     busPIRone (BUSPAR) 15 MG tablet Take 0.5-1 tablets (7.5-15 mg) by mouth 3 times daily as needed (Anxiety) (Patient not taking: Reported on 8/4/2021) 45 tablet 3     cetirizine-pseudoePHEDrine ER (ZYRTEC-D) 5-120 MG 12 hr tablet Take 1 tablet by mouth 2 times daily as needed for congestion 14 tablet 0     Allergies  "  Allergen Reactions     Nsaids      Other reaction(s): Ecchymosis     Past medical history, past surgical history, current medications and allergies reviewed and accurate to the best of my knowledge.      ROS:  Refer to HPI    /76   Pulse 70   Temp 98.1  F (36.7  C) (Tympanic)   Resp 16   Ht 1.562 m (5' 1.5\")   Wt 58.5 kg (129 lb)   LMP  (LMP Unknown)   SpO2 96%   Breastfeeding No   BMI 23.98 kg/m      EXAM:  General Appearance: Well appearing 55-year old female, appropriate appearance for age. No acute distress  Ears: Left TM intact, erythematous with bony landmarks appreciated, no effusion, mild bulging, no purulence.  Right TM intact, translucent with bony landmarks appreciated, no erythema, no effusion, no bulging, no purulence.  Left auditory canal clear.  Right auditory canal clear.  Normal external ears, non tender.  Eyes: conjunctivae normal without erythema or irritation, corneas clear, no drainage or crusting, no eyelid swelling, pupils equal   Orophayrnx: moist mucous membranes, posterior pharynx without erythema, tonsils without hypertrophy, no erythema, no exudates or petechiae, no post nasal drip seen, no trismus, voice clear.    Sinuses:  No sinus tenderness upon palpation of the frontal or maxillary sinuses  Nose:  Bilateral nares: no erythema, no edema, no drainage or congestion   Neck: supple without adenopathy  Respiratory: normal chest wall and respirations.  Normal effort.  Clear to auscultation bilaterally, no wheezing, crackles or rhonchi.  No increased work of breathing.  No cough appreciated.  Cardiac: RRR with no murmurs  Psychological: normal affect, alert, oriented, and pleasant.     Labs:  COVID pending    Xray:  None     "

## 2021-08-04 NOTE — PATIENT INSTRUCTIONS
"Please refer to your AVS for follow up and pain/symptoms management recommendations (I.e.: medications, helpful conservative treatment modalities, appropriate follow up if need to a specialist or family practice, etc.). Please return to urgent care if your symptoms change or worsen.     Discharge instructions:  -If you were prescribed a medication(s), please take this as prescribed/directed  -Monitor your symptoms, if changing/worsening, return to UC/ER or PCP for follow up    - For ear infection. Take entire course of antibiotics to ensure this clears (even if feeling better).  - Tylenol or ibuprofen for pain and fevers.   - Eat yogurt, kefir or take over-the-counter \"probiotic\" at least 2 hours before or after a dose of antibiotic. This will replace good bacteria that may have been lost due to the antibiotic. (This may also help to prevent yeast infections and upset stomach during the course of antibiotic.)  - In the future at onset of congestion: Blow nose or use bulb syringe to keep nasal congestion cleared and use saline nasal spray/flush.  -Alternative ibuprofen and tylenol as needed.   -Rest/relaxation and keeping hydrated with clear liquids (ie: water or gatorade). Using a humidifier may be beneficial as well.     * Recheck with family practice as needed or ER sooner with worsening or concerns.     -Flonase, Zyrtec (Benadyl, Claritin) over the counter for congestion    You were prescribed an antibiotic, please take into consideration the following information:  - Take entire course of antibiotic even if you start to feel better.  - Antibiotics can cause stomach upset including nausea and diarrhea. Read your bottle or ask the pharmacist if antibiotic can be taken with food to help prevent nausea. If you have symptoms of diarrhea you can take an over-the-counter probiotic and/or increase foods with probiotics such as yogurt, [place], sauerkraut.  -Use caution in sunlight as can lead to increased risk of sunburn " while on ABX (antibiotics).

## 2021-08-04 NOTE — TELEPHONE ENCOUNTER
Patient called in regards to antibiotic, she states that she feels Amoxicillin is not effective for her and requesting ABX change. No adverse side effects to RX. Appears patient has been on Augmentin quite a few times in the past. Refer to nurse phone encounter.     Discussed Omnicef for patient, did discuss that Augmentin is first line - patient declined RX and accepted RX for Omnicef PO BID x 7 days. She understands that if symptoms do not resolve that she should be reevaluated.     Stephanie Lewis PA-C  8/4/2021  4:30 PM

## 2021-08-04 NOTE — NURSING NOTE
Patient presents to the clinic today with a cough, left ear ache, and loss of taste and smell that started a couple days ago.  Charlee Nolan LPN

## 2021-09-08 DIAGNOSIS — G43.009 MIGRAINE WITHOUT AURA AND WITHOUT STATUS MIGRAINOSUS, NOT INTRACTABLE: ICD-10-CM

## 2021-09-14 DIAGNOSIS — F33.41 RECURRENT MAJOR DEPRESSIVE DISORDER, IN PARTIAL REMISSION (H): ICD-10-CM

## 2021-09-17 RX ORDER — FLUOXETINE 40 MG/1
CAPSULE ORAL
Qty: 90 CAPSULE | Refills: 0 | Status: SHIPPED | OUTPATIENT
Start: 2021-09-17 | End: 2021-12-06

## 2021-09-17 NOTE — TELEPHONE ENCOUNTER
"Requested Prescriptions   Pending Prescriptions Disp Refills     FLUoxetine (PROZAC) 40 MG capsule [Pharmacy Med Name: FLUOXETINE 40MG CAPSULE] 90 capsule 0     Sig: TAKE 1 CAPSULE BY MOUTH EVERY MORNING       SSR[facility] Protocol Passed - 9/14/2021  3:16 PM        Passed - PHQ-9 score less than 5 in past 6 months     Please review last PHQ-9 score.           Passed - Medication is active on med list        Passed - Patient is age 18 or older        Passed - No active pregnancy on record        Passed - No positive pregnancy test in last 12 months        Passed - Recent (6 mo) or future (30 days) visit within the authorizing provider's specialty     Patient had office visit in the last 6 months or has a visit in the next 30 days with authorizing provider or within the authorizing provider's specialty.  See \"Patient Info\" tab in inbasket, or \"Choose Columns\" in Meds & Orders section of the refill encounter.               LOV: 04/08/2021 (Jagdish)  Prescription approved per [facility] Refill Protocol.  Yajaira Guaman RN ....................  9/17/2021   8:31 AM      "

## 2021-09-20 ENCOUNTER — ALLIED HEALTH/NURSE VISIT (OUTPATIENT)
Dept: FAMILY MEDICINE | Facility: OTHER | Age: 56
End: 2021-09-20
Attending: FAMILY MEDICINE
Payer: COMMERCIAL

## 2021-09-20 DIAGNOSIS — R43.2 LOSS OF TASTE: ICD-10-CM

## 2021-09-20 DIAGNOSIS — R50.9 FEVER AND CHILLS: Primary | ICD-10-CM

## 2021-09-20 PROCEDURE — U0003 INFECTIOUS AGENT DETECTION BY NUCLEIC ACID (DNA OR RNA); SEVERE ACUTE RESPIRATORY SYNDROME CORONAVIRUS 2 (SARS-COV-2) (CORONAVIRUS DISEASE [COVID-19]), AMPLIFIED PROBE TECHNIQUE, MAKING USE OF HIGH THROUGHPUT TECHNOLOGIES AS DESCRIBED BY CMS-2020-01-R: HCPCS | Mod: ZL

## 2021-09-20 PROCEDURE — C9803 HOPD COVID-19 SPEC COLLECT: HCPCS

## 2021-09-21 LAB — SARS-COV-2 RNA RESP QL NAA+PROBE: NEGATIVE

## 2021-09-24 RX ORDER — RIZATRIPTAN BENZOATE 10 MG/1
10 TABLET ORAL
Qty: 10 TABLET | Refills: 0 | Status: SHIPPED | OUTPATIENT
Start: 2021-09-24 | End: 2021-10-05

## 2021-09-24 NOTE — TELEPHONE ENCOUNTER
[id number] sent Rx request for the following:      Requested Prescriptions   Pending Prescriptions Disp Refills     rizatriptan (MAXALT) 10 MG tablet [Pharmacy Med Name: RIZATRIPTAN 10MG TABLET] 10 tablet 0     Sig: TAKE 1 TABLET (10 MG) BY MOUTH AT ONSET OF HEADACHE FOR MIGRAINE MAY REPEAT IN 2 HOURS. MAX 3 TABLETS/24 HOURS.   Last Prescription Date:   8/4/21  Last Fill Qty/Refills:         10, R-0    Last Office Visit:              4/8/21   Future Office visit:           None  Routing refill request to provider for review/approval because:    Serotonin Agonists Failed - 9/24/2021 10:00 AM        Failed - Serotonin Agonist request needs review.     Unable to complete prescription refill per RN Medication Refill Policy. Kezia Gale RN .............. 9/24/2021  10:01 AM

## 2021-10-04 NOTE — PROGRESS NOTES
Assessment & Plan     1. Hair loss  Given onset after bleaching hair, balding is most likely related to this however will complete blood work as outlined below for additional evaluation. Will notify with results and treat if indicated. If labs return normal, consider dermatology follow up for possible microscopic or biopsy evaluation.   - CBC and Differential; Future  - Basic Metabolic Panel; Future  - TSH Reflex GH; Future  - Iron Binding Panel; Future  - Ferritin; Future  - CBC and Differential  - Basic Metabolic Panel  - TSH Reflex GH  - Iron Binding Panel  - Ferritin    2. Acute bronchitis, unspecified organism  Stable. Refilled as below. Discussed tobacco cessation, patient is not interested at this time.   - albuterol (PROAIR HFA/PROVENTIL HFA/VENTOLIN HFA) 108 (90 Base) MCG/ACT inhaler; Inhale 2 puffs into the lungs every 6 hours  Dispense: 6.7 g; Refill: 1    3. Migraine without aura and without status migrainosus, not intractable  Stable. Refilled as below.   - rizatriptan (MAXALT) 10 MG tablet; Take 1 tablet (10 mg) by mouth at onset of headache for migraine May repeat in 2 hours. Max 3 tablets/24 hours.  Dispense: 10 tablet; Refill: 0    4. Encounter for screening mammogram for breast cancer  - MA Screen Bilateral w/Gera; Future    5. Screening cholesterol level  Lipid panel pending. Will notify with results and treat if indicated. Encourage healthy diet and exercise.   - Lipid Panel; Future  - Lipid Panel    6. Special screening for malignant neoplasms, colon  - Adult General Surg Referral; Future      Return if symptoms worsen or fail to improve.    Lisa Crisostomo PA-C  [facility]    ADDENDUM:  Cholesterol quite elevated. Script for Lipitor sent. Recheck in 6-12 months. Iron deficient. Recommend daily supplement, patient will purchase OTC.   Lisa Crisostomo PA-C on 10/6/2021 at 8:33 AM      Oneil Gallegos is a 55 year old who presents for the following health  issues    HPI   Here for evaluation of hair loss concerns. Reports about three months ago she was bleaching her hair and then developed two small bald spots on the back of her head. The spots have been getting larger. Initially the size of a finger tip but now about 2-3 inches in diameter. No associated pain, wound, bleeding, drainage. Using Pantene hair products which has decreased hair loss with combing. Requesting refills of albuterol inhaler and Maxalt. Doing well with both medications. No side effects noted. Due for screening mammogram and colonoscopy.         PAST MEDICAL HISTORY:   Past Medical History:   Diagnosis Date     Other constipation     No Comments Provided     Pain in knee     No Comments Provided     Personal history of other medical treatment (CODE)     G7, P3-0-4-3 (3 vag. 4 spon miscarriages-one in the second trimester and one daughter was twin and lost the twin).     Personal history of other medical treatment (CODE)     in prior relationship and childhood     Radiculopathy     2011,Benign hypermobility per [facility]       PAST SURGICAL HISTORY:   Past Surgical History:   Procedure Laterality Date     COLONOSCOPY      4/2003,Normal, bleeding hemorrhoids     HYSTERECTOMY VAGINAL      5/2003,Dr Ramirez-[place], chronic pelvic pain/endometriosis     OTHER SURGICAL HISTORY      [id number],DILATION AND CURETTAGE,After 4 miscarriages     OTHER SURGICAL HISTORY      1986,[id number],OTHER,Ovarian cyst     OTHER SURGICAL HISTORY      6/2002,165048,OTHER,Chronic pelvic pain/hemorrhagic cyst     OTHER SURGICAL HISTORY      2/2009,400862,OTHER,Dr Ramirez-recurrent ovarian cysts       FAMILY HISTORY:   Family History   Problem Relation Age of Onset     Breast Cancer Mother 68        Cancer-breast     Diabetes Mother         Diabetes     Heart Disease Father         Heart Disease,CHF, pacemaker,MI at 93     Hypertension Father         Hypertension     Aneurysm Sister         Brain      Fibromyalgia Sister      Arthritis  "Sister      Migraines Sister         Chronic     Arthritis Brother      Aneurysm Sister         Brain     Migraines Sister      Migraines Sister      Breast Cancer Sister         Cancer-breast     Tumor Sister         Brain     Breast Cancer Sister      Ovarian Cancer Sister      Arthritis Sister      Aneurysm Brother         Brain     Arthritis Brother      Arthritis Brother        SOCIAL HISTORY:   Social History     Tobacco Use     Smoking status: Current Every Day Smoker     Packs/day: 0.25     Years: 10.00     Pack years: 2.50     Types: Cigarettes     Smokeless tobacco: Never Used   Substance Use Topics     Alcohol use: Yes     Comment: occ        Allergies   Allergen Reactions     Nsaids      Other reaction(s): Ecchymosis     Current Outpatient Medications   Medication     albuterol (PROAIR HFA/PROVENTIL HFA/VENTOLIN HFA) 108 (90 Base) MCG/ACT inhaler     busPIRone (BUSPAR) 15 MG tablet     cetirizine-pseudoePHEDrine ER (ZYRTEC-D) 5-120 MG 12 hr tablet     cyclobenzaprine (FLEXERIL) 10 MG tablet     esomeprazole (NEXIUM) 40 MG DR capsule     Flaxseed, Linseed, (FLAX SEEDS) POWD     FLUoxetine (PROZAC) 40 MG capsule     fluticasone (FLONASE) 50 MCG/ACT nasal spray     HYDROcodone-acetaminophen (NORCO) 5-325 MG tablet     hydrOXYzine (ATARAX) 10 MG tablet     pregabalin (LYRICA) 150 MG capsule     rizatriptan (MAXALT) 10 MG tablet     rizatriptan (MAXALT) 10 MG tablet     No current facility-administered medications for this visit.         Review of Systems   Per HPI        Objective    /64   Pulse 75   Temp 97.2  F (36.2  C)   Resp 14   Ht 1.562 m (5' 1.5\")   Wt 58.6 kg (129 lb 3.2 oz)   LMP  (LMP Unknown)   SpO2 97%   Breastfeeding No   BMI 24.02 kg/m    Body mass index is 24.02 kg/m .  Physical Exam   General: Pleasant, in no apparent distress.  Cardiovascular: Regular rate and rhythm with S1 equal to S2. No murmurs, friction rubs, or gallops.   Respiratory: Lungs are resonant and clear to " auscultation bilaterally. No wheezes, crackles, or rhonchi.  Skin: Two circular bald spots on posterior scalp with complete hair loss. No associated rash, overlying erythema, wound, drainage, bleeding.   Psych: Appropriate mood and affect.

## 2021-10-05 ENCOUNTER — OFFICE VISIT (OUTPATIENT)
Dept: FAMILY MEDICINE | Facility: OTHER | Age: 56
End: 2021-10-05
Attending: PHYSICIAN ASSISTANT
Payer: COMMERCIAL

## 2021-10-05 VITALS
OXYGEN SATURATION: 97 % | TEMPERATURE: 97.2 F | HEIGHT: 62 IN | WEIGHT: 129.2 LBS | BODY MASS INDEX: 23.77 KG/M2 | RESPIRATION RATE: 14 BRPM | SYSTOLIC BLOOD PRESSURE: 118 MMHG | HEART RATE: 75 BPM | DIASTOLIC BLOOD PRESSURE: 64 MMHG

## 2021-10-05 DIAGNOSIS — J20.9 ACUTE BRONCHITIS, UNSPECIFIED ORGANISM: ICD-10-CM

## 2021-10-05 DIAGNOSIS — L65.9 HAIR LOSS: Primary | ICD-10-CM

## 2021-10-05 DIAGNOSIS — G43.009 MIGRAINE WITHOUT AURA AND WITHOUT STATUS MIGRAINOSUS, NOT INTRACTABLE: ICD-10-CM

## 2021-10-05 DIAGNOSIS — Z12.31 ENCOUNTER FOR SCREENING MAMMOGRAM FOR BREAST CANCER: ICD-10-CM

## 2021-10-05 DIAGNOSIS — Z13.220 SCREENING CHOLESTEROL LEVEL: ICD-10-CM

## 2021-10-05 DIAGNOSIS — E78.5 HYPERLIPIDEMIA, UNSPECIFIED HYPERLIPIDEMIA TYPE: ICD-10-CM

## 2021-10-05 DIAGNOSIS — Z12.11 SPECIAL SCREENING FOR MALIGNANT NEOPLASMS, COLON: ICD-10-CM

## 2021-10-05 LAB
ANION GAP SERPL CALCULATED.3IONS-SCNC: 6 MMOL/L (ref 3–14)
BASOPHILS # BLD AUTO: 0 10E3/UL (ref 0–0.2)
BASOPHILS NFR BLD AUTO: 1 %
BUN SERPL-MCNC: 10 MG/DL (ref 7–25)
CALCIUM SERPL-MCNC: 10 MG/DL (ref 8.6–10.3)
CHLORIDE BLD-SCNC: 104 MMOL/L (ref 98–107)
CHOLEST SERPL-MCNC: 359 MG/DL
CO2 SERPL-SCNC: 30 MMOL/L (ref 21–31)
CREAT SERPL-MCNC: 0.71 MG/DL (ref 0.6–1.2)
EOSINOPHIL # BLD AUTO: 0.3 10E3/UL (ref 0–0.7)
EOSINOPHIL NFR BLD AUTO: 6 %
ERYTHROCYTE [DISTWIDTH] IN BLOOD BY AUTOMATED COUNT: 13.1 % (ref 10–15)
FASTING STATUS PATIENT QL REPORTED: ABNORMAL
FERRITIN SERPL-MCNC: 19 NG/ML (ref 24–336)
GFR SERPL CREATININE-BSD FRML MDRD: >90 ML/MIN/1.73M2
GLUCOSE BLD-MCNC: 88 MG/DL (ref 70–105)
HCT VFR BLD AUTO: 46.3 % (ref 35–47)
HDLC SERPL-MCNC: 48 MG/DL (ref 23–92)
HGB BLD-MCNC: 15.6 G/DL (ref 11.7–15.7)
IMM GRANULOCYTES # BLD: 0 10E3/UL
IMM GRANULOCYTES NFR BLD: 1 %
IRON SATN MFR SERPL: 16 % (ref 20–55)
IRON SERPL-MCNC: 78 UG/DL (ref 50–212)
LDLC SERPL CALC-MCNC: 263 MG/DL
LYMPHOCYTES # BLD AUTO: 2 10E3/UL (ref 0.8–5.3)
LYMPHOCYTES NFR BLD AUTO: 34 %
MCH RBC QN AUTO: 30 PG (ref 26.5–33)
MCHC RBC AUTO-ENTMCNC: 33.7 G/DL (ref 31.5–36.5)
MCV RBC AUTO: 89 FL (ref 78–100)
MONOCYTES # BLD AUTO: 0.4 10E3/UL (ref 0–1.3)
MONOCYTES NFR BLD AUTO: 7 %
NEUTROPHILS # BLD AUTO: 3.1 10E3/UL (ref 1.6–8.3)
NEUTROPHILS NFR BLD AUTO: 51 %
NONHDLC SERPL-MCNC: 311 MG/DL
NRBC # BLD AUTO: 0 10E3/UL
NRBC BLD AUTO-RTO: 0 /100
PLATELET # BLD AUTO: 248 10E3/UL (ref 150–450)
POTASSIUM BLD-SCNC: 5.7 MMOL/L (ref 3.5–5.1)
RBC # BLD AUTO: 5.2 10E6/UL (ref 3.8–5.2)
SODIUM SERPL-SCNC: 140 MMOL/L (ref 134–144)
TIBC SERPL-MCNC: 478.8 UG/DL (ref 245–400)
TRANSFERRIN SERPL-MCNC: 342 MG/DL (ref 203–362)
TRIGL SERPL-MCNC: 242 MG/DL
TSH SERPL DL<=0.005 MIU/L-ACNC: 3.22 MU/L (ref 0.4–4)
UIBC (UNSATURATED): 400.8 MG/DL
WBC # BLD AUTO: 5.8 10E3/UL (ref 4–11)

## 2021-10-05 PROCEDURE — 36415 COLL VENOUS BLD VENIPUNCTURE: CPT | Mod: ZL | Performed by: PHYSICIAN ASSISTANT

## 2021-10-05 PROCEDURE — 84443 ASSAY THYROID STIM HORMONE: CPT | Mod: ZL | Performed by: PHYSICIAN ASSISTANT

## 2021-10-05 PROCEDURE — 80048 BASIC METABOLIC PNL TOTAL CA: CPT | Mod: ZL | Performed by: PHYSICIAN ASSISTANT

## 2021-10-05 PROCEDURE — 82728 ASSAY OF FERRITIN: CPT | Mod: ZL | Performed by: PHYSICIAN ASSISTANT

## 2021-10-05 PROCEDURE — 99214 OFFICE O/P EST MOD 30 MIN: CPT | Performed by: PHYSICIAN ASSISTANT

## 2021-10-05 PROCEDURE — 80061 LIPID PANEL: CPT | Mod: ZL | Performed by: PHYSICIAN ASSISTANT

## 2021-10-05 PROCEDURE — 83550 IRON BINDING TEST: CPT | Mod: ZL | Performed by: PHYSICIAN ASSISTANT

## 2021-10-05 PROCEDURE — 85025 COMPLETE CBC W/AUTO DIFF WBC: CPT | Mod: ZL | Performed by: PHYSICIAN ASSISTANT

## 2021-10-05 RX ORDER — RIZATRIPTAN BENZOATE 10 MG/1
10 TABLET ORAL
Qty: 10 TABLET | Refills: 0 | Status: SHIPPED | OUTPATIENT
Start: 2021-10-05 | End: 2022-01-28

## 2021-10-05 RX ORDER — ALBUTEROL SULFATE 90 UG/1
2 AEROSOL, METERED RESPIRATORY (INHALATION) EVERY 6 HOURS
Qty: 6.7 G | Refills: 1 | Status: SHIPPED | OUTPATIENT
Start: 2021-10-05 | End: 2022-01-28

## 2021-10-05 ASSESSMENT — ANXIETY QUESTIONNAIRES
1. FEELING NERVOUS, ANXIOUS, OR ON EDGE: NOT AT ALL
IF YOU CHECKED OFF ANY PROBLEMS ON THIS QUESTIONNAIRE, HOW DIFFICULT HAVE THESE PROBLEMS MADE IT FOR YOU TO DO YOUR WORK, TAKE CARE OF THINGS AT HOME, OR GET ALONG WITH OTHER PEOPLE: NOT DIFFICULT AT ALL
GAD7 TOTAL SCORE: 0
3. WORRYING TOO MUCH ABOUT DIFFERENT THINGS: NOT AT ALL
7. FEELING AFRAID AS IF SOMETHING AWFUL MIGHT HAPPEN: NOT AT ALL
6. BECOMING EASILY ANNOYED OR IRRITABLE: NOT AT ALL
2. NOT BEING ABLE TO STOP OR CONTROL WORRYING: NOT AT ALL
5. BEING SO RESTLESS THAT IT IS HARD TO SIT STILL: NOT AT ALL

## 2021-10-05 ASSESSMENT — PATIENT HEALTH QUESTIONNAIRE - PHQ9: 5. POOR APPETITE OR OVEREATING: NOT AT ALL

## 2021-10-05 ASSESSMENT — PAIN SCALES - GENERAL: PAINLEVEL: MODERATE PAIN (4)

## 2021-10-05 ASSESSMENT — MIFFLIN-ST. JEOR: SCORE: 1136.36

## 2021-10-05 NOTE — NURSING NOTE
Patient presents to clinic with hair loss. She has 2 bald spots on back of head.  Mia Cullen LPN ....................  10/5/2021   2:12 PM

## 2021-10-06 RX ORDER — ATORVASTATIN CALCIUM 10 MG/1
10 TABLET, FILM COATED ORAL DAILY
Qty: 30 TABLET | Refills: 3 | Status: SHIPPED | OUTPATIENT
Start: 2021-10-06 | End: 2021-11-19

## 2021-10-06 ASSESSMENT — ANXIETY QUESTIONNAIRES: GAD7 TOTAL SCORE: 0

## 2021-10-07 DIAGNOSIS — Z12.11 SCREENING FOR COLON CANCER: Primary | ICD-10-CM

## 2021-10-07 NOTE — TELEPHONE ENCOUNTER
Screening Questions for the Scheduling of Screening Colonoscopies   (If Colonoscopy is diagnostic, Provider should review the chart before scheduling.)  Are you younger than 50 or older than 80?  NO  Do you take aspirin or fish oil?  NO (if yes, tell patient to stop 1 week prior to Colonoscopy)  Do you take warfarin (Coumadin), clopidogrel (Plavix), apixaban (Eliquis), dabigatram (Pradaxa), rivaroxaban (Xarelto) or any blood thinner? NO  Do you use oxygen at home?  NO  Do you have kidney disease? NO  Are you on dialysis? NO  Have you had a stroke or heart attack in the last year? NO  Have you had a stent in your heart or any blood vessel in the last year? NO  Have you had a transplant of any organ? NO  Have you had a colonoscopy or upper endoscopy (EGD) before? YES         When?  2019 ?  Date of scheduled Colonoscopy. 11/30/2021  Provider [facility]  [facility]arm[facility]

## 2021-11-02 ENCOUNTER — TELEPHONE (OUTPATIENT)
Dept: FAMILY MEDICINE | Facility: OTHER | Age: 56
End: 2021-11-02

## 2021-11-04 RX ORDER — POLYETHYLENE GLYCOL 3350, SODIUM CHLORIDE, SODIUM BICARBONATE, POTASSIUM CHLORIDE 420; 11.2; 5.72; 1.48 G/4L; G/4L; G/4L; G/4L
4000 POWDER, FOR SOLUTION ORAL ONCE
Qty: 4000 ML | Refills: 0 | Status: SHIPPED | OUTPATIENT
Start: 2021-11-04 | End: 2021-11-04

## 2021-11-04 RX ORDER — BISACODYL 5 MG/1
TABLET, DELAYED RELEASE ORAL
Qty: 2 TABLET | Refills: 0 | Status: ON HOLD | OUTPATIENT
Start: 2021-11-04 | End: 2021-11-30

## 2021-11-09 ENCOUNTER — HOSPITAL ENCOUNTER (OUTPATIENT)
Dept: MAMMOGRAPHY | Facility: OTHER | Age: 56
Discharge: HOME | End: 2021-11-09
Attending: PHYSICIAN ASSISTANT | Admitting: PHYSICIAN ASSISTANT
Payer: COMMERCIAL

## 2021-11-09 DIAGNOSIS — Z12.31 ENCOUNTER FOR SCREENING MAMMOGRAM FOR BREAST CANCER: ICD-10-CM

## 2021-11-09 PROCEDURE — 77063 BREAST TOMOSYNTHESIS BI: CPT

## 2021-11-10 ENCOUNTER — NURSE TRIAGE (OUTPATIENT)
Dept: FAMILY MEDICINE | Facility: OTHER | Age: 56
End: 2021-11-10
Payer: COMMERCIAL

## 2021-11-10 ENCOUNTER — APPOINTMENT (OUTPATIENT)
Dept: GENERAL RADIOLOGY | Facility: OTHER | Age: 56
End: 2021-11-10
Attending: FAMILY MEDICINE
Payer: COMMERCIAL

## 2021-11-10 ENCOUNTER — HOSPITAL ENCOUNTER (EMERGENCY)
Facility: OTHER | Age: 56
Discharge: HOME | End: 2021-11-10
Attending: FAMILY MEDICINE | Admitting: FAMILY MEDICINE
Payer: COMMERCIAL

## 2021-11-10 VITALS
HEIGHT: 62 IN | HEART RATE: 64 BPM | DIASTOLIC BLOOD PRESSURE: 90 MMHG | BODY MASS INDEX: 24.29 KG/M2 | OXYGEN SATURATION: 97 % | SYSTOLIC BLOOD PRESSURE: 131 MMHG | WEIGHT: 132 LBS | RESPIRATION RATE: 20 BRPM

## 2021-11-10 DIAGNOSIS — R07.89 ATYPICAL CHEST PAIN: ICD-10-CM

## 2021-11-10 LAB
ALBUMIN SERPL-MCNC: 4.1 G/DL (ref 3.5–5.7)
ALP SERPL-CCNC: 59 U/L (ref 34–104)
ALT SERPL W P-5'-P-CCNC: 25 U/L (ref 7–52)
ANION GAP SERPL CALCULATED.3IONS-SCNC: 6 MMOL/L (ref 3–14)
AST SERPL W P-5'-P-CCNC: 19 U/L (ref 13–39)
BASOPHILS # BLD AUTO: 0 10E3/UL (ref 0–0.2)
BASOPHILS NFR BLD AUTO: 0 %
BILIRUB SERPL-MCNC: 0.3 MG/DL (ref 0.3–1)
BUN SERPL-MCNC: 10 MG/DL (ref 7–25)
CALCIUM SERPL-MCNC: 9.5 MG/DL (ref 8.6–10.3)
CHLORIDE BLD-SCNC: 106 MMOL/L (ref 98–107)
CO2 SERPL-SCNC: 26 MMOL/L (ref 21–31)
CREAT SERPL-MCNC: 0.57 MG/DL (ref 0.6–1.2)
D DIMER PPP FEU-MCNC: 0.3 UG/ML FEU (ref 0–0.5)
EOSINOPHIL # BLD AUTO: 0.3 10E3/UL (ref 0–0.7)
EOSINOPHIL NFR BLD AUTO: 4 %
ERYTHROCYTE [DISTWIDTH] IN BLOOD BY AUTOMATED COUNT: 12.9 % (ref 10–15)
FLUAV RNA SPEC QL NAA+PROBE: NEGATIVE
FLUBV RNA RESP QL NAA+PROBE: NEGATIVE
GFR SERPL CREATININE-BSD FRML MDRD: >90 ML/MIN/1.73M2
GLUCOSE BLD-MCNC: 79 MG/DL (ref 70–105)
HCT VFR BLD AUTO: 41.8 % (ref 35–47)
HGB BLD-MCNC: 14.2 G/DL (ref 11.7–15.7)
IMM GRANULOCYTES # BLD: 0 10E3/UL
IMM GRANULOCYTES NFR BLD: 0 %
INR PPP: 0.91 (ref 0.85–1.15)
LDH SERPL L TO P-CCNC: 148 U/L (ref 140–271)
LYMPHOCYTES # BLD AUTO: 2.3 10E3/UL (ref 0.8–5.3)
LYMPHOCYTES NFR BLD AUTO: 37 %
MCH RBC QN AUTO: 30.3 PG (ref 26.5–33)
MCHC RBC AUTO-ENTMCNC: 34 G/DL (ref 31.5–36.5)
MCV RBC AUTO: 89 FL (ref 78–100)
MONOCYTES # BLD AUTO: 0.6 10E3/UL (ref 0–1.3)
MONOCYTES NFR BLD AUTO: 9 %
NEUTROPHILS # BLD AUTO: 3 10E3/UL (ref 1.6–8.3)
NEUTROPHILS NFR BLD AUTO: 50 %
NRBC # BLD AUTO: 0 10E3/UL
NRBC BLD AUTO-RTO: 0 /100
PLATELET # BLD AUTO: 211 10E3/UL (ref 150–450)
POTASSIUM BLD-SCNC: 4 MMOL/L (ref 3.5–5.1)
PROCALCITONIN SERPL-MCNC: <0.5 NG/ML
PROT SERPL-MCNC: 6 G/DL (ref 6.4–8.9)
RBC # BLD AUTO: 4.68 10E6/UL (ref 3.8–5.2)
RSV RNA SPEC NAA+PROBE: NEGATIVE
SARS-COV-2 RNA RESP QL NAA+PROBE: NEGATIVE
SODIUM SERPL-SCNC: 138 MMOL/L (ref 134–144)
TROPONIN I SERPL-MCNC: <2.4 PG/ML (ref 0–34)
WBC # BLD AUTO: 6.1 10E3/UL (ref 4–11)

## 2021-11-10 PROCEDURE — C9803 HOPD COVID-19 SPEC COLLECT: HCPCS | Performed by: EMERGENCY MEDICINE

## 2021-11-10 PROCEDURE — 93005 ELECTROCARDIOGRAM TRACING: CPT | Performed by: EMERGENCY MEDICINE

## 2021-11-10 PROCEDURE — 96374 THER/PROPH/DIAG INJ IV PUSH: CPT | Performed by: FAMILY MEDICINE

## 2021-11-10 PROCEDURE — 84145 PROCALCITONIN (PCT): CPT | Performed by: FAMILY MEDICINE

## 2021-11-10 PROCEDURE — 93005 ELECTROCARDIOGRAM TRACING: CPT | Performed by: FAMILY MEDICINE

## 2021-11-10 PROCEDURE — 85610 PROTHROMBIN TIME: CPT | Performed by: FAMILY MEDICINE

## 2021-11-10 PROCEDURE — 85025 COMPLETE CBC W/AUTO DIFF WBC: CPT | Performed by: FAMILY MEDICINE

## 2021-11-10 PROCEDURE — 99285 EMERGENCY DEPT VISIT HI MDM: CPT | Mod: 25 | Performed by: FAMILY MEDICINE

## 2021-11-10 PROCEDURE — 83615 LACTATE (LD) (LDH) ENZYME: CPT | Performed by: FAMILY MEDICINE

## 2021-11-10 PROCEDURE — C9803 HOPD COVID-19 SPEC COLLECT: HCPCS | Performed by: FAMILY MEDICINE

## 2021-11-10 PROCEDURE — 250N000011 HC RX IP 250 OP 636: Performed by: FAMILY MEDICINE

## 2021-11-10 PROCEDURE — 93010 ELECTROCARDIOGRAM REPORT: CPT | Performed by: INTERNAL MEDICINE

## 2021-11-10 PROCEDURE — 80053 COMPREHEN METABOLIC PANEL: CPT | Performed by: FAMILY MEDICINE

## 2021-11-10 PROCEDURE — 71046 X-RAY EXAM CHEST 2 VIEWS: CPT

## 2021-11-10 PROCEDURE — 85379 FIBRIN DEGRADATION QUANT: CPT | Performed by: FAMILY MEDICINE

## 2021-11-10 PROCEDURE — 87637 SARSCOV2&INF A&B&RSV AMP PRB: CPT | Performed by: FAMILY MEDICINE

## 2021-11-10 PROCEDURE — 99283 EMERGENCY DEPT VISIT LOW MDM: CPT | Performed by: FAMILY MEDICINE

## 2021-11-10 PROCEDURE — 96374 THER/PROPH/DIAG INJ IV PUSH: CPT | Performed by: EMERGENCY MEDICINE

## 2021-11-10 PROCEDURE — 99285 EMERGENCY DEPT VISIT HI MDM: CPT | Mod: 25 | Performed by: EMERGENCY MEDICINE

## 2021-11-10 PROCEDURE — 36415 COLL VENOUS BLD VENIPUNCTURE: CPT | Performed by: FAMILY MEDICINE

## 2021-11-10 PROCEDURE — 84484 ASSAY OF TROPONIN QUANT: CPT | Performed by: EMERGENCY MEDICINE

## 2021-11-10 RX ORDER — DEXAMETHASONE SODIUM PHOSPHATE 10 MG/ML
6 INJECTION, SOLUTION INTRAMUSCULAR; INTRAVENOUS ONCE
Status: COMPLETED | OUTPATIENT
Start: 2021-11-10 | End: 2021-11-10

## 2021-11-10 RX ADMIN — DEXAMETHASONE SODIUM PHOSPHATE 6 MG: 10 INJECTION INTRAMUSCULAR; INTRAVENOUS at 17:15

## 2021-11-10 ASSESSMENT — MIFFLIN-ST. JEOR: SCORE: 1149.06

## 2021-11-10 NOTE — TELEPHONE ENCOUNTER
S-(situation): Patient concern of chest pain    B-(background): Patient has no noted cardiac or lung history, patient states does utilize inhaler prescribed by PCP when SOB occurs. Chronic pain and anxiety listed historically    A-(assessment): Patient states pain location if left chest behind breast and radiates in left side of back, into left arm and pain occur in left side of neck. Patient is questioning if related to muscle strain or other source. Pain is described as comes and goes, patient also says pain is 'steady'. Pain will occur in middle of night awakening patient from sleep. Initially, pain was thought to be anxiety related, anxiety medication does not help. Pain began 9 days ago with unknown trigger or injury relation. Rating pain 7/10 and lasting hours in duration. Cold and heat attempted with no relief to pain areas. Patient states feels more short or breath with pain especially with breathing inward. Patient denies fever, weakness, cough, nausea or vomiting. Patient does admit feeling a loss of balance at time with taking dog outside, occurrence of today. Also, patient states HR possibly feels a little faster, unknown if this is related to anxiety. Patient denies ever having previous pain, recent illness or prolonged travel. No known history of hypertension or cardiac/lung concerns.    R-(recommendations): Per protocol, patient recommended to present to [facility] ED for cardiac evaluation. Advised for patient to not drive self. Patient states  is not home at this time and would agree to go to ER, although would drive self. Discussed with patient to contact 911 with any increase in symptoms and cease driving until ambulance would arrive. Patient unclear of timeline to [facility] ED but verbalized plan to present for evaluation. Advised patient to call back with any further questions or concerns. Patient verbalized understanding and has no further questions at this time. Yajaira Guaman RN  Sitting up in bed. Denies nausea. Reports some improvement in right chest pain. Tolerating ice chips well   ....................  11/10/2021   4:08 PM        Reason for Disposition    Pain also in shoulder(s) or arm(s) or jaw     Pain radiates to left back and left arm, into left side of neck    Difficulty breathing    Heart beating irregularly or very rapidly     Patient states feels HR is elevated    Protocols used: CHEST PAIN-A-[place]

## 2021-11-10 NOTE — ED TRIAGE NOTES
"ED Nursing Triage Note (General)   ________________________________    El Joseph is a 55 year old Female that presents to triage private car  With history of  Chest pain 6/10 sharp pain that goes straight through to her back and down her left arm reported by patient. Pain has progressively been getting worse over the past 9 days.  Significant symptoms had onset 9 day(s) ago.  BP (!) 131/90   Pulse 64   Resp 20   Ht 1.562 m (5' 1.5\")   Wt 59.9 kg (132 lb)   LMP  (LMP Unknown)   SpO2 97%   BMI 24.54 kg/m  t  Patient appears alert  and oriented, in no acute distress., and cooperative and pleasant behavior.  GCS Total = 15  Airway: intact  Breathing noted as Normal  Circulation Normal  Skin:  Normal  Action taken:  Triage to critical care immediately      PRE HOSPITAL PRIOR LIVING SITUATION Spouse  "

## 2021-11-10 NOTE — ED PROVIDER NOTES
History     Chief Complaint   Patient presents with     Chest Pain     Shortness of Breath     HPI  El Joseph is a 55 year old female who presents to the emergency room with shortness of breath and chest pain that feels like it is going through to her back and then down her left arm.  She noted that she is having increasingly severe shortness of breath.  She is unvaccinated.  She feels like it is very difficult to get events very small breaths and looks like she is wincing in pain when she tries to take of breath.    Allergies:  Allergies   Allergen Reactions     Nsaids      Other reaction(s): Ecchymosis       Problem List:    Patient Active Problem List    Diagnosis Date Noted     Anxiety state 01/23/2018     Priority: Medium     Osteoarthrosis 01/23/2018     Priority: Medium     Personal history of tobacco use, presenting hazards to health 01/23/2018     Priority: Medium     Chronic mixed headache syndrome 06/20/2017     Priority: Medium     Fixed drug eruption 04/21/2015     Priority: Medium     Constipation 07/30/2014     Priority: Medium     Fibromyalgia 07/30/2013     Priority: Medium     Pain medication agreement 07/30/2013     Priority: Medium     Overview:   Updated 3/22/2018  Hydrocodone 5/325 mg #30 every 2-3 months.                                   Lyrica 150mg   # 60 a month        Chronic insomnia 08/10/2012     Priority: Medium     Bruxism 05/24/2012     Priority: Medium     Chronic pain disorder 05/24/2012     Priority: Medium     Depression, major, recurrent, moderate (H) 05/24/2012     Priority: Medium     Malaise and fatigue 05/24/2012     Priority: Medium     Temporomandibular joint disorder 05/24/2012     Priority: Medium        Past Medical History:    Past Medical History:   Diagnosis Date     Other constipation      Pain in knee      Personal history of other medical treatment (CODE)      Personal history of other medical treatment (CODE)      Radiculopathy        Past Surgical  History:    Past Surgical History:   Procedure Laterality Date     COLONOSCOPY      4/2003,Normal, bleeding hemorrhoids     HYSTERECTOMY VAGINAL      5/2003,Dr Ramirez-Jean, chronic pelvic pain/endometriosis     OTHER SURGICAL HISTORY      [id number],DILATION AND CURETTAGE,After 4 miscarriages     OTHER SURGICAL HISTORY      1986,600000,OTHER,Ovarian cyst     OTHER SURGICAL HISTORY      6/2002,579520,OTHER,Chronic pelvic pain/hemorrhagic cyst     OTHER SURGICAL HISTORY      2/2009,576880,OTHER,Dr Ramirez-recurrent ovarian cysts       Family History:    Family History   Problem Relation Age of Onset     Breast Cancer Mother 68        Cancer-breast     Diabetes Mother         Diabetes     Heart Disease Father         Heart Disease,CHF, pacemaker,MI at 93     Hypertension Father         Hypertension     Aneurysm Sister         Brain      Fibromyalgia Sister      Arthritis Sister      Migraines Sister         Chronic     Arthritis Brother      Aneurysm Sister         Brain     Migraines Sister      Migraines Sister      Breast Cancer Sister         Cancer-breast     Tumor Sister         Brain     Breast Cancer Sister      Ovarian Cancer Sister      Arthritis Sister      Aneurysm Brother         Brain     Arthritis Brother      Arthritis Brother        Social History:  Marital Status:   [2]  Social History     Tobacco Use     Smoking status: Current Every Day Smoker     Packs/day: 0.25     Years: 10.00     Pack years: 2.50     Types: Cigarettes     Smokeless tobacco: Never Used   Vaping Use     Vaping Use: Never used   Substance Use Topics     Alcohol use: Yes     Comment: occ     Drug use: No        Medications:    albuterol (PROAIR HFA/PROVENTIL HFA/VENTOLIN HFA) 108 (90 Base) MCG/ACT inhaler  atorvastatin (LIPITOR) 10 MG tablet  bisacodyl (DULCOLAX) 5 MG EC tablet  busPIRone (BUSPAR) 15 MG tablet  cetirizine-pseudoePHEDrine ER (ZYRTEC-D) 5-120 MG 12 hr tablet  cyclobenzaprine (FLEXERIL) 10 MG tablet  esomeprazole  "(NEXIUM) 40 MG DR capsule  Flaxseed, Linseed, (FLAX SEEDS) POWD  FLUoxetine (PROZAC) 40 MG capsule  fluticasone (FLONASE) 50 MCG/ACT nasal spray  HYDROcodone-acetaminophen (NORCO) 5-325 MG tablet  hydrOXYzine (ATARAX) 10 MG tablet  pregabalin (LYRICA) 150 MG capsule  rizatriptan (MAXALT) 10 MG tablet  rizatriptan (MAXALT) 10 MG tablet          Review of Systems   Constitutional: Positive for fatigue. Negative for chills and fever.   Respiratory: Positive for cough, chest tightness and shortness of breath. Negative for wheezing.    Cardiovascular: Positive for chest pain.   Gastrointestinal: Positive for abdominal pain.       Physical Exam   BP: (!) 131/90  Pulse: 64  Resp: 20  Height: 156.2 cm (5' 1.5\")  Weight: 59.9 kg (132 lb)  SpO2: 97 %      Physical Exam  Vitals and nursing note reviewed.   Constitutional:       Appearance: She is well-developed.   HENT:      Head: Normocephalic and atraumatic.   Eyes:      Extraocular Movements: Extraocular movements intact.      Pupils: Pupils are equal, round, and reactive to light.   Cardiovascular:      Rate and Rhythm: Normal rate and regular rhythm.      Heart sounds: Normal heart sounds.   Pulmonary:      Effort: Pulmonary effort is normal.      Breath sounds: Normal breath sounds.      Comments: Crackles in lungs bilateraly.   Abdominal:      General: Bowel sounds are normal.      Palpations: Abdomen is soft.   Musculoskeletal:      Cervical back: Normal range of motion and neck supple.   Skin:     General: Skin is warm and dry.      Capillary Refill: Capillary refill takes less than 2 seconds.   Neurological:      Mental Status: She is alert.       ED Course   Patient seen and examined. COVID swab done and labs ordered. Dexamethasone given orally.     Reviewed labs with patient. Covid is negative and strongly encouraged patient to get vaccine. Discussed MRNA vaccines with her and the overall safety of this vaccine.     She is feeling better overall and was ready for " discharge. Recommended that she followup with primary if symptoms persist.      Procedures    No results found for this or any previous visit (from the past 24 hour(s)).    Medications   dexamethasone PF (DECADRON) injection 6 mg (6 mg Intravenous Given 11/10/21 1715)       Assessments & Plan (with Medical Decision Making)     I have reviewed the nursing notes.    I have reviewed the findings, diagnosis, plan and need for follow up with the patient.    Discharge Medication List as of 11/10/2021  9:03 PM          Final diagnoses:   Atypical chest pain       11/10/2021   [facility]  11/11/21 2016

## 2021-11-10 NOTE — TELEPHONE ENCOUNTER
Patient want to talk to PCP or nurse regarding chest pains x 9 days. They happen during the day and night, going through to her back and arm feels out of whack.    Patient wants to talk to PCP instead of going to the ED.  Please call patient back thank you   Yumiko Valdez on 11/10/2021 at 2:32 PM

## 2021-11-11 ASSESSMENT — ENCOUNTER SYMPTOMS
WHEEZING: 0
FEVER: 0
SHORTNESS OF BREATH: 1
FATIGUE: 1
ABDOMINAL PAIN: 1
CHEST TIGHTNESS: 1
CHILLS: 0
COUGH: 1

## 2021-11-11 NOTE — DISCHARGE INSTRUCTIONS
All of your heart related tests look great.  No sign of blood clot.  No sign of pneumonia.  I would recommend following up in clinic with your regular doctor to discuss this further.  Return to ER if worse.

## 2021-11-11 NOTE — ED PROVIDER NOTES
Care patient turned over to myself at change of shift.  Initially seen by Dr. Roque, please see her note for details.  She came in with chest pain shortness of breath.  I see no evidence of any acute cardiac etiology to explain her symptoms.  Normal EKG, chest x-ray and troponin.  Also negative D-dimer.  Procalcitonin and lactate dehydrogenase also normal.  Influenza Covid and RSV negative.  She is feeling better at this time.  Will be discharged to home.  Recommended following up in clinic with her primary provider to discuss further to see if stress testing might be something they would like to pursue.  Return if worse.    (R07.89) Atypical chest pain           Misael Branham MD  11/10/21 1909

## 2021-11-12 LAB
ATRIAL RATE - MUSE: 70 BPM
DIASTOLIC BLOOD PRESSURE - MUSE: NORMAL MMHG
INTERPRETATION ECG - MUSE: NORMAL
P AXIS - MUSE: -2 DEGREES
PR INTERVAL - MUSE: 108 MS
QRS DURATION - MUSE: 72 MS
QT - MUSE: 406 MS
QTC - MUSE: 438 MS
R AXIS - MUSE: 11 DEGREES
SYSTOLIC BLOOD PRESSURE - MUSE: NORMAL MMHG
T AXIS - MUSE: -2 DEGREES
VENTRICULAR RATE- MUSE: 70 BPM

## 2021-11-19 ENCOUNTER — OFFICE VISIT (OUTPATIENT)
Dept: FAMILY MEDICINE | Facility: OTHER | Age: 56
End: 2021-11-19
Attending: FAMILY MEDICINE
Payer: COMMERCIAL

## 2021-11-19 VITALS
SYSTOLIC BLOOD PRESSURE: 124 MMHG | HEIGHT: 62 IN | HEART RATE: 87 BPM | DIASTOLIC BLOOD PRESSURE: 76 MMHG | WEIGHT: 129 LBS | OXYGEN SATURATION: 97 % | TEMPERATURE: 97.3 F | RESPIRATION RATE: 14 BRPM | BODY MASS INDEX: 23.74 KG/M2

## 2021-11-19 DIAGNOSIS — R07.89 OTHER CHEST PAIN: Primary | ICD-10-CM

## 2021-11-19 DIAGNOSIS — F41.9 ANXIETY: ICD-10-CM

## 2021-11-19 DIAGNOSIS — E78.5 HYPERLIPIDEMIA, UNSPECIFIED HYPERLIPIDEMIA TYPE: ICD-10-CM

## 2021-11-19 PROCEDURE — 99214 OFFICE O/P EST MOD 30 MIN: CPT | Performed by: FAMILY MEDICINE

## 2021-11-19 PROCEDURE — 93000 ELECTROCARDIOGRAM COMPLETE: CPT | Performed by: INTERNAL MEDICINE

## 2021-11-19 RX ORDER — ATORVASTATIN CALCIUM 20 MG/1
20 TABLET, FILM COATED ORAL DAILY
Qty: 90 TABLET | Refills: 0 | Status: SHIPPED | OUTPATIENT
Start: 2021-11-19 | End: 2022-02-14

## 2021-11-19 ASSESSMENT — ANXIETY QUESTIONNAIRES
6. BECOMING EASILY ANNOYED OR IRRITABLE: SEVERAL DAYS
4. TROUBLE RELAXING: MORE THAN HALF THE DAYS
5. BEING SO RESTLESS THAT IT IS HARD TO SIT STILL: SEVERAL DAYS
7. FEELING AFRAID AS IF SOMETHING AWFUL MIGHT HAPPEN: SEVERAL DAYS
7. FEELING AFRAID AS IF SOMETHING AWFUL MIGHT HAPPEN: SEVERAL DAYS
1. FEELING NERVOUS, ANXIOUS, OR ON EDGE: NEARLY EVERY DAY
GAD7 TOTAL SCORE: 14
8. IF YOU CHECKED OFF ANY PROBLEMS, HOW DIFFICULT HAVE THESE MADE IT FOR YOU TO DO YOUR WORK, TAKE CARE OF THINGS AT HOME, OR GET ALONG WITH OTHER PEOPLE?: SOMEWHAT DIFFICULT
2. NOT BEING ABLE TO STOP OR CONTROL WORRYING: NEARLY EVERY DAY
3. WORRYING TOO MUCH ABOUT DIFFERENT THINGS: NEARLY EVERY DAY

## 2021-11-19 ASSESSMENT — PATIENT HEALTH QUESTIONNAIRE - PHQ9
SUM OF ALL RESPONSES TO PHQ QUESTIONS 1-9: 9
10. IF YOU CHECKED OFF ANY PROBLEMS, HOW DIFFICULT HAVE THESE PROBLEMS MADE IT FOR YOU TO DO YOUR WORK, TAKE CARE OF THINGS AT HOME, OR GET ALONG WITH OTHER PEOPLE: SOMEWHAT DIFFICULT
SUM OF ALL RESPONSES TO PHQ QUESTIONS 1-9: 9

## 2021-11-19 ASSESSMENT — ENCOUNTER SYMPTOMS
FEVER: 0
CHILLS: 0

## 2021-11-19 ASSESSMENT — PAIN SCALES - GENERAL: PAINLEVEL: NO PAIN (0)

## 2021-11-19 ASSESSMENT — MIFFLIN-ST. JEOR: SCORE: 1130.45

## 2021-11-19 NOTE — PROGRESS NOTES
Assessment & Plan     Other chest pain  Chest pain is atypical for ACS but has some concerning factors, mainly waking her up from sleep at night.  She has risk factors for ACS including positive family history, hypercholesterolemia, and current tobacco abuse.  Emergency department records reviewed.  EKG at that time with some lateral nonspecific ST changes.  EKG today normal sinus rhythm.  Will order exercise stress test for additional evaluation.  - EKG 12-lead, tracing only    Hyperlipidemia, unspecified hyperlipidemia type  Cholesterol levels reviewed.  She has tolerated Atorvastatin 10 mg daily.  Increase to 20 mg daily.  - atorvastatin (LIPITOR) 20 MG tablet; Take 1 tablet (20 mg) by mouth daily    Anxiety  Her MILY-7 is consistent with moderate anxiety.  Her symptoms are currently uncontrolled and may be contributing to or responsible for her chest pain.  She has been on multiple SSRI/SNRI medications in the past with poor result and cannot remember which she has tried.  She would benefit from genetic testing to determine most appropriate therapy.  Mental health referral placed.  List of area resources also provided.  - MENTAL HEALTH REFERRAL  - Adult; Psychiatry; Psychiatry; Other: [facility] [phone]; We will contact you to schedule the appointment or please call with any questions; Future    Follow-up after testing or sooner if needed.    38 minutes spent on the date of the encounter doing chart review, history and exam, documentation and further activities per the note    Mairsa Dubon,   [facility]    Oneil Gallegos is a 56 year old who presents for the following health issues:    HPI     Chest pain:  She reports that she has been having issues with chest pain for the past two weeks.  She reports that it ranges from 6-7/10 in severity.  The pain can occur during the day and in the night.  Episodes occur 2-3 times per day and last hours at a time.  Pain is present  "from her left chest with radiation to her back.  She states her left arm also feels \"off.\"  She describes the pain as sharp.  She has been woken up from the pain before.  She has tried staying hydrated, taking her anxiety medication, meditating, praying, and using ice and heat packs without significant improvement.  She has noted nothing which improves her symptoms.  Driving makes her symptoms worse.  She states that she is a \"very nervous\" .  She has felt dizzy and nauseated, which are new symptoms.  These sometimes occur with the chest pain and sometimes without.  She states that she typically has to \"wait it out.\"  This has happened on a daily basis.  She reports that it occurs randomly.  She cannot pinpoint anything which brings it on.    She has a family history of heart disease in her father who had approximately five myocardial infarctions.  She is a current smoker of 2 packs per week.    Her cholesterol levels were significantly elevated in October.  She was started on Atorvastatin 10 mg daily.  She denies any complications or known adverse effects.    She does not feel that her Fluoxetine is as effective as in the past.  She has been tried on several medications for anxiety in the past which did not help her.  She can only remember being on Wellbutrin but states that she had some kind of reaction to it.  She has been tried on Hydroxyzine and Buspar as needed for anxiety without significant improvement.    Review of Systems   Constitutional: Negative for chills and fever.          Objective    /76   Pulse 87   Temp 97.3  F (36.3  C)   Resp 14   Ht 1.562 m (5' 1.5\")   Wt 58.5 kg (129 lb)   LMP  (LMP Unknown)   SpO2 97%   Breastfeeding No   BMI 23.98 kg/m    Body mass index is 23.98 kg/m .  Physical Exam  Constitutional:       General: She is not in acute distress.     Appearance: Normal appearance. She is not ill-appearing.   Cardiovascular:      Rate and Rhythm: Normal rate and regular " rhythm.      Heart sounds: No murmur heard.  No friction rub. No gallop.    Pulmonary:      Effort: Pulmonary effort is normal.      Breath sounds: Normal breath sounds. No wheezing, rhonchi or rales.   Neurological:      Mental Status: She is alert.   Psychiatric:         Mood and Affect: Mood normal.     EKG - Reviewed and interpreted by me appears normal, NSR, normal axis, normal intervals, no acute ST/T changes c/w ischemia    Answers for HPI/ROS submitted by the patient on 11/19/2021  If you checked off any problems, how difficult have these problems made it for you to do your work, take care of things at home, or get along with other people?: Somewhat difficult  PHQ9 TOTAL SCORE: 9  MILY 7 TOTAL SCORE: 14

## 2021-11-19 NOTE — NURSING NOTE
Patient presents to clinic for ER follow up.  Mia Cullen LPN ....................  11/19/2021   1:39 PM

## 2021-11-20 ASSESSMENT — PATIENT HEALTH QUESTIONNAIRE - PHQ9: SUM OF ALL RESPONSES TO PHQ QUESTIONS 1-9: 9

## 2021-11-20 ASSESSMENT — ANXIETY QUESTIONNAIRES: GAD7 TOTAL SCORE: 14

## 2021-11-22 LAB
ATRIAL RATE - MUSE: 72 BPM
DIASTOLIC BLOOD PRESSURE - MUSE: NORMAL MMHG
INTERPRETATION ECG - MUSE: NORMAL
P AXIS - MUSE: 0 DEGREES
PR INTERVAL - MUSE: 116 MS
QRS DURATION - MUSE: 70 MS
QT - MUSE: 420 MS
QTC - MUSE: 459 MS
R AXIS - MUSE: 13 DEGREES
SYSTOLIC BLOOD PRESSURE - MUSE: NORMAL MMHG
T AXIS - MUSE: 36 DEGREES
VENTRICULAR RATE- MUSE: 72 BPM

## 2021-11-26 ENCOUNTER — ALLIED HEALTH/NURSE VISIT (OUTPATIENT)
Dept: FAMILY MEDICINE | Facility: OTHER | Age: 56
End: 2021-11-26
Attending: SURGERY
Payer: COMMERCIAL

## 2021-11-26 DIAGNOSIS — Z20.822 COVID-19 RULED OUT: Primary | ICD-10-CM

## 2021-11-26 PROCEDURE — C9803 HOPD COVID-19 SPEC COLLECT: HCPCS

## 2021-11-26 PROCEDURE — U0003 INFECTIOUS AGENT DETECTION BY NUCLEIC ACID (DNA OR RNA); SEVERE ACUTE RESPIRATORY SYNDROME CORONAVIRUS 2 (SARS-COV-2) (CORONAVIRUS DISEASE [COVID-19]), AMPLIFIED PROBE TECHNIQUE, MAKING USE OF HIGH THROUGHPUT TECHNOLOGIES AS DESCRIBED BY CMS-2020-01-R: HCPCS | Mod: ZL

## 2021-11-26 NOTE — PROGRESS NOTES
Patient here for Covid Testing. Pre op 11/30/21 MORRO.    Rach Stone MA on 11/26/2021 at 9:47 AM

## 2021-11-27 LAB — SARS-COV-2 RNA RESP QL NAA+PROBE: NEGATIVE

## 2021-11-30 ENCOUNTER — ANESTHESIA (OUTPATIENT)
Dept: SURGERY | Facility: OTHER | Age: 56
End: 2021-11-30
Payer: COMMERCIAL

## 2021-11-30 ENCOUNTER — HOSPITAL ENCOUNTER (OUTPATIENT)
Facility: OTHER | Age: 56
Discharge: HOME | End: 2021-11-30
Attending: SURGERY | Admitting: SURGERY
Payer: COMMERCIAL

## 2021-11-30 ENCOUNTER — ANESTHESIA EVENT (OUTPATIENT)
Dept: SURGERY | Facility: OTHER | Age: 56
End: 2021-11-30
Payer: COMMERCIAL

## 2021-11-30 VITALS
RESPIRATION RATE: 16 BRPM | SYSTOLIC BLOOD PRESSURE: 117 MMHG | TEMPERATURE: 98.1 F | HEART RATE: 57 BPM | BODY MASS INDEX: 23.98 KG/M2 | WEIGHT: 129 LBS | DIASTOLIC BLOOD PRESSURE: 72 MMHG | OXYGEN SATURATION: 92 %

## 2021-11-30 DIAGNOSIS — Z12.11 SPECIAL SCREENING FOR MALIGNANT NEOPLASMS, COLON: Primary | ICD-10-CM

## 2021-11-30 DIAGNOSIS — K62.1 RECTAL POLYP: ICD-10-CM

## 2021-11-30 PROCEDURE — 258N000003 HC RX IP 258 OP 636: Performed by: SURGERY

## 2021-11-30 PROCEDURE — 88305 TISSUE EXAM BY PATHOLOGIST: CPT

## 2021-11-30 PROCEDURE — 45385 COLONOSCOPY W/LESION REMOVAL: CPT | Mod: PT | Performed by: SURGERY

## 2021-11-30 PROCEDURE — 250N000009 HC RX 250: Performed by: NURSE ANESTHETIST, CERTIFIED REGISTERED

## 2021-11-30 PROCEDURE — 250N000011 HC RX IP 250 OP 636: Performed by: NURSE ANESTHETIST, CERTIFIED REGISTERED

## 2021-11-30 PROCEDURE — 45385 COLONOSCOPY W/LESION REMOVAL: CPT | Performed by: NURSE ANESTHETIST, CERTIFIED REGISTERED

## 2021-11-30 PROCEDURE — 45380 COLONOSCOPY AND BIOPSY: CPT | Performed by: SURGERY

## 2021-11-30 RX ORDER — FLUMAZENIL 0.1 MG/ML
0.2 INJECTION, SOLUTION INTRAVENOUS
Status: DISCONTINUED | OUTPATIENT
Start: 2021-11-30 | End: 2021-11-30 | Stop reason: HOSPADM

## 2021-11-30 RX ORDER — PROPOFOL 10 MG/ML
INJECTION, EMULSION INTRAVENOUS CONTINUOUS PRN
Status: DISCONTINUED | OUTPATIENT
Start: 2021-11-30 | End: 2021-11-30

## 2021-11-30 RX ORDER — NALOXONE HYDROCHLORIDE 0.4 MG/ML
0.2 INJECTION, SOLUTION INTRAMUSCULAR; INTRAVENOUS; SUBCUTANEOUS
Status: DISCONTINUED | OUTPATIENT
Start: 2021-11-30 | End: 2021-11-30 | Stop reason: HOSPADM

## 2021-11-30 RX ORDER — NALOXONE HYDROCHLORIDE 0.4 MG/ML
0.4 INJECTION, SOLUTION INTRAMUSCULAR; INTRAVENOUS; SUBCUTANEOUS
Status: DISCONTINUED | OUTPATIENT
Start: 2021-11-30 | End: 2021-11-30 | Stop reason: HOSPADM

## 2021-11-30 RX ORDER — LIDOCAINE HYDROCHLORIDE 20 MG/ML
INJECTION, SOLUTION INFILTRATION; PERINEURAL PRN
Status: DISCONTINUED | OUTPATIENT
Start: 2021-11-30 | End: 2021-11-30

## 2021-11-30 RX ORDER — LIDOCAINE 40 MG/G
CREAM TOPICAL
Status: DISCONTINUED | OUTPATIENT
Start: 2021-11-30 | End: 2021-11-30 | Stop reason: HOSPADM

## 2021-11-30 RX ORDER — ONDANSETRON 2 MG/ML
4 INJECTION INTRAMUSCULAR; INTRAVENOUS EVERY 6 HOURS PRN
Status: DISCONTINUED | OUTPATIENT
Start: 2021-11-30 | End: 2021-11-30 | Stop reason: HOSPADM

## 2021-11-30 RX ORDER — ONDANSETRON 4 MG/1
4 TABLET, ORALLY DISINTEGRATING ORAL EVERY 6 HOURS PRN
Status: DISCONTINUED | OUTPATIENT
Start: 2021-11-30 | End: 2021-11-30 | Stop reason: HOSPADM

## 2021-11-30 RX ORDER — PROPOFOL 10 MG/ML
INJECTION, EMULSION INTRAVENOUS PRN
Status: DISCONTINUED | OUTPATIENT
Start: 2021-11-30 | End: 2021-11-30

## 2021-11-30 RX ORDER — PROCHLORPERAZINE MALEATE 5 MG
10 TABLET ORAL EVERY 6 HOURS PRN
Status: DISCONTINUED | OUTPATIENT
Start: 2021-11-30 | End: 2021-11-30 | Stop reason: HOSPADM

## 2021-11-30 RX ORDER — SODIUM CHLORIDE, SODIUM LACTATE, POTASSIUM CHLORIDE, CALCIUM CHLORIDE 600; 310; 30; 20 MG/100ML; MG/100ML; MG/100ML; MG/100ML
INJECTION, SOLUTION INTRAVENOUS CONTINUOUS
Status: DISCONTINUED | OUTPATIENT
Start: 2021-11-30 | End: 2021-11-30 | Stop reason: HOSPADM

## 2021-11-30 RX ADMIN — SODIUM CHLORIDE, POTASSIUM CHLORIDE, SODIUM LACTATE AND CALCIUM CHLORIDE: 600; 310; 30; 20 INJECTION, SOLUTION INTRAVENOUS at 09:57

## 2021-11-30 RX ADMIN — PROPOFOL 100 MG: 10 INJECTION, EMULSION INTRAVENOUS at 10:26

## 2021-11-30 RX ADMIN — PROPOFOL 140 MCG/KG/MIN: 10 INJECTION, EMULSION INTRAVENOUS at 10:26

## 2021-11-30 RX ADMIN — LIDOCAINE HYDROCHLORIDE 70 MG: 20 INJECTION, SOLUTION INFILTRATION; PERINEURAL at 10:26

## 2021-11-30 NOTE — OR NURSING
The patient was unable or refused to remove jewelry prior to their surgical procedure. The patient has been instructed on the risk of injury and possible infection. In the event jewelry or piercings are obstructing the surgical process or impeding circulation, the jewelry or piercings may need to be cut off. The surgeon and anesthesia provider have been notified that an item cannot be removed, or that the patient refuses to remove the jewelry or piercing. The surgeon and anesthesia provider will determine if it is in the patient's best interest to proceed with the procedure with the jewelry or piercings remaining in contact with the patient's body.     Patient is wanting to wear her crucifix in the OR.  MD is aware and is ok with this.

## 2021-11-30 NOTE — ANESTHESIA PREPROCEDURE EVALUATION
Anesthesia Pre-Procedure Evaluation    Patient: El Joseph   MRN: [id number] : 1965        Preoperative Diagnosis: Encounter for screening colonoscopy [Z12.11]    Procedure : Procedure(s):  COLONOSCOPY          Past Medical History:   Diagnosis Date     Other constipation     No Comments Provided     Pain in knee     No Comments Provided     Personal history of other medical treatment (CODE)     G7, P3-0-4-3 (3 vag. 4 spon miscarriages-one in the second trimester and one daughter was twin and lost the twin).     Personal history of other medical treatment (CODE)     in prior relationship and childhood     Radiculopathy     ,Benign hypermobility per [facility]      Past Surgical History:   Procedure Laterality Date     COLONOSCOPY      2003,Normal, bleeding hemorrhoids     HYSTERECTOMY VAGINAL      2003,Dr Ramirez-Jean, chronic pelvic pain/endometriosis     OTHER SURGICAL HISTORY      [id number],DILATION AND CURETTAGE,After 4 miscarriages     OTHER SURGICAL HISTORY      ,343891,OTHER,Ovarian cyst     OTHER SURGICAL HISTORY      2002,052958,OTHER,Chronic pelvic pain/hemorrhagic cyst     OTHER SURGICAL HISTORY      2009,439938,OTHER,Dr Ramirez-recurrent ovarian cysts      Allergies   Allergen Reactions     Nsaids      Other reaction(s): Ecchymosis      Social History     Tobacco Use     Smoking status: Current Every Day Smoker     Packs/day: 0.25     Years: 10.00     Pack years: 2.50     Types: Cigarettes     Smokeless tobacco: Never Used   Substance Use Topics     Alcohol use: Yes     Comment: occ      Wt Readings from Last 1 Encounters:   21 58.5 kg (129 lb)        Anesthesia Evaluation   Pt has had prior anesthetic.         ROS/MED HX  ENT/Pulmonary:  - neg pulmonary ROS     Neurologic: Comment: Chronic headaches       Cardiovascular:       METS/Exercise Tolerance: >4 METS    Hematologic:  - neg hematologic  ROS     Musculoskeletal:   (+) arthritis,     GI/Hepatic: Comment: H/O Constipation        Renal/Genitourinary:  - neg Renal ROS     Endo:  - neg endo ROS     Psychiatric/Substance Use:     (+) psychiatric history depression and anxiety H/O chronic opiod use .     Infectious Disease:  - neg infectious disease ROS     Malignancy:  - neg malignancy ROS     Other:  - neg other ROS    (+) , H/O Chronic Pain,        Physical Exam    Airway        Mallampati: I   TM distance: > 3 FB   Neck ROM: full   Mouth opening: > 3 cm    Respiratory Devices and Support         Dental  no notable dental history         Cardiovascular   cardiovascular exam normal       Rhythm and rate: regular and normal     Pulmonary   pulmonary exam normal        breath sounds clear to auscultation           OUTSIDE LABS:  CBC:   Lab Results   Component Value Date    WBC 6.1 11/10/2021    WBC 5.8 10/05/2021    HGB 14.2 11/10/2021    HGB 15.6 10/05/2021    HCT 41.8 11/10/2021    HCT 46.3 10/05/2021     11/10/2021     10/05/2021     BMP:   Lab Results   Component Value Date     11/10/2021     10/05/2021    POTASSIUM 4.0 11/10/2021    POTASSIUM 5.7 (H) 10/05/2021    CHLORIDE 106 11/10/2021    CHLORIDE 104 10/05/2021    CO2 26 11/10/2021    CO2 30 10/05/2021    BUN 10 11/10/2021    BUN 10 10/05/2021    CR 0.57 (L) 11/10/2021    CR 0.71 10/05/2021    GLC 79 11/10/2021    GLC 88 10/05/2021     COAGS:   Lab Results   Component Value Date    INR 0.91 11/10/2021     POC:   Lab Results   Component Value Date    HCG Negative 04/23/2019     HEPATIC:   Lab Results   Component Value Date    ALBUMIN 4.1 11/10/2021    PROTTOTAL 6.0 (L) 11/10/2021    ALT 25 11/10/2021    AST 19 11/10/2021    ALKPHOS 59 11/10/2021    BILITOTAL 0.3 11/10/2021     OTHER:   Lab Results   Component Value Date    ANDRES 9.5 11/10/2021    LIPASE 9 (L) 04/23/2019    TSH 3.22 10/05/2021    CRP 0.7 (H) 04/23/2019    SED 6 04/23/2019       Anesthesia Plan    ASA Status:  2   NPO Status:  NPO Appropriate    Anesthesia Type: MAC.     - Reason for MAC: straight  local not clinically adequate              Consents    Anesthesia Plan(s) and associated risks, benefits, and realistic alternatives discussed. Questions answered and patient/representative(s) expressed understanding.    - Discussed:     - Discussed with:  Patient         Postoperative Care            Comments:                David Kellerman, APRN CRNA

## 2021-11-30 NOTE — ANESTHESIA CARE TRANSFER NOTE
Patient: El Joseph    Procedure: Procedure(s):  COLONOSCOPY, WITH POLYPECTOMY AND BIOPSY       Diagnosis: Encounter for screening colonoscopy [Z12.11]  Diagnosis Additional Information: No value filed.    Anesthesia Type:   MAC     Note:    Oropharynx: oropharynx clear of all foreign objects  Level of Consciousness: drowsy  Oxygen Supplementation: face mask  Level of Supplemental Oxygen (L/min / FiO2): 6  Independent Airway: airway patency satisfactory and stable  Dentition: dentition unchanged  Vital Signs Stable: post-procedure vital signs reviewed and stable  Report to RN Given: handoff report given  Patient transferred to: Phase II    Handoff Report: Identifed the Patient, Identified the Reponsible Provider, Reviewed the pertinent medical history, Discussed the surgical course, Reviewed Intra-OP anesthesia mangement and issues during anesthesia, Set expectations for post-procedure period and Allowed opportunity for questions and acknowledgement of understanding      Vitals:  Vitals Value Taken Time   BP     Temp     Pulse     Resp     SpO2         Electronically Signed By: SHAWN BALLARD CRNA  November 30, 2021  10:48 AM

## 2021-11-30 NOTE — ANESTHESIA POSTPROCEDURE EVALUATION
Patient: El Joseph    Procedure: Procedure(s):  COLONOSCOPY, WITH POLYPECTOMY AND BIOPSY       Diagnosis:Encounter for screening colonoscopy [Z12.11]  Diagnosis Additional Information: No value filed.    Anesthesia Type:  MAC    Note:  Disposition: Outpatient   Postop Pain Control: Uneventful            Sign Out: Well controlled pain   PONV: No   Neuro/Psych: Uneventful            Sign Out: Acceptable/Baseline neuro status   Airway/Respiratory: Uneventful            Sign Out: Acceptable/Baseline resp. status   CV/Hemodynamics: Uneventful            Sign Out: Acceptable CV status; No obvious hypovolemia; No obvious fluid overload   Other NRE: NONE   DID A NON-ROUTINE EVENT OCCUR? No           Last vitals:  Vitals Value Taken Time   BP     Temp     Pulse     Resp     SpO2 85 % 11/30/21 1048   Vitals shown include unvalidated device data.    Electronically Signed By: SHAWN BALLARD CRNA  November 30, 2021  10:49 AM

## 2021-11-30 NOTE — OP NOTE
PROCEDURE NOTE    SURGEON:Jessica Vick MD.    PRE-OP DIAGNOSIS:  Screening Colonoscopy      POST-OP DIAGNOSIS: rectal polyp    PROCEDURE:  Colonoscopy with snare polypectomy    ESTIMATED BLOODLOSS: none    COMPLICATIONS:  None    SPECIMEN:  Rectal polyp    ANESTHESIA:  See anesthesia note, anesthesia requested due to: chronic pain medication use    INDICATION FOR THE PROCEDURE: The patient is a 56 year old female. The patient has no complaints. I explained to the patient the risks, benefits and alternatives to screening colonoscopy for evaluating the colon for colon polyps and colon cancer. We specifically discussed the risks of bleeding, infection, perforation, potential inability to reach the cecum and the risks of sedation. The patient's questions were answered and the patient wished to proceed. Informed consent paperwork was completed.    PROCEDURE: The patient was taken to the endoscopy suite. Appropriate monitors were attached. The patient was placed in the left lateral decubitus position.Timeout was performed confirming the patient's identity and procedure to be performed. After appropriate sedation was confirmed, digital rectal exam was performed. There was normal tone and no gross abnormality was noted. The lubricated colonoscope was introduced into the anus the colon was insufflated with air. The prep quality was adequate. Under direct visualization the scope was advanced to the cecum. The ileocecal valve was intubated and the terminal ileum inspected. No gross abnormality was noted. The scope was withdrawn back into the cecum. The mucosa of colon was inspected while withdrawing the scope. A small pedunculated polyp was noted in the rectum and removed with cold snare. The scope was retroflexed in the rectum and the anorectal junction was inspected. No abnormalities were noted. The scope was returned to a neutral position and the colon was decompressed. The scope was removed. The patient tolerated the  procedure with no immediately apparent complication. The patient was taken to recovery in stable condition.    FOLLOW UP:RECOMMEND high fiber diet, follow up: Will call with pathology results.

## 2021-11-30 NOTE — H&P
PRE-PROCEDURE NOTE    CHIEF COMPLAINT / REASON FORPROCEDURE:  Need for screening colonoscopy.    PERTINENT HISTORY   Patient with no complaints. Previous colonoscopy 2003-no polyps. No diarrhea, constipation, abdominal pain or rectal bleeding. No family history of colon polyps or colon cancer.  Past Medical History:   Diagnosis Date     Other constipation     No Comments Provided     Pain in knee     No Comments Provided     Personal history of other medical treatment (CODE)     G7, P3-0-4-3 (3 vag. 4 spon miscarriages-one in the second trimester and one daughter was twin and lost the twin).     Personal history of other medical treatment (CODE)     in prior relationship and childhood     Radiculopathy     2011,Benign hypermobility per [facility]     Past Surgical History:   Procedure Laterality Date     COLONOSCOPY      4/2003,Normal, bleeding hemorrhoids     HYSTERECTOMY VAGINAL      5/2003,Dr [facility], chronic pelvic pain/endometriosis     OTHER SURGICAL HISTORY      [id number],DILATION AND CURETTAGE,After 4 miscarriages     OTHER SURGICAL HISTORY      1986,637382,OTHER,Ovarian cyst     OTHER SURGICAL HISTORY      6/2002,468301,OTHER,Chronic pelvic pain/hemorrhagic cyst     OTHER SURGICAL HISTORY      2/2009,720829,OTHER,Dr Ramirez-recurrent ovarian cysts     Other:  None  Bleeding tendencies:  No    Relevant Family History:  none    Relevant Social History:  none    A relevant review of systems was performed and was Negative.    ALLERGIES/SENSITIVITIES:   Allergies   Allergen Reactions     Nsaids      Other reaction(s): Ecchymosis        CURRENTMEDICATIONS:    No current facility-administered medications on file prior to encounter.  albuterol (PROAIR HFA/PROVENTIL HFA/VENTOLIN HFA) 108 (90 Base) MCG/ACT inhaler, Inhale 2 puffs into the lungs every 6 hours  busPIRone (BUSPAR) 15 MG tablet, Take 0.5-1 tablets (7.5-15 mg) by mouth 3 times daily as needed (Anxiety)  cetirizine-pseudoePHEDrine ER (ZYRTEC-D) 5-120 MG 12 hr  tablet, Take 1 tablet by mouth 2 times daily as needed for congestion  dextromethorphan-guaiFENesin (MUCINEX DM)  MG per 12 hr tablet, Take 1 tablet by mouth every 12 hours  esomeprazole (NEXIUM) 40 MG DR capsule, Take 30-60 minutes before eating.  Flaxseed, Linseed, (FLAX SEEDS) POWD, 1 Tablespoonful Sprinkle on foods daily  FLUoxetine (PROZAC) 40 MG capsule, TAKE 1 CAPSULE BY MOUTH EVERY MORNING  fluticasone (FLONASE) 50 MCG/ACT nasal spray, Spray 2 sprays into both nostrils daily  HYDROcodone-acetaminophen (NORCO) 5-325 MG tablet, Take 1 tablet by mouth every 8 hours as needed for severe pain * ACETAMINOPHEN SHOULD BE LIMITED TO 4000MG PER DAY*  hydrOXYzine (ATARAX) 10 MG tablet, Take 1 tablet (10 mg) by mouth every 8 hours as needed for anxiety  pregabalin (LYRICA) 150 MG capsule, Take 1 capsule (150 mg) by mouth 2 times daily  rizatriptan (MAXALT) 10 MG tablet, Take 1 tablet (10 mg) by mouth at onset of headache for migraine May repeat in 2 hours. Max 3 tablets/24 hours.  rizatriptan (MAXALT) 10 MG tablet, Take 1 tablet (10 mg) by mouth every 2 hours as needed for migraine  cyclobenzaprine (FLEXERIL) 10 MG tablet, Take 1 tablet (10 mg) by mouth 3 times daily as needed for muscle spasms      Current Facility-Administered Medications   Medication     lactated ringers infusion     lidocaine (LMX4) cream     lidocaine 1 % 0.1-1 mL     sodium chloride (PF) 0.9% PF flush 3 mL     sodium chloride (PF) 0.9% PF flush 3 mL       PRE-SEDATION ASSESSMENT:    /87   Pulse 61   Temp 98.1  F (36.7  C) (Tympanic)   Resp 16   Wt 58.5 kg (129 lb)   LMP  (LMP Unknown)   SpO2 98%   BMI 23.98 kg/m    Lung Exam:  Normal  Heart Exam:  Normal    Comment(s):      IMPRESSION:  Need for screening colonoscopy.    PLAN:  I discussed screening colonoscopy with the patient. Anesthesia coverage requested due to chronic pain medication use.

## 2021-11-30 NOTE — DISCHARGE INSTRUCTIONS
Procedure you had done: colonoscopy with removal of polyp  Your health care provider is:  Marisa Dubon  Your surgeon is Dr. Jessica Vick.   Please call your health care provider or surgeon at [phone] if:    - you feel you are getting worse or having an increase in problems    - fever greater than 101 degrees  - increasing shortness of breath or chest pain  - any signs of infection (increasing redness, swelling, tenderness, warmth, change in appearance, or  increased drainage)  - blood in your urine or stool  - coughing or vomiting blood  - nausea (upset stomach) and vomiting and/or diarrhea that will not stop  - severe pain that is not relieved by medicine, rest or ice  You have had medications for sedation. Please be aware that this can cause drowsiness and impaired judgment for up to 24 hours after your procedure. Do not drive, operate power tools or drink alcohol for 24 hours.  If samples were taken-you will get a phone call and a letter with your results in the next 7-10 days. If you don't get results, please call and let us know!

## 2021-12-01 LAB
PATH REPORT.COMMENTS IMP SPEC: NORMAL
PATH REPORT.FINAL DX SPEC: NORMAL
PHOTO IMAGE: NORMAL

## 2021-12-01 NOTE — RESULT ENCOUNTER NOTE
Michelle/Saskia/Ludivina/Janelle-Please call patient and let them know the colon polyp that was removed was a tubular adenoma. This type of polyp has a low risk of being associated with a colon cancer. Follow up colonoscopy is recommended in 5 years to check for new polyps. High fiber diet is recommended for colon health. Patient should call with questions or concerns. Thanks!

## 2021-12-02 DIAGNOSIS — F33.41 RECURRENT MAJOR DEPRESSIVE DISORDER, IN PARTIAL REMISSION (H): ICD-10-CM

## 2021-12-06 RX ORDER — FLUOXETINE 40 MG/1
CAPSULE ORAL
Qty: 90 CAPSULE | Refills: 0 | Status: SHIPPED | OUTPATIENT
Start: 2021-12-06 | End: 2022-01-28

## 2021-12-06 NOTE — TELEPHONE ENCOUNTER
[id number] sent Rx request for the following:      Requested Prescriptions   Pending Prescriptions Disp Refills     FLUoxetine (PROZAC) 40 MG capsule [Pharmacy Med Name: FLUOXETINE 40MG CAPSULE] 90 capsule 0     Sig: TAKE 1 CAPSULE BY MOUTH EVERY MORNING   Last Prescription Date:   9/17/21  Last Fill Qty/Refills:         90, R-0    Last Office Visit:              11/19/21   Future Office visit:           None  Routing refill request to provider for review/approval because:    SSRIs Protocol Failed - 12/6/2021 11:30 AM        Failed - PHQ-9 score less than 5 in past 6 months     In clinical absence of patient's primary, Marisa Dubon, patient is requesting that this message be sent to the covering provider for consideration please.    Unable to complete prescription refill per RN Medication Refill Policy. Kezia Gale RN .............. 12/6/2021  11:38 AM

## 2021-12-13 ENCOUNTER — TRANSFERRED RECORDS (OUTPATIENT)
Dept: HEALTH INFORMATION MANAGEMENT | Facility: OTHER | Age: 56
End: 2021-12-13
Payer: COMMERCIAL

## 2021-12-21 DIAGNOSIS — L65.9 ALOPECIA: Primary | ICD-10-CM

## 2021-12-31 DIAGNOSIS — K21.9 GASTROESOPHAGEAL REFLUX DISEASE WITHOUT ESOPHAGITIS: ICD-10-CM

## 2022-01-03 RX ORDER — ESOMEPRAZOLE MAGNESIUM 40 MG/1
CAPSULE, DELAYED RELEASE ORAL
Qty: 90 CAPSULE | Refills: 1 | Status: SHIPPED | OUTPATIENT
Start: 2022-01-03 | End: 2022-07-07

## 2022-01-03 NOTE — TELEPHONE ENCOUNTER
[id number] sent Rx request for the following:      Requested Prescriptions   Pending Prescriptions Disp Refills     esomeprazole (NEXIUM) 40 MG DR capsule [Pharmacy Med Name: ESOMEPRAZOLE 40MG DR CAPSULE] 90 capsule 0     Sig: TAKE 1 CAPSULE BY MOUTH EVERY DAY - 30-60 MINUTES BEFORE A MEAL       PPI Protocol Passed - 1/3/2022  3:58 PM        Last Prescription Date:   6/25/2021  Last Fill Qty/Refills:          90, R-1   Last Office Visit:               11/19/2021  Future Office visit:           None  Meets RN criteria for refills at this time. Will refill. Rochelle Rivas RN on 1/3/2022 at 4:02 PM

## 2022-01-12 ENCOUNTER — OFFICE VISIT (OUTPATIENT)
Dept: FAMILY MEDICINE | Facility: OTHER | Age: 57
End: 2022-01-12
Attending: FAMILY MEDICINE
Payer: COMMERCIAL

## 2022-01-12 VITALS
BODY MASS INDEX: 23.72 KG/M2 | DIASTOLIC BLOOD PRESSURE: 78 MMHG | OXYGEN SATURATION: 98 % | WEIGHT: 127.6 LBS | TEMPERATURE: 97.1 F | RESPIRATION RATE: 16 BRPM | HEART RATE: 73 BPM | SYSTOLIC BLOOD PRESSURE: 124 MMHG

## 2022-01-12 DIAGNOSIS — R51.9 NONINTRACTABLE HEADACHE, UNSPECIFIED CHRONICITY PATTERN, UNSPECIFIED HEADACHE TYPE: Primary | ICD-10-CM

## 2022-01-12 PROCEDURE — U0005 INFEC AGEN DETEC AMPLI PROBE: HCPCS | Mod: ZL | Performed by: FAMILY MEDICINE

## 2022-01-12 PROCEDURE — 250N000013 HC RX MED GY IP 250 OP 250 PS 637: Performed by: FAMILY MEDICINE

## 2022-01-12 PROCEDURE — 250N000009 HC RX 250: Performed by: FAMILY MEDICINE

## 2022-01-12 PROCEDURE — C9803 HOPD COVID-19 SPEC COLLECT: HCPCS | Performed by: FAMILY MEDICINE

## 2022-01-12 PROCEDURE — 250N000011 HC RX IP 250 OP 636: Performed by: FAMILY MEDICINE

## 2022-01-12 PROCEDURE — 99214 OFFICE O/P EST MOD 30 MIN: CPT | Performed by: FAMILY MEDICINE

## 2022-01-12 PROCEDURE — 96372 THER/PROPH/DIAG INJ SC/IM: CPT

## 2022-01-12 RX ORDER — DIPHENHYDRAMINE HCL 12.5MG/5ML
50 LIQUID (ML) ORAL ONCE
Status: DISCONTINUED | OUTPATIENT
Start: 2022-01-12 | End: 2022-10-25

## 2022-01-12 RX ORDER — DIPHENHYDRAMINE HCL 25 MG
50 CAPSULE ORAL ONCE
Status: COMPLETED | OUTPATIENT
Start: 2022-01-12 | End: 2022-01-12

## 2022-01-12 RX ORDER — KETOROLAC TROMETHAMINE 30 MG/ML
30 INJECTION, SOLUTION INTRAMUSCULAR; INTRAVENOUS EVERY 6 HOURS PRN
Status: ACTIVE | OUTPATIENT
Start: 2022-01-12 | End: 2022-01-17

## 2022-01-12 RX ORDER — DEXAMETHASONE SODIUM PHOSPHATE 4 MG/ML
10 VIAL (ML) INJECTION ONCE
Status: COMPLETED | OUTPATIENT
Start: 2022-01-12 | End: 2022-01-12

## 2022-01-12 RX ORDER — CLOBETASOL PROPIONATE 0.5 MG/ML
SOLUTION TOPICAL
COMMUNITY
Start: 2021-12-13

## 2022-01-12 RX ADMIN — DIPHENHYDRAMINE HYDROCHLORIDE 25 MG: 25 CAPSULE ORAL at 15:48

## 2022-01-12 RX ADMIN — DEXAMETHASONE SODIUM PHOSPHATE 10 MG: 4 INJECTION, SOLUTION INTRAMUSCULAR; INTRAVENOUS at 15:40

## 2022-01-12 RX ADMIN — KETOROLAC TROMETHAMINE 30 MG: 30 INJECTION, SOLUTION INTRAMUSCULAR; INTRAVENOUS at 15:42

## 2022-01-12 ASSESSMENT — PAIN SCALES - GENERAL: PAINLEVEL: EXTREME PAIN (8)

## 2022-01-12 ASSESSMENT — PATIENT HEALTH QUESTIONNAIRE - PHQ9
10. IF YOU CHECKED OFF ANY PROBLEMS, HOW DIFFICULT HAVE THESE PROBLEMS MADE IT FOR YOU TO DO YOUR WORK, TAKE CARE OF THINGS AT HOME, OR GET ALONG WITH OTHER PEOPLE: SOMEWHAT DIFFICULT
SUM OF ALL RESPONSES TO PHQ QUESTIONS 1-9: 17
SUM OF ALL RESPONSES TO PHQ QUESTIONS 1-9: 17

## 2022-01-12 ASSESSMENT — ENCOUNTER SYMPTOMS: HEADACHES: 1

## 2022-01-12 NOTE — NURSING NOTE
Patient here for migraine and sore throat and ears with fatigue since Sunday morning. Medication Reconciliation: complete.    Angle Johnston LPN  1/12/2022 2:37 PM

## 2022-01-12 NOTE — PROGRESS NOTES
"  SUBJECTIVE:   El Joseph is a 56 year old female who presents to clinic today for the following health issues: Headache    Patient arrives here for monitoring. She is also reporting some sore throat probably had an fatigue. Patient states that she has a migraine on a regular basis. She'll go to the [facility] in the middle acupuncture. That does help. She had an appointment yesterday but was unable to make the appointment. She has gotten a cocktail in the ER. She has tried her Maxalt without any improvement. She is also concerned about COVID. She feels that she was exposed to COVID recently. Patient states that this is her usual migraine    Headache     History of Present Illness       Migraines:   Since the patient's last clinic visit, headaches are: worsened  The patient is getting headaches:  4-5days week  She is not able to do normal daily activities when she has a migraine.  The patient is taking the following rescue/relief medications:  Excedrin and Maxalt   Patient states \"The relief is inconsistent\" from the rescue/relief medications.   The patient is taking the following medications to prevent migraines:  No medications to prevent migraines  In the past 4 weeks, the patient has gone to an Urgent Care or Emergency Room 0 times times due to headaches.    She eats 2-3 servings of fruits and vegetables daily.She consumes 0 sweetened beverage(s) daily.She exercises with enough effort to increase her heart rate 20 to 29 minutes per day.  She exercises with enough effort to increase her heart rate 5 days per week.   She is taking medications regularly.        Patient Active Problem List    Diagnosis Date Noted     Anxiety state 01/23/2018     Priority: Medium     Osteoarthrosis 01/23/2018     Priority: Medium     Personal history of tobacco use, presenting hazards to health 01/23/2018     Priority: Medium     Chronic mixed headache syndrome 06/20/2017     Priority: Medium     Fixed drug eruption 04/21/2015     " Priority: Medium     Constipation 07/30/2014     Priority: Medium     Fibromyalgia 07/30/2013     Priority: Medium     Pain medication agreement 07/30/2013     Priority: Medium     Overview:   Updated 3/22/2018  Hydrocodone 5/325 mg #30 every 2-3 months.                                   Lyrica 150mg   # 60 a month        Chronic insomnia 08/10/2012     Priority: Medium     Bruxism 05/24/2012     Priority: Medium     Chronic pain disorder 05/24/2012     Priority: Medium     Depression, major, recurrent, moderate (H) 05/24/2012     Priority: Medium     Malaise and fatigue 05/24/2012     Priority: Medium     Temporomandibular joint disorder 05/24/2012     Priority: Medium     Past Medical History:   Diagnosis Date     Other constipation     No Comments Provided     Pain in knee     No Comments Provided     Personal history of other medical treatment (CODE)     G7, P3-0-4-3 (3 vag. 4 spon miscarriages-one in the second trimester and one daughter was twin and lost the twin).     Personal history of other medical treatment (CODE)     in prior relationship and childhood     Radiculopathy     2011,Benign hypermobility per [facility]        Review of Systems   Neurological: Positive for headaches.        OBJECTIVE:     /78   Pulse 73   Temp 97.1  F (36.2  C)   Resp 16   Wt 57.9 kg (127 lb 9.6 oz)   LMP  (LMP Unknown)   SpO2 98%   BMI 23.72 kg/m    Body mass index is 23.72 kg/m .  Physical Exam  HENT:      Right Ear: Tympanic membrane normal.      Left Ear: Tympanic membrane normal.      Nose: No congestion or rhinorrhea.      Mouth/Throat:      Pharynx: No oropharyngeal exudate or posterior oropharyngeal erythema.   Eyes:      Pupils: Pupils are equal, round, and reactive to light.   Neck:      Comments: Patient does have tenderness along the occiput.  Cardiovascular:      Rate and Rhythm: Normal rate and regular rhythm.   Pulmonary:      Effort: Pulmonary effort is normal.      Breath sounds: Normal breath sounds.    Musculoskeletal:      Cervical back: Normal range of motion. No rigidity.   Skin:     General: Skin is warm.   Neurological:      Mental Status: She is alert.      Comments: Fast alternating movement finger-nose-finger Romberg all normal   Psychiatric:         Mood and Affect: Mood normal.         Diagnostic Test Results:  none     ASSESSMENT/PLAN:         (R51.9) Nonintractable headache, unspecified chronicity pattern, unspecified headache type  (primary encounter diagnosis)  Comment:   Plan: Symptomatic; Yes; 1/8/2022 COVID-19 Virus         (Coronavirus) by PCR, dexamethasone (DECADRON)         injectable solution used ORALLY 10 mg,         diphenhydrAMINE (BENADRYL) solution 50 mg,         ketorolac (TORADOL) injection 30 mg  Recommend home and rest. Follow-up if any symptoms should recur. Follow-up with her acupuncture at her earliest convenience.        Damaso Gonzales MD  [facility] [place]  Answers for HPI/ROS submitted by the patient on 1/12/2022  If you checked off any problems, how difficult have these problems made it for you to do your work, take care of things at home, or get along with other people?: Somewhat difficult  PHQ9 TOTAL SCORE: 17

## 2022-01-13 ASSESSMENT — PATIENT HEALTH QUESTIONNAIRE - PHQ9: SUM OF ALL RESPONSES TO PHQ QUESTIONS 1-9: 17

## 2022-01-14 LAB — SARS-COV-2 RNA RESP QL NAA+PROBE: NOT DETECTED

## 2022-01-21 DIAGNOSIS — M79.7 FIBROMYALGIA: ICD-10-CM

## 2022-01-21 DIAGNOSIS — G43.009 MIGRAINE WITHOUT AURA AND WITHOUT STATUS MIGRAINOSUS, NOT INTRACTABLE: ICD-10-CM

## 2022-01-21 DIAGNOSIS — G89.29 OTHER CHRONIC PAIN: ICD-10-CM

## 2022-01-21 RX ORDER — PREGABALIN 150 MG/1
150 CAPSULE ORAL 2 TIMES DAILY
Qty: 180 CAPSULE | Refills: 1 | Status: SHIPPED | OUTPATIENT
Start: 2022-01-21 | End: 2022-07-07

## 2022-01-21 RX ORDER — RIZATRIPTAN BENZOATE 10 MG/1
10 TABLET ORAL
Qty: 18 TABLET | Refills: 1 | Status: SHIPPED | OUTPATIENT
Start: 2022-01-21 | End: 2022-01-21

## 2022-01-21 RX ORDER — CYCLOBENZAPRINE HCL 10 MG
10 TABLET ORAL 3 TIMES DAILY PRN
Qty: 90 TABLET | Refills: 1 | Status: SHIPPED | OUTPATIENT
Start: 2022-01-21 | End: 2022-09-16

## 2022-01-21 RX ORDER — RIZATRIPTAN BENZOATE 10 MG/1
10 TABLET ORAL
Qty: 18 TABLET | Refills: 0 | Status: SHIPPED | OUTPATIENT
Start: 2022-01-21 | End: 2022-01-28

## 2022-01-21 RX ORDER — HYDROCODONE BITARTRATE AND ACETAMINOPHEN 5; 325 MG/1; MG/1
1 TABLET ORAL EVERY 8 HOURS PRN
Qty: 5 TABLET | Refills: 0 | OUTPATIENT
Start: 2022-01-21

## 2022-01-21 NOTE — TELEPHONE ENCOUNTER
Patient sent Rx request for the following:      Requested Prescriptions   Pending Prescriptions Disp Refills     rizatriptan (MAXALT) 10 MG tablet 18 tablet 1     Sig: Take 1 tablet (10 mg) by mouth every 2 hours as needed for migraine       Serotonin Agonists Failed - 1/21/2022 11:11 AM        Failed - Serotonin Agonist request needs review.     Please review patient's record. If patient has had 8 or more treatments in the past month, please forward to provider.          pregabalin (LYRICA) 150 MG capsule 180 capsule 1     Sig: Take 1 capsule (150 mg) by mouth 2 times daily       There is no refill protocol information for this order        HYDROcodone-acetaminophen (NORCO) 5-325 MG tablet 30 tablet 0     Sig: Take 1 tablet by mouth every 8 hours as needed for severe pain * ACETAMINOPHEN SHOULD BE LIMITED TO 4000MG PER DAY*       There is no refill protocol information for this order        cyclobenzaprine (FLEXERIL) 10 MG tablet 90 tablet 1     Sig: Take 1 tablet (10 mg) by mouth 3 times daily as needed for muscle spasms       There is no refill protocol information for this order          Last Prescription Date:   4/8/21, 5/20/20  Last Fill Qty/Refills:         90, 30, 18, 180 R-1, 0  Last Office Visit:              1/12/22   Future Office visit:           1/26/22  Routing refill request to provider for review/approval because:  Drug not on the [facility], [facility] or [facility] refill protocol or controlled substance  Unable to complete prescription refill per RN Medication Refill Policy.       In clinical absence of patient's primary, Marisa Dubon, patient is requesting that this message be sent to the covering provider for consideration please.    Stephanie Reilly RN on 1/21/2022 at 11:15 AM

## 2022-01-21 NOTE — TELEPHONE ENCOUNTER
RIZATRIPTAN 10MG TABLET     Last Written Prescription Date:  10/5/21  Last Fill Quantity: 10,   # refills: 0  Last Office Visit: 1/12/2022  Future Office visit:    Next 5 appointments (look out 90 days)    Jan 26, 2022 11:20 AM  SHORT with Lisa Crisostomo PA-C  [facility] ([facility] ) [address]  [place] [address]  [phone]           Routing refill request to provider for review/approval because:  Drug not on the [facility], [facility] or [facility] refill protocol or controlled substance. Unable to complete prescription refill per RNMedication Refill Policy.................... Little Stiles RN ....................  1/21/2022   2:29 PM

## 2022-01-21 NOTE — TELEPHONE ENCOUNTER
Called patient and verified name and date of birth. Patient reports she has ran out of hydrocodone, rizatriptan and Lyrica. She has a few tablets left of cyclobenzaprine left. I informed the patient she needs an appointment to refill the hydrocodone. Patient sent to scheduling to make appointment. Dr. Dubon is out of office today. Will send refill requests to teamlet for consideration.    Stephanie Reilly RN on 1/21/2022 at 11:09 AM

## 2022-01-21 NOTE — TELEPHONE ENCOUNTER
Reason for call: Medication or medication refill    Name of medication requested: Lyrica, rizatriptan, cyclobenzaprine and hydrocodone     Are you out of the medication? Yes out of all or almost     What pharmacy do you use? [facility]    Preferred method for responding to this message: Telephone Call    Phone number patient can be reached at: Cell number on file:    Telephone Information:   Mobile [phone]       If we cannot reach you directly, may we leave a detailed response at the number you provided? Yes        Nara Olvera on 1/21/2022 at 10:50 AM

## 2022-01-22 ENCOUNTER — HEALTH MAINTENANCE LETTER (OUTPATIENT)
Age: 57
End: 2022-01-22

## 2022-01-24 DIAGNOSIS — G43.009 MIGRAINE WITHOUT AURA AND WITHOUT STATUS MIGRAINOSUS, NOT INTRACTABLE: ICD-10-CM

## 2022-01-24 RX ORDER — RIZATRIPTAN BENZOATE 10 MG/1
10 TABLET ORAL
Qty: 18 TABLET | Refills: 0 | OUTPATIENT
Start: 2022-01-24

## 2022-01-24 NOTE — TELEPHONE ENCOUNTER
Redundant refill request refused: Too soon:     rizatriptan (MAXALT) 10 MG tablet 18 tablet 0 1/21/2022  No   Sig - Route: TAKE 1 TABLET (10 MG) BY MOUTH EVERY 2 HOURS AS NEEDED FOR MIGRAINE - Oral   Sent to pharmacy as: Rizatriptan Benzoate 10 MG Oral Tablet (MAXALT)   Class: E-Prescribe   Notes to Pharmacy: PLEASE FAX BACK WITH MAX TABS PER WEEK AND PER MONTH   Order: [id number]   E-Prescribing Status: Receipt confirmed by pharmacy (1/21/2022  2:37 PM CST)     [id number] - [place], [place] - [address]     Unable to complete prescription refill per RN Medication Refill Policy. Kezia Gale RN .............. 1/24/2022  9:28 AM

## 2022-01-27 NOTE — PROGRESS NOTES
Assessment & Plan     1. Migraine without aura and without status migrainosus, not intractable  Longstanding history of migraines, refilled rizatriptan as below.  Active migraine treated with migraine cocktail as below.  Follow-up as needed.  - ketorolac (TORADOL) injection 60 mg  - diphenhydrAMINE (BENADRYL) capsule 25 mg  - ondansetron (ZOFRAN-ODT) ODT tab 4 mg  - dexamethasone (DECADRON) injectable solution used ORALLY 10 mg  - rizatriptan (MAXALT) 10 MG tablet; Take 1 tablet (10 mg) by mouth at onset of headache for migraine May repeat in 2 hours. Max 3 tablets/24 hours.  Dispense: 10 tablet; Refill: 0    2. Acute bronchitis, unspecified organism  Longstanding history of on and off bronchitis, seasonal allergies.  Refilled albuterol and Mucinex DM as below.  Follow-up as needed.  - albuterol (PROAIR HFA/PROVENTIL HFA/VENTOLIN HFA) 108 (90 Base) MCG/ACT inhaler; Inhale 2 puffs into the lungs every 6 hours  Dispense: 6.7 g; Refill: 1  - dextromethorphan-guaiFENesin (MUCINEX DM)  MG per 12 hr tablet; Take 1 tablet by mouth every 12 hours  Dispense: 60 tablet; Refill: 0    3. Acute sinusitis with symptoms > 10 days  Has done well with Zyrtec-D for as needed sinus infection symptoms.  Refilled as below.  Follow-up as needed.  - cetirizine-pseudoePHEDrine ER (ZYRTEC-D) 5-120 MG 12 hr tablet; Take 1 tablet by mouth 2 times daily as needed for congestion  Dispense: 14 tablet; Refill: 0    4. Recurrent major depressive disorder, in partial remission (H)  Longstanding history of depression that had previously been well controlled with 40 mg of Prozac daily.  Recent increase in depressive symptoms and stress levels.  Will increase dose to 50 mg daily with 140 mg and 110 mg tablet daily.  Follow-up for recheck in 4 weeks or sooner if needed.  - FLUoxetine (PROZAC) 40 MG capsule; Take one tablet by mouth daily  Dispense: 90 capsule; Refill: 0  - FLUoxetine (PROZAC) 10 MG capsule; Take 1 capsule (10 mg) by mouth daily   Dispense: 90 capsule; Refill: 0    5. Other chronic pain  Patient does well with 1/2 tablet Norco quite sparingly.   reviewed and appears most recent fill was in 4/2021 for 30 tablets.  Manages pain with heating pad, icing, essential oils.  Follow-up as needed.  - HYDROcodone-acetaminophen (NORCO) 5-325 MG tablet; Take 1 tablet by mouth every 8 hours as needed for severe pain * ACETAMINOPHEN SHOULD BE LIMITED TO 4000MG PER DAY*  Dispense: 30 tablet; Refill: 0      No follow-ups on file.    Lisa Crisostomo PA-C  [facility] AND [facility]   El is a 56 year old who presents for the following health issues    HPI   Here for medication check.  Patient is requesting refills of Zyrtec, Mucinex DM for which she takes for seasonal allergies.  She is also requesting refill of Prozac.  She feels that she cannot use a small increase in dose.  Reports she has increased in stress with her daughter relapsing.  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  Had been doing well with Prozac 40 mg but feels a small increase may provide additional benefit.  Also requesting refill of Norco.  She last filled this in 4/2021.  Reports she sparingly takes 1/2 tablet for fibromyalgia and arthritis pains.  Managing pains with heating and icing, essential oils.   reviewed and appears appropriate.  Requesting refill of albuterol inhaler.    Patient also presents with an active migraine today.  She has a longstanding history of migraines for which she takes rizatriptan.  She is needing a refill of the rizatriptan.  Patient has presented to the clinic previously and received migraine cocktail including Toradol injection, oral Decadron, oral Benadryl.  Most recently this was done on 1/12 and provided good relief.  Patient reports she is quite nauseous with this migraine is requesting an antinausea medication as well.      PAST MEDICAL HISTORY:   Past Medical History:   Diagnosis Date      Other constipation     No Comments Provided     Pain in knee     No Comments Provided     Personal history of other medical treatment (CODE)     G7, P3-0-4-3 (3 vag. 4 spon miscarriages-one in the second trimester and one daughter was twin and lost the twin).     Personal history of other medical treatment (CODE)     in prior relationship and childhood     Radiculopathy     2011,Benign hypermobility per [facility]       PAST SURGICAL HISTORY:   Past Surgical History:   Procedure Laterality Date     COLONOSCOPY      4/2003,Normal, bleeding hemorrhoids     COLONOSCOPY N/A 11/30/2021    tubular adenomas- f/u 5 years     HYSTERECTOMY VAGINAL      5/2003,Dr Ramirez-Hudson, chronic pelvic pain/endometriosis     OTHER SURGICAL HISTORY      [id number],DILATION AND CURETTAGE,After 4 miscarriages     OTHER SURGICAL HISTORY      1986,[id number],OTHER,Ovarian cyst     OTHER SURGICAL HISTORY      6/2002,288771,OTHER,Chronic pelvic pain/hemorrhagic cyst     OTHER SURGICAL HISTORY      2/2009,530347,OTHER,Dr Ramirez-recurrent ovarian cysts       FAMILY HISTORY:   Family History   Problem Relation Age of Onset     Breast Cancer Mother 68        Cancer-breast     Diabetes Mother         Diabetes     Heart Disease Father         Heart Disease,CHF, pacemaker,MI at 93     Hypertension Father         Hypertension     Aneurysm Sister         Brain      Fibromyalgia Sister      Arthritis Sister      Migraines Sister         Chronic     Arthritis Brother      Aneurysm Sister         Brain     Migraines Sister      Migraines Sister      Breast Cancer Sister         Cancer-breast     Tumor Sister         Brain     Breast Cancer Sister      Ovarian Cancer Sister      Arthritis Sister      Aneurysm Brother         Brain     Arthritis Brother      Arthritis Brother        SOCIAL HISTORY:   Social History     Tobacco Use     Smoking status: Current Every Day Smoker     Packs/day: 0.25     Years: 10.00     Pack years: 2.50     Types: Cigarettes     Smokeless  tobacco: Never Used   Substance Use Topics     Alcohol use: Yes     Comment: occ        Allergies   Allergen Reactions     Nsaids      Other reaction(s): Ecchymosis     Current Outpatient Medications   Medication     albuterol (PROAIR HFA/PROVENTIL HFA/VENTOLIN HFA) 108 (90 Base) MCG/ACT inhaler     cetirizine-pseudoePHEDrine ER (ZYRTEC-D) 5-120 MG 12 hr tablet     dextromethorphan-guaiFENesin (MUCINEX DM)  MG per 12 hr tablet     FLUoxetine (PROZAC) 10 MG capsule     FLUoxetine (PROZAC) 40 MG capsule     HYDROcodone-acetaminophen (NORCO) 5-325 MG tablet     rizatriptan (MAXALT) 10 MG tablet     atorvastatin (LIPITOR) 20 MG tablet     clobetasol (TEMOVATE) 0.05 % external solution     cyclobenzaprine (FLEXERIL) 10 MG tablet     esomeprazole (NEXIUM) 40 MG DR capsule     fluticasone (FLONASE) 50 MCG/ACT nasal spray     hydrOXYzine (ATARAX) 10 MG tablet     pregabalin (LYRICA) 150 MG capsule     Current Facility-Administered Medications   Medication     dexamethasone (DECADRON) injectable solution used ORALLY 10 mg     diphenhydrAMINE (BENADRYL) capsule 25 mg     diphenhydrAMINE (BENADRYL) solution 50 mg     ketorolac (TORADOL) injection 60 mg     ondansetron (ZOFRAN-ODT) ODT tab 4 mg         Review of Systems   Per HPI        Objective    /86 (BP Location: Right arm, Patient Position: Sitting, Cuff Size: Adult Regular)   Pulse 63   Temp 97.9  F (36.6  C) (Temporal)   Wt 60.1 kg (132 lb 9.6 oz)   LMP  (LMP Unknown)   SpO2 98%   Breastfeeding No   BMI 24.65 kg/m    Body mass index is 24.65 kg/m .  Physical Exam   General: Pleasant, in no apparent distress.  Eyes: Sclera are white and conjunctiva are clear bilaterally. Lacrimal apparatus free of erythema, edema, and discharge bilaterally.  Ears: External ears without erythema or edema. Tympanic membranes are pearly white and without erythema, scarring or perforations bilaterally. External auditory canals are free of foreign bodies, erythema, ulcers,  and masses.  Nose: External nose is symmetrical and free of lesions and deformities.   Oropharynx: Oral mucosa is pink and without ulcers, nodules, and white patches. Tongue is symmetrical, pink, and without masses or lesions. Pharynx is pink, symmetrical, and without lesions. Uvula is midline. Tonsils are pink, symmetrical, and without edema, ulcers, or exudates, and 1+ bilaterally.  Neck: No cervical lymphadenopathy on inspection and palpation.  Cardiovascular: Regular rate and rhythm with S1 equal to S2. No murmurs, friction rubs, or gallops.   Respiratory: Lungs are resonant and clear to auscultation bilaterally. No wheezes, crackles, or rhonchi.  Psych: Appropriate mood and affect.

## 2022-01-28 ENCOUNTER — OFFICE VISIT (OUTPATIENT)
Dept: FAMILY MEDICINE | Facility: OTHER | Age: 57
End: 2022-01-28
Attending: PHYSICIAN ASSISTANT
Payer: COMMERCIAL

## 2022-01-28 VITALS
OXYGEN SATURATION: 98 % | BODY MASS INDEX: 24.65 KG/M2 | TEMPERATURE: 97.9 F | SYSTOLIC BLOOD PRESSURE: 130 MMHG | DIASTOLIC BLOOD PRESSURE: 86 MMHG | WEIGHT: 132.6 LBS | HEART RATE: 63 BPM

## 2022-01-28 DIAGNOSIS — J20.9 ACUTE BRONCHITIS, UNSPECIFIED ORGANISM: ICD-10-CM

## 2022-01-28 DIAGNOSIS — G89.29 OTHER CHRONIC PAIN: ICD-10-CM

## 2022-01-28 DIAGNOSIS — G43.009 MIGRAINE WITHOUT AURA AND WITHOUT STATUS MIGRAINOSUS, NOT INTRACTABLE: Primary | ICD-10-CM

## 2022-01-28 DIAGNOSIS — J01.90 ACUTE SINUSITIS WITH SYMPTOMS > 10 DAYS: ICD-10-CM

## 2022-01-28 DIAGNOSIS — F33.41 RECURRENT MAJOR DEPRESSIVE DISORDER, IN PARTIAL REMISSION (H): ICD-10-CM

## 2022-01-28 PROCEDURE — 96372 THER/PROPH/DIAG INJ SC/IM: CPT | Performed by: PHYSICIAN ASSISTANT

## 2022-01-28 PROCEDURE — 99214 OFFICE O/P EST MOD 30 MIN: CPT | Performed by: PHYSICIAN ASSISTANT

## 2022-01-28 PROCEDURE — 250N000011 HC RX IP 250 OP 636: Performed by: PHYSICIAN ASSISTANT

## 2022-01-28 PROCEDURE — 250N000009 HC RX 250: Performed by: PHYSICIAN ASSISTANT

## 2022-01-28 PROCEDURE — 250N000013 HC RX MED GY IP 250 OP 250 PS 637: Performed by: PHYSICIAN ASSISTANT

## 2022-01-28 RX ORDER — HYDROCODONE BITARTRATE AND ACETAMINOPHEN 5; 325 MG/1; MG/1
1 TABLET ORAL EVERY 8 HOURS PRN
Qty: 30 TABLET | Refills: 0 | Status: SHIPPED | OUTPATIENT
Start: 2022-01-28 | End: 2023-07-06

## 2022-01-28 RX ORDER — FLUOXETINE 40 MG/1
CAPSULE ORAL
Qty: 90 CAPSULE | Refills: 0 | Status: SHIPPED | OUTPATIENT
Start: 2022-01-28 | End: 2022-07-07

## 2022-01-28 RX ORDER — ALBUTEROL SULFATE 90 UG/1
2 AEROSOL, METERED RESPIRATORY (INHALATION) EVERY 6 HOURS
Qty: 6.7 G | Refills: 1 | Status: SHIPPED | OUTPATIENT
Start: 2022-01-28 | End: 2023-07-06

## 2022-01-28 RX ORDER — KETOROLAC TROMETHAMINE 30 MG/ML
60 INJECTION, SOLUTION INTRAMUSCULAR; INTRAVENOUS ONCE
Status: COMPLETED | OUTPATIENT
Start: 2022-01-28 | End: 2022-01-28

## 2022-01-28 RX ORDER — RIZATRIPTAN BENZOATE 10 MG/1
10 TABLET ORAL
Qty: 10 TABLET | Refills: 0 | Status: SHIPPED | OUTPATIENT
Start: 2022-01-28 | End: 2022-07-07

## 2022-01-28 RX ORDER — CETIRIZINE HYDROCHLORIDE, PSEUDOEPHEDRINE HYDROCHLORIDE 5; 120 MG/1; MG/1
1 TABLET, FILM COATED, EXTENDED RELEASE ORAL 2 TIMES DAILY PRN
Qty: 14 TABLET | Refills: 0 | Status: SHIPPED | OUTPATIENT
Start: 2022-01-28 | End: 2022-10-25

## 2022-01-28 RX ORDER — FLUOXETINE 10 MG/1
10 CAPSULE ORAL DAILY
Qty: 90 CAPSULE | Refills: 0 | Status: SHIPPED | OUTPATIENT
Start: 2022-01-28 | End: 2022-04-13

## 2022-01-28 RX ORDER — ONDANSETRON 4 MG/1
4 TABLET, ORALLY DISINTEGRATING ORAL EVERY 6 HOURS PRN
Status: DISCONTINUED | OUTPATIENT
Start: 2022-01-28 | End: 2022-10-25

## 2022-01-28 RX ORDER — DEXAMETHASONE SODIUM PHOSPHATE 4 MG/ML
10 VIAL (ML) INJECTION ONCE
Status: COMPLETED | OUTPATIENT
Start: 2022-01-28 | End: 2022-01-28

## 2022-01-28 RX ORDER — DIPHENHYDRAMINE HCL 25 MG
25 CAPSULE ORAL EVERY 6 HOURS PRN
Status: DISCONTINUED | OUTPATIENT
Start: 2022-01-28 | End: 2022-10-25

## 2022-01-28 RX ADMIN — KETOROLAC TROMETHAMINE 60 MG: 30 INJECTION, SOLUTION INTRAMUSCULAR at 14:45

## 2022-01-28 RX ADMIN — DIPHENHYDRAMINE HYDROCHLORIDE 25 MG: 25 CAPSULE ORAL at 14:47

## 2022-01-28 RX ADMIN — DEXAMETHASONE SODIUM PHOSPHATE 10 MG: 4 INJECTION, SOLUTION INTRA-ARTICULAR; INTRALESIONAL; INTRAMUSCULAR; INTRAVENOUS; SOFT TISSUE at 14:45

## 2022-01-28 RX ADMIN — ONDANSETRON 4 MG: 4 TABLET, ORALLY DISINTEGRATING ORAL at 14:47

## 2022-01-28 ASSESSMENT — PAIN SCALES - GENERAL: PAINLEVEL: WORST PAIN (10)

## 2022-01-28 NOTE — NURSING NOTE
Clinic Administered Medication Documentation    Administrations This Visit     dexamethasone (DECADRON) injectable solution used ORALLY 10 mg     Admin Date  01/28/2022 Action  Given Dose  10 mg Route  Oral Site   Administered By  Doris Turner RN    Ordering Provider: Lisa Crisostomo PA-C    Patient Supplied?: No    Comments: multiple vials          diphenhydrAMINE (BENADRYL) capsule 25 mg     Admin Date  01/28/2022 Action  Given Dose  25 mg Route  Oral Site   Administered By  Doris Turner RN    Ordering Provider: Lisa Crisostomo PA-C    Patient Supplied?: No          ketorolac (TORADOL) injection 60 mg     Admin Date  01/28/2022 Action  Given Dose  60 mg Route  Intramuscular Site   Administered By  Doris Turner RN    Ordering Provider: Lisa Crisostomo PA-C    Patient Supplied?: No          ondansetron (ZOFRAN-ODT) ODT tab 4 mg     Admin Date  01/28/2022 Action  Given Dose  4 mg Route  Oral Site   Administered By  Doris Turner RN    Ordering Provider: Lisa Crisostomo PA-C    Patient Supplied?: No                  Injectable Medication Documentation    Patient was given Ketorolac Tromethamine (Toradol). Prior to medication administration, verified patients identity using patient s name and date of birth. Please see MAR and medication order for additional information. Patient instructed to remain in clinic for 15 minutes and report any adverse reaction to staff immediately .      Was entire vial of medication used? yes  Vial/Syringe: Single dose vial    Oral Medication Documentation    Patient was given Ondansetron (Zofran)  and Decadron. Prior to medication administration, verified patients identity using patient s name and date of birth. Please see MAR and medication order for additional information.     Was entire amount of medication used? No, The remainder 2 MG was discarded as unavoidable waste. (Decadron)    Doris Turner RN...................1/28/2022 2:54  PM

## 2022-02-12 DIAGNOSIS — E78.5 HYPERLIPIDEMIA, UNSPECIFIED HYPERLIPIDEMIA TYPE: ICD-10-CM

## 2022-02-14 RX ORDER — ATORVASTATIN CALCIUM 20 MG/1
20 TABLET, FILM COATED ORAL DAILY
Qty: 90 TABLET | Refills: 3 | Status: SHIPPED | OUTPATIENT
Start: 2022-02-14 | End: 2023-02-03

## 2022-02-14 NOTE — TELEPHONE ENCOUNTER
[id number] sent Rx request for the following:      Requested Prescriptions   Pending Prescriptions Disp Refills     atorvastatin (LIPITOR) 20 MG tablet [Pharmacy Med Name: ATORVASTATIN 20MG TABLET] 90 tablet 0     Sig: TAKE 1 TABLET (20 MG) BY MOUTH DAILY   Last Prescription Date:   11/19/21  Last Fill Qty/Refills:         90, R-0    Last Office Visit:              1/28/22   Future Office visit:           None    Unable to complete prescription refill per RN Medication Refill Policy. Kezia Gale RN .............. 2/14/2022  9:42 AM

## 2022-04-01 ENCOUNTER — OFFICE VISIT (OUTPATIENT)
Dept: FAMILY MEDICINE | Facility: OTHER | Age: 57
End: 2022-04-01
Attending: FAMILY MEDICINE
Payer: COMMERCIAL

## 2022-04-01 ENCOUNTER — HOSPITAL ENCOUNTER (OUTPATIENT)
Dept: GENERAL RADIOLOGY | Facility: OTHER | Age: 57
Discharge: HOME | End: 2022-04-01
Attending: FAMILY MEDICINE
Payer: COMMERCIAL

## 2022-04-01 VITALS
OXYGEN SATURATION: 96 % | SYSTOLIC BLOOD PRESSURE: 134 MMHG | BODY MASS INDEX: 25.8 KG/M2 | TEMPERATURE: 98.8 F | HEIGHT: 62 IN | WEIGHT: 140.2 LBS | HEART RATE: 75 BPM | RESPIRATION RATE: 16 BRPM | DIASTOLIC BLOOD PRESSURE: 88 MMHG

## 2022-04-01 DIAGNOSIS — R68.84 JAW PAIN: Primary | ICD-10-CM

## 2022-04-01 DIAGNOSIS — M54.2 NECK PAIN: ICD-10-CM

## 2022-04-01 DIAGNOSIS — R68.84 JAW PAIN: ICD-10-CM

## 2022-04-01 PROCEDURE — 99213 OFFICE O/P EST LOW 20 MIN: CPT | Performed by: FAMILY MEDICINE

## 2022-04-01 PROCEDURE — 72040 X-RAY EXAM NECK SPINE 2-3 VW: CPT

## 2022-04-01 PROCEDURE — 70330 X-RAY EXAM OF JAW JOINTS: CPT

## 2022-04-01 RX ORDER — CLINDAMYCIN HCL 150 MG
CAPSULE ORAL
COMMUNITY
Start: 2022-03-25 | End: 2022-10-25

## 2022-04-01 RX ORDER — AMOXICILLIN AND CLAVULANATE POTASSIUM 500; 125 MG/1; MG/1
TABLET, FILM COATED ORAL
COMMUNITY
Start: 2022-02-03 | End: 2022-04-01

## 2022-04-01 ASSESSMENT — ENCOUNTER SYMPTOMS
CONFUSION: 0
PHOTOPHOBIA: 0

## 2022-04-01 ASSESSMENT — PATIENT HEALTH QUESTIONNAIRE - PHQ9
SUM OF ALL RESPONSES TO PHQ QUESTIONS 1-9: 12
10. IF YOU CHECKED OFF ANY PROBLEMS, HOW DIFFICULT HAVE THESE PROBLEMS MADE IT FOR YOU TO DO YOUR WORK, TAKE CARE OF THINGS AT HOME, OR GET ALONG WITH OTHER PEOPLE: SOMEWHAT DIFFICULT
SUM OF ALL RESPONSES TO PHQ QUESTIONS 1-9: 12

## 2022-04-01 ASSESSMENT — PAIN SCALES - GENERAL: PAINLEVEL: SEVERE PAIN (7)

## 2022-04-01 ASSESSMENT — ANXIETY QUESTIONNAIRES
GAD7 TOTAL SCORE: 13
5. BEING SO RESTLESS THAT IT IS HARD TO SIT STILL: MORE THAN HALF THE DAYS
GAD7 TOTAL SCORE: 13
3. WORRYING TOO MUCH ABOUT DIFFERENT THINGS: MORE THAN HALF THE DAYS
7. FEELING AFRAID AS IF SOMETHING AWFUL MIGHT HAPPEN: SEVERAL DAYS
GAD7 TOTAL SCORE: 13
4. TROUBLE RELAXING: MORE THAN HALF THE DAYS
7. FEELING AFRAID AS IF SOMETHING AWFUL MIGHT HAPPEN: SEVERAL DAYS
2. NOT BEING ABLE TO STOP OR CONTROL WORRYING: MORE THAN HALF THE DAYS
6. BECOMING EASILY ANNOYED OR IRRITABLE: MORE THAN HALF THE DAYS
1. FEELING NERVOUS, ANXIOUS, OR ON EDGE: MORE THAN HALF THE DAYS

## 2022-04-01 NOTE — NURSING NOTE
"Chief Complaint   Patient presents with     Fall     fell out of bed and hurt jaw, neck and ear         Initial /88   Pulse 75   Temp 98.8  F (37.1  C) (Tympanic)   Resp 16   Ht 1.562 m (5' 1.5\")   Wt 63.6 kg (140 lb 3.2 oz)   LMP  (LMP Unknown)   SpO2 96%   Breastfeeding No   BMI 26.06 kg/m   Estimated body mass index is 26.06 kg/m  as calculated from the following:    Height as of this encounter: 1.562 m (5' 1.5\").    Weight as of this encounter: 63.6 kg (140 lb 3.2 oz).         Norma J. Gosselin, LPN   "

## 2022-04-01 NOTE — PROGRESS NOTES
Assessment & Plan     Jaw pain  Neck pain  Secondary to trauma.  She fell at night and hit her head.  She does not know how she fell but does not believe she lost consciousness.  She was not evaluated after the incident.  She denies misuse of her medications and alcohol consumption.  She is on several sedating medications, making polypharmacy a concern.  Will further evaluate with C-spine XR and mandible XR.  Encouraged continued conservative management with heat, massage, OTC oral and topical pain relief medications, and her home prescriptions.  Will refer if indicated based on imaging; otherwise, she will notify clinic if she desires to do physical therapy.  - XR TMJ Open/Closed Bilateral; Future  - XR Cervical Spine 2/3 Views; Future    Marisa Dubon DO  [facility] AND [facility]    Oneil Gallegos is a 56 year old who presents for the following health issues fell out of bed 2 weeks ago and is having neck, ears and right jaw pain.    Fall    History of Present Illness       Mental Health Follow-up:                    Today's PHQ-9         PHQ-9 Total Score: 12  PHQ-9 Q9 Thoughts of better off dead/self-harm past 2 weeks :   Not at all    How difficult have these problems made it for you to do your work, take care of things at home, or get along with other people: Somewhat difficult    Today's MILY-7 Score: 13    Reason for visit:  Pain in my mouth, glands, ears, neck, and head  Symptoms include:  Pain in all of those areas  Symptom intensity:  Moderate  Symptom progression:  Staying the same  Had these symptoms before:  No  What makes it worse:  Really cold or really hot things to drink and eating in general  What makes it better:  Warm pad on my checks, glands, neck area while massaging it    She eats 2-3 servings of fruits and vegetables daily.She consumes 1 sweetened beverage(s) daily.She exercises with enough effort to increase her heart rate 20 to 29 minutes per day.  She exercises with enough  "effort to increase her heart rate 5 days per week.   She is taking medications regularly.     She reports that she fell out of bed two weeks ago during the night.  She states that she \"smashed\" her face and received cuts to her mouth and near her eye.  Since then she has been having mouth, jaw, neck, and head pain.  She was evaluated by her dentist and reports that they did not find any dental cause for her symptoms.  Pain is worse with eating/drinking hot and cold foods.  Pain is also present with talking and chewing.  She has tried using a heating pad and massage with some relief.    She has a history of a dislocated jaw years ago and became concerned that this could have happened again.  When asked how she fell, she reports that she has been under significant stress with her grandchildren recently moving to [place].  She denies mixing up her medications or taking higher doses or more frequently.  She denies any alcohol use prior to or during incident.  She does not know how she fell, just becoming aware once she was on the floor.  She denies any known loss of consciousness.  She did not choose to be evaluated after the incident.    Review of Systems   Eyes: Negative for photophobia.   Psychiatric/Behavioral: Negative for confusion.          Objective    /88   Pulse 75   Temp 98.8  F (37.1  C) (Tympanic)   Resp 16   Ht 1.562 m (5' 1.5\")   Wt 63.6 kg (140 lb 3.2 oz)   LMP  (LMP Unknown)   SpO2 96%   Breastfeeding No   BMI 26.06 kg/m    Body mass index is 26.06 kg/m .  Physical Exam  Constitutional:       General: She is not in acute distress.     Appearance: Normal appearance. She is not ill-appearing.   HENT:      Head:      Comments: Tenderness to palpation of right TM joint.  Deviation of mandible to right with opening of mouth.     Mouth/Throat:      Mouth: Mucous membranes are moist.      Pharynx: Oropharynx is clear. No oropharyngeal exudate or posterior oropharyngeal erythema.      Comments: " Linear white tissue along right buccal mucosa, consistent with previous trauma.  Eyes:      General:         Right eye: No discharge.         Left eye: No discharge.      Pupils: Pupils are equal, round, and reactive to light.   Musculoskeletal:      Cervical back: Neck supple. No tenderness.   Lymphadenopathy:      Cervical: No cervical adenopathy.   Neurological:      General: No focal deficit present.      Mental Status: She is alert.   Psychiatric:         Mood and Affect: Mood normal.

## 2022-04-02 ENCOUNTER — NURSE TRIAGE (OUTPATIENT)
Dept: NURSING | Facility: CLINIC | Age: 57
End: 2022-04-02
Payer: COMMERCIAL

## 2022-04-02 ASSESSMENT — ANXIETY QUESTIONNAIRES: GAD7 TOTAL SCORE: 13

## 2022-04-02 NOTE — TELEPHONE ENCOUNTER
Pt calling to discuss her X-ray results from yesterday.  She is able to see the result in her MyChart.      General education given per x-ray report.  Instructed pt to continue providers discharge instructions from yesterday and to call the clinic on Monday to discuss imaging results.  Patient verbalized understanding and had no further questions.      Margo Jordan RN  [facility] Nurse Advisor      Reason for Disposition    [1] Caller requesting NON-URGENT health information AND [2] PCP's office is the best resource    Additional Information    Negative: RN needs further essential information from caller in order to complete triage    Negative: Requesting regular office appointment    Protocols used: INFORMATION ONLY CALL - NO TRIAGE-A-       Wound Care Instructions: When packing is in place    1. Change outer gauze dressing daily and as needed, if it becomes wet or soiled. You typically have more drainage when packing is in place. The packing helps wick out the drainage.  2. Leave the packing in. Trim packing if it is stuck to the outer gauze.  3. May shower; change outer gauze dressing after.  4. If packing falls out, leave out, and begin instructions below  5. May use tylenol or ibuprofen as needed for pain    If packing has not fallen out by 8/29/21, please pull in shower. The gauze strip may appear slimy with some bleeding after this is normal.    Wound Care Instructions: When packing is out.    1. Remove previous dressing. Wash area with soap and water.  2. Cleanse wound with 50/50 hydrogen peroxide and warm water. Make sure to spread the wound open slightly when doing this.  3. Place a thin layer of silvadene cream in wound. You do not need to fill the entire wound. The drainage may appear slimy and greenish while using the cream, this is normal.  4. Cover with gauze and tape.  5. Repeat at least once per day, more if bandage is wet or soiled.  6. May use tylenol or ibuprofen as needed for pain.    May shower as usual. No submerging wound in baths, hot tubs, lakes or pools until area is completely healed with no scabs.    Please call if area  has increased redness, pain, swelling, if fever or chills develop, or with any other concerns or questions.    Follow up in about 10 days for wound check.

## 2022-04-05 ENCOUNTER — TELEPHONE (OUTPATIENT)
Dept: FAMILY MEDICINE | Facility: OTHER | Age: 57
End: 2022-04-05
Payer: COMMERCIAL

## 2022-04-05 DIAGNOSIS — M26.609 TEMPOROMANDIBULAR JOINT DISORDER: Primary | ICD-10-CM

## 2022-04-05 NOTE — TELEPHONE ENCOUNTER
Patient is requesting her xray results from 4/1/22. Patient pcp is not available. Patient in pain would like to know her results .     Kassandra Jones on 4/5/2022 at 9:38 AM

## 2022-04-06 NOTE — TELEPHONE ENCOUNTER
XR did not show any fractures or dislocation.  Suspect she flared her TMJ.  I can put a referral in to physical therapy if she would like.  As for her pain, she is already on several sedating medications for pain, and I am still concerned about the fact that she is not aware of how she fell.  I will not be adding any additional sedating substances.  She should take the maximum dose of Tylenol (4000 mg in 24 hour period) in addition to the medications she already has prescribed.  If she is concerned about new or worsening symptoms, she should be seen in clinic again.

## 2022-04-06 NOTE — TELEPHONE ENCOUNTER
Natalee Benites  [facility] Nurse 9 minutes ago (10:10 AM)     BS    Patient called and is in severe pain-with jaw-would like results and if she can get  Something for pain-[employer]---Please call patient today    Routing comment

## 2022-04-11 ENCOUNTER — TRANSFERRED RECORDS (OUTPATIENT)
Dept: HEALTH INFORMATION MANAGEMENT | Facility: OTHER | Age: 57
End: 2022-04-11
Payer: COMMERCIAL

## 2022-04-13 DIAGNOSIS — F33.41 RECURRENT MAJOR DEPRESSIVE DISORDER, IN PARTIAL REMISSION (H): ICD-10-CM

## 2022-04-13 NOTE — TELEPHONE ENCOUNTER
Patient reportedly taking 50 mg daily per last office visit.  Is she only needing the 10 mg dose refilled?

## 2022-04-13 NOTE — TELEPHONE ENCOUNTER
[id number] sent Rx request for the following:    From pharmacy: Patient enrolled in our Rx Med Sync service to improve adherence. We are requesting a refill authorization in advance to ensure an active prescription is on file.     Requested Prescriptions   Pending Prescriptions Disp Refills     FLUoxetine (PROZAC) 10 MG capsule [Pharmacy Med Name: FLUOXETINE 10MG CAPSULE] 90 capsule 0     Sig: TAKE 1 CAPSULE (10 MG) BY MOUTH DAILY       SSRIs Protocol Failed - 4/13/2022  3:57 PM        Failed - PHQ-9 score less than 5 in past 6 months     Please review last PHQ-9 score.   Per Quality Report for Outreach, next due for PHQ-9 4/1/2023.     Last Prescription Date:   1/28/22  Last Fill Qty/Refills:         90, R-0    Last Office Visit:              4/1/22   Future Office visit:           None    Kezia Gale RN .............. 4/13/2022  3:59 PM

## 2022-04-26 RX ORDER — FLUOXETINE 10 MG/1
10 CAPSULE ORAL DAILY
Qty: 90 CAPSULE | Refills: 1 | Status: SHIPPED | OUTPATIENT
Start: 2022-04-26 | End: 2022-08-17

## 2022-04-26 RX ORDER — FLUOXETINE 40 MG/1
40 CAPSULE ORAL DAILY
Qty: 90 CAPSULE | Refills: 1 | Status: SHIPPED | OUTPATIENT
Start: 2022-04-26 | End: 2022-08-17

## 2022-05-02 ENCOUNTER — HOSPITAL ENCOUNTER (OUTPATIENT)
Dept: PHYSICAL THERAPY | Facility: OTHER | Age: 57
Setting detail: THERAPIES SERIES
Discharge: HOME | End: 2022-05-02
Attending: FAMILY MEDICINE
Payer: COMMERCIAL

## 2022-05-02 DIAGNOSIS — R68.84 JAW PAIN: ICD-10-CM

## 2022-05-02 DIAGNOSIS — M54.2 NECK PAIN: ICD-10-CM

## 2022-05-02 PROCEDURE — 97140 MANUAL THERAPY 1/> REGIONS: CPT | Mod: GP

## 2022-05-02 PROCEDURE — 97110 THERAPEUTIC EXERCISES: CPT | Mod: GP

## 2022-05-02 PROCEDURE — 97530 THERAPEUTIC ACTIVITIES: CPT | Mod: GP

## 2022-05-02 PROCEDURE — 97162 PT EVAL MOD COMPLEX 30 MIN: CPT | Mod: GP

## 2022-05-02 NOTE — PROGRESS NOTES
05/02/22 1300   General Information   Type of Visit Initial OP Ortho PT Evaluation   Start of Care Date 05/02/22   Referring Physician Jagdish Cunningham, DO   Patient/Family Goals Statement to have improved pain in jaw and mouth   Orders Evaluate and Treat   Date of Order 04/06/22   Certification Required? No   Medical Diagnosis Jaw pain  Neck pain   Surgical/Medical history reviewed Yes   General Information Comments PMH: depression, anxiety, fibromyalgia, chronic pain disorder, chronic insomnia   Body Part(s)   Body Part(s) TMJ   Presentation and Etiology   Pertinent history of current problem (include personal factors and/or comorbidities that impact the POC) Patient has long standing baseline of TMJ pain since she was 20 years old. Also has baseline migraine history. Was having frequent HA, but in last few years began seeing acupuncturist who was able to largely address migraine symptoms. Patient was doing relatively well at baselines until she fell out of her bed while sleeping at end of March resulting in facial trauma when she hit the night stand including a tear at cheek and abrasion at R temple. Patient is unable to determine cause for the fall. Since the fall out of bed and facial trauma patient states symptoms have tripled in severity. Symptoms use to be 4/10 and she would be able to self manage with massage and use of mouth guard. Symptoms are currently very high. Feels likes she is wearing a mask tension from the check bones down. Pain is from ears into TMJ region to chin. Has not been able to wear mouth guard since injury. Is only eating pureed foods as chewing and talking are both very painful. Patient does have history of clenching during stress and bruxism. Denies UE numbness, tingling and pain. Has hx of anxiety and panic attacks and did recently experience two attacks while flying in last two weeks to visit daughter. Patient has limited mouth opening today. Hx of dental procedures including root  canal. Currently working with two dentists, but is getting differing opinions. Patient denies dizziness, but states that she did have another fall. Had dog on leash and began to feel unwell and went into house and was walking to bedroom when she fell to floor. Patient states she did report fall to primary care. This fall occurred 1-2 weeks ago. Reports sensation of light headedness and feeling unwell when this occurred. Has not happended since. Diffuse tension and pain present through out neck including both sides and suboccipitals. patient has trialed ice and heat. Ice makes it worse. heat helps some. Has been performing self massage, but is not able to get muscles to release for long term.   Impairments A. Pain;D. Decreased ROM;N. Headaches   Functional Limitations perform activities of daily living;perform required work activities;perform desired leisure / sports activities   Symptom Location bilateral TMJ, neck, HA   How/Where did it occur With a fall   Onset date of current episode/exacerbation   (End of March 2022)   Chronicity New   Pain rating (0-10 point scale) Best (/10);Worst (/10)   Best (/10) 6   Worst (/10) 10   Pain quality A. Sharp;F. Stabbing   Frequency of pain/symptoms A. Constant  (increased with eating, talking and sleep)   Pain/symptoms eased by G. Heat;I. OTC medication(s)  (prescription medications)   Progression of symptoms since onset: Unchanged   Current / Previous Interventions   Diagnostic Tests: X-ray   X-ray Results Results   X-ray results Cervical 4/1/22: There is no concerning prevertebral soft tissue edema. There  is disc space narrowing of moderate severity of C3-C4. There is mild  disc space narrowing of C4-C5 and C6-C7. Alignment is normal.  Vertebral body height is maintained throughout. TMJ:Normal anterior translocation of the right mandibular  condyle in relation to the condylar fossa and temporal eminence with  jaw opening.   Current Level of Function   Patient  role/employment history A. Employed   Fall Risk Screen   Have you fallen 2 or more times in the past year? Yes   Have you fallen and had an injury in the past year? Yes   Timed Up and Go score (seconds) NT   Is patient a fall risk? No   Fall screen comments Situational falls. Will monitor in department. But gait belt not indicated. One fall was out of bed.   TMJ Objective Findings   TMJ ROM Comments Protrusion painful. Increased pain with resisted protrusion.   Accessory Motion Hypomobile   Observation Pain behaviors noted.   Posture Mild increased thoracic kyphosis at C7-T2.   Joint noises None   Joint lock Does not lock   Pain Chewing;Talking;Yawning;Clenching;Opening   Associated symptoms Earache;Cheek pain;Stress   Headache Migraine   Habits Bruxism;Clenching   Tongue position Typically not at roof of mouth for resting position. Does try for tongue to roof of mouth at rest per dentist recommendation in past.   Accelerated Tooth Wear Yes   Appliance wear Night only  (Has not been able to wear)   Major dental work Yes   Tired or painful jaw muscles Yes   Opening click No   Closing click No   Crepitus Yes   Passive testing - comments Cervical screening reveals limited cervical flexion with high muscle tension at cervical parapsinals. Extension full. Protrusion and retraction full. R and L cervical rotation moderately limited.   Palpation   (Pain present at bialteral TMJ.)   Dental work comment Root canal. Cavity in R lower.   Jaw muscle pain comment Masseter, medial and lateral pterygoid   Planned Therapy Interventions   Planned Therapy Interventions manual therapy;joint mobilization;ROM;strengthening;stretching;neuromuscular re-education   Planned Therapy Interventions Comment Manual techniques and therapeutic exercise to reduce muscle tension and improve TMJ health, address cervical findings, patient education and HEP.   Planned Modality Interventions   Planned Modality Interventions Ultrasound;Hot packs    Planned Modality Interventions Comments as needed   Clinical Impression   Criteria for Skilled Therapeutic Interventions Met yes, treatment indicated   PT Diagnosis TMJ, HA, cervical pain, muscle tension, chronic pain syndrome.   Influenced by the following impairments pain, arthralgia at bilateral TMJ, high muscle tension, cervical mobility restrictions   Functional limitations due to impairments Limited tolerance for talking, chewing. High level of pain and tension limiting tolerance for sleep and daily tasks.   Clinical Presentation Evolving/Changing   Clinical Presentation Rationale clinicla judgement   Clinical Decision Making (Complexity) Moderate complexity   Therapy Frequency 2 times/Week   Predicted Duration of Therapy Intervention (days/wks) 90 days   Risk & Benefits of therapy have been explained Yes   Patient, Family & other staff in agreement with plan of care Yes   Clinical Impression Comments Patient presents with complicated presentation with baseline TMJ and HA symptoms worsensed significantly from recent fall out of bed and facial trauma during fall. Current presentation is high tone and pain in bilateral masseters with R greater than L. High tone and pain in medial and lateral pterygoid. Limited mouth opening and arthalgia at bilateral TMJs. Limited cervical flexion, R and L rotation with high muscle tone at scalenes and cervical parapsinals as well as suboccipitals. patient's pain is very high and patient has history of fibromyalgia, chronic insomnia, chronic pain disorder, migraine, anxiety and depression. Patient also has concerning recent falls. 2 total reported today including fall out of bed that resulted in injury. Will closely monitor and communitcate as indicated with primary care provider.   Education Assessment   Barriers to Learning No barriers   ORTHO GOALS   PT Ortho Eval Goals 1;3;2;4   Ortho Goal 1   Goal Identifier STG 1 - HEP   Goal Description Patient will be independent and  compliant with HEP.   Target Date 05/16/22   Ortho Goal 2   Goal Identifier LTG 1 -  Sleep   Goal Description Patient will report return to baseline sleep without limitaiton from TMJ pain and cervical pain and tension.   Target Date 07/31/22   Ortho Goal 3   Goal Identifier LTG 2 - Chewing and Talking   Goal Description Patient will be able to return to baseline eating tolerance including hard textures and perform regular talking frequency without limitation from pain and tension.   Target Date 07/31/22   Ortho Goal 4   Goal Identifier LTG 3 - Pain   Goal Description Patient will report return to baseline cervical and TMJ pain allowing return to prior activity level without limitation.   Target Date 07/31/22   Total Evaluation Time   PT Eval, Moderate Complexity Minutes (55732) 30

## 2022-05-05 ENCOUNTER — HOSPITAL ENCOUNTER (OUTPATIENT)
Dept: PHYSICAL THERAPY | Facility: OTHER | Age: 57
Setting detail: THERAPIES SERIES
Discharge: HOME | End: 2022-05-05
Attending: FAMILY MEDICINE
Payer: COMMERCIAL

## 2022-05-05 PROCEDURE — 97140 MANUAL THERAPY 1/> REGIONS: CPT | Mod: GP

## 2022-05-05 PROCEDURE — 97110 THERAPEUTIC EXERCISES: CPT | Mod: GP

## 2022-05-12 ENCOUNTER — HOSPITAL ENCOUNTER (OUTPATIENT)
Dept: PHYSICAL THERAPY | Facility: OTHER | Age: 57
Setting detail: THERAPIES SERIES
Discharge: HOME | End: 2022-05-12
Attending: FAMILY MEDICINE
Payer: COMMERCIAL

## 2022-05-12 PROCEDURE — 97140 MANUAL THERAPY 1/> REGIONS: CPT | Mod: GP

## 2022-05-17 ENCOUNTER — HOSPITAL ENCOUNTER (OUTPATIENT)
Dept: PHYSICAL THERAPY | Facility: OTHER | Age: 57
Setting detail: THERAPIES SERIES
Discharge: HOME | End: 2022-05-17
Attending: FAMILY MEDICINE
Payer: COMMERCIAL

## 2022-05-17 PROCEDURE — 97140 MANUAL THERAPY 1/> REGIONS: CPT | Mod: GP

## 2022-05-17 PROCEDURE — 97112 NEUROMUSCULAR REEDUCATION: CPT | Mod: GP

## 2022-05-19 ENCOUNTER — HOSPITAL ENCOUNTER (OUTPATIENT)
Dept: PHYSICAL THERAPY | Facility: OTHER | Age: 57
Setting detail: THERAPIES SERIES
Discharge: HOME | End: 2022-05-19
Attending: FAMILY MEDICINE
Payer: COMMERCIAL

## 2022-05-19 PROCEDURE — 97140 MANUAL THERAPY 1/> REGIONS: CPT | Mod: GP

## 2022-05-19 PROCEDURE — 97112 NEUROMUSCULAR REEDUCATION: CPT | Mod: GP

## 2022-05-23 ENCOUNTER — TRANSFERRED RECORDS (OUTPATIENT)
Dept: HEALTH INFORMATION MANAGEMENT | Facility: OTHER | Age: 57
End: 2022-05-23
Payer: COMMERCIAL

## 2022-06-02 ENCOUNTER — HOSPITAL ENCOUNTER (OUTPATIENT)
Dept: PHYSICAL THERAPY | Facility: OTHER | Age: 57
Setting detail: THERAPIES SERIES
Discharge: HOME | End: 2022-06-02
Attending: FAMILY MEDICINE
Payer: COMMERCIAL

## 2022-06-02 PROCEDURE — 97112 NEUROMUSCULAR REEDUCATION: CPT | Mod: GP

## 2022-07-01 DIAGNOSIS — M79.7 FIBROMYALGIA: ICD-10-CM

## 2022-07-05 NOTE — TELEPHONE ENCOUNTER
TW #[id number] sent Rx request for the following:      PREGABALIN 150MG CAPSULE      Last Prescription Date:   1/21/2022  Last Fill Qty/Refills:         180, R-1    Last Office Visit:              4/1/2022   Future Office visit:           none    Prateek Harvey RN, BSN  ....................  7/5/2022   9:36 AM

## 2022-07-06 RX ORDER — PREGABALIN 150 MG/1
CAPSULE ORAL
Qty: 180 CAPSULE | Refills: 0 | OUTPATIENT
Start: 2022-07-06

## 2022-07-07 ENCOUNTER — OFFICE VISIT (OUTPATIENT)
Dept: PEDIATRICS | Facility: OTHER | Age: 57
End: 2022-07-07
Attending: INTERNAL MEDICINE
Payer: COMMERCIAL

## 2022-07-07 VITALS
TEMPERATURE: 97.4 F | HEART RATE: 74 BPM | SYSTOLIC BLOOD PRESSURE: 118 MMHG | DIASTOLIC BLOOD PRESSURE: 80 MMHG | OXYGEN SATURATION: 97 % | BODY MASS INDEX: 25.45 KG/M2 | RESPIRATION RATE: 16 BRPM | WEIGHT: 136.9 LBS

## 2022-07-07 DIAGNOSIS — G89.29 OTHER CHRONIC PAIN: ICD-10-CM

## 2022-07-07 DIAGNOSIS — G43.009 MIGRAINE WITHOUT AURA AND WITHOUT STATUS MIGRAINOSUS, NOT INTRACTABLE: ICD-10-CM

## 2022-07-07 DIAGNOSIS — K21.9 GASTROESOPHAGEAL REFLUX DISEASE WITHOUT ESOPHAGITIS: ICD-10-CM

## 2022-07-07 DIAGNOSIS — F39 MOOD DISORDER (H): ICD-10-CM

## 2022-07-07 DIAGNOSIS — M79.7 FIBROMYALGIA: Primary | ICD-10-CM

## 2022-07-07 DIAGNOSIS — M79.7 FIBROMYALGIA: ICD-10-CM

## 2022-07-07 PROCEDURE — 99214 OFFICE O/P EST MOD 30 MIN: CPT | Performed by: INTERNAL MEDICINE

## 2022-07-07 RX ORDER — HYDROCODONE BITARTRATE AND ACETAMINOPHEN 5; 325 MG/1; MG/1
1 TABLET ORAL EVERY 8 HOURS PRN
Qty: 30 TABLET | Refills: 0 | Status: CANCELLED | OUTPATIENT
Start: 2022-07-07

## 2022-07-07 RX ORDER — PREGABALIN 150 MG/1
150 CAPSULE ORAL 2 TIMES DAILY
Qty: 180 CAPSULE | Refills: 1 | Status: SHIPPED | OUTPATIENT
Start: 2022-07-07 | End: 2022-12-30

## 2022-07-07 RX ORDER — AMOXICILLIN 500 MG/1
CAPSULE ORAL
COMMUNITY
Start: 2022-07-01 | End: 2022-10-25

## 2022-07-07 RX ORDER — ESOMEPRAZOLE MAGNESIUM 40 MG/1
CAPSULE, DELAYED RELEASE ORAL
Qty: 90 CAPSULE | Refills: 1 | Status: SHIPPED | OUTPATIENT
Start: 2022-07-07 | End: 2022-12-30

## 2022-07-07 RX ORDER — RIZATRIPTAN BENZOATE 10 MG/1
10 TABLET ORAL
Qty: 10 TABLET | Refills: 1 | Status: SHIPPED | OUTPATIENT
Start: 2022-07-07 | End: 2023-04-10

## 2022-07-07 ASSESSMENT — PAIN SCALES - GENERAL: PAINLEVEL: EXTREME PAIN (8)

## 2022-07-07 NOTE — TELEPHONE ENCOUNTER
Note from pharmacy:  Patient is needing a refill on this medication. The Rx was originally transferred to [facility] in [place] and cannot be transferred back as it is a controlled substance. Please call with questions.    Kezia Gale RN .............. 7/7/2022  10:20 AM

## 2022-07-07 NOTE — NURSING NOTE
Chief Complaint   Patient presents with     Recheck Medication     Here today for med refills.     Medication Reconciliation: complete    Lenore Crooks LPN

## 2022-07-07 NOTE — PATIENT INSTRUCTIONS
-- Herb Chi exercises   -- Establish with a therapist   -- Neurology consult for migraines   -- Psychiatry referral to consider DNA testing   -- No narcotics are prescribed today    Patient Education       [place] counselors/therapists   Telephone Hours Kids? Address   [facility]  (Many counselors) [phone] M-Th 8-5  F 8-12 Yes [address] [place] [address]   [email or web address]   [facility]  (Many counselors) [phone]  Yes [address]   [email or web address]   [facility]  (Many counselors) [phone]  Yes [address]  [email or web address]   [facility]  Christiano Hammer [phone]  Yes [facility]  [address] [place] [address], [address]  [email or web address]   [facility]  Nitish Montes [phone]  Yes [address] [place] [address]   [address]  [email or web address]   Linda Briggs [phone]   [address] [facility] [address]  [email or web address]   [facility] Kushal Quarles [phone]   [address] [facility] [address]  [email or web address] [email or web address]   Kayley Randy [phone]   [address]   Jessica Boyd [phone]   [address] [place] [address]   Minda Townsend [phone]  Yes [address]   Catie Fatima [phone]   [address] [place]    Shemar Sanchez [phone]   [address] [place] [address]   Caty Danni [phone]   [address]   [address] [place] [address]   [email or web address]   Mae Manjarrez [phone]   [address] [place] [address] [address]  ([facility])  [email or web address]   [facility]  Gustavo Burt [phone]   [address] [place] [address] Counseling  Michele Rinne Cindy Thomas [phone]      [facility]: [place] [phone]  Yes DREA Avelar  [facility]  [email or web address]   [facility]: [place] [phone]  Yes  [place]  [address]  [email or web address]

## 2022-07-07 NOTE — PROGRESS NOTES
Assessment & Plan   1. Fibromyalgia  We had a lengthy discussion today with regard to fibromyalgia.  I recommend consideration of switching from fluoxetine to duloxetine given its combined efficacy for mood disorders and chronic pain syndromes.  The patient declined that at this time.  Lyrica was prescribed.  Recommend trial of herb chi exercises.  - pregabalin (LYRICA) 150 MG capsule; Take 1 capsule (150 mg) by mouth 2 times daily  Dispense: 180 capsule; Refill: 1    2. Migraine without aura and without status migrainosus, not intractable  She is having daily migraines despite current medication regiment.  There has been significant changes and new classes of medications in this regard.  I recommend a neurology consultation.  - rizatriptan (MAXALT) 10 MG tablet; Take 1 tablet (10 mg) by mouth at onset of headache for migraine May repeat in 2 hours. Max 3 tablets/24 hours.  Dispense: 10 tablet; Refill: 1  - Adult Neurology  Referral; Future    3. Gastroesophageal reflux disease without esophagitis  At goal, no change.  - esomeprazole (NEXIUM) 40 MG DR capsule; TAKE 1 CAPSULE BY MOUTH EVERY DAY - 30-60 MINUTES BEFORE A MEAL  Dispense: 90 capsule; Refill: 1    4. Other chronic pain  I see no indication for narcotics.  I also expressed the concern that narcotics combined with fibromyalgia has been shown to worsen chronic pain.  I was concerned that she is receiving narcotics from her dentist as well as her physician here.  No narcotics are prescribed today.    5. Mood disorder (H)  Recommend she establish with a therapist  - Adult Mental Health  Referral; Future      Patient Instructions      -- Herb Chi exercises   -- Establish with a therapist   -- Neurology consult for migraines   -- Psychiatry referral to consider DNA testing   -- No narcotics are prescribed today    Patient Education       [place] counselors/therapists   Telephone Hours Kids? Address   [facility]  (Many counselors)  [phone] M-Th 8-5  F 8-12 Yes [address] [place] [address]   [email or web address]   [facility]  (Many counselors) [phone]  Yes [address]   [email or web address]   [facility]  (Many counselors) [phone]  Yes [address]  [email or web address]://[email or web address]   [facility]  Christiano Hammer [phone]  Yes [facility]  [address]  [email or web address]   [facility]  Nitish Montes [phone]  Yes [address]   [address]  [email or web address]   Linda Briggs [phone]   [address]  [email or web address]   [facility]  Michele Quarles [phone]   [address] [facility] [address]  [email or web address]   Kayley Padilla [phone]   [address]   Jessica Boyd [phone]   [address] [place] [address]   Minda Townsend [phone]  Yes [address]   Veranelia Kushal [phone]   [address] [place]    Shemar Sanchez [phone]   [address] [place] [address]   Caty Danni [phone]   10   [address] [place], [place] [address]   [email or web address]://[email or web address]   Mae Manjarrez [phone]   [address] [place] [address] [address]  ([facility])  [email or web address]   [facility]  Gustavo Burt [phone]   [address] [place] [address] Counseling  Michele Rinne Cindy Thomas [phone]      [facility]: [place] [phone]  Yes DREA Avelar  [address]  [email or web address]   [facility]: [place] [phone]  Yes DREA Shah  [address] [place] [address]  [email or web address]           Return if symptoms worsen or fail to improve.    Signed, Liborio Younger MD, FAAP, FACP  Internal Medicine & Pediatrics    Subjective   El Joseph is a 56 year old female who presents for medication refills.  She has a history of migraine  "headaches.  She has been taking rizatriptan on an almost daily basis.  She found Topamax ineffective for her migraines.  She has not tried other treatments.  She has fibromyalgia and continues on Lyrica.  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  She has a history of mood disorder and has been on fluoxetine for a long time.    Objective   Vitals: /80 (BP Location: Right arm, Patient Position: Sitting, Cuff Size: Adult Regular)   Pulse 74   Temp 97.4  F (36.3  C) (Tympanic)   Resp 16   Wt 62.1 kg (136 lb 14.4 oz)   LMP  (LMP Unknown)   SpO2 97%   Breastfeeding No   BMI 25.45 kg/m          Review and Analysis of Data   I personally reviewed the following:  External notes: Yes I reviewed the [place]  database today  Results: No  Use of an independent historian: No  Independent review of a test performed by another physician: No  Discussion of management with another physician: No  Low risk of morbidity from additional diagnostic testing and/or treatment.    "

## 2022-07-08 RX ORDER — PREGABALIN 150 MG/1
150 CAPSULE ORAL 2 TIMES DAILY
Qty: 180 CAPSULE | Refills: 1 | OUTPATIENT
Start: 2022-07-08

## 2022-07-25 ENCOUNTER — TRANSFERRED RECORDS (OUTPATIENT)
Dept: HEALTH INFORMATION MANAGEMENT | Facility: OTHER | Age: 57
End: 2022-07-25

## 2022-09-06 ENCOUNTER — HOSPITAL ENCOUNTER (OUTPATIENT)
Dept: GENERAL RADIOLOGY | Facility: OTHER | Age: 57
Discharge: HOME | End: 2022-09-06
Attending: PHYSICIAN ASSISTANT
Payer: COMMERCIAL

## 2022-09-06 ENCOUNTER — OFFICE VISIT (OUTPATIENT)
Dept: FAMILY MEDICINE | Facility: OTHER | Age: 57
End: 2022-09-06
Attending: PHYSICIAN ASSISTANT
Payer: COMMERCIAL

## 2022-09-06 VITALS
BODY MASS INDEX: 25.71 KG/M2 | TEMPERATURE: 98.3 F | WEIGHT: 138.3 LBS | DIASTOLIC BLOOD PRESSURE: 76 MMHG | SYSTOLIC BLOOD PRESSURE: 124 MMHG | OXYGEN SATURATION: 97 % | RESPIRATION RATE: 24 BRPM | HEART RATE: 70 BPM

## 2022-09-06 DIAGNOSIS — J45.901 REACTIVE AIRWAY DISEASE WITH WHEEZING WITH ACUTE EXACERBATION, UNSPECIFIED ASTHMA SEVERITY, UNSPECIFIED WHETHER PERSISTENT: Primary | ICD-10-CM

## 2022-09-06 PROCEDURE — 71046 X-RAY EXAM CHEST 2 VIEWS: CPT

## 2022-09-06 PROCEDURE — 99214 OFFICE O/P EST MOD 30 MIN: CPT | Performed by: PHYSICIAN ASSISTANT

## 2022-09-06 RX ORDER — ALBUTEROL SULFATE 90 UG/1
2 AEROSOL, METERED RESPIRATORY (INHALATION) EVERY 6 HOURS
Qty: 18 G | Refills: 1 | Status: SHIPPED | OUTPATIENT
Start: 2022-09-06 | End: 2023-07-06

## 2022-09-06 RX ORDER — FLUTICASONE PROPIONATE 110 UG/1
1 AEROSOL, METERED RESPIRATORY (INHALATION) 2 TIMES DAILY
Qty: 12 G | Refills: 1 | Status: SHIPPED | OUTPATIENT
Start: 2022-09-06 | End: 2023-07-06

## 2022-09-06 ASSESSMENT — PAIN SCALES - GENERAL: PAINLEVEL: SEVERE PAIN (6)

## 2022-09-06 NOTE — NURSING NOTE
Patient is here for concerns of breathing for past month. States is getting worse, difficult to breath at night, wheezing, not able to catch her breath.     No LMP recorded (lmp unknown). Patient has had a hysterectomy.  Medication Reconciliation: complete    Gaviota Morrow LPN 9/6/2022 1:44 PM

## 2022-09-06 NOTE — PROGRESS NOTES
ASSESSMENT/PLAN:    I have reviewed the nursing notes.  I have reviewed the findings, diagnosis, plan and need for follow up with the patient.    1. Reactive airway disease with wheezing with acute exacerbation, unspecified asthma severity, unspecified whether persistent  - XR Chest 2 Views  - albuterol (PROAIR HFA/PROVENTIL HFA/VENTOLIN HFA) 108 (90 Base) MCG/ACT inhaler; Inhale 2 puffs into the lungs every 6 hours  Dispense: 18 g; Refill: 1  - fluticasone (FLOVENT HFA) 110 MCG/ACT inhaler; Inhale 1 puff into the lungs 2 times daily  Dispense: 12 g; Refill: 1  - Pulmonary Function Test (); Future  - Vital signs stable. PE consistent with likely COPD versus reactive airway disease. CXR clear chest. COVID test declined and patient is > 1 month out from symptoms. PFT testing ordered to confirm diagnosis - we will follow up on results as available. Patient has pertinent history of smoking and discussed that allergies and asthma/COPD can often go hand in hand. Recommend tobacco cessation, she is not interested at this time, but will let us know if she is.     In the interim, we will continue with Albuterol ANGELICA inhaler for as needed use for shortness of breath. We will add in Flovent, 1 puff twice a day. She should keep a log of her symptoms and recommend follow up in 3-4 weeks.     Patient is in agreement and understanding of the above treatment plan. All questions and concerns were addressed and answered to patient's satisfaction. AVS reviewed with patient.     Discussed warning signs/symptoms indicative of need to f/u    Follow up if symptoms persist or worsen or concerns    I explained my diagnostic considerations and recommendations to the patient, who voiced understanding and agreement with the treatment plan. All questions were answered. We discussed potential side effects of any prescribed or recommended therapies, as well as expectations for response to treatments.    Stephanie Lewis PA-C  9/6/2022  1:56  PM    HPI:    El Joseph is a 56 year old female  who presents to [facility] today for concerns of shortness of breath, cough (mild production, usually dry), wheezing x 1 month. Equal symptoms between day and nighttime now, initially was worse at night.     Symptoms:  No fevers or chills  No sore throat/pharyngitis/tonsillitis.   No allergy/URI Symptoms  No Muffled Sounds/Change in Hearing  No Sensation of Fullness in Ear(s)  No Ringing in Ears/Tinnitus  No Balance Changes  No Dizziness  Yes Congestion (head/nasal/chest)  Yes Cough/Productive Cough  Yes Post Nasal Drip  Yes Headache  Yes Sinus Pain/Pressure  Yes Myalgias  No Otalgia  Activity Level Changes: Yes: more tired, lack of sleep  Appetite/Liquid Intake Changes: No  Changes to Bowel Habits: No  Changes to Bladder Habits: No  Additional Symptoms to Report: No  History of similar symptoms: No  Prior workup: No    Treatments tried: Antihistamine, OTC Cough med, Fluids and Rest. Albuterol has been using more often than usual    Site of exposure: not known.  Type of exposure: not known    Vaccination status:   - Influenza: not immunized at this time  - COVID: not immunized at this time    Cardiopulmonary History:  Recent Infections (Pneumonia, etc): No  Smoker: Yes: 1 pack every 2-3 days  Asthma: Unsure  COPD: Unsure    NSAIDs    PCP: DO Jagdish    Past Medical History:   Diagnosis Date     Other constipation     No Comments Provided     Pain in knee     No Comments Provided     Personal history of other medical treatment (CODE)     G7, P3-0-4-3 (3 vag. 4 spon miscarriages-one in the second trimester and one daughter was twin and lost the twin).     Personal history of other medical treatment (CODE)     in prior relationship and childhood     Radiculopathy     2011,Benign hypermobility per [facility]     Past Surgical History:   Procedure Laterality Date     COLONOSCOPY      4/2003,Normal, bleeding hemorrhoids     COLONOSCOPY N/A 11/30/2021    tubular adenomas-  f/u 5 years     HYSTERECTOMY VAGINAL      5/2003,Dr Ramirez-Jean, chronic pelvic pain/endometriosis     OTHER SURGICAL HISTORY      [id number],DILATION AND CURETTAGE,After 4 miscarriages     OTHER SURGICAL HISTORY      1986,600000,OTHER,Ovarian cyst     OTHER SURGICAL HISTORY      6/2002,600000,OTHER,Chronic pelvic pain/hemorrhagic cyst     OTHER SURGICAL HISTORY      2/2009,600000,OTHER,Dr Ramirez-recurrent ovarian cysts     Social History     Tobacco Use     Smoking status: Current Every Day Smoker     Packs/day: 0.25     Years: 10.00     Pack years: 2.50     Types: Cigarettes     Smokeless tobacco: Never Used   Substance Use Topics     Alcohol use: Yes     Comment: occ     Current Outpatient Medications   Medication Sig Dispense Refill     acetaminophen-codeine (TYLENOL #3) 300-30 MG tablet Take 1 tablet by mouth every 6 hours as needed TAKE 1 TABLET BY MOUTH EVERY 6 HOURS AS NEEDED       albuterol (PROAIR HFA/PROVENTIL HFA/VENTOLIN HFA) 108 (90 Base) MCG/ACT inhaler Inhale 2 puffs into the lungs every 6 hours 6.7 g 1     amoxicillin (AMOXIL) 500 MG capsule TAKE 2 CAPSULES BY MOUTH NOW, AND THEN 1 CAPSULE EVERY 8 HOURS UNTIL GONE       atorvastatin (LIPITOR) 20 MG tablet TAKE 1 TABLET (20 MG) BY MOUTH DAILY 90 tablet 3     cetirizine-pseudoePHEDrine ER (ZYRTEC-D) 5-120 MG 12 hr tablet Take 1 tablet by mouth 2 times daily as needed for congestion 14 tablet 0     clindamycin (CLEOCIN) 150 MG capsule TAKE 2 CAPSULES NOW THEN 1 CAP EVERY 8 HOURS       clobetasol (TEMOVATE) 0.05 % external solution APPLY TO THE SCALP TWICE DAILY.       cyclobenzaprine (FLEXERIL) 10 MG tablet Take 1 tablet (10 mg) by mouth 3 times daily as needed for muscle spasms 90 tablet 1     dextromethorphan-guaiFENesin (MUCINEX DM)  MG per 12 hr tablet Take 1 tablet by mouth every 12 hours 60 tablet 0     esomeprazole (NEXIUM) 40 MG DR capsule TAKE 1 CAPSULE BY MOUTH EVERY DAY - 30-60 MINUTES BEFORE A MEAL 90 capsule 1     FLUoxetine (PROZAC) 10 MG  capsule TAKE 1 CAPSULE (10 MG) BY MOUTH DAILY 90 capsule 0     FLUoxetine (PROZAC) 40 MG capsule TAKE 1 CAPSULE (40 MG) BY MOUTH DAILY 90 capsule 0     fluticasone (FLONASE) 50 MCG/ACT nasal spray Spray 2 sprays into both nostrils daily 16 g 1     HYDROcodone-acetaminophen (NORCO) 5-325 MG tablet Take 1 tablet by mouth every 8 hours as needed for severe pain * ACETAMINOPHEN SHOULD BE LIMITED TO 4000MG PER DAY* 30 tablet 0     hydrOXYzine (ATARAX) 10 MG tablet Take 1 tablet (10 mg) by mouth every 8 hours as needed for anxiety 90 tablet 0     pregabalin (LYRICA) 150 MG capsule Take 1 capsule (150 mg) by mouth 2 times daily 180 capsule 1     rizatriptan (MAXALT) 10 MG tablet Take 1 tablet (10 mg) by mouth at onset of headache for migraine May repeat in 2 hours. Max 3 tablets/24 hours. 10 tablet 1     Allergies   Allergen Reactions     Nsaids      Other reaction(s): Ecchymosis     Past medical history, past surgical history, current medications and allergies reviewed and accurate to the best of my knowledge.      ROS:  Refer to HPI    /76   Pulse 70   Temp 98.3  F (36.8  C) (Tympanic)   Resp 24   Wt 62.7 kg (138 lb 4.8 oz)   LMP  (LMP Unknown)   SpO2 97%   Breastfeeding No   BMI 25.71 kg/m      EXAM:  General Appearance: Well appearing 56 year old female, appropriate appearance for age. No acute distress   Ears: Left TM intact, translucent with bony landmarks appreciated, no erythema, no effusion, no bulging, no purulence.  Right TM intact, translucent with bony landmarks appreciated, no erythema, no effusion, no bulging, no purulence.  Left auditory canal clear.  Right auditory canal clear.  Normal external ears, non tender.  Eyes: conjunctivae normal without erythema or irritation, corneas clear, no drainage or crusting, no eyelid swelling, pupils equal   Oropharynx: moist mucous membranes, posterior pharynx without erythema, tonsils symmetric, no erythema, no exudates or petechiae, + post nasal drip  seen, no trismus, voice clear.    Sinuses:  No sinus tenderness upon palpation of the frontal or maxillary sinuses  Nose:  Bilateral nares: no erythema, no edema, no drainage or congestion   Neck: supple without adenopathy  Respiratory: normal chest wall and respirations.  Normal effort. No rhonchi.  No increased work of breathing.  Dry cough, diffuse wheezing and crackles.   Cardiac: RRR with no murmurs  Dermatological: no rashes noted of exposed skin  Psychological: normal affect, alert, oriented, and pleasant.     Labs:  None     Xray:  Results for orders placed or performed in visit on 09/06/22   XR Chest 2 Views     Status: None    Narrative    PROCEDURE: XR CHEST 2 VIEWS 9/6/2022 2:23 PM    HISTORY: Chronic obstructive pulmonary disease, unspecified COPD type  (H)    COMPARISONS: 11/10/2021.    TECHNIQUE: 2 views.    FINDINGS: Heart and pulmonary vasculature are normal. Lungs are clear  and no pleural effusion is seen.         Impression    IMPRESSION: No acute disease.    AN FLETCHER MD         SYSTEM ID:  [id number]

## 2022-09-15 DIAGNOSIS — G89.29 OTHER CHRONIC PAIN: ICD-10-CM

## 2022-09-16 RX ORDER — CYCLOBENZAPRINE HCL 10 MG
10 TABLET ORAL 3 TIMES DAILY PRN
Qty: 90 TABLET | Refills: 1 | Status: SHIPPED | OUTPATIENT
Start: 2022-09-16 | End: 2023-07-06

## 2022-09-16 NOTE — TELEPHONE ENCOUNTER
Routing to last prescribing provider, pcp no longer in clinic  Jessica Medina RN on 9/16/2022 at 3:06 PM    Last Prescription Date: 1/21/22  Last Qty/Refills: 90 / 1  Last Office Visit: 7/7/22  Future Office Visit: None     Requested Prescriptions   Pending Prescriptions Disp Refills     cyclobenzaprine (FLEXERIL) 10 MG tablet [Pharmacy Med Name: CYCLOBENZAPRINE 10MG TABLET] 90 tablet 1     Sig: TAKE 1 TABLET (10 MG) BY MOUTH 3 TIMES DAILY AS NEEDED FOR MUSCLE SPASMS       There is no refill protocol information for this order

## 2022-09-26 ENCOUNTER — ALLIED HEALTH/NURSE VISIT (OUTPATIENT)
Dept: FAMILY MEDICINE | Facility: OTHER | Age: 57
End: 2022-09-26
Attending: PHYSICIAN ASSISTANT
Payer: COMMERCIAL

## 2022-09-26 DIAGNOSIS — Z20.822 ENCOUNTER FOR LABORATORY TESTING FOR COVID-19 VIRUS: Primary | ICD-10-CM

## 2022-09-26 PROCEDURE — C9803 HOPD COVID-19 SPEC COLLECT: HCPCS

## 2022-09-26 PROCEDURE — U0003 INFECTIOUS AGENT DETECTION BY NUCLEIC ACID (DNA OR RNA); SEVERE ACUTE RESPIRATORY SYNDROME CORONAVIRUS 2 (SARS-COV-2) (CORONAVIRUS DISEASE [COVID-19]), AMPLIFIED PROBE TECHNIQUE, MAKING USE OF HIGH THROUGHPUT TECHNOLOGIES AS DESCRIBED BY CMS-2020-01-R: HCPCS | Mod: ZL

## 2022-09-26 NOTE — NURSING NOTE
Patient swabbed for COVID-19 testing.  Gaviota Morrow LPN on 9/26/2022 at 10:25 AM    Procedure GICH 9/30/22 PFT

## 2022-09-27 LAB — SARS-COV-2 RNA RESP QL NAA+PROBE: NEGATIVE

## 2022-09-30 ENCOUNTER — HOSPITAL ENCOUNTER (OUTPATIENT)
Dept: RESPIRATORY THERAPY | Facility: OTHER | Age: 57
Discharge: HOME | End: 2022-09-30
Attending: PHYSICIAN ASSISTANT | Admitting: PHYSICIAN ASSISTANT
Payer: COMMERCIAL

## 2022-09-30 DIAGNOSIS — J45.901 REACTIVE AIRWAY DISEASE WITH WHEEZING WITH ACUTE EXACERBATION, UNSPECIFIED ASTHMA SEVERITY, UNSPECIFIED WHETHER PERSISTENT: ICD-10-CM

## 2022-09-30 PROCEDURE — 94729 DIFFUSING CAPACITY: CPT | Mod: 26 | Performed by: INTERNAL MEDICINE

## 2022-09-30 PROCEDURE — 94726 PLETHYSMOGRAPHY LUNG VOLUMES: CPT

## 2022-09-30 PROCEDURE — 94010 BREATHING CAPACITY TEST: CPT | Mod: 26 | Performed by: INTERNAL MEDICINE

## 2022-09-30 PROCEDURE — 94729 DIFFUSING CAPACITY: CPT

## 2022-09-30 PROCEDURE — 94010 BREATHING CAPACITY TEST: CPT

## 2022-09-30 PROCEDURE — 94726 PLETHYSMOGRAPHY LUNG VOLUMES: CPT | Mod: 26 | Performed by: INTERNAL MEDICINE

## 2022-09-30 PROCEDURE — 999N000157 HC STATISTIC RCP TIME EA 10 MIN

## 2022-10-03 ENCOUNTER — TELEPHONE (OUTPATIENT)
Dept: FAMILY MEDICINE | Facility: OTHER | Age: 57
End: 2022-10-03

## 2022-10-03 NOTE — TELEPHONE ENCOUNTER
Left message on patient's mobile phone voicemail informing her of negative Covid results     Lenore Campa, CMA on 10/3/2022 at 4:52 PM

## 2022-10-06 ENCOUNTER — TELEPHONE (OUTPATIENT)
Dept: FAMILY MEDICINE | Facility: OTHER | Age: 57
End: 2022-10-06

## 2022-10-06 NOTE — TELEPHONE ENCOUNTER
Patient called to get results from testing that was done last Friday. She is not able to get into Mychart. Please call to advise.    Jemima Lui on 10/6/2022 at 3:06 PM

## 2022-10-07 NOTE — TELEPHONE ENCOUNTER
Called pt. with results after verification of last name and .  Ronna Cullen LPN on 10/7/2022 at 10:15 AM

## 2022-10-24 NOTE — PROGRESS NOTES
Assessment & Plan     1. Fibromyalgia  Continues to struggle with near daily fibromyalgia symptoms and chronic pain. Discussed alternative treatment options including trying Cymbalta for management of pain and mood, pursuing acupuncture, chiropractic management, establishing with pain clinic, etc. Patient open to trying alternative options. Will continue with Lyrica for now and will consider finding provider closer to home for possible acupuncture treatments.     2. Chronic pain disorder  Discussed current research regarding use of opioids and fibromyalgia and chronic pain. Discussed continued us of Norco is not indicated at this time. Will consider pursuing other options as above. Can place referral to chiropractic, pain clinic, etc per patient preference going forward.   - Drug Confirmation Panel Urine with Creat  - Urine Drugs of Abuse Screen Panel 13    3. Migraine without aura and without status migrainosus, not intractable  Not well controlled with Maxalt only. Discussed trying alternative as needed vs prophylactic medication, neurology follow up, pursuing chiropractic or acupuncture with provider closer to home, etc. Patient would like to think about options and will notify provider with preferences going forward.     4. Encounter for screening mammogram for breast cancer  - MA Screen Bilateral w/Gera; Future    2 or more chronic illnesses with progression  Ordering and reviewing of results of each unique test  Prescription drug management.     Return if symptoms worsen or fail to improve.    Lisa Crisostomo PA-C  [facility]    Subjective   El is a 56 year old accompanied by her sister, presenting for the following health issues:  Refill Request      HPI   Here for follow up on fibromyalgia, chronic pain, and migraines. Longstanding history of migraines for which she continues on Maxalt prn. Is using quite frequently. Continues to have 3-4 migraines per week. Previously had been  on topiramate and prophylactic atenolol with minimal improvement. Does report she sees someone for acupuncture in [place]. She has had great success with this providing nearly a month of no migraines following treatment. Migraines then resume. She would like to be able to continue with this as treatment however the sessions are too expensive for her to attend more frequently. She also continues to struggle with near daily diffuse body pain associated with fibromyalgia. Continues on Lyrica 150 mg BID. Had previously been receiving scattered refills of Norco for pain management as well. No recent refills per  review. Reports she was seen in the clinic regarding these concerns in July where she was told she could no longer be on the Norco as it was not appropriate treatment. It was also recommended she transition to duloxetine from fluoxetine but patient was not interested at that time. Admits to use of THC gummies which have provided minimal improvement in pain. Upon further questioning patient reports she had been referred to a pain clinic in the [facility] but states when she went she was further referred to other providers and was not actually given treatment plan options.       PAST MEDICAL HISTORY:   Past Medical History:   Diagnosis Date     Other constipation     No Comments Provided     Pain in knee     No Comments Provided     Personal history of other medical treatment (CODE)     G7, P3-0-4-3 (3 vag. 4 spon miscarriages-one in the second trimester and one daughter was twin and lost the twin).     Personal history of other medical treatment (CODE)     in prior relationship and childhood     Radiculopathy     2011,Benign hypermobility per [facility]       PAST SURGICAL HISTORY:   Past Surgical History:   Procedure Laterality Date     COLONOSCOPY      4/2003,Normal, bleeding hemorrhoids     COLONOSCOPY N/A 11/30/2021    tubular adenomas- f/u 5 years     HYSTERECTOMY VAGINAL      5/2003,Dr Ramirez-Jean, chronic  pelvic pain/endometriosis     OTHER SURGICAL HISTORY      [id number],DILATION AND CURETTAGE,After 4 miscarriages     OTHER SURGICAL HISTORY      1986,524741,OTHER,Ovarian cyst     OTHER SURGICAL HISTORY      6/2002,456230,OTHER,Chronic pelvic pain/hemorrhagic cyst     OTHER SURGICAL HISTORY      2/2009,710795,OTHER,Dr Ramirez-recurrent ovarian cysts       FAMILY HISTORY:   Family History   Problem Relation Age of Onset     Breast Cancer Mother 68        Cancer-breast     Diabetes Mother         Diabetes     Heart Disease Father         Heart Disease,CHF, pacemaker,MI at 93     Hypertension Father         Hypertension     Aneurysm Sister         Brain      Fibromyalgia Sister      Arthritis Sister      Migraines Sister         Chronic     Arthritis Brother      Aneurysm Sister         Brain     Migraines Sister      Migraines Sister      Breast Cancer Sister         Cancer-breast     Tumor Sister         Brain     Breast Cancer Sister      Ovarian Cancer Sister      Arthritis Sister      Aneurysm Brother         Brain     Arthritis Brother      Arthritis Brother        SOCIAL HISTORY:   Social History     Tobacco Use     Smoking status: Every Day     Packs/day: 0.25     Years: 10.00     Pack years: 2.50     Types: Cigarettes     Smokeless tobacco: Never   Substance Use Topics     Alcohol use: Yes     Comment: occ        Allergies   Allergen Reactions     Nsaids      Other reaction(s): Ecchymosis     Current Outpatient Medications   Medication     albuterol (PROAIR HFA/PROVENTIL HFA/VENTOLIN HFA) 108 (90 Base) MCG/ACT inhaler     albuterol (PROAIR HFA/PROVENTIL HFA/VENTOLIN HFA) 108 (90 Base) MCG/ACT inhaler     atorvastatin (LIPITOR) 20 MG tablet     clobetasol (TEMOVATE) 0.05 % external solution     cyclobenzaprine (FLEXERIL) 10 MG tablet     esomeprazole (NEXIUM) 40 MG DR capsule     FLUoxetine (PROZAC) 10 MG capsule     FLUoxetine (PROZAC) 40 MG capsule     fluticasone (FLONASE) 50 MCG/ACT nasal spray     fluticasone  "(FLOVENT HFA) 110 MCG/ACT inhaler     HYDROcodone-acetaminophen (NORCO) 5-325 MG tablet     pregabalin (LYRICA) 150 MG capsule     rizatriptan (MAXALT) 10 MG tablet     No current facility-administered medications for this visit.         Review of Systems   Per HPI        Objective    /78   Pulse 71   Temp 98.4  F (36.9  C)   Resp 12   Ht 1.562 m (5' 1.5\")   Wt 64.4 kg (142 lb)   LMP  (LMP Unknown)   SpO2 97%   BMI 26.40 kg/m    Body mass index is 26.4 kg/m .  Physical Exam   General: Pleasant, in no apparent distress.  Psych: Appropriate mood and affect.        "

## 2022-10-25 ENCOUNTER — OFFICE VISIT (OUTPATIENT)
Dept: FAMILY MEDICINE | Facility: OTHER | Age: 57
End: 2022-10-25
Attending: PHYSICIAN ASSISTANT
Payer: COMMERCIAL

## 2022-10-25 VITALS
OXYGEN SATURATION: 97 % | BODY MASS INDEX: 26.13 KG/M2 | RESPIRATION RATE: 12 BRPM | HEIGHT: 62 IN | HEART RATE: 71 BPM | WEIGHT: 142 LBS | DIASTOLIC BLOOD PRESSURE: 78 MMHG | SYSTOLIC BLOOD PRESSURE: 132 MMHG | TEMPERATURE: 98.4 F

## 2022-10-25 DIAGNOSIS — G43.009 MIGRAINE WITHOUT AURA AND WITHOUT STATUS MIGRAINOSUS, NOT INTRACTABLE: ICD-10-CM

## 2022-10-25 DIAGNOSIS — Z12.31 ENCOUNTER FOR SCREENING MAMMOGRAM FOR BREAST CANCER: ICD-10-CM

## 2022-10-25 DIAGNOSIS — G89.4 CHRONIC PAIN DISORDER: ICD-10-CM

## 2022-10-25 DIAGNOSIS — M79.7 FIBROMYALGIA: Primary | ICD-10-CM

## 2022-10-25 LAB
AMPHETAMINES UR QL: NOT DETECTED
BARBITURATES UR QL SCN: NOT DETECTED
BENZODIAZ UR QL SCN: NOT DETECTED
BUPRENORPHINE UR QL: NOT DETECTED
CANNABINOIDS UR QL SCN: ABNORMAL
CANNABINOIDS UR QL: ABNORMAL
COCAINE UR QL SCN: NOT DETECTED
CREAT UR-MCNC: 58 MG/DL
D-METHAMPHET UR QL: NOT DETECTED
METHADONE UR QL SCN: NOT DETECTED
OPIATES UR QL SCN: NOT DETECTED
OXYCODONE UR QL SCN: NOT DETECTED
PCP UR QL SCN: NOT DETECTED
PROPOXYPH UR QL: NOT DETECTED
TRICYCLICS UR QL SCN: ABNORMAL

## 2022-10-25 PROCEDURE — 80307 DRUG TEST PRSMV CHEM ANLYZR: CPT | Mod: ZL | Performed by: PHYSICIAN ASSISTANT

## 2022-10-25 PROCEDURE — 80306 DRUG TEST PRSMV INSTRMNT: CPT | Mod: ZL | Performed by: PHYSICIAN ASSISTANT

## 2022-10-25 PROCEDURE — 99214 OFFICE O/P EST MOD 30 MIN: CPT | Performed by: PHYSICIAN ASSISTANT

## 2022-10-25 ASSESSMENT — ASTHMA QUESTIONNAIRES
QUESTION_5 LAST FOUR WEEKS HOW WOULD YOU RATE YOUR ASTHMA CONTROL: WELL CONTROLLED
ACT_TOTALSCORE: 21
QUESTION_2 LAST FOUR WEEKS HOW OFTEN HAVE YOU HAD SHORTNESS OF BREATH: ONCE OR TWICE A WEEK
QUESTION_1 LAST FOUR WEEKS HOW MUCH OF THE TIME DID YOUR ASTHMA KEEP YOU FROM GETTING AS MUCH DONE AT WORK, SCHOOL OR AT HOME: NONE OF THE TIME
ACT_TOTALSCORE: 21
QUESTION_3 LAST FOUR WEEKS HOW OFTEN DID YOUR ASTHMA SYMPTOMS (WHEEZING, COUGHING, SHORTNESS OF BREATH, CHEST TIGHTNESS OR PAIN) WAKE YOU UP AT NIGHT OR EARLIER THAN USUAL IN THE MORNING: ONCE OR TWICE
ACUTE_EXACERBATION_TODAY: NO
QUESTION_4 LAST FOUR WEEKS HOW OFTEN HAVE YOU USED YOUR RESCUE INHALER OR NEBULIZER MEDICATION (SUCH AS ALBUTEROL): ONCE A WEEK OR LESS

## 2022-10-25 ASSESSMENT — PAIN SCALES - GENERAL: PAINLEVEL: SEVERE PAIN (7)

## 2022-10-25 ASSESSMENT — ANXIETY QUESTIONNAIRES: 7. FEELING AFRAID AS IF SOMETHING AWFUL MIGHT HAPPEN: SEVERAL DAYS

## 2022-10-25 NOTE — NURSING NOTE
Patient presents to clinic requesting a refill of hydrocodone.  Mai Cullen LPN ....................  10/25/2022   10:26 AM

## 2022-10-27 ENCOUNTER — TELEPHONE (OUTPATIENT)
Dept: FAMILY MEDICINE | Facility: OTHER | Age: 57
End: 2022-10-27

## 2022-10-27 NOTE — TELEPHONE ENCOUNTER
Please notify patient that I did some further research and she will need to reach out to her insurance to determine if further options are covered for pain management. Options in our area include meeting with a chiropractor for consideration of acupunture or other possible treatments, meeting with a pain clinic in [facility] to set up a treatment plan, or even considering meeting with a PM&R provider. If her insurance will cover any of these I am happy to place the referral.     Lisa Crisostomo PA-C on 10/27/2022 at 12:43 PM

## 2022-10-27 NOTE — TELEPHONE ENCOUNTER
Notified patient of providers note.  Mia Cullen LPN ....................  10/27/2022   1:10 PM

## 2022-10-28 LAB
LORAZEPAM UR QL CFM: PRESENT
PREGABALIN UR QL CFM: PRESENT

## 2022-11-08 DIAGNOSIS — F33.41 RECURRENT MAJOR DEPRESSIVE DISORDER, IN PARTIAL REMISSION (H): ICD-10-CM

## 2022-11-09 NOTE — TELEPHONE ENCOUNTER
Routing refill request to provider for review/approval because:    LOV: 10/25/22    Michaela Townsend RN on 11/9/2022 at 3:37 PM

## 2022-11-10 RX ORDER — FLUOXETINE 40 MG/1
40 CAPSULE ORAL DAILY
Qty: 90 CAPSULE | Refills: 1 | Status: SHIPPED | OUTPATIENT
Start: 2022-11-10 | End: 2023-05-02

## 2022-11-30 DIAGNOSIS — F33.41 RECURRENT MAJOR DEPRESSIVE DISORDER, IN PARTIAL REMISSION (H): ICD-10-CM

## 2022-11-30 NOTE — TELEPHONE ENCOUNTER
[id number] of [place] sent Rx request for the following:    Patient enrolled in our Rx Med Sync service to improve adherence. We are requesting a refill authorization in advance to ensure an active prescription is on file.     Requested Prescriptions   Pending Prescriptions Disp Refills     FLUoxetine (PROZAC) 10 MG capsule [Pharmacy Med Name: FLUOXETINE 10MG CAPSULE] 90 capsule 0     Sig: TAKE 1 CAPSULE (10 MG) BY MOUTH DAILY       SSRIs Protocol Failed - 11/30/2022  3:04 PM        Failed - PHQ-9 score less than 5 in past 6 months     Please review last PHQ-9 score.        Last Prescription Date:   8/17/22  Last Fill Qty/Refills:         90, R-0    Last Office Visit:              10/25/22   Future Office visit:           None    Kezia Gale RN .............. 11/30/2022  3:12 PM

## 2022-12-01 RX ORDER — FLUOXETINE 10 MG/1
10 CAPSULE ORAL DAILY
Qty: 90 CAPSULE | Refills: 0 | Status: SHIPPED | OUTPATIENT
Start: 2022-12-01 | End: 2023-02-15

## 2022-12-29 DIAGNOSIS — M79.7 FIBROMYALGIA: ICD-10-CM

## 2022-12-29 DIAGNOSIS — K21.9 GASTROESOPHAGEAL REFLUX DISEASE WITHOUT ESOPHAGITIS: ICD-10-CM

## 2022-12-30 RX ORDER — PREGABALIN 150 MG/1
150 CAPSULE ORAL 2 TIMES DAILY
Qty: 180 CAPSULE | Refills: 1 | Status: SHIPPED | OUTPATIENT
Start: 2022-12-30 | End: 2023-07-06

## 2022-12-30 RX ORDER — ESOMEPRAZOLE MAGNESIUM 40 MG/1
CAPSULE, DELAYED RELEASE ORAL
Qty: 90 CAPSULE | Refills: 1 | Status: SHIPPED | OUTPATIENT
Start: 2022-12-30 | End: 2023-05-02

## 2022-12-30 NOTE — TELEPHONE ENCOUNTER
" [id number]  sent Rx request for the following:      Requested Prescriptions   Pending Prescriptions Disp Refills     pregabalin (LYRICA) 150 MG capsule [Pharmacy Med Name: PREGABALIN 150MG CAPSULE] 180 capsule 1     Sig: TAKE 1 CAPSULE (150 MG) BY MOUTH 2 TIMES DAILY       There is no refill protocol information for this order     Last Prescription Date:   07/07/22  Last Fill Qty/Refills:         180, R-1       esomeprazole (NEXIUM) 40 MG DR capsule [Pharmacy Med Name: ESOMEPRAZOLE 40MG DR CAPSULE] 90 capsule 1     Sig: TAKE 1 CAPSULE BY MOUTH EVERY DAY - 30-60 MINUTES BEFORE A MEAL       PPI Protocol Passed - 12/29/2022  8:42 PM        Passed - Not on Clopidogrel (unless Pantoprazole ordered)        Passed - No diagnosis of osteoporosis on record        Passed - Recent (12 mo) or future (30 days) visit within the authorizing provider's specialty     Patient has had an office visit with the authorizing provider or a provider within the authorizing providers department within the previous 12 mos or has a future within next 30 days. See \"Patient Info\" tab in inbasket, or \"Choose Columns\" in Meds & Orders section of the refill encounter.          Passed - Medication is active on med list        Passed - Patient is age 18 or older        Passed - No active pregnacy on record        Passed - No positive pregnancy test in past 12 months     Last Prescription Date:   07/07/22  Last Fill Qty/Refills:         90, R-1  Last Office Visit:              10/25/22   Future Office visit:           None    Re Johnston RN on 12/30/2022 at 10:34 AM      "

## 2023-02-01 DIAGNOSIS — E78.5 HYPERLIPIDEMIA, UNSPECIFIED HYPERLIPIDEMIA TYPE: ICD-10-CM

## 2023-02-03 RX ORDER — ATORVASTATIN CALCIUM 20 MG/1
TABLET, FILM COATED ORAL
Qty: 90 TABLET | Refills: 3 | Status: SHIPPED | OUTPATIENT
Start: 2023-02-03 | End: 2023-11-17

## 2023-02-14 DIAGNOSIS — F33.41 RECURRENT MAJOR DEPRESSIVE DISORDER, IN PARTIAL REMISSION (H): ICD-10-CM

## 2023-02-15 RX ORDER — FLUOXETINE 10 MG/1
10 CAPSULE ORAL DAILY
Qty: 90 CAPSULE | Refills: 3 | Status: SHIPPED | OUTPATIENT
Start: 2023-02-15 | End: 2024-01-15

## 2023-02-20 ENCOUNTER — VIRTUAL VISIT (OUTPATIENT)
Dept: FAMILY MEDICINE | Facility: OTHER | Age: 58
End: 2023-02-20
Attending: PHYSICIAN ASSISTANT
Payer: COMMERCIAL

## 2023-02-20 DIAGNOSIS — U07.1 INFECTION DUE TO 2019 NOVEL CORONAVIRUS: Primary | ICD-10-CM

## 2023-02-20 PROCEDURE — 99212 OFFICE O/P EST SF 10 MIN: CPT | Mod: 95 | Performed by: PHYSICIAN ASSISTANT

## 2023-04-03 ENCOUNTER — HOSPITAL ENCOUNTER (EMERGENCY)
Facility: OTHER | Age: 58
Discharge: HOME | End: 2023-04-03
Attending: INTERNAL MEDICINE | Admitting: INTERNAL MEDICINE
Payer: COMMERCIAL

## 2023-04-03 ENCOUNTER — APPOINTMENT (OUTPATIENT)
Dept: GENERAL RADIOLOGY | Facility: OTHER | Age: 58
End: 2023-04-03
Attending: INTERNAL MEDICINE
Payer: COMMERCIAL

## 2023-04-03 VITALS
HEART RATE: 72 BPM | SYSTOLIC BLOOD PRESSURE: 149 MMHG | RESPIRATION RATE: 22 BRPM | OXYGEN SATURATION: 99 % | DIASTOLIC BLOOD PRESSURE: 90 MMHG

## 2023-04-03 DIAGNOSIS — Z72.0 TOBACCO USE: ICD-10-CM

## 2023-04-03 DIAGNOSIS — J18.9 COMMUNITY ACQUIRED PNEUMONIA OF LEFT LOWER LOBE OF LUNG: Primary | ICD-10-CM

## 2023-04-03 LAB
ALBUMIN SERPL BCG-MCNC: 4.2 G/DL (ref 3.5–5.2)
ALP SERPL-CCNC: 95 U/L (ref 35–104)
ALT SERPL W P-5'-P-CCNC: 34 U/L (ref 10–35)
ANION GAP SERPL CALCULATED.3IONS-SCNC: 11 MMOL/L (ref 7–15)
AST SERPL W P-5'-P-CCNC: 33 U/L (ref 10–35)
BASE EXCESS BLDV CALC-SCNC: 2.1 MMOL/L (ref -7.7–1.9)
BASOPHILS # BLD AUTO: 0 10E3/UL (ref 0–0.2)
BASOPHILS NFR BLD AUTO: 0 %
BILIRUB SERPL-MCNC: 0.3 MG/DL
BUN SERPL-MCNC: 9.9 MG/DL (ref 6–20)
CALCIUM SERPL-MCNC: 9.3 MG/DL (ref 8.6–10)
CHLORIDE SERPL-SCNC: 104 MMOL/L (ref 98–107)
CREAT SERPL-MCNC: 0.67 MG/DL (ref 0.51–0.95)
CRP SERPL-MCNC: 12.95 MG/L
DEPRECATED HCO3 PLAS-SCNC: 24 MMOL/L (ref 22–29)
EOSINOPHIL # BLD AUTO: 0.5 10E3/UL (ref 0–0.7)
EOSINOPHIL NFR BLD AUTO: 8 %
ERYTHROCYTE [DISTWIDTH] IN BLOOD BY AUTOMATED COUNT: 13 % (ref 10–15)
GFR SERPL CREATININE-BSD FRML MDRD: >90 ML/MIN/1.73M2
GLUCOSE SERPL-MCNC: 83 MG/DL (ref 70–99)
HCO3 BLDV-SCNC: 26 MMOL/L (ref 21–28)
HCT VFR BLD AUTO: 40.6 % (ref 35–47)
HGB BLD-MCNC: 14.1 G/DL (ref 11.7–15.7)
HOLD SPECIMEN: NORMAL
HOLD SPECIMEN: NORMAL
IMM GRANULOCYTES # BLD: 0 10E3/UL
IMM GRANULOCYTES NFR BLD: 0 %
LYMPHOCYTES # BLD AUTO: 2.2 10E3/UL (ref 0.8–5.3)
LYMPHOCYTES NFR BLD AUTO: 39 %
MCH RBC QN AUTO: 30.7 PG (ref 26.5–33)
MCHC RBC AUTO-ENTMCNC: 34.7 G/DL (ref 31.5–36.5)
MCV RBC AUTO: 88 FL (ref 78–100)
MONOCYTES # BLD AUTO: 0.5 10E3/UL (ref 0–1.3)
MONOCYTES NFR BLD AUTO: 9 %
NEUTROPHILS # BLD AUTO: 2.4 10E3/UL (ref 1.6–8.3)
NEUTROPHILS NFR BLD AUTO: 44 %
NRBC # BLD AUTO: 0 10E3/UL
NRBC BLD AUTO-RTO: 0 /100
O2/TOTAL GAS SETTING VFR VENT: 21 %
OXYHGB MFR BLDV: 95 % (ref 70–75)
PCO2 BLDV: 36 MM HG (ref 40–50)
PH BLDV: 7.46 [PH] (ref 7.32–7.43)
PLATELET # BLD AUTO: 213 10E3/UL (ref 150–450)
PO2 BLDV: 136 MM HG (ref 25–47)
POTASSIUM SERPL-SCNC: 4.4 MMOL/L (ref 3.4–5.3)
PROCALCITONIN SERPL IA-MCNC: 0.05 NG/ML
PROT SERPL-MCNC: 6.7 G/DL (ref 6.4–8.3)
RBC # BLD AUTO: 4.6 10E6/UL (ref 3.8–5.2)
SODIUM SERPL-SCNC: 139 MMOL/L (ref 136–145)
WBC # BLD AUTO: 5.6 10E3/UL (ref 4–11)

## 2023-04-03 PROCEDURE — 999N000157 HC STATISTIC RCP TIME EA 10 MIN

## 2023-04-03 PROCEDURE — 96361 HYDRATE IV INFUSION ADD-ON: CPT | Performed by: INTERNAL MEDICINE

## 2023-04-03 PROCEDURE — 250N000013 HC RX MED GY IP 250 OP 250 PS 637: Performed by: INTERNAL MEDICINE

## 2023-04-03 PROCEDURE — 258N000003 HC RX IP 258 OP 636: Performed by: INTERNAL MEDICINE

## 2023-04-03 PROCEDURE — 250N000009 HC RX 250: Performed by: INTERNAL MEDICINE

## 2023-04-03 PROCEDURE — 86140 C-REACTIVE PROTEIN: CPT | Performed by: INTERNAL MEDICINE

## 2023-04-03 PROCEDURE — 84145 PROCALCITONIN (PCT): CPT | Performed by: INTERNAL MEDICINE

## 2023-04-03 PROCEDURE — 94640 AIRWAY INHALATION TREATMENT: CPT

## 2023-04-03 PROCEDURE — 99284 EMERGENCY DEPT VISIT MOD MDM: CPT | Performed by: INTERNAL MEDICINE

## 2023-04-03 PROCEDURE — 36415 COLL VENOUS BLD VENIPUNCTURE: CPT | Performed by: INTERNAL MEDICINE

## 2023-04-03 PROCEDURE — 99284 EMERGENCY DEPT VISIT MOD MDM: CPT | Mod: 25 | Performed by: INTERNAL MEDICINE

## 2023-04-03 PROCEDURE — 80053 COMPREHEN METABOLIC PANEL: CPT | Performed by: INTERNAL MEDICINE

## 2023-04-03 PROCEDURE — 85025 COMPLETE CBC W/AUTO DIFF WBC: CPT | Performed by: INTERNAL MEDICINE

## 2023-04-03 PROCEDURE — 250N000011 HC RX IP 250 OP 636: Performed by: INTERNAL MEDICINE

## 2023-04-03 PROCEDURE — 96374 THER/PROPH/DIAG INJ IV PUSH: CPT | Performed by: INTERNAL MEDICINE

## 2023-04-03 PROCEDURE — 82805 BLOOD GASES W/O2 SATURATION: CPT | Performed by: INTERNAL MEDICINE

## 2023-04-03 PROCEDURE — 71046 X-RAY EXAM CHEST 2 VIEWS: CPT

## 2023-04-03 RX ORDER — IPRATROPIUM BROMIDE AND ALBUTEROL SULFATE 2.5; .5 MG/3ML; MG/3ML
3 SOLUTION RESPIRATORY (INHALATION) ONCE
Status: COMPLETED | OUTPATIENT
Start: 2023-04-03 | End: 2023-04-03

## 2023-04-03 RX ORDER — AZITHROMYCIN 250 MG/1
500 TABLET, FILM COATED ORAL ONCE
Status: COMPLETED | OUTPATIENT
Start: 2023-04-03 | End: 2023-04-03

## 2023-04-03 RX ORDER — IPRATROPIUM BROMIDE AND ALBUTEROL SULFATE 2.5; .5 MG/3ML; MG/3ML
.5-1 SOLUTION RESPIRATORY (INHALATION)
Qty: 150 ML | Refills: 1 | Status: SHIPPED | OUTPATIENT
Start: 2023-04-03

## 2023-04-03 RX ORDER — AZITHROMYCIN 250 MG/1
250 TABLET, FILM COATED ORAL DAILY
Qty: 4 TABLET | Refills: 0 | Status: SHIPPED | OUTPATIENT
Start: 2023-04-04 | End: 2023-04-08

## 2023-04-03 RX ORDER — PREDNISONE 10 MG/1
TABLET ORAL
Qty: 30 TABLET | Refills: 0 | Status: SHIPPED | OUTPATIENT
Start: 2023-04-03 | End: 2023-07-06

## 2023-04-03 RX ORDER — CEFDINIR 300 MG/1
300 CAPSULE ORAL 2 TIMES DAILY
Qty: 14 CAPSULE | Refills: 0 | Status: SHIPPED | OUTPATIENT
Start: 2023-04-03 | End: 2023-04-10

## 2023-04-03 RX ORDER — METHYLPREDNISOLONE SODIUM SUCCINATE 40 MG/ML
40 INJECTION, POWDER, LYOPHILIZED, FOR SOLUTION INTRAMUSCULAR; INTRAVENOUS ONCE
Status: COMPLETED | OUTPATIENT
Start: 2023-04-03 | End: 2023-04-03

## 2023-04-03 RX ADMIN — SODIUM CHLORIDE 1000 ML: 9 INJECTION, SOLUTION INTRAVENOUS at 12:28

## 2023-04-03 RX ADMIN — METHYLPREDNISOLONE SODIUM SUCCINATE 40 MG: 40 INJECTION, POWDER, FOR SOLUTION INTRAMUSCULAR; INTRAVENOUS at 12:27

## 2023-04-03 RX ADMIN — AZITHROMYCIN MONOHYDRATE 500 MG: 250 TABLET ORAL at 12:29

## 2023-04-03 RX ADMIN — IPRATROPIUM BROMIDE AND ALBUTEROL SULFATE 3 ML: 2.5; .5 SOLUTION RESPIRATORY (INHALATION) at 13:30

## 2023-04-03 RX ADMIN — IPRATROPIUM BROMIDE AND ALBUTEROL SULFATE 3 ML: .5; 2.5 SOLUTION RESPIRATORY (INHALATION) at 13:24

## 2023-04-03 ASSESSMENT — ACTIVITIES OF DAILY LIVING (ADL): ADLS_ACUITY_SCORE: 35

## 2023-04-03 NOTE — ED PROVIDER NOTES
Emergency Department Provider Note  : 1965 Age: 57 year old Sex: female MRN: [id number]    Chief Complaint   Patient presents with     Shortness of Breath       Medical Decision Making / Assessment / Plan   57 year old female presenting with left lower lobe pneumonia    ED Course as of 23 1359   Mon 2023   1202 Patient evaluated.  Current cigarette smoker.  Progressive shortness of breath with wheezing and some more labored breathing the last 1.5 weeks.  Albuterol nebulizer at home helps for 30 to 45 minutes.  Friend is with her indicates that she has been having some confusion acutely the last couple days.  Initiate treatment for pneumonia versus COPD exacerbation.  Check chest x-ray, lab work.  IV Solu-Medrol 40 mg.  1 L IV fluid bolus.  Start azithromycin 500 mg oral, DuoNeb ordered.   1228 CBC is normal.   1301 Procalcitonin is minimally elevated 0.05   1302 VBG= pH 7.46, CO2 36, O2 136, bicarb 26   1303 Chest x-ray shows small density in left lung base, concerning for early pneumonia.   1351 CRP returned slightly elevated at 12.95.   1351 - Respiratory therapy gave her 1 DuoNeb and followed with the second DuoNeb due to persistent wheezing.   1355 Patient is feeling better after above interventions and would like to discharge home.  Plan will be prescription for Augmentin, Z-Rodney, DuoNeb solution and prednisone taper.  Outpatient follow-up as needed for new or worsening symptoms.  -Recommend smoking cessation.        A shared decision making model was used. Plan and all results were discussed  Time was taken to answer all questions. Patient and/or associated parties understood and were agreeable to treatment plan.  Strict return to Emergency Department precautions as well as appropriate follow up instructions were provided. The patient was discharged in stable condition.    New Prescriptions    AMOXICILLIN-CLAVULANATE (AUGMENTIN) 875-125 MG TABLET    Take 1 tablet by mouth 2 times daily  for 7 days    AZITHROMYCIN (ZITHROMAX) 250 MG TABLET    Take 1 tablet (250 mg) by mouth daily for 4 days -- Start Tuesday 4/4    IPRATROPIUM - ALBUTEROL 0.5 MG/2.5 MG/3 ML (DUONEB) 0.5-2.5 (3) MG/3ML NEB SOLUTION    Take 0.5-1 vials (1.5-3 mLs) by nebulization every 2 hours as needed for shortness of breath, wheezing or cough    PREDNISONE (DELTASONE) 10 MG TABLET    4 tab daily with food x3 day, then 3 tab daily x3 days, then 2 tab daily x3 days, then 1 tab daily       Final diagnoses:   Tobacco use   Community acquired pneumonia of left lower lobe of lung       Ventura Millard MD  4/3/2023   Emergency Department    Subjective   El is a 57 year old female who presents at 11:41 AM with 1.5 weeks of progressive shortness of breath, wheezing.  Has only smoked about 6 cigarettes in the last week.  Has been having progressive shortness of breath.  Some labored breathing.  Comes in with a friend who indicates that she has had some acute confusion in the last couple days.    Has access to a nebulizer, albuterol, at home and this is helping her breathing for 30-45 minutes.    Reports having history of pneumonia 10 or 12 years ago.  She is talking in partially broken sentences, has audible wheezing during interview.    Denies substernal chest pain/heaviness, no abdominal pain.  No rash.    I have reviewed the Medications, Allergies, Past Medical and Surgical History, and Social History in the [facility] and with family.    Review of Systems:  Please see Subjective / HPI for pertinent positives and negatives. All other systems reviewed and found to be negative.      Objective     Patient Vitals for the past 24 hrs:   SpO2   04/03/23 1324 96 %       Physical Exam:     General: Awake, alert, in Mild respiratory distress -talking in partial sentences, audible wheezing  Head: Normocephalic, atraumatic.  Eyes: Conjugate gaze.  ENT: Moist membranes, external ear appears normal.   Chest/Respiratory: Equal chest rise, bilateral  wheezing bronchial breath sounds, no focal or egophony.  Cardiovascular: Peripheral pulses present, regular rate and rhythm.  Abdominal: Soft, non-distended, non-tender.  Extremities: No obvious deformity.  Neurological: GCS 15, moving all extremities without gross deficit.  Skin: Warm, no rashes, lesions, or bruising.   Psychiatric: Appropriate affect.     Procedures / Critical Care   Procedures    Aggregate Critical Care Time: None.     Orders Placed This Encounter   Procedures     XR Chest 2 Views     Comprehensive metabolic panel     CRP inflammation     Blood gas venous and oxyhgb     Procalcitonin     CBC with platelets and differential     Extra Tube     Extra Blue Top Tube     Extra Red Top Tube     Peripheral IV catheter     CBC with platelets differential       RESULTS: As noted above.            Medical/Surgical History:  Past Medical History:   Diagnosis Date     Pain in knee     No Comments Provided     Personal history of other medical treatment (CODE)     G7, P3-0-4-3 (3 vag. 4 spon miscarriages-one in the second trimester and one daughter was twin and lost the twin).     Radiculopathy     2011,Benign hypermobility per [facility]     Past Surgical History:   Procedure Laterality Date     COLONOSCOPY      4/2003,Normal, bleeding hemorrhoids     COLONOSCOPY N/A 11/30/2021    tubular adenomas- f/u 5 years     HYSTERECTOMY VAGINAL      5/2003,Dr Ramirez-[place], chronic pelvic pain/endometriosis     OTHER SURGICAL HISTORY      205138,DILATION AND CURETTAGE,After 4 miscarriages     OTHER SURGICAL HISTORY      1986,224732,OTHER,Ovarian cyst     OTHER SURGICAL HISTORY      6/2002,[id number],OTHER,Chronic pelvic pain/hemorrhagic cyst     OTHER SURGICAL HISTORY      2/2009,[id number],OTHER,Dr Ramirez-recurrent ovarian cysts       Medications:  No current facility-administered medications for this encounter.     Current Outpatient Medications   Medication     amoxicillin-clavulanate (AUGMENTIN) 875-125 MG tablet     [START ON  4/4/2023] azithromycin (ZITHROMAX) 250 MG tablet     ipratropium - albuterol 0.5 mg/2.5 mg/3 mL (DUONEB) 0.5-2.5 (3) MG/3ML neb solution     predniSONE (DELTASONE) 10 MG tablet     albuterol (PROAIR HFA/PROVENTIL HFA/VENTOLIN HFA) 108 (90 Base) MCG/ACT inhaler     albuterol (PROAIR HFA/PROVENTIL HFA/VENTOLIN HFA) 108 (90 Base) MCG/ACT inhaler     atorvastatin (LIPITOR) 20 MG tablet     clobetasol (TEMOVATE) 0.05 % external solution     cyclobenzaprine (FLEXERIL) 10 MG tablet     esomeprazole (NEXIUM) 40 MG DR capsule     FLUoxetine (PROZAC) 10 MG capsule     FLUoxetine (PROZAC) 40 MG capsule     fluticasone (FLONASE) 50 MCG/ACT nasal spray     fluticasone (FLOVENT HFA) 110 MCG/ACT inhaler     HYDROcodone-acetaminophen (NORCO) 5-325 MG tablet     pregabalin (LYRICA) 150 MG capsule     rizatriptan (MAXALT) 10 MG tablet       Allergies:  Amoxicillin and Nsaids    Relevant labs, images, EKGs, Epic and outside hospital (if applicable) charts were reviewed. The findings, diagnosis, plan, and need for follow up were discussed with the patient/family. Nursing notes were reviewed.      Ventura Millard MD  04/03/23 1352

## 2023-04-03 NOTE — ED TRIAGE NOTES
Patient reporting SOB for about 1.5 weeks and she states that it just has progressively worsened. She feels like she has mucus that she can't get out. Vss. audible wheezing while ambulating.      Triage Assessment     Row Name 04/03/23 1144       Triage Assessment (Adult)    Airway WDL X    Additional Documentation Breath Sounds (Group)       Respiratory WDL    Respiratory WDL X       Breath Sounds    Breath Sounds All Fields    All Lung Fields Breath Sounds Posterior:;wheezes, inspiratory;wheezes, expiratory       Cardiac WDL    Cardiac WDL X;chest pain       Chest Pain Assessment    Character pressure       Peripheral/Neurovascular WDL    Peripheral Neurovascular WDL WDL       Cognitive/Neuro/Behavioral WDL    Cognitive/Neuro/Behavioral WDL WDL

## 2023-04-03 NOTE — DISCHARGE INSTRUCTIONS
Tobacco use  QUIT SMOKING!!    Community acquired pneumonia of left lower lobe of lung    START:   - amoxicillin-clavulanate (AUGMENTIN) 875-125 MG tablet; Take 1 tablet by mouth 2 times daily for 7 days    Start Tuesday 4/:  - azithromycin (ZITHROMAX) 250 MG tablet; Take 1 tablet (250 mg) by mouth daily for 4 days     START:   - predniSONE (DELTASONE) 10 MG tablet; 4 tab daily with food x3 day, then 3 tab daily x3 days, then 2 tab daily x3 days, then 1 tab daily  - ipratropium - albuterol 0.5 mg/2.5 mg/3 mL (DUONEB) 0.5-2.5 (3) MG/3ML neb solution; Take 0.5-1 vials (1.5-3 mLs) by nebulization every 2 hours as needed for shortness of breath, wheezing or cough      Return as needed for follow-up for new / worsening symptoms.    Clinic : [phone]  Appointment line: [phone]

## 2023-04-10 ENCOUNTER — TELEPHONE (OUTPATIENT)
Dept: FAMILY MEDICINE | Facility: OTHER | Age: 58
End: 2023-04-10
Payer: COMMERCIAL

## 2023-04-10 DIAGNOSIS — G43.009 MIGRAINE WITHOUT AURA AND WITHOUT STATUS MIGRAINOSUS, NOT INTRACTABLE: ICD-10-CM

## 2023-04-10 RX ORDER — RIZATRIPTAN BENZOATE 10 MG/1
10 TABLET ORAL
Qty: 10 TABLET | Refills: 1 | Status: SHIPPED | OUTPATIENT
Start: 2023-04-10 | End: 2023-07-06

## 2023-04-10 NOTE — TELEPHONE ENCOUNTER
She is already on the maximum dose of the rizatriptan. I sent in a refill.   Lisa Crisostomo PA-C on 4/10/2023 at 1:58 PM

## 2023-04-10 NOTE — TELEPHONE ENCOUNTER
Routing refill request to provider for review/approval because:    LOV; 2/20/23 VV  Patient needs refill on Rizatriptan   Patient wondering if she could get a increase in dose on Rizatriptan.    [facility] is not able to get to [facility] for acupuncture.  [facility] her migraines seem to be getting worse and happening more often.    [facility] last week she was in ER and had pneumonia.    States son has cancer that they are dealing with that right now.  [facility] she would not be able to come in for appointment because she brings son to radiation M-F but could possibly do a phone visit if needed.    Michaela Townsend, RN on 4/10/2023 at 1:28 PM

## 2023-04-10 NOTE — TELEPHONE ENCOUNTER
Reason for call: Medication or medication refill    Name of medication requested: rizatriptan    Are you out of the medication? 1 left     What pharmacy do you use? [facility] (main)    Preferred method for responding to this message: Telephone Call    Phone number patient can be reached at: Cell number on file:    Telephone Information:   Mobile [phone]       If we cannot reach you directly, may we leave a detailed response at the number you provided? Yes     Patient is currently on 10 mg and would like a larger dose if possible for her migraines.     Kassandra Jones on 4/10/2023 at 12:50 PM

## 2023-05-01 DIAGNOSIS — K21.9 GASTROESOPHAGEAL REFLUX DISEASE WITHOUT ESOPHAGITIS: ICD-10-CM

## 2023-05-01 DIAGNOSIS — F33.41 RECURRENT MAJOR DEPRESSIVE DISORDER, IN PARTIAL REMISSION (H): ICD-10-CM

## 2023-05-02 RX ORDER — ESOMEPRAZOLE MAGNESIUM 40 MG/1
CAPSULE, DELAYED RELEASE ORAL
Qty: 90 CAPSULE | Refills: 1 | Status: SHIPPED | OUTPATIENT
Start: 2023-05-02 | End: 2023-10-24

## 2023-05-02 RX ORDER — FLUOXETINE 40 MG/1
CAPSULE ORAL
Qty: 90 CAPSULE | Refills: 1 | Status: SHIPPED | OUTPATIENT
Start: 2023-05-02 | End: 2023-10-24

## 2023-06-30 DIAGNOSIS — M79.7 FIBROMYALGIA: ICD-10-CM

## 2023-07-05 NOTE — TELEPHONE ENCOUNTER
Disp Refills Start End MARIN   pregabalin (LYRICA) 150 MG capsule 180 capsule 1 12/30/2022  No   Sig - Route: TAKE 1 CAPSULE (150 MG) BY MOUTH 2 TIMES DAILY - Oral   Sent to pharmacy as: Pregabalin 150 MG Oral Capsule (LYRICA)   Class: E-Prescribe       Last Office Visit: 10/25/2022  Future Office visit:    Next 5 appointments (look out 90 days)    Jul 06, 2023  3:20 PM  SHORT with Charlee Cordero PA-C  [facility] [address]  [place] [address]  [phone]           Routing refill request to provider for review/approval.     Unable to complete prescription refill per RNMedication Refill Policy.................... Jordana Olivia RN ....................  7/5/2023   3:12 PM

## 2023-07-06 ENCOUNTER — OFFICE VISIT (OUTPATIENT)
Dept: FAMILY MEDICINE | Facility: OTHER | Age: 58
End: 2023-07-06
Attending: PHYSICIAN ASSISTANT
Payer: COMMERCIAL

## 2023-07-06 VITALS
SYSTOLIC BLOOD PRESSURE: 120 MMHG | HEART RATE: 71 BPM | TEMPERATURE: 97.9 F | OXYGEN SATURATION: 99 % | DIASTOLIC BLOOD PRESSURE: 82 MMHG | HEIGHT: 62 IN | BODY MASS INDEX: 25.58 KG/M2 | WEIGHT: 139 LBS | RESPIRATION RATE: 16 BRPM

## 2023-07-06 DIAGNOSIS — G89.29 OTHER CHRONIC PAIN: ICD-10-CM

## 2023-07-06 DIAGNOSIS — J45.901 REACTIVE AIRWAY DISEASE WITH WHEEZING WITH ACUTE EXACERBATION, UNSPECIFIED ASTHMA SEVERITY, UNSPECIFIED WHETHER PERSISTENT: ICD-10-CM

## 2023-07-06 DIAGNOSIS — M79.7 FIBROMYALGIA: ICD-10-CM

## 2023-07-06 DIAGNOSIS — G43.009 MIGRAINE WITHOUT AURA AND WITHOUT STATUS MIGRAINOSUS, NOT INTRACTABLE: ICD-10-CM

## 2023-07-06 DIAGNOSIS — G89.4 CHRONIC PAIN DISORDER: Primary | ICD-10-CM

## 2023-07-06 PROCEDURE — 99214 OFFICE O/P EST MOD 30 MIN: CPT | Performed by: PHYSICIAN ASSISTANT

## 2023-07-06 RX ORDER — CYCLOBENZAPRINE HCL 10 MG
10 TABLET ORAL 3 TIMES DAILY PRN
Qty: 90 TABLET | Refills: 1 | Status: SHIPPED | OUTPATIENT
Start: 2023-07-06 | End: 2023-12-13

## 2023-07-06 RX ORDER — PREGABALIN 150 MG/1
150 CAPSULE ORAL 2 TIMES DAILY
Qty: 180 CAPSULE | Refills: 1 | Status: SHIPPED | OUTPATIENT
Start: 2023-07-06 | End: 2023-12-21

## 2023-07-06 RX ORDER — PREGABALIN 150 MG/1
150 CAPSULE ORAL 2 TIMES DAILY
Qty: 180 CAPSULE | Refills: 1 | OUTPATIENT
Start: 2023-07-06

## 2023-07-06 RX ORDER — HYDROCODONE BITARTRATE AND ACETAMINOPHEN 5; 325 MG/1; MG/1
1 TABLET ORAL EVERY 8 HOURS PRN
Qty: 10 TABLET | Refills: 0 | Status: SHIPPED | OUTPATIENT
Start: 2023-07-06

## 2023-07-06 RX ORDER — RIZATRIPTAN BENZOATE 10 MG/1
10 TABLET ORAL
Qty: 10 TABLET | Refills: 11 | Status: SHIPPED | OUTPATIENT
Start: 2023-07-06 | End: 2023-12-13

## 2023-07-06 RX ORDER — ALBUTEROL SULFATE 90 UG/1
2 AEROSOL, METERED RESPIRATORY (INHALATION) EVERY 6 HOURS
Qty: 18 G | Refills: 1 | Status: SHIPPED | OUTPATIENT
Start: 2023-07-06 | End: 2024-02-12

## 2023-07-06 ASSESSMENT — PAIN SCALES - GENERAL: PAINLEVEL: MODERATE PAIN (5)

## 2023-07-06 ASSESSMENT — PATIENT HEALTH QUESTIONNAIRE - PHQ9
10. IF YOU CHECKED OFF ANY PROBLEMS, HOW DIFFICULT HAVE THESE PROBLEMS MADE IT FOR YOU TO DO YOUR WORK, TAKE CARE OF THINGS AT HOME, OR GET ALONG WITH OTHER PEOPLE: SOMEWHAT DIFFICULT
SUM OF ALL RESPONSES TO PHQ QUESTIONS 1-9: 14
SUM OF ALL RESPONSES TO PHQ QUESTIONS 1-9: 14

## 2023-07-06 NOTE — TELEPHONE ENCOUNTER
Declined refill. Has an appointment scheduled with Charlee Cordero for this afternoon to discuss a possible medication change regarding Lyrica.  Lisa Crisostomo PA-C on 7/6/2023 at 7:24 AM

## 2023-07-06 NOTE — PROGRESS NOTES
Assessment & Plan   Problem List Items Addressed This Visit        Nervous and Auditory    Chronic pain disorder - Primary    Relevant Medications    HYDROcodone-acetaminophen (NORCO) 5-325 MG tablet    Other Relevant Orders    Chiropractic Referral    Fibromyalgia    Relevant Medications    pregabalin (LYRICA) 150 MG capsule   Other Visit Diagnoses     Reactive airway disease with wheezing with acute exacerbation, unspecified asthma severity, unspecified whether persistent        Relevant Medications    albuterol (PROAIR HFA/PROVENTIL HFA/VENTOLIN HFA) 108 (90 Base) MCG/ACT inhaler    Migraine without aura and without status migrainosus, not intractable        Relevant Medications    pregabalin (LYRICA) 150 MG capsule    HYDROcodone-acetaminophen (NORCO) 5-325 MG tablet    rizatriptan (MAXALT) 10 MG tablet    cyclobenzaprine (FLEXERIL) 10 MG tablet    Other Relevant Orders    Chiropractic Referral    Other chronic pain        Relevant Medications    HYDROcodone-acetaminophen (NORCO) 5-325 MG tablet    cyclobenzaprine (FLEXERIL) 10 MG tablet         Discussed symptoms and concerns at length.  Discussed chronic pain disorder at length along with fibromyalgia.  Patient is due for close follow-up with her PCP.  Discussed chronic pain medication use and when it is appropriate to take.  We will complete a one-time refill of her hydrocodone/acetaminophen 5/325 mg quantity 10 with 0 refills to use sparingly as needed for severe pain.  Needs to see PCP prior to future refills.  Offered to complete a pain management referral however patient declined at this time.  Discussed migraines at length.  Refilled Lyrica to take 150 mg twice daily.  Patient was given Lyrica 150 mg capsules quantity 180 with 1 refill.  Gave side effect profile of the medications.  Discussed that narcotics are not appropriate for long-term chronic pain management.  Encouraged to use Tylenol as needed.  Offered physical therapy or chiropractor referral  "if needed.  Encouraged using acupuncture.  Referred to chiropractor for consult to discuss acupuncture.  Patient has chronically gotten acupuncture in the past for her migraines and pain management.  Refilled Flexeril to use as needed for symptomatic relief.    Migraines: Patient was given a refill of her Maxalt.  Gave side effect profile.    Patient was given a one time refill of the narcotic medication and lyrica to use prior to having a medication management appointment with the primary care provider.   Patient was given the side effect profile and the safety concerns with using the controlled medication prescription.   Patient should not share the controlled medication with other people.   Due for UDS.    I did not have the patient sign a new drug contract today since I will not be managing the medication in the future. The patient was told that a new contract agreement needs to be signed with the primary care provider prior to further refills.    website was reviewed.     PDMP Review       Value Time User    State PDMP site checked  Yes 7/7/2023 12:03 PM Charlee Cordero PA-C            Prescription drug management  30 minutes spent by me on the date of the encounter doing chart review, history and exam, documentation and further activities per the note       BMI:   Estimated body mass index is 25.84 kg/m  as calculated from the following:    Height as of this encounter: 1.562 m (5' 1.5\").    Weight as of this encounter: 63 kg (139 lb).       See Patient Instructions    No follow-ups on file.    Charlee Cordero PA-C  [facility]    Oneil Gallegos is a 57 year old, presenting for the following health issues:  Recheck Medication        7/6/2023     3:26 PM   Additional Questions   Roomed by Cherri Z   Accompanied by self     History of Present Illness       Reason for visit:  Med check    She eats 0-1 servings of fruits and vegetables daily.She consumes 0 sweetened beverage(s) daily.She " exercises with enough effort to increase her heart rate 9 or less minutes per day.  She exercises with enough effort to increase her heart rate 3 or less days per week.   She is taking medications regularly.    Today's PHQ-9         PHQ-9 Total Score: 14    PHQ-9 Q9 Thoughts of better off dead/self-harm past 2 weeks :   Not at all    How difficult have these problems made it for you to do your work, take care of things at home, or get along with other people: Somewhat difficult       Medication Followup of all meds    Taking Medication as prescribed: yes    Side Effects:  Rizatriptan and hydrocodone- can't take while driving    Medication Helping Symptoms:  yes    Patient is coming today for medication refills.  Has taken Lyrica in the past for chronic pain.  Has been out over the last week.  Since being on the medication she has felt nauseous, hot and cold, and having sweats.  The medication has worked well for chronic pain in the past.  Also takes Flexeril for her restless leg syndrome.  Has chronically used hydrocodone in the past sparingly as needed for her chronic pain.  States that she is unable to take NSAIDs due to side effect concerns.  Tolerates Tylenol however if the pain is severe Tylenol does not work effectively for pain relief.  Has rarely had to take hydrocodone in the past for pain relief.  Previous refill from her PCP.  Wondering about getting refill prior to being seen by her PCP.  No side effects with the medication.  Tolerates medication well.  No abuse concerns.    Patient has history of migraines.  Has been to an acupuncture specialist in the past.  Has done well with acupuncture for calming down migraines however with her last session she did not get relief with her migraines.  Interested in seeing an acupuncture specialist here in town for a consult.  Needs refill of the Maxalt.    No depression or anxiety concerns.    Review of Systems   Constitutional, HEENT, cardiovascular, pulmonary, gi  "and gu systems are negative, except as otherwise noted.      Objective    /82   Pulse 71   Temp 97.9  F (36.6  C) (Tympanic)   Resp 16   Ht 1.562 m (5' 1.5\")   Wt 63 kg (139 lb)   LMP  (LMP Unknown)   SpO2 99%   BMI 25.84 kg/m    Body mass index is 25.84 kg/m .  Physical Exam  Vitals and nursing note reviewed.   Constitutional:       Appearance: Normal appearance.   HENT:      Head: Normocephalic and atraumatic.   Eyes:      Extraocular Movements: Extraocular movements intact.      Conjunctiva/sclera: Conjunctivae normal.   Musculoskeletal:         General: No swelling, tenderness or signs of injury. Normal range of motion.      Cervical back: Normal range of motion.   Skin:     General: Skin is warm and dry.   Neurological:      General: No focal deficit present.      Mental Status: She is alert and oriented to person, place, and time.      Motor: No weakness.      Coordination: Coordination normal.      Gait: Gait normal.      Deep Tendon Reflexes: Reflexes normal.   Psychiatric:         Attention and Perception: Attention and perception normal.         Mood and Affect: Mood and affect normal.         Speech: Speech normal.         Behavior: Behavior normal. Behavior is cooperative.         Thought Content: Thought content normal. Thought content does not include homicidal or suicidal ideation.         Cognition and Memory: Cognition and memory normal.         Judgment: Judgment normal.              Answers for HPI/ROS submitted by the patient on 7/6/2023  If you checked off any problems, how difficult have these problems made it for you to do your work, take care of things at home, or get along with other people?: Somewhat difficult  PHQ9 TOTAL SCORE: 14      "

## 2023-07-06 NOTE — NURSING NOTE
Patient presents to the clinic for med re-check, wants something for nausea when she experiences migraines. Wants to make sure Alpilean is okay to take with her prescribed meds.    FOOD SECURITY SCREENING QUESTIONS:    The next two questions are to help us understand your food security.  If you are feeling you need any assistance in this area, we have resources available to support you today.    Hunger Vital Signs:  Within the past 12 months we worried whether our food would run out before we got money to buy more. Never  Within the past 12 months the food we bought just didn't last and we didn't have money to get more. Never    Advance Care Directive on file? No  Advance Care Directive provided to patient? Yes    Cherri Villegas LPN on 7/6/2023 at 3:42 PM

## 2023-07-18 ENCOUNTER — THERAPY VISIT (OUTPATIENT)
Dept: CHIROPRACTIC MEDICINE | Facility: OTHER | Age: 58
End: 2023-07-18
Attending: PHYSICIAN ASSISTANT
Payer: COMMERCIAL

## 2023-07-18 VITALS — OXYGEN SATURATION: 96 % | HEART RATE: 73 BPM | TEMPERATURE: 98.7 F | RESPIRATION RATE: 16 BRPM

## 2023-07-18 DIAGNOSIS — G89.4 CHRONIC PAIN DISORDER: ICD-10-CM

## 2023-07-18 DIAGNOSIS — G43.009 MIGRAINE WITHOUT AURA AND WITHOUT STATUS MIGRAINOSUS, NOT INTRACTABLE: ICD-10-CM

## 2023-07-18 DIAGNOSIS — M99.01 SEGMENTAL AND SOMATIC DYSFUNCTION OF CERVICAL REGION: Primary | ICD-10-CM

## 2023-07-18 PROCEDURE — 99213 OFFICE O/P EST LOW 20 MIN: CPT | Mod: 25 | Performed by: CHIROPRACTOR

## 2023-07-18 PROCEDURE — 97810 ACUP 1/> WO ESTIM 1ST 15 MIN: CPT | Performed by: CHIROPRACTOR

## 2023-07-18 NOTE — PROGRESS NOTES
Neck is having pain. Constantly having throbbing migraines.8/10 W24 10/10. Using medications, rest, massage gun, and TENS unit. These provide a decrease in pain. Bilateral lower back is occasionally sharp stabbing, 3/10 W24 5/10. Radiating down both legs to ankles. Using medications, rest, massage gun, and TENS unit. These provide a decrease in pain.  Raven Dominguez on 7/18/2023 at 10:24 AM    Reviewed by EW    PATIENT:  El Joseph is a 57 year old female presenting for headaches, neck pain and back pain    PROBLEM:   Date of Initial Visit for this Episode:  7/18/2023    Visit #1    SUBJECTIVE / HPI: Patient referred to our office by Charlee AVILA for evaluation and treatment of neck and back pain symptoms.  Patient has extensive record of trying to treat chronic migraines.  Informs me that she had head injury in the third grade.  Migraine symptoms began around this time and have been progressively worsening to the point where they are almost daily.  Reports occasionally experiencing intermittent relief but this is rare.    Review of chart shows extensive imaging studies as well as treatment with physical therapy.  Patient informs me that physical therapy provided little of any lasting benefits.    Has also tried working with chiropractors who performed services other than just chiropractic care including but not limited to nutritional services and applied kinesiology.  Found temporary improvement of symptoms with chiropractic treatments but nothing lasting.    Worked with acupuncturist in the [facility] with beneficial results.  Reports going in for 4 consecutive days of treatment.  After which patient found roughly 1 month of relief of symptoms.  States that due to distance as well as cost this is only performed 3-4 times annually.    Patient also reports history of dislocation of the TMJ in 1991.    Referred to our office as it is believed chiropractic care and acupuncture services may be beneficial for  ongoing concerns.    No red flags such as loss of bowel or bladder control or weakness of the upper/lower extremities at this time.    (DVPRS) Pain Rating Score : 3-Sometimes distracts me (W24 5/10) (07/18/23 1012)    See flowsheets in chart for details.  7/18/2023 7/18/2023    10:26 AM   Neck Disability Index (  Marky GRAY. and Elvia IBANEZ. 1991. All rights reserved.; used with permission)   SECTION 1 - PAIN INTENSITY 3   SECTION 2 - PERSONAL CARE 2   SECTION 3 - LIFTING 2   SECTION 4 - READING 4   SECTION 5 - HEADACHES 5   SECTION 6 - CONCENTRATION 3   SECTION 7 - WORK 1   SECTION 8 - DRIVING 3   SECTION 9 - SLEEPING 2   SECTION 10 - RECREATION 2   Count 10   Sum 27   Raw Score: /50 27   Neck Disability Index Score: (%) 54 %      Oswestry (TALA) Questionnaire        7/18/2023    10:31 AM   OSWESTRY DISABILITY INDEX   Count 9   Sum 13   Oswestry Score (%) 28.89 %        PAST MEDICAL HISTORY:  Past Medical History:   Diagnosis Date     Pain in knee     No Comments Provided     Personal history of other medical treatment (CODE)     G7, P3-0-4-3 (3 vag. 4 spon miscarriages-one in the second trimester and one daughter was twin and lost the twin).     Radiculopathy     2011,Benign hypermobility per [facility]       PAST SURGICAL HISTORY:  Past Surgical History:   Procedure Laterality Date     COLONOSCOPY      4/2003,Normal, bleeding hemorrhoids     COLONOSCOPY N/A 11/30/2021    tubular adenomas- f/u 5 years     HYSTERECTOMY VAGINAL      5/2003,Dr Ramirez[place], chronic pelvic pain/endometriosis     OTHER SURGICAL HISTORY      205138,DILATION AND CURETTAGE,After 4 miscarriages     OTHER SURGICAL HISTORY      1986,[id number],OTHER,Ovarian cyst     OTHER SURGICAL HISTORY      6/2002,020857,OTHER,Chronic pelvic pain/hemorrhagic cyst     OTHER SURGICAL HISTORY      2/2009,[id number],OTHER,Dr Ramirez-recurrent ovarian cysts       ALLERGIES:  Allergies   Allergen Reactions     Amoxicillin      Not effective.      Nsaids      Other reaction(s):  Ecchymosis       CURRENT MEDICATIONS:  Current Outpatient Medications   Medication Sig Dispense Refill     albuterol (PROAIR HFA/PROVENTIL HFA/VENTOLIN HFA) 108 (90 Base) MCG/ACT inhaler Inhale 2 puffs into the lungs every 6 hours 18 g 1     atorvastatin (LIPITOR) 20 MG tablet TAKE 1 TABLET BY MOUTH ONCE DAILY 90 tablet 3     clobetasol (TEMOVATE) 0.05 % external solution APPLY TO THE SCALP TWICE DAILY.       cyclobenzaprine (FLEXERIL) 10 MG tablet Take 1 tablet (10 mg) by mouth 3 times daily as needed for muscle spasms 90 tablet 1     esomeprazole (NEXIUM) 40 MG DR capsule TAKE 1 CAPSULE BY MOUTH EVERY DAY - 30-60 MINUTES BEFORE A MEAL 90 capsule 1     FLUoxetine (PROZAC) 10 MG capsule TAKE 1 CAPSULE (10 MG) BY MOUTH DAILY 90 capsule 3     FLUoxetine (PROZAC) 40 MG capsule TAKE 1 CAPSULE BY MOUTH DAILY 90 capsule 1     fluticasone (FLONASE) 50 MCG/ACT nasal spray Spray 2 sprays into both nostrils daily 16 g 1     HYDROcodone-acetaminophen (NORCO) 5-325 MG tablet Take 1 tablet by mouth every 8 hours as needed for severe pain * ACETAMINOPHEN SHOULD BE LIMITED TO 4000MG PER DAY* 10 tablet 0     ipratropium - albuterol 0.5 mg/2.5 mg/3 mL (DUONEB) 0.5-2.5 (3) MG/3ML neb solution Take 0.5-1 vials (1.5-3 mLs) by nebulization every 2 hours as needed for shortness of breath, wheezing or cough 150 mL 1     pregabalin (LYRICA) 150 MG capsule Take 1 capsule (150 mg) by mouth 2 times daily 180 capsule 1     rizatriptan (MAXALT) 10 MG tablet Take 1 tablet (10 mg) by mouth at onset of headache for migraine May repeat in 2 hours. Max 3 tablets/24 hours. 10 tablet 11       SOCIAL HISTORY:  Social History     Socioeconomic History     Marital status:    Tobacco Use     Smoking status: Every Day     Packs/day: 0.25     Years: 10.00     Pack years: 2.50     Types: Cigarettes     Smokeless tobacco: Never   Vaping Use     Vaping Use: Some days     Substances: Nicotine, Flavoring   Substance and Sexual Activity     Alcohol use:  Yes     Comment: occ     Drug use: Yes     Types: Marijuana     Sexual activity: Yes     Partners: Male     Birth control/protection: Surgical     Comment: Birth control method: Hysterectomy, vasectomy   Social History Narrative    . Spouse, Adrian.  Children, Pradeep, 02/11/92; moving to [place] for school 2016 and was using heroin but went to treatment, then a suicide attempt fall 2016 and living in [place], then went to treatment, has mental health issues and applying for disability,   Dora, 09/09/97-living in  and expecting baby 2018; Clifford, 02/20/99, will graduate from high school 2017, considering school at [employer] or [employer].  (Current  is not father of Kyrie).  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        FAMILY HISTORY:  Family History   Problem Relation Age of Onset     Breast Cancer Mother 68        Cancer-breast     Diabetes Mother         Diabetes     Heart Disease Father         Heart Disease,CHF, pacemaker,MI at 93     Hypertension Father         Hypertension     Aneurysm Sister         Brain      Fibromyalgia Sister      Arthritis Sister      Migraines Sister         Chronic     Arthritis Brother      Aneurysm Sister         Brain     Migraines Sister      Migraines Sister      Breast Cancer Sister         Cancer-breast     Tumor Sister         Brain     Breast Cancer Sister      Ovarian Cancer Sister      Arthritis Sister      Aneurysm Brother         Brain     Arthritis Brother      Arthritis Brother        Patient Active Problem List   Diagnosis     Anxiety state     Bruxism     Chronic mixed headache syndrome     Chronic pain disorder     Constipation     Depression, major, recurrent, moderate (H)     Malaise and fatigue     Fibromyalgia     Fixed drug eruption     Chronic insomnia     Osteoarthrosis     Pain medication agreement     Temporomandibular joint disorder     Tobacco use         ROS:  The patient denies any fevers, chills, nausea, vomiting, , or urinary  incontinence.  No shortness of breath, chest pain, or rashes.    OBJECTIVE:    DIAGNOSTICS: Study Result    Narrative & Impression   XR CERVICAL SPINE 2/3 VWS     HISTORY: 56 years Female Neck pain     COMPARISON: None     TECHNIQUE: Cervical spine 3 views     FINDINGS: There is no concerning prevertebral soft tissue edema. There  is disc space narrowing of moderate severity of C3-C4. There is mild  disc space narrowing of C4-C5 and C6-C7. Alignment is normal.  Vertebral body height is maintained throughout.                                                                      IMPRESSION: Mild to moderate degenerative disc disease of the cervical  spine.     DONNA PENN MD         SYSTEM ID:  [id number]      Study Result    Narrative & Impression   MRA BRAIN ([place] [facility]) WO CONTRAST     HISTORY:  Family history of aneurysm of blood vessel of brain     TECHNIQUE: 3-D time-of-flight noncontrast MRA of the brain was  performed.  Source and MIP images were reviewed.     COMPARISON:  CT head 6/21/2017     FINDINGS:       The distal cervical, petrous, cavernous, and supraclinoid internal  carotid arteries are patent.     The A1 segments are symmetric. An anterior communicating artery is  present. The distal PAULY vessels are unremarkable. The M1 segments, MCA  bifurcations and distal MCA vessels are patent.      The basilar and vertebral arteries have a normal caliber. The  vertebral arteries are codominant. The PICA origins are seen. The PCA  and SCA branches demonstrate no focal abnormalities.                                                                        IMPRESSION:       No evidence of intracranial aneurysm.     ESCOBAR GILL MD     XR SPINE LUMBAR 3 VIEWS  Order: [id number]  Narrative    XR SPINE LUMBAR 3 VIEWS     HISTORY:  BACK PAIN; .     TECHNIQUE: 3 views lumbosacral spine.     COMPARISON: CT abdomen and pelvis 12/23/2008.     FINDINGS:     Lumbar vertebral body heights are preserved. There  is trace degenerative retrolisthesis of L3 on L4 and anterolisthesis of L4 on L5 on the basis of advanced lower lumbar facet degeneration. Low lumbar disc height loss is mild at L4-5 and L5-S1. No suspicious marrow lesion is seen.       IMPRESSION:     Advanced lower lumbar facet degeneration at L4-5 and L5-S1.       Electronically Signed By: Octaviano Henning on 10/23/2017 8:48 AM  Exam End: 10/22/17  5:24 PM         PHYSICAL EXAM:   Pulse 73   Temp 98.7  F (37.1  C) (Tympanic)   Resp 16   LMP  (LMP Unknown)   SpO2 96%    GENERAL APPEARANCE: alert, no distress, moderate distress, cooperative.  Lights at low levels as patient reports photophobia  GAIT: NORMAL  PSYCH:  mentation appears normal and anxious    MUSCULOSKELETAL:   Posture: Anterior head carriage, rounded shoulders.   Gait:  unremarkable.     Cervical performed actively, measured approximately  ROM:   smooth/halting arc of motion   45/50 flexion    40/45 extension    30/45 RLF  40/45 LLF    55/85 RR         70/85 LR        Thoracic and Lumbar performed actively, measured approximately  ROM:  50/60 flexion 45/60 extension    30/45 RLF    40/45 LLF   35/45 RR      40/45 LR    - Straight leg raise  Kemps: negative    +Tenderness: not reported by patient  +Muscle spasm: Marked spasms suboccipitals bilaterally, splenius capitis bilaterally left greater than right, lumbar paraspinals and quadratus lumborum bilaterally  +Joint asymmetry and restriction: C2 right rotation and extension.     ASSESSMENT: El Joseph is a 57 year old female presenting with primary complaints of neck and back pain along with migraine symptoms.  Chiropractic assessment although suggestive of segmental/somatic dysfunction of the upper cervical spine I do not believe chiropractic services at this time would be beneficial for patient care.  Patient has found most benefit with course of traditional acupuncture.  Patient did describe how previous chiropractor typically performed  treatment.    We initially began treatment with patient in seated position supported by desk and a pillow.  Chiropractic aide was with patient during duration of treatment when I was not able to be present.  Approximately 1 minute prior to discontinuation of treatment patient felt exacerbation of symptoms primarily on the left side.  Patient reports that she has had similar experiences with previous acupuncturist.  Patient had medications with along with essential oil which has been helpful in the past.  Patient has also reported finding benefit with placing cold along frontal sinuses and heat along upper trapezius muscles.  This was performed for approximately 2 minutes.  Patient did not notice much improvement of symptoms but felt she could get home safely.    We will contact patient via phone tomorrow to determine further course of care.     1. Chronic pain disorder    2. Migraine without aura and without status migrainosus, not intractable        PLAN    Evaluation and Management:  94813 Moderate exam established patient 20 min    Procedures:  Modalities:  84160: Acupuncture, for 15 minutes:  Points: Bl 10, 23, 24, 25, 46, 47, LI 4, 11, TW 21, GV 21, GB 20  For 15 minutes    CMT:  Not performed      Therapeutic procedures:  None    Response to Treatment  Exacerbation of symptoms     Prognosis: Guarded    7/18/2023 Plan of Care:  6-8 visits of Chiropractic Care including Spinal Adjustments, acupuncture and/or physiotherapy and active rehabilitation, to include exercises in the office and/or at home to meet care plan goals.     Frequency: 1-2xweek for up to 4 weeks. A reevaluation would be clinically appropriate in 6-8 visits, to determine progress and further course of care.    POC discussed and patient agreeable to plan of care.      7/18/2023 Goals:      Patient will report improved pain by 20%.   Patient will report improved ability to drive as shown by a 1-2 point decrease on neck disability index  score.   Patient will report improved ability to concentrate as shown by 1-2 point decrease on neck disability index score.   Patient will demonstrate an improved ability to complete Activities of Daily Living  as shown by a reported 10-30% reduced score on neck and/or back index.    Patient will demonstrate improved ROM.        INSTRUCTIONS   ice 20 minutes every other hour as needed, heat 15 minutes every other hour as needed and rest    Follow-up:  Not set up, will contact patient in one day to determine further course of care.

## 2023-08-23 ENCOUNTER — APPOINTMENT (OUTPATIENT)
Dept: CT IMAGING | Facility: OTHER | Age: 58
End: 2023-08-23
Attending: PHYSICIAN ASSISTANT
Payer: COMMERCIAL

## 2023-08-23 ENCOUNTER — HOSPITAL ENCOUNTER (EMERGENCY)
Facility: OTHER | Age: 58
Discharge: HOME | End: 2023-08-23
Attending: PHYSICIAN ASSISTANT | Admitting: PHYSICIAN ASSISTANT
Payer: COMMERCIAL

## 2023-08-23 ENCOUNTER — APPOINTMENT (OUTPATIENT)
Dept: GENERAL RADIOLOGY | Facility: OTHER | Age: 58
End: 2023-08-23
Attending: PHYSICIAN ASSISTANT
Payer: COMMERCIAL

## 2023-08-23 VITALS
SYSTOLIC BLOOD PRESSURE: 117 MMHG | HEART RATE: 62 BPM | TEMPERATURE: 98.7 F | OXYGEN SATURATION: 99 % | DIASTOLIC BLOOD PRESSURE: 101 MMHG | RESPIRATION RATE: 13 BRPM

## 2023-08-23 DIAGNOSIS — J40 BRONCHITIS: ICD-10-CM

## 2023-08-23 DIAGNOSIS — R07.89 ATYPICAL CHEST PAIN: ICD-10-CM

## 2023-08-23 LAB
ALBUMIN SERPL BCG-MCNC: 4.3 G/DL (ref 3.5–5.2)
ALP SERPL-CCNC: 85 U/L (ref 35–104)
ALT SERPL W P-5'-P-CCNC: 41 U/L (ref 0–50)
ANION GAP SERPL CALCULATED.3IONS-SCNC: 10 MMOL/L (ref 7–15)
AST SERPL W P-5'-P-CCNC: 32 U/L (ref 0–45)
BASOPHILS # BLD AUTO: 0 10E3/UL (ref 0–0.2)
BASOPHILS NFR BLD AUTO: 0 %
BILIRUB SERPL-MCNC: 0.3 MG/DL
BUN SERPL-MCNC: 8.9 MG/DL (ref 6–20)
CALCIUM SERPL-MCNC: 9.5 MG/DL (ref 8.6–10)
CHLORIDE SERPL-SCNC: 105 MMOL/L (ref 98–107)
CREAT SERPL-MCNC: 0.56 MG/DL (ref 0.51–0.95)
D DIMER PPP FEU-MCNC: <=0.27 UG/ML FEU (ref 0–0.5)
DEPRECATED HCO3 PLAS-SCNC: 24 MMOL/L (ref 22–29)
EOSINOPHIL # BLD AUTO: 0.4 10E3/UL (ref 0–0.7)
EOSINOPHIL NFR BLD AUTO: 6 %
ERYTHROCYTE [DISTWIDTH] IN BLOOD BY AUTOMATED COUNT: 12.7 % (ref 10–15)
GFR SERPL CREATININE-BSD FRML MDRD: >90 ML/MIN/1.73M2
GLUCOSE SERPL-MCNC: 94 MG/DL (ref 70–99)
HCT VFR BLD AUTO: 39.9 % (ref 35–47)
HGB BLD-MCNC: 13.9 G/DL (ref 11.7–15.7)
HOLD SPECIMEN: NORMAL
HOLD SPECIMEN: NORMAL
IMM GRANULOCYTES # BLD: 0 10E3/UL
IMM GRANULOCYTES NFR BLD: 1 %
LACTATE SERPL-SCNC: 1.3 MMOL/L (ref 0.7–2)
LIPASE SERPL-CCNC: 23 U/L (ref 13–60)
LYMPHOCYTES # BLD AUTO: 1.8 10E3/UL (ref 0.8–5.3)
LYMPHOCYTES NFR BLD AUTO: 26 %
MAGNESIUM SERPL-MCNC: 2 MG/DL (ref 1.7–2.3)
MCH RBC QN AUTO: 31.2 PG (ref 26.5–33)
MCHC RBC AUTO-ENTMCNC: 34.8 G/DL (ref 31.5–36.5)
MCV RBC AUTO: 90 FL (ref 78–100)
MONOCYTES # BLD AUTO: 0.5 10E3/UL (ref 0–1.3)
MONOCYTES NFR BLD AUTO: 7 %
NEUTROPHILS # BLD AUTO: 4.2 10E3/UL (ref 1.6–8.3)
NEUTROPHILS NFR BLD AUTO: 60 %
NRBC # BLD AUTO: 0 10E3/UL
NRBC BLD AUTO-RTO: 0 /100
NT-PROBNP SERPL-MCNC: 38 PG/ML (ref 0–900)
PLATELET # BLD AUTO: 188 10E3/UL (ref 150–450)
POTASSIUM SERPL-SCNC: 4 MMOL/L (ref 3.4–5.3)
PROT SERPL-MCNC: 6.6 G/DL (ref 6.4–8.3)
RBC # BLD AUTO: 4.46 10E6/UL (ref 3.8–5.2)
SODIUM SERPL-SCNC: 139 MMOL/L (ref 136–145)
TROPONIN T SERPL HS-MCNC: 12 NG/L
TROPONIN T SERPL HS-MCNC: 14 NG/L
TSH SERPL DL<=0.005 MIU/L-ACNC: 2.53 UIU/ML (ref 0.3–4.2)
WBC # BLD AUTO: 6.9 10E3/UL (ref 4–11)

## 2023-08-23 PROCEDURE — 83690 ASSAY OF LIPASE: CPT | Performed by: PHYSICIAN ASSISTANT

## 2023-08-23 PROCEDURE — 70450 CT HEAD/BRAIN W/O DYE: CPT

## 2023-08-23 PROCEDURE — 96374 THER/PROPH/DIAG INJ IV PUSH: CPT | Mod: XU

## 2023-08-23 PROCEDURE — 83880 ASSAY OF NATRIURETIC PEPTIDE: CPT | Performed by: PHYSICIAN ASSISTANT

## 2023-08-23 PROCEDURE — 83605 ASSAY OF LACTIC ACID: CPT | Performed by: PHYSICIAN ASSISTANT

## 2023-08-23 PROCEDURE — 96375 TX/PRO/DX INJ NEW DRUG ADDON: CPT | Mod: XU

## 2023-08-23 PROCEDURE — 84484 ASSAY OF TROPONIN QUANT: CPT | Mod: 91 | Performed by: PHYSICIAN ASSISTANT

## 2023-08-23 PROCEDURE — 250N000009 HC RX 250: Performed by: PHYSICIAN ASSISTANT

## 2023-08-23 PROCEDURE — 99284 EMERGENCY DEPT VISIT MOD MDM: CPT | Performed by: PHYSICIAN ASSISTANT

## 2023-08-23 PROCEDURE — 250N000011 HC RX IP 250 OP 636: Performed by: PHYSICIAN ASSISTANT

## 2023-08-23 PROCEDURE — 84443 ASSAY THYROID STIM HORMONE: CPT | Performed by: PHYSICIAN ASSISTANT

## 2023-08-23 PROCEDURE — 85379 FIBRIN DEGRADATION QUANT: CPT | Performed by: PHYSICIAN ASSISTANT

## 2023-08-23 PROCEDURE — 71260 CT THORAX DX C+: CPT | Mod: TC

## 2023-08-23 PROCEDURE — 99285 EMERGENCY DEPT VISIT HI MDM: CPT | Mod: 25

## 2023-08-23 PROCEDURE — 250N000013 HC RX MED GY IP 250 OP 250 PS 637: Performed by: PHYSICIAN ASSISTANT

## 2023-08-23 PROCEDURE — 93005 ELECTROCARDIOGRAM TRACING: CPT

## 2023-08-23 PROCEDURE — 71045 X-RAY EXAM CHEST 1 VIEW: CPT

## 2023-08-23 PROCEDURE — 83735 ASSAY OF MAGNESIUM: CPT | Performed by: PHYSICIAN ASSISTANT

## 2023-08-23 PROCEDURE — 80053 COMPREHEN METABOLIC PANEL: CPT | Performed by: PHYSICIAN ASSISTANT

## 2023-08-23 PROCEDURE — 250N000011 HC RX IP 250 OP 636: Mod: JZ | Performed by: PHYSICIAN ASSISTANT

## 2023-08-23 PROCEDURE — 85025 COMPLETE CBC W/AUTO DIFF WBC: CPT | Performed by: PHYSICIAN ASSISTANT

## 2023-08-23 PROCEDURE — 36415 COLL VENOUS BLD VENIPUNCTURE: CPT | Performed by: PHYSICIAN ASSISTANT

## 2023-08-23 PROCEDURE — 93010 ELECTROCARDIOGRAM REPORT: CPT | Performed by: INTERNAL MEDICINE

## 2023-08-23 RX ORDER — IOPAMIDOL 755 MG/ML
100 INJECTION, SOLUTION INTRAVASCULAR ONCE
Status: COMPLETED | OUTPATIENT
Start: 2023-08-23 | End: 2023-08-23

## 2023-08-23 RX ORDER — DEXAMETHASONE SODIUM PHOSPHATE 10 MG/ML
10 INJECTION, SOLUTION INTRAMUSCULAR; INTRAVENOUS ONCE
Status: COMPLETED | OUTPATIENT
Start: 2023-08-23 | End: 2023-08-23

## 2023-08-23 RX ORDER — AZITHROMYCIN 250 MG/1
500 TABLET, FILM COATED ORAL ONCE
Status: COMPLETED | OUTPATIENT
Start: 2023-08-23 | End: 2023-08-23

## 2023-08-23 RX ORDER — IPRATROPIUM BROMIDE AND ALBUTEROL SULFATE 2.5; .5 MG/3ML; MG/3ML
3 SOLUTION RESPIRATORY (INHALATION) ONCE
Status: COMPLETED | OUTPATIENT
Start: 2023-08-23 | End: 2023-08-23

## 2023-08-23 RX ORDER — AZITHROMYCIN 250 MG/1
250 TABLET, FILM COATED ORAL DAILY
Qty: 4 TABLET | Refills: 0 | Status: SHIPPED | OUTPATIENT
Start: 2023-08-23 | End: 2023-08-28

## 2023-08-23 RX ORDER — METOCLOPRAMIDE HYDROCHLORIDE 5 MG/ML
10 INJECTION INTRAMUSCULAR; INTRAVENOUS ONCE
Status: COMPLETED | OUTPATIENT
Start: 2023-08-23 | End: 2023-08-23

## 2023-08-23 RX ORDER — ALBUTEROL SULFATE 0.83 MG/ML
2.5 SOLUTION RESPIRATORY (INHALATION) EVERY 6 HOURS PRN
Qty: 25 ML | Refills: 3 | Status: SHIPPED | OUTPATIENT
Start: 2023-08-23 | End: 2024-02-12

## 2023-08-23 RX ORDER — PREDNISONE 20 MG/1
TABLET ORAL
Qty: 10 TABLET | Refills: 0 | Status: SHIPPED | OUTPATIENT
Start: 2023-08-23 | End: 2023-08-28

## 2023-08-23 RX ORDER — DIPHENHYDRAMINE HYDROCHLORIDE 50 MG/ML
25 INJECTION INTRAMUSCULAR; INTRAVENOUS ONCE
Status: COMPLETED | OUTPATIENT
Start: 2023-08-23 | End: 2023-08-23

## 2023-08-23 RX ADMIN — FAMOTIDINE 20 MG: 10 INJECTION, SOLUTION INTRAVENOUS at 22:07

## 2023-08-23 RX ADMIN — DEXAMETHASONE SODIUM PHOSPHATE 10 MG: 10 INJECTION, SOLUTION INTRAMUSCULAR; INTRAVENOUS at 20:36

## 2023-08-23 RX ADMIN — METOCLOPRAMIDE 10 MG: 5 INJECTION, SOLUTION INTRAMUSCULAR; INTRAVENOUS at 20:35

## 2023-08-23 RX ADMIN — IOPAMIDOL 90 ML: 755 INJECTION, SOLUTION INTRAVENOUS at 20:54

## 2023-08-23 RX ADMIN — DIPHENHYDRAMINE HYDROCHLORIDE 25 MG: 50 INJECTION, SOLUTION INTRAMUSCULAR; INTRAVENOUS at 20:36

## 2023-08-23 RX ADMIN — AZITHROMYCIN DIHYDRATE 500 MG: 250 TABLET, FILM COATED ORAL at 22:07

## 2023-08-23 RX ADMIN — IPRATROPIUM BROMIDE AND ALBUTEROL SULFATE 3 ML: .5; 3 SOLUTION RESPIRATORY (INHALATION) at 21:04

## 2023-08-23 ASSESSMENT — ACTIVITIES OF DAILY LIVING (ADL)
ADLS_ACUITY_SCORE: 35

## 2023-08-23 NOTE — ED TRIAGE NOTES
Pt arrives to ER via EMS with c/o chest pain and migraine. Pt states the chest pain started today, pt reports the pain is much worse with deep breaths. Pt reports that about 1/2 hour after the chest pain she started to get a migraine. Pt reports getting migraines 4-5 times weekly.  Pt reports that she had a migraine yesterday, pt reports taking 2 rizatriptan, 2 benadryl 1 oxy and 2 flexeril within a 24 hr period to get rid of the migraie

## 2023-08-24 NOTE — DISCHARGE INSTRUCTIONS
-Unsure to as what is causing your chest pain symptoms.  No signs of heart attack, pneumonia, collapsed lung, mass, or any other concerning etiologies.  Would recommend following with your primary care doctor in the next 2 days for further evaluation.    -For your bronchitis, will treat for possible pneumonia with use of azithromycin and prednisone.  Take prednisone 40 mg every morning for 5 days.  Take azithromycin 250 mg every evening for 4 days.  First dose given here in the ER.

## 2023-08-24 NOTE — ED PROVIDER NOTES
EMERGENCY DEPARTMENT ENCOUNTER      NAME: El Joseph  AGE: 57 year old female  YOB: 1965  MRN: [id number]  EVALUATION DATE & TIME: 8/23/2023  5:24 PM    PCP: Lisa Crisostomo    ED PROVIDER: Darren Nolan PA-C       CHIEF COMPLAINT:  Chief Complaint   Patient presents with    Chest Pain    Headache       FINAL IMPRESSION:  1. Atypical chest pain    2. Bronchitis        ED COURSE, MEDICAL DECISION MAKING, ASSESSMENT, AND PLAN:      The patient was interviewed and examined.  HPI and physical exam as below.  Differential diagnosis and MDM Key Documentation Elements as below.  Vitals and triage note were reviewed.  BP (!) 117/101   Pulse 62   Temp 98.7  F (37.1  C) (Tympanic)   Resp 13   LMP  (LMP Unknown)   SpO2 99%     Overall Impression/Treatment Plan/Discharge Info/Follow-up/Medical Necessity for Admission:  El Joseph is a pleasant 57 year old female with history of tobacco use who presents to the ER with her family for evaluation of chest pain and migraine.    Patient states that she has been having problems with wheezing and feeling short of breath for the past few weeks.  Patient having lower chest discomfort substernally that is described as a stabbing sensation.  Started earlier today.  Denies any injury or trauma.  Patient does have some wheezing.  She does have a nebulizer at home.  No fever or chills.  No abdominal pain, nausea, vomiting, or diarrhea.  Patient states that she also has a migraine which started yesterday.  Patient had a pretty severe headache yesterday which she does have a history of migraines.  Patient had a similar headache around 3 to 4 weeks ago.  Patient states that she has 5 migraines a week.  No real changes in her migraine headache pain.  Sensitive to light and sound.  Not feeling nauseous.  Has tried Flexeril, oxycodone, Benadryl, and Maxalt without any significant relief.  Denies any new head injury or trauma.  No stiff neck or neurological  symptoms.    Differential includes but is not limited to ACS/MI, unstable angina, acute PE, pneumothorax, pneumonia, chest wall pain, costochondritis, gastritis, cholecystitis, pancreatitis, migraine, tension headache    Patient is afebrile with Leyla blood pressure 117/101.  Patient was in no acute distress.  Patient had expiratory wheezing on physical exam.  No chest wall tenderness.  No abdominal tenderness.  Heart was unremarkable.  Patient was sensitive to light on physical exam.  Patient otherwise with a normal neurological exam with no focal findings.  Exam otherwise benign.    EKG shows a slightly shortened MA interval but without signs of ST segment deviation to suggest ACS/MI.  Screening troponins at the 0 and 2-hour aileen today were nonelevated.  Screening BNP was nonelevated.  No leukocytosis or anemia.  Normal renal and hepatic functions.  No electrolyte abnormalities.  Normal lipase, D-dimer was nonelevated.  Lactic acid was normal.  CT of the chest with IV contrast today was unremarkable with no acute findings.    Patient was given a DuoNeb, Decadron, and Z-Rodney here in the ER for bronchitis/wheezing which did improve symptoms.        Assessment/plan:  Atypical chest pain and bronchitis  -No signs of ACS/MI, PE, pneumonia, or any other concerning symptoms.  Patient did have significant bronchitis which was improved with use of interventions.  Will prescribe patient a Z-Rodney for the next 5 days with first dose given here in the ER.  Patient use prednisone 40 mg daily for 5 days.  We will have patient follow-up with both her primary care doctor and cardiology.  Referrals were entered.  Patient is return to the ER for any worsening of symptoms.  Would also recommend following up with a pulmonologist if she has continued issues with breathing.  Her primary care doctor may give a referral to pulmonology.  Patient with no hypoxia today.    Reassessments, Medications, Interventions, & Response to  Treatments:  As above    Consultations:  None    Decision Rules, Medical Calculators, and Risk Stratification Tools:  None    MDM Key Documentation Elements for Patient's Evaluation:  Differential diagnosis to include high risk considerations: As above  Escalation to admission/observation considered: Admission/observation considered, but patient does not meet admission criteria  Discussions and management with other clinicians:    3a. Independent interpretation of testing performed by another health professional:  -No  3b. Discussion of management or test interpretation with another health professional: -No  Independent interpretation of tests:  Ordering and/or review of 3+ test(s)  Discussion of test interpretations with radiology:  No  History obtained from source other than patient or assessment requiring an independent historian:  No  Review of non-ED/external records:  review of 3+ records  Diagnostic tests considered but not ultimately performed/deferred:  -CT PE study  Prescription medications considered but not prescribed:  -Augmentin  Chronic conditions affecting care:  -Smoking  Care affected by social determinants of health:  -None    The patient's management involved:   - Laboratory studies  - Imaging studies  - Parenteral controlled substance  - Prescription drug management    Pertinent Labs & Imaging studies reviewed. (See chart for details)  Results for orders placed or performed during the hospital encounter of 08/23/23   XR Chest Port 1 View    Impression    IMPRESSION: Clear chest    DONNA PENN MD         SYSTEM ID:  [id number]   CT Chest w Contrast    Impression    IMPRESSION:   Mild bibasilar atelectasis. No evidence of pneumonia or pulmonary edema.     THIS DOCUMENT HAS BEEN ELECTRONICALLY SIGNED BY MAAME PORTER MD   CT Head w/o Contrast    Impression    IMPRESSION: Negative Head CT    DONNA PENN MD         SYSTEM ID:  [id number]   Extra Blue Top Tube   Result Value Ref Range     Hold Specimen x    Result Value Ref Range    Troponin T, High Sensitivity 14 <=14 ng/L   Comprehensive metabolic panel   Result Value Ref Range    Sodium 139 136 - 145 mmol/L    Potassium 4.0 3.4 - 5.3 mmol/L    Chloride 105 98 - 107 mmol/L    Carbon Dioxide (CO2) 24 22 - 29 mmol/L    Anion Gap 10 7 - 15 mmol/L    Urea Nitrogen 8.9 6.0 - 20.0 mg/dL    Creatinine 0.56 0.51 - 0.95 mg/dL    Calcium 9.5 8.6 - 10.0 mg/dL    Glucose 94 70 - 99 mg/dL    Alkaline Phosphatase 85 35 - 104 U/L    AST 32 0 - 45 U/L    ALT 41 0 - 50 U/L    Protein Total 6.6 6.4 - 8.3 g/dL    Albumin 4.3 3.5 - 5.2 g/dL    Bilirubin Total 0.3 <=1.2 mg/dL    GFR Estimate >90 >60 mL/min/1.73m2   Result Value Ref Range    Lipase 23 13 - 60 U/L   Lactic acid whole blood   Result Value Ref Range    Lactic Acid 1.3 0.7 - 2.0 mmol/L   Result Value Ref Range    Magnesium 2.0 1.7 - 2.3 mg/dL   TSH Reflex GH   Result Value Ref Range    TSH 2.53 0.30 - 4.20 uIU/mL   Nt probnp inpatient (BNP)   Result Value Ref Range    N terminal Pro BNP Inpatient 38 0 - 900 pg/mL   D dimer quantitative   Result Value Ref Range    D-Dimer Quantitative <=0.27 0.00 - 0.50 ug/mL FEU   CBC with platelets and differential   Result Value Ref Range    WBC Count 6.9 4.0 - 11.0 10e3/uL    RBC Count 4.46 3.80 - 5.20 10e6/uL    Hemoglobin 13.9 11.7 - 15.7 g/dL    Hematocrit 39.9 35.0 - 47.0 %    MCV 90 78 - 100 fL    MCH 31.2 26.5 - 33.0 pg    MCHC 34.8 31.5 - 36.5 g/dL    RDW 12.7 10.0 - 15.0 %    Platelet Count 188 150 - 450 10e3/uL    % Neutrophils 60 %    % Lymphocytes 26 %    % Monocytes 7 %    % Eosinophils 6 %    % Basophils 0 %    % Immature Granulocytes 1 %    NRBCs per 100 WBC 0 <1 /100    Absolute Neutrophils 4.2 1.6 - 8.3 10e3/uL    Absolute Lymphocytes 1.8 0.8 - 5.3 10e3/uL    Absolute Monocytes 0.5 0.0 - 1.3 10e3/uL    Absolute Eosinophils 0.4 0.0 - 0.7 10e3/uL    Absolute Basophils 0.0 0.0 - 0.2 10e3/uL    Absolute Immature Granulocytes 0.0 <=0.4 10e3/uL    Absolute  NRBCs 0.0 10e3/uL   Extra Red Top Tube   Result Value Ref Range    Hold Specimen x    Result Value Ref Range    Troponin T, High Sensitivity 12 <=14 ng/L     No results found for: ABORH      A shared decision making model was used. Time was taken to answer all questions.  Patient and/or associated parties understood and were agreeable to treatment plan.  Plan and all results were discussed. Warning signs and close return precautions to return to the ED given. Copy of results given. Discharged in stable condition. Discharged with discharge instructions outlining plan for further care and follow up.        PPE worn during patient evaluation:  Mask: No  Eye Protection: No  Gown: No  Hair cover: No  Face Shield: No  Patient wearing a mask: No      MEDICATIONS GIVEN IN THE EMERGENCY:  Medications   famotidine (PEPCID) injection 20 mg (20 mg Intravenous $Given 8/23/23 2207)   dexAMETHasone PF (DECADRON) injection 10 mg (10 mg Intravenous $Given 8/23/23 2036)   diphenhydrAMINE (BENADRYL) injection 25 mg (25 mg Intravenous $Given 8/23/23 2036)   metoclopramide (REGLAN) injection 10 mg (10 mg Intravenous $Given 8/23/23 2035)   ipratropium - albuterol 0.5 mg/2.5 mg/3 mL (DUONEB) neb solution 3 mL (3 mLs Nebulization $Given 8/23/23 2104)   iopamidol (ISOVUE-370) solution 100 mL (90 mLs Intravenous $Given 8/23/23 2054)   azithromycin (ZITHROMAX) tablet 500 mg (500 mg Oral $Given 8/23/23 2207)       NEW PRESCRIPTIONS STARTED AT TODAY'S ER VISIT:  New Prescriptions    AZITHROMYCIN (ZITHROMAX) 250 MG TABLET    Take 1 tablet (250 mg) by mouth daily    PREDNISONE (DELTASONE) 20 MG TABLET    Take two tablets (= 40mg) each day for 5 (five) days          =================================================================    HPI  El Joseph is a pleasant 57 year old female with history of tobacco use who presents to the ER with her family for evaluation of chest pain and migraine.    Patient states that she has been having problems with  wheezing and feeling short of breath for the past few weeks.  Patient having lower chest discomfort substernally that is described as a stabbing sensation.  Started earlier today.  Denies any injury or trauma.  Patient does have some wheezing.  She does have a nebulizer at home.  No fever or chills.  No abdominal pain, nausea, vomiting, or diarrhea.  Patient states that she also has a migraine which started yesterday.  Patient had a pretty severe headache yesterday which she does have a history of migraines.  Patient had a similar headache around 3 to 4 weeks ago.  Patient states that she has 5 migraines a week.  No real changes in her migraine headache pain.  Sensitive to light and sound.  Not feeling nauseous.  Has tried Flexeril, oxycodone, Benadryl, and Maxalt without any significant relief.  Denies any new head injury or trauma.  No stiff neck or neurological symptoms.      REVIEW OF SYSTEMS   Review of Systems  As above, otherwise ROS is unremarkable.      PAST MEDICAL HISTORY:  Past Medical History:   Diagnosis Date    Pain in knee     No Comments Provided    Personal history of other medical treatment (CODE)     G7, P3-0-4-3 (3 vag. 4 spon miscarriages-one in the second trimester and one daughter was twin and lost the twin).    Radiculopathy     2011,Benign hypermobility per [facility]       PAST SURGICAL HISTORY:  Past Surgical History:   Procedure Laterality Date    COLONOSCOPY      4/2003,Normal, bleeding hemorrhoids    COLONOSCOPY N/A 11/30/2021    tubular adenomas- f/u 5 years    HYSTERECTOMY VAGINAL      5/2003,Dr Ramirez[place], chronic pelvic pain/endometriosis    OTHER SURGICAL HISTORY      [id number],DILATION AND CURETTAGE,After 4 miscarriages    OTHER SURGICAL HISTORY      1986,600000,OTHER,Ovarian cyst    OTHER SURGICAL HISTORY      6/2002,338977,OTHER,Chronic pelvic pain/hemorrhagic cyst    OTHER SURGICAL HISTORY      2/2009,[id number],OTHER,Dr Ramirez-recurrent ovarian cysts       CURRENT MEDICATIONS:    Current  Outpatient Medications   Medication Instructions    albuterol (PROAIR HFA/PROVENTIL HFA/VENTOLIN HFA) 108 (90 Base) MCG/ACT inhaler 2 puffs, Inhalation, EVERY 6 HOURS    atorvastatin (LIPITOR) 20 MG tablet TAKE 1 TABLET BY MOUTH ONCE DAILY    azithromycin (ZITHROMAX) 250 mg, Oral, DAILY    clobetasol (TEMOVATE) 0.05 % external solution APPLY TO THE SCALP TWICE DAILY.    cyclobenzaprine (FLEXERIL) 10 mg, Oral, 3 TIMES DAILY PRN    esomeprazole (NEXIUM) 40 MG DR capsule TAKE 1 CAPSULE BY MOUTH EVERY DAY - 30-60 MINUTES BEFORE A MEAL    FLUoxetine (PROZAC) 40 MG capsule TAKE 1 CAPSULE BY MOUTH DAILY    FLUoxetine (PROZAC) 10 mg, Oral, DAILY    fluticasone (FLONASE) 50 MCG/ACT nasal spray 2 sprays, Both Nostrils, DAILY    HYDROcodone-acetaminophen (NORCO) 5-325 MG tablet 1 tablet, Oral, EVERY 8 HOURS PRN, * ACETAMINOPHEN SHOULD BE LIMITED TO 4000MG PER DAY*    ipratropium - albuterol 0.5 mg/2.5 mg/3 mL (DUONEB) 0.5-2.5 (3) MG/3ML neb solution 1.5-3 mLs, Nebulization, EVERY 2 HOURS PRN    predniSONE (DELTASONE) 20 MG tablet Take two tablets (= 40mg) each day for 5 (five) days    pregabalin (LYRICA) 150 mg, Oral, 2 TIMES DAILY    rizatriptan (MAXALT) 10 mg, Oral, AT ONSET OF HEADACHE, May repeat in 2 hours. Max 3 tablets/24 hours.       ALLERGIES:  Allergies   Allergen Reactions    Amoxicillin      Not effective.     Nsaids      Other reaction(s): Ecchymosis       FAMILY HISTORY:  Family History   Problem Relation Age of Onset    Breast Cancer Mother 68        Cancer-breast    Diabetes Mother         Diabetes    Heart Disease Father         Heart Disease,CHF, pacemaker,MI at 93    Hypertension Father         Hypertension    Aneurysm Sister         Brain     Fibromyalgia Sister     Arthritis Sister     Migraines Sister         Chronic    Arthritis Brother     Aneurysm Sister         Brain    Migraines Sister     Migraines Sister     Breast Cancer Sister         Cancer-breast    Tumor Sister         Brain    Breast Cancer  Sister     Ovarian Cancer Sister     Arthritis Sister     Aneurysm Brother         Brain    Arthritis Brother     Arthritis Brother        SOCIAL HISTORY:   Social History     Socioeconomic History    Marital status:      Spouse name: None    Number of children: None    Years of education: None    Highest education level: None   Tobacco Use    Smoking status: Every Day     Packs/day: 0.25     Years: 10.00     Pack years: 2.50     Types: Cigarettes    Smokeless tobacco: Never   Vaping Use    Vaping Use: Some days    Substances: Nicotine, Flavoring   Substance and Sexual Activity    Alcohol use: Yes     Comment: occ    Drug use: Yes     Types: Marijuana    Sexual activity: Yes     Partners: Male     Birth control/protection: Surgical     Comment: Birth control method: Hysterectomy, vasectomy   Social History Narrative    . Spouse, Adrian.  Children, Pradeep, 02/11/92; moving to [place] for school 2016 and was using heroin but went to treatment, then a suicide attempt fall 2016 and living in [place], then went to treatment, has mental health issues and applying for disability,   Dora, 09/09/97-living in  and expecting baby 2018; Clifford, 02/20/99, will graduate from high school 2017, considering school at [employer] or [employer] [facility] [place].  (Current  is not father of Kyrie).  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       PHYSICAL EXAM    VITAL SIGNS: BP (!) 117/101   Pulse 62   Temp 98.7  F (37.1  C) (Tympanic)   Resp 13   LMP  (LMP Unknown)   SpO2 99%     Patient Vitals for the past 24 hrs:   BP Temp Temp src Pulse Resp SpO2   08/23/23 2116 (!) 117/101 -- -- 62 13 99 %   08/23/23 2101 125/79 -- -- -- -- --   08/23/23 2016 131/88 -- -- 60 15 94 %   08/23/23 1956 130/80 -- -- 62 15 95 %   08/23/23 1941 114/76 -- -- 62 16 94 %   08/23/23 1926 -- -- -- 60 15 --   08/23/23 1911 135/79 -- -- 65 21 --   08/23/23 1856 (!) 129/90 -- -- 64 14 --   08/23/23 1845 136/88 -- -- 62 13 --   08/23/23 1841 136/88 -- -- 65  17 93 %   08/23/23 1830 119/75 -- -- 68 14 94 %   08/23/23 1815 128/76 -- -- 68 14 91 %   08/23/23 1800 119/81 -- -- 67 14 92 %   08/23/23 1745 123/87 -- -- 66 14 92 %   08/23/23 1730 (!) 127/90 -- -- 72 23 91 %   08/23/23 1726 (!) 127/90 98.7  F (37.1  C) Tympanic 67 18 92 %       Physical Exam  Constitutional:       Appearance: Normal appearance. She is obese. She is not ill-appearing or diaphoretic.   HENT:      Head: Normocephalic and atraumatic.      Right Ear: Tympanic membrane, ear canal and external ear normal.      Left Ear: Tympanic membrane, ear canal and external ear normal.      Nose: Nose normal.      Mouth/Throat:      Mouth: Mucous membranes are moist.      Pharynx: Oropharynx is clear.   Eyes:      Extraocular Movements: Extraocular movements intact.      Conjunctiva/sclera: Conjunctivae normal.      Pupils: Pupils are equal, round, and reactive to light.      Comments: Patient sensitive to light   Cardiovascular:      Rate and Rhythm: Normal rate and regular rhythm.      Pulses: Normal pulses.   Pulmonary:      Effort: Pulmonary effort is normal. No respiratory distress.      Breath sounds: Wheezing (Bilateral expiratory wheezing) present.   Chest:      Chest wall: No tenderness.   Abdominal:      General: Abdomen is flat. Bowel sounds are normal.      Palpations: Abdomen is soft.      Tenderness: There is no abdominal tenderness.   Musculoskeletal:         General: Normal range of motion.      Cervical back: Normal range of motion and neck supple. No tenderness.   Skin:     General: Skin is warm and dry.      Capillary Refill: Capillary refill takes less than 2 seconds.   Neurological:      General: No focal deficit present.      Mental Status: She is alert and oriented to person, place, and time.      Cranial Nerves: No cranial nerve deficit.      Comments: Patient GCS 15.  Alert and oriented x4.  Cranial nerves II through XII exam unremarkable.  Normal finger to finger and finger-nose.  Normal  sensation in the upper or lower extremities.  Normal muscle strength in the upper lower extremities and equal bilaterally.   Psychiatric:         Mood and Affect: Mood normal.         Behavior: Behavior normal.         Thought Content: Thought content normal.              LABS & RADIOLOGY:  All pertinent labs reviewed and interpreted. Reviewed all pertinent imaging. Please see official radiology report.  Results for orders placed or performed during the hospital encounter of 08/23/23   XR Chest Port 1 View    Impression    IMPRESSION: Clear chest    DONNA PENN MD         SYSTEM ID:  [id number]   CT Chest w Contrast    Impression    IMPRESSION:   Mild bibasilar atelectasis. No evidence of pneumonia or pulmonary edema.     THIS DOCUMENT HAS BEEN ELECTRONICALLY SIGNED BY MAAME PORTER MD   CT Head w/o Contrast    Impression    IMPRESSION: Negative Head CT    DONNA PENN MD         SYSTEM ID:  [id number]   Extra Blue Top Tube   Result Value Ref Range    Hold Specimen x    Result Value Ref Range    Troponin T, High Sensitivity 14 <=14 ng/L   Comprehensive metabolic panel   Result Value Ref Range    Sodium 139 136 - 145 mmol/L    Potassium 4.0 3.4 - 5.3 mmol/L    Chloride 105 98 - 107 mmol/L    Carbon Dioxide (CO2) 24 22 - 29 mmol/L    Anion Gap 10 7 - 15 mmol/L    Urea Nitrogen 8.9 6.0 - 20.0 mg/dL    Creatinine 0.56 0.51 - 0.95 mg/dL    Calcium 9.5 8.6 - 10.0 mg/dL    Glucose 94 70 - 99 mg/dL    Alkaline Phosphatase 85 35 - 104 U/L    AST 32 0 - 45 U/L    ALT 41 0 - 50 U/L    Protein Total 6.6 6.4 - 8.3 g/dL    Albumin 4.3 3.5 - 5.2 g/dL    Bilirubin Total 0.3 <=1.2 mg/dL    GFR Estimate >90 >60 mL/min/1.73m2   Result Value Ref Range    Lipase 23 13 - 60 U/L   Lactic acid whole blood   Result Value Ref Range    Lactic Acid 1.3 0.7 - 2.0 mmol/L   Result Value Ref Range    Magnesium 2.0 1.7 - 2.3 mg/dL   TSH Reflex GH   Result Value Ref Range    TSH 2.53 0.30 - 4.20 uIU/mL   Nt probnp inpatient (BNP)    Result Value Ref Range    N terminal Pro BNP Inpatient 38 0 - 900 pg/mL   D dimer quantitative   Result Value Ref Range    D-Dimer Quantitative <=0.27 0.00 - 0.50 ug/mL FEU   CBC with platelets and differential   Result Value Ref Range    WBC Count 6.9 4.0 - 11.0 10e3/uL    RBC Count 4.46 3.80 - 5.20 10e6/uL    Hemoglobin 13.9 11.7 - 15.7 g/dL    Hematocrit 39.9 35.0 - 47.0 %    MCV 90 78 - 100 fL    MCH 31.2 26.5 - 33.0 pg    MCHC 34.8 31.5 - 36.5 g/dL    RDW 12.7 10.0 - 15.0 %    Platelet Count 188 150 - 450 10e3/uL    % Neutrophils 60 %    % Lymphocytes 26 %    % Monocytes 7 %    % Eosinophils 6 %    % Basophils 0 %    % Immature Granulocytes 1 %    NRBCs per 100 WBC 0 <1 /100    Absolute Neutrophils 4.2 1.6 - 8.3 10e3/uL    Absolute Lymphocytes 1.8 0.8 - 5.3 10e3/uL    Absolute Monocytes 0.5 0.0 - 1.3 10e3/uL    Absolute Eosinophils 0.4 0.0 - 0.7 10e3/uL    Absolute Basophils 0.0 0.0 - 0.2 10e3/uL    Absolute Immature Granulocytes 0.0 <=0.4 10e3/uL    Absolute NRBCs 0.0 10e3/uL   Extra Red Top Tube   Result Value Ref Range    Hold Specimen x    Result Value Ref Range    Troponin T, High Sensitivity 12 <=14 ng/L     CT Chest w Contrast   Final Result   IMPRESSION:    Mild bibasilar atelectasis. No evidence of pneumonia or pulmonary edema.       THIS DOCUMENT HAS BEEN ELECTRONICALLY SIGNED BY MAAME PORTER MD      CT Head w/o Contrast   Final Result   IMPRESSION: Negative Head CT      DONNA PENN MD            SYSTEM ID:  [id number]      XR Chest Port 1 View   Final Result   IMPRESSION: Clear chest      DONNA PENN MD            SYSTEM ID:  [id number]            [id number] EKG available for comparison. EKG reviewed at 1805.  1) Rhythm: Sinus rhythm  2) Rate: ventricular rate 66 bpm.  3) QRS Axis: No axis deviation  4) P waves/ NM interval: Sinus.  Short WANDA. No atrial enlargement  5) QRS complex: Narrow. No BBB. No ventricular hypertrophy. No pathological Q waves.  6) ST Segment: No acute  ST segment elevation or depression  7) T waves: No T wave inversions. No peaked or flattened T waves.     I have independently reviewed and interpreted today's EKG, pending cardiologist over read.           I, Octaviano Nolan PA-C, personally performed the services described in this documentation, and it is both accurate and complete.       Darren Nolan PA-C  08/23/23 3537

## 2023-08-25 NOTE — PROGRESS NOTES
"  Assessment & Plan     1. Migraine without aura and without status migrainosus, not intractable  Chronic, poorly controlled. Has tried and failed multiple as needed medications, atenolol for preventative management, acupuncture, chiropractic care. Has previously met with neurology which patient feels was not helpful. Discussed options including alternative prn medication, preventative medication, repeat referral to neurology. Discussed preventative options, patient would like to try Nurtec as below. Educated on mediation, use, and side effects.   - rimegepant (NURTEC) 75 MG ODT tablet; Place 1 tablet (75 mg) under the tongue every 48 hours  Dispense: 16 tablet; Refill: 0    2. Tobacco use  Would like to continue to work towards cessation. Did not tolerate patches and unable to chew gum due to migraine trigger.  Discussed alternatives with lozenges or inhaler, Wellbutrin, and Chantix. Requesting inhaler.  - nicotine (NICOTROL) 10 MG inhaler; Use 1 cartridge as needed for urge to smoke by puffing over course of 20min.  Use 6-16 cart/day; reduce number of cart/day over 6-12 weeks.  Dispense: 200 each; Refill: 0    3. Acute recurrent maxillary sinusitis  Stable, refilled.   - fluticasone (FLONASE) 50 MCG/ACT nasal spray; Spray 2 sprays into both nostrils daily  Dispense: 16 g; Refill: 1    4. Memory loss  Seems to be somewhat associated with increased migraines. Reviewed labs from ED visit which were stable. Discussed additional evaluation with neurology, imaging, avoidance of illicit substances, etc. Continue to monitor for now.      MED REC REQUIRED  Post Medication Reconciliation Status:  Discharge medications reconciled, continue medications without change  BMI:   Estimated body mass index is 26.99 kg/m  as calculated from the following:    Height as of 7/6/23: 1.562 m (5' 1.5\").    Weight as of this encounter: 65.9 kg (145 lb 3.2 oz).   Weight management plan: Discussed healthy diet and exercise " guidelines        No follow-ups on file.    Lisa Crisostomo PA-C  [facility]    Oneil Gallegos is a 57 year old, presenting for the following health issues:  ER F/U ([facility] 8/23/23)      History of Present Illness     Asthma:  She presents for follow up of asthma.  She has some cough, some wheezing, and some shortness of breath.  She is using a relief medication daily. She does not miss any doses of her controller medication throughout the week. Patient is aware of the following triggers: smoke. The patient has had a visit to the Emergency Room, Urgent Care or Hospital due to asthma since the last clinic visit. She has been to the Emergency Room or Urgent Care 1 time.She has had a Hospitalization 0 times.    Reason for visit:  Follow up ER visit    She eats 2-3 servings of fruits and vegetables daily.She consumes 1 sweetened beverage(s) daily.She exercises with enough effort to increase her heart rate 20 to 29 minutes per day.  She exercises with enough effort to increase her heart rate 4 days per week.   She is taking medications regularly.       Here for ED follow up. Respiratory symptoms are improving.  He is still struggling with increased migraines.  Longstanding difficulties with migraines that have been refractory to multiple as needed medications.  Currently continues on rizatriptan as needed with minimal improvement.  She reports she had been on atenolol for preventative measures previously without any change.  She is also followed with neurology previously without any other changes recommended.  She has tried chiropractor and acupuncture more recently which seems to exacerbate symptoms.  She is wondering about trying a different preventative medication.    Would like to continue to work towards tobacco cessation.  Smoking approximately 1/2 pack/day.  Has tried patches previously and did not tolerate.  Unable to try nicotine gum as chewing gum is a trigger for migraines.  Has been  on Wellbutrin for mental health in the past and did not tolerate due to side effects.    Has been noticing some difficulties with short-term memory.  Both her and friend feel memory is worse on days that migraines are worse.  Extensive lab work completed in ED reviewed and was stable.  Denies significant alcohol use or illicit substance use.      Review of Systems   Per HPI        Objective    BP (!) 142/96   Pulse 62   Temp 98.6  F (37  C) (Tympanic)   Resp 16   Wt 65.9 kg (145 lb 3.2 oz)   LMP  (LMP Unknown)   SpO2 97%   Breastfeeding No   BMI 26.99 kg/m    Body mass index is 26.99 kg/m .  Physical Exam   General: Pleasant, in no apparent distress.  Cardiovascular: Regular rate and rhythm with S1 equal to S2. No murmurs, friction rubs, or gallops.   Respiratory: Lungs are resonant and clear to auscultation bilaterally. No wheezes, crackles, or rhonchi.  Skin: No jaundice, pallor, rashes, or lesions on exposed skin.   Psych: Appropriate mood and affect.

## 2023-08-27 LAB
ATRIAL RATE - MUSE: 66 BPM
DIASTOLIC BLOOD PRESSURE - MUSE: NORMAL MMHG
INTERPRETATION ECG - MUSE: NORMAL
P AXIS - MUSE: -18 DEGREES
PR INTERVAL - MUSE: 110 MS
QRS DURATION - MUSE: 74 MS
QT - MUSE: 426 MS
QTC - MUSE: 446 MS
R AXIS - MUSE: 5 DEGREES
SYSTOLIC BLOOD PRESSURE - MUSE: NORMAL MMHG
T AXIS - MUSE: -3 DEGREES
VENTRICULAR RATE- MUSE: 66 BPM

## 2023-08-28 ENCOUNTER — OFFICE VISIT (OUTPATIENT)
Dept: FAMILY MEDICINE | Facility: OTHER | Age: 58
End: 2023-08-28
Attending: PHYSICIAN ASSISTANT
Payer: COMMERCIAL

## 2023-08-28 VITALS
BODY MASS INDEX: 26.99 KG/M2 | TEMPERATURE: 98.6 F | OXYGEN SATURATION: 97 % | DIASTOLIC BLOOD PRESSURE: 96 MMHG | WEIGHT: 145.2 LBS | SYSTOLIC BLOOD PRESSURE: 142 MMHG | HEART RATE: 62 BPM | RESPIRATION RATE: 16 BRPM

## 2023-08-28 DIAGNOSIS — J01.01 ACUTE RECURRENT MAXILLARY SINUSITIS: ICD-10-CM

## 2023-08-28 DIAGNOSIS — Z72.0 TOBACCO USE: ICD-10-CM

## 2023-08-28 DIAGNOSIS — R41.3 MEMORY LOSS: ICD-10-CM

## 2023-08-28 DIAGNOSIS — G43.009 MIGRAINE WITHOUT AURA AND WITHOUT STATUS MIGRAINOSUS, NOT INTRACTABLE: Primary | ICD-10-CM

## 2023-08-28 PROCEDURE — 99214 OFFICE O/P EST MOD 30 MIN: CPT | Performed by: PHYSICIAN ASSISTANT

## 2023-08-28 RX ORDER — FLUTICASONE PROPIONATE 50 MCG
2 SPRAY, SUSPENSION (ML) NASAL DAILY
Qty: 16 G | Refills: 1 | Status: SHIPPED | OUTPATIENT
Start: 2023-08-28 | End: 2024-02-12

## 2023-08-28 ASSESSMENT — PATIENT HEALTH QUESTIONNAIRE - PHQ9
SUM OF ALL RESPONSES TO PHQ QUESTIONS 1-9: 9
SUM OF ALL RESPONSES TO PHQ QUESTIONS 1-9: 9
10. IF YOU CHECKED OFF ANY PROBLEMS, HOW DIFFICULT HAVE THESE PROBLEMS MADE IT FOR YOU TO DO YOUR WORK, TAKE CARE OF THINGS AT HOME, OR GET ALONG WITH OTHER PEOPLE: SOMEWHAT DIFFICULT

## 2023-08-28 ASSESSMENT — PAIN SCALES - GENERAL: PAINLEVEL: EXTREME PAIN (8)

## 2023-08-28 ASSESSMENT — ASTHMA QUESTIONNAIRES: ACT_TOTALSCORE: 17

## 2023-08-28 NOTE — NURSING NOTE
Patient is here for follow up of recent ER visit on 8/23/23. Is feeling a little better, is trying not smoke, is wanting something to help quit.     No LMP recorded (lmp unknown). Patient has had a hysterectomy.  Medication Reconciliation: complete    Gaviota Morrow LPN 8/28/2023 10:13 AM       Advance care directive on file? no  Advance care directive provided to patient? no       Gaviota Morrow LPN

## 2023-09-26 DIAGNOSIS — G43.009 MIGRAINE WITHOUT AURA AND WITHOUT STATUS MIGRAINOSUS, NOT INTRACTABLE: ICD-10-CM

## 2023-09-30 ENCOUNTER — HEALTH MAINTENANCE LETTER (OUTPATIENT)
Age: 58
End: 2023-09-30

## 2023-10-02 DIAGNOSIS — G43.009 MIGRAINE WITHOUT AURA AND WITHOUT STATUS MIGRAINOSUS, NOT INTRACTABLE: ICD-10-CM

## 2023-10-02 NOTE — TELEPHONE ENCOUNTER
Reason for call: Medication or medication refill    Name of medication requested: nurtec    How many days of medication do you have left? ZERO    What pharmacy do you use? [facility]    Preferred method for responding to this message: Telephone Call    Phone number patient can be reached at: Cell number on file:    Telephone Information:   Mobile [phone]       If we cannot reach you directly, may we leave a detailed response at the number you provided? Yes      Patient stated the pharmacy asked her to call MIKE Hill on 10/2/2023 at 12:13 PM

## 2023-10-02 NOTE — TELEPHONE ENCOUNTER
Routing refill request to provider for review/approval because:    LOV: 8/28/23    Michaela Townsend RN on 10/2/2023 at 12:31 PM

## 2023-10-03 RX ORDER — RIMEGEPANT SULFATE 75 MG/75MG
TABLET, ORALLY DISINTEGRATING ORAL
Qty: 16 TABLET | Refills: 0 | OUTPATIENT
Start: 2023-10-03

## 2023-10-03 NOTE — TELEPHONE ENCOUNTER
Patient calling back about refill.    Reason for call: Medication or medication refill    Name of medication requested: Nurtec    How many days of medication do you have left? Out of medicine     What pharmacy do [facility]     Preferred method for responding to this message: Telephone Call    Phone number patient can be reached at: Cell number on file:    Telephone Information:   Mobile [phone]       If we cannot reach you directly, may we leave a detailed response at the number you provided? Yes

## 2023-10-03 NOTE — TELEPHONE ENCOUNTER
Disp Refills Start End MARIN   rimegepant (NURTEC) 75 MG ODT tablet 16 tablet 0 10/2/2023  No   Sig - Route: Place 1 tablet (75 mg) under the tongue every 48 hours - Sublingual   Sent to pharmacy as: Rimegepant Sulfate 75 MG Oral Tablet Disintegrating (NURTEC)   Class: E-Prescribe   Order: [id number]   E-Prescribing Status: Receipt confirmed by pharmacy (10/2/2023 12:33 PM CDT)     [id number] - [place], [place] - [address]     Called and spoke to Patient after verifying last name and date of birth. Pt states she spoke with staff at pharmacy yesterday late afternoon, and they denied receipt. Writer will return call to Pt, if there are any further issues with prescription. Called [facility], and spoke with pharmacist, after verifying Pt's last name and . They confirmed receipt, stating it is ready for , and insurance only covered #8 tablets. Pt notified. Kezia Gale RN .............. 10/3/2023  10:49 AM

## 2023-10-17 DIAGNOSIS — F33.41 RECURRENT MAJOR DEPRESSIVE DISORDER, IN PARTIAL REMISSION (H): ICD-10-CM

## 2023-10-17 DIAGNOSIS — K21.9 GASTROESOPHAGEAL REFLUX DISEASE WITHOUT ESOPHAGITIS: ICD-10-CM

## 2023-10-17 NOTE — LETTER
October 24, 2023      El Joseph  [address]  [place] [address]        Dear El,     This letter is to remind you that you are due for your annual exam with Lisa Crisostomo. Your last comprehensive visit was more than 12 months ago.      Please call the clinic at [phone] to schedule your appointment.      Thank you for choosing [facility] and [facility] for your health care needs.    Sincerely,    Refill KINGSTON  [facility]

## 2023-10-24 RX ORDER — FLUOXETINE 40 MG/1
CAPSULE ORAL
Qty: 90 CAPSULE | Refills: 0 | Status: SHIPPED | OUTPATIENT
Start: 2023-10-24 | End: 2024-01-23

## 2023-10-24 RX ORDER — ESOMEPRAZOLE MAGNESIUM 40 MG/1
CAPSULE, DELAYED RELEASE ORAL
Qty: 90 CAPSULE | Refills: 0 | Status: SHIPPED | OUTPATIENT
Start: 2023-10-24 | End: 2024-01-23

## 2023-10-24 NOTE — TELEPHONE ENCOUNTER
[facility] sent Rx request for the following:      Requested Prescriptions   Pending Prescriptions Disp Refills    FLUoxetine (PROZAC) 40 MG capsule [Pharmacy Med Name: FLUOXETINE 40MG CAPSULE] 90 capsule 1     Sig: TAKE 1 CAPSULE BY MOUTH DAILY       SSRIs Protocol Failed - 10/17/2023  1:06 AM        Failed - PHQ-9 score less than 5 in past 6 months     Please review last PHQ-9 score.          Last Prescription Date:   5/2/23  Last Fill Qty/Refills:         90, R-1            esomeprazole (NEXIUM) 40 MG DR capsule [Pharmacy Med Name: ESOMEPRAZOLE 40MG DR CAPSULE] 90 capsule 1     Sig: TAKE 1 CAPSULE BY MOUTH EVERY DAY - 30-60 MINUTES BEFORE A MEAL       PPI Protocol Passed - 10/17/2023  1:06 AM       Last Prescription Date:   5/2/23  Last Fill Qty/Refills:         90, R-1    Last Office Visit:              10/25/22   Future Office visit:           none    Patient is due for annual review.  Letter and my chart message sent.  Will route to unit 4 scheduling to call patient and help assist in scheduling appointment.    Michaela Townsend RN on 10/24/2023 at 9:51 AM

## 2023-11-13 ENCOUNTER — OFFICE VISIT (OUTPATIENT)
Dept: CARDIOLOGY | Facility: OTHER | Age: 58
End: 2023-11-13
Attending: NURSE PRACTITIONER
Payer: COMMERCIAL

## 2023-11-13 VITALS
BODY MASS INDEX: 27.51 KG/M2 | OXYGEN SATURATION: 96 % | TEMPERATURE: 96 F | SYSTOLIC BLOOD PRESSURE: 136 MMHG | HEART RATE: 72 BPM | WEIGHT: 148 LBS | DIASTOLIC BLOOD PRESSURE: 76 MMHG | RESPIRATION RATE: 20 BRPM

## 2023-11-13 DIAGNOSIS — R07.9 CHEST PAIN, UNSPECIFIED TYPE: Primary | ICD-10-CM

## 2023-11-13 DIAGNOSIS — E78.5 HYPERLIPIDEMIA, UNSPECIFIED HYPERLIPIDEMIA TYPE: ICD-10-CM

## 2023-11-13 DIAGNOSIS — R06.09 DOE (DYSPNEA ON EXERTION): ICD-10-CM

## 2023-11-13 DIAGNOSIS — E78.2 MIXED HYPERLIPIDEMIA: ICD-10-CM

## 2023-11-13 DIAGNOSIS — R94.31 SHORTENED PR INTERVAL: ICD-10-CM

## 2023-11-13 DIAGNOSIS — Z82.49 FAMILY HISTORY OF PREMATURE CAD: ICD-10-CM

## 2023-11-13 DIAGNOSIS — F17.200 TOBACCO DEPENDENCE SYNDROME: ICD-10-CM

## 2023-11-13 LAB
ATRIAL RATE - MUSE: 69 BPM
CHOLEST SERPL-MCNC: 222 MG/DL
DIASTOLIC BLOOD PRESSURE - MUSE: NORMAL MMHG
HDLC SERPL-MCNC: 48 MG/DL
INTERPRETATION ECG - MUSE: NORMAL
LDLC SERPL CALC-MCNC: 136 MG/DL
NONHDLC SERPL-MCNC: 174 MG/DL
P AXIS - MUSE: 14 DEGREES
PR INTERVAL - MUSE: 108 MS
QRS DURATION - MUSE: 74 MS
QT - MUSE: 392 MS
QTC - MUSE: 420 MS
R AXIS - MUSE: 21 DEGREES
SYSTOLIC BLOOD PRESSURE - MUSE: NORMAL MMHG
T AXIS - MUSE: -87 DEGREES
TRIGL SERPL-MCNC: 188 MG/DL
VENTRICULAR RATE- MUSE: 69 BPM

## 2023-11-13 PROCEDURE — 99203 OFFICE O/P NEW LOW 30 MIN: CPT | Performed by: NURSE PRACTITIONER

## 2023-11-13 PROCEDURE — 93000 ELECTROCARDIOGRAM COMPLETE: CPT | Performed by: INTERNAL MEDICINE

## 2023-11-13 PROCEDURE — 36415 COLL VENOUS BLD VENIPUNCTURE: CPT | Mod: ZL | Performed by: NURSE PRACTITIONER

## 2023-11-13 PROCEDURE — 80061 LIPID PANEL: CPT | Mod: ZL | Performed by: NURSE PRACTITIONER

## 2023-11-13 ASSESSMENT — PAIN SCALES - GENERAL: PAINLEVEL: EXTREME PAIN (8)

## 2023-11-13 NOTE — PROGRESS NOTES
"[facility] CARE   CARDIOLOGY CONSULT     El Joseph   [address]  [place] [address]    Lisa Crisostomo     Chief Complaint   Patient presents with    Consult     Chest pain        HPI:   Mrs. Joseph is a 58 year old female who presents for cardiology evaluation with recently endorsed chest pain.  PMH includes hyperlipidemia, chronic headaches, tobacco dependence, GERD, osteoarthritis, fibromyalgia, chronic insomnia, TMJ, significant anxiety and depression.    Patient presented to the ED on 8/23/2023 with headache and reports of chest pain.  She described increased dyspnea in the prior 2 weeks.  She described chest discomfort substernally, stabbing sensation.  ECG revealing borderline shortened TN interval without any ischemic ST-T changes.  Serial troponins negative.  D-dimer without elevation.  CT of the chest with contrast was unremarkable.    Patient admits to family history of late onset CHF in bother her parents. Father with premature CAD, first MI in his early 60's. Patients son was recently identified for have cardiomyopathy in his early 30's and is following cardiology in the [facility].     Patient has a long history of tobacco abuse, currently down to 2 cigarettes per day. She has been on atorvastatin 20 mg daily for the past year. She has not required antihypertensive treatment.     Today, patient describes episodes of chest pressure which occur weekly. She suspects anxiety related as she is usually not active/ no exertion when symptoms occur. Onset of symptoms include tingling in hands and feet, numbness of tongue, heart racing at the time of event and chest pressure \"feels like someone is sitting on me\". Symptoms do improve with resting, deep breathing and use of anxiety medication. Additionally, she describes increased exertional dyspnea over past 6 months. Occasional lightheadedness without near syncope. No significant edema, positive for gradual weight gain.       PAST MEDICAL " HISTORY:   Past Medical History:   Diagnosis Date    Pain in knee     No Comments Provided    Personal history of other medical treatment (CODE)     G7, P3-0-4-3 (3 vag. 4 spon miscarriages-one in the second trimester and one daughter was twin and lost the twin).    Radiculopathy     2011,Benign hypermobility per [facility]          FAMILY HISTORY:   Family History   Problem Relation Age of Onset    Breast Cancer Mother 68        Cancer-breast    Diabetes Mother         Diabetes    Heart Disease Father         Heart Disease,CHF, pacemaker,MI at 93    Hypertension Father         Hypertension    Aneurysm Sister         Brain     Fibromyalgia Sister     Arthritis Sister     Migraines Sister         Chronic    Arthritis Brother     Aneurysm Sister         Brain    Migraines Sister     Migraines Sister     Breast Cancer Sister         Cancer-breast    Tumor Sister         Brain    Breast Cancer Sister     Ovarian Cancer Sister     Arthritis Sister     Aneurysm Brother         Brain    Arthritis Brother     Arthritis Brother           PAST SURGICAL HISTORY:   Past Surgical History:   Procedure Laterality Date    COLONOSCOPY      4/2003,Normal, bleeding hemorrhoids    COLONOSCOPY N/A 11/30/2021    tubular adenomas- f/u 5 years    HYSTERECTOMY VAGINAL      5/2003,Dr [facility], chronic pelvic pain/endometriosis    OTHER SURGICAL HISTORY      [id number],DILATION AND CURETTAGE,After 4 miscarriages    OTHER SURGICAL HISTORY      1986,[id number],OTHER,Ovarian cyst    OTHER SURGICAL HISTORY      6/2002,658853,OTHER,Chronic pelvic pain/hemorrhagic cyst    OTHER SURGICAL HISTORY      2/2009,[id number],OTHER,Dr Ramirez-recurrent ovarian cysts          SOCIAL HISTORY:   Social History     Socioeconomic History    Marital status:      Spouse name: None    Number of children: None    Years of education: None    Highest education level: None   Tobacco Use    Smoking status: Every Day     Packs/day: 0.20     Years: 10.00     Additional pack years:  0.00     Total pack years: 2.00     Types: Cigarettes     Start date: 5/1/1984     Passive exposure: Current    Smokeless tobacco: Never    Tobacco comments:     Trying to quit, minimal 0-1 a day about    Vaping Use    Vaping Use: Former    Substances: Nicotine, Flavoring   Substance and Sexual Activity    Alcohol use: Not Currently     Alcohol/week: 0.0 - 1.0 standard drinks of alcohol    Drug use: Not Currently     Types: Marijuana    Sexual activity: Yes     Partners: Male     Birth control/protection: Surgical     Comment: Birth control method: Hysterectomy, vasectomy   Social History Narrative    . Spouse, Adrian.  Children, Pradeep, 02/11/92; moving to [place] for school 2016 and was using heroin but went to treatment, then a suicide attempt fall 2016 and living in [place], then went to treatment, has mental health issues and applying for disability,   Dora, 09/09/97-living in  and expecting baby 2018; Clifford, 02/20/99, will graduate from high school 2017, considering school at [employer] or [employer] [place].  (Current  is not father of Kyrie).  Working at [employer] in [place] part time.          CURRENT MEDICATIONS:   Prior to Admission medications    Medication Sig Start Date End Date Taking? Authorizing Provider   albuterol (PROAIR HFA/PROVENTIL HFA/VENTOLIN HFA) 108 (90 Base) MCG/ACT inhaler Inhale 2 puffs into the lungs every 6 hours 7/6/23  Yes Charlee Cordero PA-C   albuterol (PROVENTIL) (2.5 MG/3ML) 0.083% neb solution Take 1 vial (2.5 mg) by nebulization every 6 hours as needed for shortness of breath, wheezing or cough 8/23/23  Yes Darren Nolan PA-C   atorvastatin (LIPITOR) 20 MG tablet TAKE 1 TABLET BY MOUTH ONCE DAILY 2/3/23  Yes Morales Park MD   clobetasol (TEMOVATE) 0.05 % external solution APPLY TO THE SCALP TWICE DAILY. 12/13/21  Yes Reported, Patient   cyclobenzaprine (FLEXERIL) 10 MG tablet Take 1 tablet (10 mg) by mouth 3 times daily as needed for muscle spasms 7/6/23   Yes Charlee Cordero PA-C   esomeprazole (NEXIUM) 40 MG DR capsule TAKE 1 CAPSULE BY MOUTH EVERY DAY - 30-60 MINUTES BEFORE A MEAL 10/24/23  Yes Lisa Crisostomo PA-C   FLUoxetine (PROZAC) 10 MG capsule TAKE 1 CAPSULE (10 MG) BY MOUTH DAILY 2/15/23  Yes Lisa Crisostomo PA-C   FLUoxetine (PROZAC) 40 MG capsule TAKE 1 CAPSULE BY MOUTH DAILY 10/24/23  Yes Lisa Crisostomo PA-C   fluticasone (FLONASE) 50 MCG/ACT nasal spray Spray 2 sprays into both nostrils daily 8/28/23  Yes Lisa Crisostomo PA-C   HYDROcodone-acetaminophen (NORCO) 5-325 MG tablet Take 1 tablet by mouth every 8 hours as needed for severe pain * ACETAMINOPHEN SHOULD BE LIMITED TO 4000MG PER DAY* 7/6/23  Yes Charlee Cordero PA-C   ipratropium - albuterol 0.5 mg/2.5 mg/3 mL (DUONEB) 0.5-2.5 (3) MG/3ML neb solution Take 0.5-1 vials (1.5-3 mLs) by nebulization every 2 hours as needed for shortness of breath, wheezing or cough 4/3/23  Yes Ventura Millard MD   nicotine (NICOTROL) 10 MG inhaler Use 1 cartridge as needed for urge to smoke by puffing over course of 20min.  Use 6-16 cart/day; reduce number of cart/day over 6-12 weeks. 8/28/23  Yes Lisa Crisostomo PA-C   pregabalin (LYRICA) 150 MG capsule Take 1 capsule (150 mg) by mouth 2 times daily 7/6/23  Yes Charlee Cordero PA-C   rizatriptan (MAXALT) 10 MG tablet Take 1 tablet (10 mg) by mouth at onset of headache for migraine May repeat in 2 hours. Max 3 tablets/24 hours. 7/6/23  Yes Charlee Cordero PA-C          ALLERGIES:   Allergies   Allergen Reactions    Amoxicillin      Not effective.     Nsaids      Other reaction(s): Ecchymosis          ROS:   CONSTITUTIONAL: No reported fever or chills. No changes in weight.  ENT: No visual disturbance, ear ache, epistaxis or sore throat.   CARDIOVASCULAR: Positive for chest discomfort, see HPI. Positive for rare racing heart palpitations with chest pain episodes. No lower extremity edema.   RESPIRATORY: Positive for increased dyspnea  upon exertion. No cough, wheezing or hemoptysis.   GI: No reported abdominal pain.   : No reported hematuria or dysuria.   NEUROLOGICAL: Occasional lightheadedness. No dizziness, presyncope, syncope, ataxia, paresthesias or weakness.   HEMATOLOGIC: No history of anemia. No bleeding or excessive bruising. No history of blood clots.   MUSCULOSKELETAL: No new joint pain or swelling, no muscle pain.  ENDOCRINOLOGIC: No temperature intolerance. No hair or skin changes.  SKIN: No abnormal rashes or sores, no unusual itching.  PSYCHIATRIC: Positive for history of depression and anxiety.     PHYSICAL EXAM:   /76 (BP Location: Right arm, Patient Position: Sitting, Cuff Size: Adult Regular)   Pulse 72   Temp (!) 96  F (35.6  C) (Temporal)   Resp 20   Wt 67.1 kg (148 lb)   LMP  (LMP Unknown)   SpO2 96%   BMI 27.51 kg/m    GENERAL: The patient is a well-developed, well-nourished, in no apparent distress.  HEENT: Head is normocephalic and atraumatic. Eyes are symmetrical with normal visual tracking. No icterus, no xanthelasmas. Nares appeared normal without nasal drainage. Mucous membranes are moist, no cyanosis.  NECK: Supple, no cervical bruits, JVP not visible.   CHEST/ LUNGS: Lungs clear to auscultation, no rales, rhonchi or wheezes, no use of accessory muscles, no retractions, respirations unlabored and normal respiratory rate.   CARDIO: Regular rate and rhythm normal with S1 and S2, no S3 or S4 and no murmur, click or rub.   ABD: Abdomen is nondistended.   EXTREMITIES: No edema present.   MUSCULOSKELETAL: No visible joint swelling.   NEUROLOGIC: Alert and oriented X3. Normal speech, gait and affect. No focal neurologic deficits.   SKIN: No jaundice. No rashes or visible skin lesions present. No ecchymosis.     EKG:    Sinus rhythm with shortened OK ( ms), rate 69 bpm, nonspecific T wave abnormality, consider inferolateral ischemia.     LAB RESULTS:   No visits with results within 3 Month(s) from this  "visit.   Latest known visit with results is:   Office Visit on 10/25/2022   Component Date Value Ref Range Status    Cannabinoids (23-jyk-6-carboxy-9-T* 10/25/2022 Presumptive positive-Unconfirmed result (A)  Not Detected Final    Phencyclidine 10/25/2022 Not Detected  Not Detected Final    Cocaine (Benzoylecgonine) 10/25/2022 Not Detected  Not Detected Final    Methamphetamine (d-Methamphetamine) 10/25/2022 Not Detected  Not Detected Final    Opiates (Morphine) 10/25/2022 Not Detected  Not Detected Final    Amphetamine (d-Amphetamine) 10/25/2022 Not Detected  Not Detected Final    Benzodiazepines (Nordiazepam) 10/25/2022 Not Detected  Not Detected Final    Tricyclic Antidepressants (Desipra* 10/25/2022 Presumptive positive-Unconfirmed result (A)  Not Detected Final    Methadone 10/25/2022 Not Detected  Not Detected Final    Barbiturates (Butalbital) 10/25/2022 Not Detected  Not Detected Final    Oxycodone 10/25/2022 Not Detected  Not Detected Final    Propoxyphene (Norpropoxyphene) 10/25/2022 Not Detected  Not Detected Final    Buprenorphine 10/25/2022 Not Detected  Not Detected Final    Lorazepam 10/25/2022 Present (A)  Absent Final    Pregabalin 10/25/2022 Present (A)  Absent Final    Creatinine Urine for Drug Screen 10/25/2022 58  mg/dL Final    Cannabinoids Urine 10/25/2022 Screen Positive (A)  Screen Negative Final          ASSESSMENT:   El Joseph presents for cardiology evaluation with recently endorsed chest pain.  PMH includes hyperlipidemia, chronic headaches, tobacco dependence, GERD, osteoarthritis, fibromyalgia, chronic insomnia, TMJ, significant anxiety and depression.  Today, patient describes episodes of chest pressure which occur weekly. She suspects anxiety related as she is usually not active/ no exertion when symptoms occur. Onset of symptoms include tingling in hands and feet, numbness of tongue, heart racing at the time of event and chest pressure \"feels like someone is sitting on me\". " Symptoms do improve with resting, deep breathing and use of anxiety medication. Additionally, she describes increased exertional dyspnea over past 6 months. Occasional lightheadedness without near syncope. No significant edema, positive for gradual weight gain.     1. Chest pain, unspecified type  2. Tobacco dependence syndrome  3. Mixed hyperlipidemia  4. Family history of premature CAD  5. KUMAR (dyspnea on exertion)  6. Shortened CT interval    PLAN:   Nonspecific chest pains, patient suspects may be related to anxiety. Multiple CAD risk factors, she will proceed with dobutamine stress echocardiogram. Admits that she can not exercise for this study due to chronic OA.   Strongly encouraged complete tobacco cessation.   Repeat fasting lipids today. Last lipid panel (10/2021)-severely elevated , , HDL 48, . She was started on Atorvastatin 20 mg daily 1 year ago.  Shortened CT interval on ECG, no concerning symptoms. If increased palpitations consider cardiac monitoring. Shortened CT interval was present on ECG dating back to 2019 without progression.     Orders Placed This Encounter   Procedures    Lipid Profile    EKG 12-lead, tracing only (Same Day)    Echo Stress Echocardiogram     Thank you for allowing me to participate in the care of your patient. Please do not hesitate to contact me if you have any questions.     Total time 42 minutes on date of encounter spent reviewing records, face-to-face time performing HPI, physical exam, reviewing results of recent testing and counseling on the above diagnoses and recommended plan of care.    Becky Anthony, APRN CNP [facility]

## 2023-11-13 NOTE — NURSING NOTE
"Chief Complaint   Patient presents with    Consult     Chest pain       Initial /76 (BP Location: Right arm, Patient Position: Sitting, Cuff Size: Adult Regular)   Pulse 72   Temp (!) 96  F (35.6  C) (Temporal)   Resp 20   Wt 67.1 kg (148 lb)   LMP  (LMP Unknown)   SpO2 96%   BMI 27.51 kg/m   Estimated body mass index is 27.51 kg/m  as calculated from the following:    Height as of 7/6/23: 1.562 m (5' 1.5\").    Weight as of this encounter: 67.1 kg (148 lb).  Meds Reconciled: complete  Pt is not on Aspirin  Pt is on a Statin  PHQ and/or MILY reviewed. Pt referred to PCP/MH Provider as appropriate.    Michelle Marley LPN    "

## 2023-11-13 NOTE — PATIENT INSTRUCTIONS
You will receive a phone call to schedule stress echocardiogram.   Labs today, we will notify you of results.

## 2023-11-17 RX ORDER — ATORVASTATIN CALCIUM 40 MG/1
20 TABLET, FILM COATED ORAL AT BEDTIME
Qty: 90 TABLET | Refills: 3 | Status: SHIPPED | OUTPATIENT
Start: 2023-11-17 | End: 2023-12-13

## 2023-12-13 ENCOUNTER — VIRTUAL VISIT (OUTPATIENT)
Dept: FAMILY MEDICINE | Facility: OTHER | Age: 58
End: 2023-12-13
Attending: PHYSICIAN ASSISTANT
Payer: COMMERCIAL

## 2023-12-13 DIAGNOSIS — E78.5 HYPERLIPIDEMIA, UNSPECIFIED HYPERLIPIDEMIA TYPE: ICD-10-CM

## 2023-12-13 DIAGNOSIS — U07.1 INFECTION DUE TO 2019 NOVEL CORONAVIRUS: Primary | ICD-10-CM

## 2023-12-13 DIAGNOSIS — G43.009 MIGRAINE WITHOUT AURA AND WITHOUT STATUS MIGRAINOSUS, NOT INTRACTABLE: ICD-10-CM

## 2023-12-13 DIAGNOSIS — G89.29 OTHER CHRONIC PAIN: ICD-10-CM

## 2023-12-13 PROCEDURE — 99213 OFFICE O/P EST LOW 20 MIN: CPT | Mod: 95 | Performed by: PHYSICIAN ASSISTANT

## 2023-12-13 RX ORDER — ATORVASTATIN CALCIUM 40 MG/1
20 TABLET, FILM COATED ORAL AT BEDTIME
Qty: 90 TABLET | Refills: 3 | Status: SHIPPED | OUTPATIENT
Start: 2023-12-13

## 2023-12-13 RX ORDER — RIZATRIPTAN BENZOATE 10 MG/1
10 TABLET ORAL
Qty: 10 TABLET | Refills: 0 | Status: SHIPPED | OUTPATIENT
Start: 2023-12-13 | End: 2024-02-12

## 2023-12-13 RX ORDER — CYCLOBENZAPRINE HCL 10 MG
10 TABLET ORAL 3 TIMES DAILY PRN
Qty: 90 TABLET | Refills: 1 | Status: SHIPPED | OUTPATIENT
Start: 2023-12-13 | End: 2024-07-30

## 2023-12-13 NOTE — PROGRESS NOTES
El is a 58 year old who is being evaluated via a billable telephone visit.      What phone number would you like to be contacted at? [phone]  How would you like to obtain your AVS? [place]    Distant Location (provider location):  On-site    Assessment & Plan     1. Migraine without aura and without status migrainosus, not intractable  Chronic, stable. Refilled as below.   - rizatriptan (MAXALT) 10 MG tablet; Take 1 tablet (10 mg) by mouth at onset of headache for migraine May repeat in 2 hours. Max 3 tablets/24 hours.  Dispense: 10 tablet; Refill: 0    2. Infection due to 2019 novel coronavirus  After discussion patient would like to proceed with antiviral treatment.  Prescription for Paxlovid was sent in.  Educated on medication and side effects.  Recommend holding off on starting atorvastatin until 8 days after the start of the antiviral therapy due to interactions.  Okay to use over-the-counter cough or cold medications as needed.  - nirmatrelvir and ritonavir (PAXLOVID) 300 mg/100 mg therapy pack; Take 3 tablets by mouth 2 times daily for 5 days (Take 2 Nirmatrelvir tablets and 1 Ritonavir tablet twice daily for 5 days)  Dispense: 30 tablet; Refill: 0    3. Other chronic pain  Chronic, stable.  Refill as below.  - cyclobenzaprine (FLEXERIL) 10 MG tablet; Take 1 tablet (10 mg) by mouth 3 times daily as needed for muscle spasms  Dispense: 90 tablet; Refill: 1    4. Hyperlipidemia, unspecified hyperlipidemia type  Recently prescribed statin by cardiology but patient reports pharmacy did not receive prescription.  Repeat order sent to pharmacy.  Hold off on starting medication until 8 days after antiviral therapy has been started.  - atorvastatin (LIPITOR) 40 MG tablet; Take 0.5 tablets (20 mg) by mouth at bedtime  Dispense: 90 tablet; Refill: 3      No follow-ups on file.    Lisa Crisostomo PA-C  [facility] AND [facility]    Subjective   El is a 58 year old, presenting for the following health  issues:  Covid Concern      HPI     COVID-19 Symptom Review  How many days ago did these symptoms start? 12/11/2023    Are any of the following symptoms significant for you?  New or worsening difficulty breathing? No  Worsening cough? Yes, I am coughing up mucus.  Fever or chills? Yes, the highest temperature was 103  Headache: YES  Sore throat: YES  Chest pain: YES  Diarrhea: No  Body aches? YES    What treatments has patient tried? Acetaminophen, cough drops  Does patient live in a nursing home, group home, or shelter? YES  Does patient have a way to get food/medications during quarantined? Yes, I have a friend or family member who can help me.    Patient is interested in antiviral therapy.  She is also needing refill of her rizatriptan for management of migraines as well as her cyclobenzaprine for chronic pain.  Reports she was prescribed atorvastatin by cardiology last month but did not receive this prescription.  Reports pharmacy reports has not received.    Review of Systems   Per HPI        Objective           Vitals:  No vitals were obtained today due to virtual visit.    Physical Exam   healthy, alert, and no distress  PSYCH: Alert and oriented times 3; coherent speech, normal   rate and volume, able to articulate logical thoughts, able   to abstract reason, no tangential thoughts, no hallucinations   or delusions  Her affect is normal  RESP: No cough, no audible wheezing, able to talk in full sentences  Remainder of exam unable to be completed due to telephone visits              Phone call duration: 7 minutes

## 2023-12-13 NOTE — NURSING NOTE
Patient is requesting Paxlovid for COVID.  Mia Cullen LPN ....................  12/13/2023   9:20 AM  EXT [id number]

## 2023-12-21 DIAGNOSIS — M79.7 FIBROMYALGIA: ICD-10-CM

## 2023-12-21 RX ORDER — PREGABALIN 150 MG/1
150 CAPSULE ORAL 2 TIMES DAILY
Qty: 180 CAPSULE | Refills: 0 | Status: SHIPPED | OUTPATIENT
Start: 2023-12-21 | End: 2024-02-12

## 2023-12-21 NOTE — TELEPHONE ENCOUNTER
Refilled Rx.   Due for recheck prior to future refills.  Charlee Cordero PA-C.......... 12/21/2023  4:09 PM        PDMP Review         Value Time User    State PDMP site checked  Yes 12/21/2023  4:09 PM Charlee Cordero PA-C

## 2024-01-15 DIAGNOSIS — F33.41 RECURRENT MAJOR DEPRESSIVE DISORDER, IN PARTIAL REMISSION (H): ICD-10-CM

## 2024-01-15 RX ORDER — FLUOXETINE 10 MG/1
10 CAPSULE ORAL DAILY
Qty: 90 CAPSULE | Refills: 3 | Status: SHIPPED | OUTPATIENT
Start: 2024-01-15

## 2024-01-15 NOTE — TELEPHONE ENCOUNTER
[id number] sent Rx request for the following:      Requested Prescriptions   Pending Prescriptions Disp Refills    FLUoxetine (PROZAC) 10 MG capsule [Pharmacy Med Name: FLUOXETINE 10MG CAPSULE] 90 capsule 3     Sig: TAKE 1 CAPSULE (10 MG) BY MOUTH DAILY       SSRIs Protocol Failed - 1/15/2024  1:08 AM        Failed - PHQ-9 score less than 5 in past 6 months     Please review last PHQ-9 score.          Last Prescription Date:   10/24/23  Last Fill Qty/Refills:         90, R-0    Last Office Visit:              8/28/23   Future Office visit:           none    Routing refill request to provider for review/approval because:  Drug not on the [facility] refill protocol     Caty Roque RN on 1/15/2024 at 4:14 PM

## 2024-01-19 DIAGNOSIS — F33.41 RECURRENT MAJOR DEPRESSIVE DISORDER, IN PARTIAL REMISSION (H): ICD-10-CM

## 2024-01-19 DIAGNOSIS — K21.9 GASTROESOPHAGEAL REFLUX DISEASE WITHOUT ESOPHAGITIS: ICD-10-CM

## 2024-01-23 RX ORDER — ESOMEPRAZOLE MAGNESIUM 40 MG/1
CAPSULE, DELAYED RELEASE ORAL
Qty: 90 CAPSULE | Refills: 0 | Status: SHIPPED | OUTPATIENT
Start: 2024-01-23 | End: 2024-02-12

## 2024-01-23 RX ORDER — FLUOXETINE 40 MG/1
CAPSULE ORAL
Qty: 90 CAPSULE | Refills: 0 | Status: SHIPPED | OUTPATIENT
Start: 2024-01-23 | End: 2024-02-12

## 2024-01-23 NOTE — TELEPHONE ENCOUNTER
[facility]  sent Rx request for the following:      Requested Prescriptions   Pending Prescriptions Disp Refills    FLUoxetine (PROZAC) 40 MG capsule [Pharmacy Med Name: FLUOXETINE 40MG CAPSULE] 90 capsule 0     Sig: TAKE 1 CAPSULE BY MOUTH DAILY       SSRIs Protocol Failed - 1/23/2024 11:47 AM        Failed - PHQ-9 score less than 5 in past 6 months     Please review last PHQ-9 score.      Last Prescription Date:   10/24/23  Last Fill Qty/Refills:         90, R-0            esomeprazole (NEXIUM) 40 MG DR capsule [Pharmacy Med Name: ESOMEPRAZOLE 40MG DR CAPSULE] 90 capsule 0     Sig: TAKE 1 CAPSULE BY MOUTH EVERY DAY - 30-60 MINUTES BEFORE A MEAL       PPI Protocol Passed - 1/23/2024 11:47 AM          Last Prescription Date:   10/24/23  Last Fill Qty/Refills:         90, R-0    Last Office Visit:              8/28/23   Future Office visit:           none.    Called patient to verify Prozac dosing.  Patient states she is taking a total of 50 mg daily.  States is taking a 10 mg and a 40 mg daily.    Informed patient she is due for annual review and transferred to scheduling.  Michaela Townsend RN on 1/23/2024 at 11:58 AM

## 2024-02-09 NOTE — PROGRESS NOTES
Preventive Care Visit  [facility] AND [facility]  Lisa Crisostomo PA-C, Family Medicine  Feb 12, 2024    Assessment & Plan     1. Routine history and physical examination of adult  Preventative exams updated as below.  Reviewed lab work completed in August, patient deferred updating.    2. Need for Tdap vaccination  - TDAP 7+ (ADACEL,BOOSTRIX)    3. Encounter for screening mammogram for breast cancer  - MA Screen Bilateral w/Gera; Future    4. Recurrent major depressive disorder, in partial remission (H24)  Chronic, stable.  Refill as below.  - FLUoxetine (PROZAC) 40 MG capsule; Take 1 capsule (40 mg) by mouth daily  Dispense: 90 capsule; Refill: 3    5. Anxiety  Chronic, increasing with associated increased stress.  Discussed as needed management with hydroxyzine as below.  Discussed possibly increasing Prozac dose if minimal response.  Consider establishing with a therapist.  - hydrOXYzine HCl (ATARAX) 25 MG tablet; Take 1-2 tablets (25-50 mg) by mouth 3 times daily as needed for anxiety  Dispense: 60 tablet; Refill: 1    6. Chronic sinusitis, unspecified location  Chronic, stable.  Refilled as below.  - fluticasone (FLONASE) 50 MCG/ACT nasal spray; Spray 2 sprays into both nostrils daily  Dispense: 16 g; Refill: 3    7. Reactive airway disease with wheezing with acute exacerbation, unspecified asthma severity, unspecified whether persistent  Chronic, stable.  Refilled as below.  - albuterol (PROAIR HFA/PROVENTIL HFA/VENTOLIN HFA) 108 (90 Base) MCG/ACT inhaler; Inhale 2 puffs into the lungs every 6 hours  Dispense: 18 g; Refill: 11  - albuterol (PROVENTIL) (2.5 MG/3ML) 0.083% neb solution; Take 1 vial (2.5 mg) by nebulization every 6 hours as needed for shortness of breath, wheezing or cough  Dispense: 25 mL; Refill: 3    8. Fibromyalgia  Chronic, stable.  Refilled as below.  PDMP reviewed and appears appropriate.  - pregabalin (LYRICA) 150 MG capsule; Take 1 capsule (150 mg) by mouth 2 times daily   Dispense: 180 capsule; Refill: 3    9. Gastroesophageal reflux disease without esophagitis  Chronic, stable.  Refilled as below.  - esomeprazole (NEXIUM) 40 MG DR capsule; TAKE 1 CAPSULE BY MOUTH EVERY DAY - 30-60 MINUTES BEFORE A MEAL  Dispense: 90 capsule; Refill: 3    10. Migraine without aura and without status migrainosus, not intractable  Chronic, progressing with increased frequency.  Discussed with patient her increase stress anxiety may be triggering her migraines.  Management for anxiety as above.  Encouraged to return to acupuncturist as that has worked well for her in the past.  Refilled Maxalt as below.  - rizatriptan (MAXALT) 10 MG tablet; Take 1 tablet (10 mg) by mouth at onset of headache for migraine May repeat in 2 hours. Max 3 tablets/24 hours.  Dispense: 10 tablet; Refill: 3    11. Restless leg syndrome  Chronic, progressing.  Offered lab work for additional evaluation with patient deferred.  Prescription for low-dose pramipexole for symptomatic management as below.  May need to increase dose further for better management going forward.  - pramipexole (MIRAPEX) 0.125 MG tablet; Take 1 tablet (0.125 mg) by mouth at bedtime  Dispense: 90 tablet; Refill: 0      No follow-ups on file.    Oneil Gallegos is a 58 year old, presenting for the following:  No chief complaint on file.         Health Care Directive  Patient does not have a Health Care Directive or Living Will: Discussed advance care planning with patient; however, patient declined at this time.    HPI  Here for annual physical.  Concerns as below.    Mood:  Continues on Prozac 50 mg once daily for management of depression.  Has been more recently noticing increased anxiety, anxiety attacks.  Has been under significant amount of stress with family changes.    Migraines:  Chronic, noticing increased frequency.  Follows with an acupuncturist to the [facility] with usually good improvement.  Noticing increased frequency with associated increased  anxiety and stress.  Continues to use rizatriptan as needed which helps some.  She is due for return to her acupuncturist as she was last seen in November.    She continues on albuterol inhaler and neb solution for reactive airway, Flonase for chronic sinusitis, pregabalin for management of fibromyalgia symptoms, and Nexium for GERD.  Needing refills.    Struggling with increased restless leg syndrome.  She is not currently on regular prescriptions for this.  Reports she has had extensive evaluation for this in the past without underlying causes determined.    Contraception: Hysterectomy, 2003  Risk for STI?: No  Last pap: Hysterectomy, 2003  Any hx of abnormal paps:  No  Cholesterol/DM concerns/screening: Due  Tobacco?: 0.2 ppd  Calcium intake: Dietary  DEXA: NA  Last mammo: 11/2021, negative  Colonoscopy: 11/2021, 5 year follow up  Immunizations: Tdap, Hep B           No data to display                   No data to display                   No data to display                     5/20/2020   Fall Risk   Fallen 2 or more times in the past year? No           No data to display                   No data to display                  Today's PHQ-9 Score:       2/12/2024     9:58 AM   PHQ-9 SCORE   PHQ-9 Total Score MyChart 8 (Mild depression)   PHQ-9 Total Score 8           8/28/2023   Substance Use   If I could quit smoking, I would Completely agree   I want to quit somking, worry about health affects Completely agree   Willing to make a plan to quit smoking Completely agree   Willing to cut down before quitting Completely agree     Social History     Tobacco Use    Smoking status: Every Day     Packs/day: 0.20     Years: 10.00     Additional pack years: 0.00     Total pack years: 2.00     Types: Cigarettes     Start date: 5/1/1984     Passive exposure: Current    Smokeless tobacco: Never    Tobacco comments:     Trying to quit, minimal 0-1 a day about    Vaping Use    Vaping Use: Former    Substances: Nicotine,  Flavoring   Substance Use Topics    Alcohol use: Not Currently     Alcohol/week: 0.0 - 1.0 standard drinks of alcohol    Drug use: Not Currently     Types: Marijuana            No data to display                 Mammogram Screening - Mammogram every 1-2 years updated in Health Maintenance based on mutual decision making      History of abnormal Pap smear: Last 3 Pap and HPV Results:         The 10-year ASCVD risk score (Aldair BABCOCK, et al., 2019) is: 7.2%    Values used to calculate the score:      Age: 58 years      Sex: Female      Is Non- : No      Diabetic: No      Tobacco smoker: Yes      Systolic Blood Pressure: 136 mmHg      Is BP treated: No      HDL Cholesterol: 48 mg/dL      Total Cholesterol: 222 mg/dL           Reviewed and updated as needed this visit by Provider                    Past Medical History:   Diagnosis Date    Pain in knee     No Comments Provided    Personal history of other medical treatment (CODE)     G7, P3-0-4-3 (3 vag. 4 spon miscarriages-one in the second trimester and one daughter was twin and lost the twin).    Radiculopathy     ,Benign hypermobility per [facility]     Past Surgical History:   Procedure Laterality Date    COLONOSCOPY      2003,Normal, bleeding hemorrhoids    COLONOSCOPY N/A 2021    tubular adenomas- f/u 5 years    HYSTERECTOMY VAGINAL      2003,Dr Ramirez[place], chronic pelvic pain/endometriosis    OTHER SURGICAL HISTORY      2051,DILATION AND CURETTAGE,After 4 miscarriages    OTHER SURGICAL HISTORY      ,[id number],OTHER,Ovarian cyst    OTHER SURGICAL HISTORY      2002,[id number],OTHER,Chronic pelvic pain/hemorrhagic cyst    OTHER SURGICAL HISTORY      2009,[id number],OTHER,Dr Ramirez-recurrent ovarian cysts     OB History    Para Term  AB Living   7 3 3 0 4 3   SAB IAB Ectopic Multiple Live Births   0 0 0 0 0     Review of Systems  Per HPI     Objective    Exam  LMP  (LMP Unknown)    Estimated body mass index is 27.51 kg/m   "as calculated from the following:    Height as of 7/6/23: 1.562 m (5' 1.5\").    Weight as of 11/13/23: 67.1 kg (148 lb).    Physical Exam  GENERAL: alert and no distress  EYES: Eyes grossly normal to inspection, PERRL and conjunctivae and sclerae normal  HENT: ear canals and TM's normal, nose and mouth without ulcers or lesions  NECK: no adenopathy, no asymmetry, masses, or scars  RESP: lungs clear to auscultation - no rales, rhonchi or wheezes  CV: regular rate and rhythm, normal S1 S2, no S3 or S4, no murmur, click or rub, no peripheral edema  MS: no gross musculoskeletal defects noted, no edema  SKIN: no suspicious lesions or rashes  NEURO: Normal strength and tone, mentation intact and speech normal  PSYCH: mentation appears normal, affect normal/bright      Signed Electronically by: Lisa Crisostomo PA-C    Answers submitted by the patient for this visit:  Patient Health Questionnaire (Submitted on 2/12/2024)  If you checked off any problems, how difficult have these problems made it for you to do your work, take care of things at home, or get along with other people?: Somewhat difficult  PHQ9 TOTAL SCORE: 8  MILY-7 (Submitted on 2/12/2024)  MILY 7 TOTAL SCORE: 18    "

## 2024-02-12 ENCOUNTER — OFFICE VISIT (OUTPATIENT)
Dept: FAMILY MEDICINE | Facility: OTHER | Age: 59
End: 2024-02-12
Attending: PHYSICIAN ASSISTANT
Payer: COMMERCIAL

## 2024-02-12 VITALS
RESPIRATION RATE: 12 BRPM | SYSTOLIC BLOOD PRESSURE: 138 MMHG | HEIGHT: 62 IN | OXYGEN SATURATION: 95 % | DIASTOLIC BLOOD PRESSURE: 70 MMHG | WEIGHT: 153.2 LBS | HEART RATE: 76 BPM | BODY MASS INDEX: 28.19 KG/M2 | TEMPERATURE: 97.1 F

## 2024-02-12 DIAGNOSIS — Z00.00 ROUTINE HISTORY AND PHYSICAL EXAMINATION OF ADULT: Primary | ICD-10-CM

## 2024-02-12 DIAGNOSIS — F33.41 RECURRENT MAJOR DEPRESSIVE DISORDER, IN PARTIAL REMISSION (H): ICD-10-CM

## 2024-02-12 DIAGNOSIS — G25.81 RESTLESS LEG SYNDROME: ICD-10-CM

## 2024-02-12 DIAGNOSIS — G43.009 MIGRAINE WITHOUT AURA AND WITHOUT STATUS MIGRAINOSUS, NOT INTRACTABLE: ICD-10-CM

## 2024-02-12 DIAGNOSIS — M79.7 FIBROMYALGIA: ICD-10-CM

## 2024-02-12 DIAGNOSIS — J45.901 REACTIVE AIRWAY DISEASE WITH WHEEZING WITH ACUTE EXACERBATION, UNSPECIFIED ASTHMA SEVERITY, UNSPECIFIED WHETHER PERSISTENT: ICD-10-CM

## 2024-02-12 DIAGNOSIS — Z12.31 ENCOUNTER FOR SCREENING MAMMOGRAM FOR BREAST CANCER: ICD-10-CM

## 2024-02-12 DIAGNOSIS — K21.9 GASTROESOPHAGEAL REFLUX DISEASE WITHOUT ESOPHAGITIS: ICD-10-CM

## 2024-02-12 DIAGNOSIS — F41.9 ANXIETY: ICD-10-CM

## 2024-02-12 DIAGNOSIS — Z23 NEED FOR TDAP VACCINATION: ICD-10-CM

## 2024-02-12 DIAGNOSIS — J32.9 CHRONIC SINUSITIS, UNSPECIFIED LOCATION: ICD-10-CM

## 2024-02-12 PROCEDURE — 99396 PREV VISIT EST AGE 40-64: CPT | Mod: 25 | Performed by: PHYSICIAN ASSISTANT

## 2024-02-12 PROCEDURE — 99214 OFFICE O/P EST MOD 30 MIN: CPT | Mod: 25 | Performed by: PHYSICIAN ASSISTANT

## 2024-02-12 PROCEDURE — 90471 IMMUNIZATION ADMIN: CPT | Performed by: PHYSICIAN ASSISTANT

## 2024-02-12 PROCEDURE — 90715 TDAP VACCINE 7 YRS/> IM: CPT | Performed by: PHYSICIAN ASSISTANT

## 2024-02-12 RX ORDER — FLUOXETINE 40 MG/1
40 CAPSULE ORAL DAILY
Qty: 90 CAPSULE | Refills: 3 | Status: SHIPPED | OUTPATIENT
Start: 2024-02-12

## 2024-02-12 RX ORDER — HYDROXYZINE HYDROCHLORIDE 25 MG/1
25-50 TABLET, FILM COATED ORAL 3 TIMES DAILY PRN
Qty: 60 TABLET | Refills: 1 | Status: SHIPPED | OUTPATIENT
Start: 2024-02-12

## 2024-02-12 RX ORDER — FLUTICASONE PROPIONATE 50 MCG
2 SPRAY, SUSPENSION (ML) NASAL DAILY
Qty: 16 G | Refills: 3 | Status: SHIPPED | OUTPATIENT
Start: 2024-02-12

## 2024-02-12 RX ORDER — ALBUTEROL SULFATE 0.83 MG/ML
2.5 SOLUTION RESPIRATORY (INHALATION) EVERY 6 HOURS PRN
Qty: 25 ML | Refills: 3 | Status: SHIPPED | OUTPATIENT
Start: 2024-02-12

## 2024-02-12 RX ORDER — ALBUTEROL SULFATE 90 UG/1
2 AEROSOL, METERED RESPIRATORY (INHALATION) EVERY 6 HOURS
Qty: 18 G | Refills: 11 | Status: SHIPPED | OUTPATIENT
Start: 2024-02-12

## 2024-02-12 RX ORDER — RIZATRIPTAN BENZOATE 10 MG/1
10 TABLET ORAL
Qty: 10 TABLET | Refills: 3 | Status: SHIPPED | OUTPATIENT
Start: 2024-02-12

## 2024-02-12 RX ORDER — PRAMIPEXOLE DIHYDROCHLORIDE 0.12 MG/1
0.12 TABLET ORAL AT BEDTIME
Qty: 90 TABLET | Refills: 0 | Status: SHIPPED | OUTPATIENT
Start: 2024-02-12 | End: 2024-05-02

## 2024-02-12 RX ORDER — ESOMEPRAZOLE MAGNESIUM 40 MG/1
CAPSULE, DELAYED RELEASE ORAL
Qty: 90 CAPSULE | Refills: 3 | Status: SHIPPED | OUTPATIENT
Start: 2024-02-12

## 2024-02-12 RX ORDER — PREGABALIN 150 MG/1
150 CAPSULE ORAL 2 TIMES DAILY
Qty: 180 CAPSULE | Refills: 3 | Status: SHIPPED | OUTPATIENT
Start: 2024-02-12 | End: 2024-09-19

## 2024-02-12 SDOH — HEALTH STABILITY: PHYSICAL HEALTH: ON AVERAGE, HOW MANY DAYS PER WEEK DO YOU ENGAGE IN MODERATE TO STRENUOUS EXERCISE (LIKE A BRISK WALK)?: 2 DAYS

## 2024-02-12 SDOH — HEALTH STABILITY: PHYSICAL HEALTH: ON AVERAGE, HOW MANY MINUTES DO YOU ENGAGE IN EXERCISE AT THIS LEVEL?: 30 MIN

## 2024-02-12 ASSESSMENT — ASTHMA QUESTIONNAIRES
ACT_TOTALSCORE: 13
QUESTION_2 LAST FOUR WEEKS HOW OFTEN HAVE YOU HAD SHORTNESS OF BREATH: THREE TO SIX TIMES A WEEK
QUESTION_1 LAST FOUR WEEKS HOW MUCH OF THE TIME DID YOUR ASTHMA KEEP YOU FROM GETTING AS MUCH DONE AT WORK, SCHOOL OR AT HOME: A LITTLE OF THE TIME
QUESTION_5 LAST FOUR WEEKS HOW WOULD YOU RATE YOUR ASTHMA CONTROL: SOMEWHAT CONTROLLED
QUESTION_4 LAST FOUR WEEKS HOW OFTEN HAVE YOU USED YOUR RESCUE INHALER OR NEBULIZER MEDICATION (SUCH AS ALBUTEROL): ONE OR TWO TIMES PER DAY
ACT_TOTALSCORE: 13
QUESTION_3 LAST FOUR WEEKS HOW OFTEN DID YOUR ASTHMA SYMPTOMS (WHEEZING, COUGHING, SHORTNESS OF BREATH, CHEST TIGHTNESS OR PAIN) WAKE YOU UP AT NIGHT OR EARLIER THAN USUAL IN THE MORNING: FOUR OR MORE NIGHTS A WEEK

## 2024-02-12 ASSESSMENT — ANXIETY QUESTIONNAIRES
7. FEELING AFRAID AS IF SOMETHING AWFUL MIGHT HAPPEN: NEARLY EVERY DAY
GAD7 TOTAL SCORE: 18
IF YOU CHECKED OFF ANY PROBLEMS ON THIS QUESTIONNAIRE, HOW DIFFICULT HAVE THESE PROBLEMS MADE IT FOR YOU TO DO YOUR WORK, TAKE CARE OF THINGS AT HOME, OR GET ALONG WITH OTHER PEOPLE: VERY DIFFICULT
6. BECOMING EASILY ANNOYED OR IRRITABLE: MORE THAN HALF THE DAYS
GAD7 TOTAL SCORE: 18
3. WORRYING TOO MUCH ABOUT DIFFERENT THINGS: NEARLY EVERY DAY
8. IF YOU CHECKED OFF ANY PROBLEMS, HOW DIFFICULT HAVE THESE MADE IT FOR YOU TO DO YOUR WORK, TAKE CARE OF THINGS AT HOME, OR GET ALONG WITH OTHER PEOPLE?: VERY DIFFICULT
2. NOT BEING ABLE TO STOP OR CONTROL WORRYING: NEARLY EVERY DAY
5. BEING SO RESTLESS THAT IT IS HARD TO SIT STILL: MORE THAN HALF THE DAYS
4. TROUBLE RELAXING: MORE THAN HALF THE DAYS
1. FEELING NERVOUS, ANXIOUS, OR ON EDGE: NEARLY EVERY DAY
GAD7 TOTAL SCORE: 18
7. FEELING AFRAID AS IF SOMETHING AWFUL MIGHT HAPPEN: NEARLY EVERY DAY

## 2024-02-12 ASSESSMENT — PATIENT HEALTH QUESTIONNAIRE - PHQ9
SUM OF ALL RESPONSES TO PHQ QUESTIONS 1-9: 8
10. IF YOU CHECKED OFF ANY PROBLEMS, HOW DIFFICULT HAVE THESE PROBLEMS MADE IT FOR YOU TO DO YOUR WORK, TAKE CARE OF THINGS AT HOME, OR GET ALONG WITH OTHER PEOPLE: SOMEWHAT DIFFICULT
SUM OF ALL RESPONSES TO PHQ QUESTIONS 1-9: 8

## 2024-02-12 ASSESSMENT — PAIN SCALES - GENERAL: PAINLEVEL: SEVERE PAIN (7)

## 2024-02-12 ASSESSMENT — SOCIAL DETERMINANTS OF HEALTH (SDOH): HOW OFTEN DO YOU GET TOGETHER WITH FRIENDS OR RELATIVES?: TWICE A WEEK

## 2024-02-12 NOTE — NURSING NOTE
Patient presents to clinic for annual physical. Patient would like to discuss her increased anxiety.Patient states that her restless legs have not improved with using her muscle relaxer.  Mia Cullen LPN ....................  2/12/2024   10:04 AM  EXT [id number]

## 2024-02-23 ENCOUNTER — TELEPHONE (OUTPATIENT)
Dept: CARDIOLOGY | Facility: OTHER | Age: 59
End: 2024-02-23
Payer: COMMERCIAL

## 2024-02-26 ENCOUNTER — TELEPHONE (OUTPATIENT)
Dept: CARDIOLOGY | Facility: OTHER | Age: 59
End: 2024-02-26
Payer: COMMERCIAL

## 2024-02-27 ENCOUNTER — HOSPITAL ENCOUNTER (OUTPATIENT)
Dept: CARDIOLOGY | Facility: OTHER | Age: 59
Discharge: HOME | End: 2024-02-27
Attending: NURSE PRACTITIONER | Admitting: NURSE PRACTITIONER
Payer: COMMERCIAL

## 2024-02-27 VITALS — WEIGHT: 148 LBS | BODY MASS INDEX: 27.23 KG/M2 | HEIGHT: 62 IN

## 2024-02-27 DIAGNOSIS — R07.9 CHEST PAIN, UNSPECIFIED TYPE: ICD-10-CM

## 2024-02-27 DIAGNOSIS — Z82.49 FAMILY HISTORY OF PREMATURE CAD: ICD-10-CM

## 2024-02-27 DIAGNOSIS — R06.09 DOE (DYSPNEA ON EXERTION): ICD-10-CM

## 2024-02-27 DIAGNOSIS — E78.2 MIXED HYPERLIPIDEMIA: ICD-10-CM

## 2024-02-27 PROCEDURE — 93018 CV STRESS TEST I&R ONLY: CPT | Performed by: INTERNAL MEDICINE

## 2024-02-27 PROCEDURE — 93017 CV STRESS TEST TRACING ONLY: CPT

## 2024-02-27 PROCEDURE — 93350 STRESS TTE ONLY: CPT | Mod: 26 | Performed by: INTERNAL MEDICINE

## 2024-02-27 PROCEDURE — 93016 CV STRESS TEST SUPVJ ONLY: CPT | Performed by: INTERNAL MEDICINE

## 2024-02-27 PROCEDURE — 93325 DOPPLER ECHO COLOR FLOW MAPG: CPT | Mod: 26 | Performed by: INTERNAL MEDICINE

## 2024-02-27 PROCEDURE — 250N000011 HC RX IP 250 OP 636: Performed by: INTERNAL MEDICINE

## 2024-02-27 PROCEDURE — 93325 DOPPLER ECHO COLOR FLOW MAPG: CPT | Mod: TC

## 2024-02-27 PROCEDURE — 93321 DOPPLER ECHO F-UP/LMTD STD: CPT | Mod: 26 | Performed by: INTERNAL MEDICINE

## 2024-02-27 PROCEDURE — 255N000002 HC RX 255 OP 636: Performed by: NURSE PRACTITIONER

## 2024-02-27 PROCEDURE — 93350 STRESS TTE ONLY: CPT | Mod: TC

## 2024-02-27 RX ORDER — DOBUTAMINE HYDROCHLORIDE 200 MG/100ML
10-50 INJECTION INTRAVENOUS CONTINUOUS
Status: DISCONTINUED | OUTPATIENT
Start: 2024-02-27 | End: 2024-02-28 | Stop reason: HOSPADM

## 2024-02-27 RX ADMIN — DOBUTAMINE HYDROCHLORIDE 10 MCG/KG/MIN: 200 INJECTION INTRAVENOUS at 09:49

## 2024-02-27 RX ADMIN — PERFLUTREN 4 ML: 6.52 INJECTION, SUSPENSION INTRAVENOUS at 10:54

## 2024-02-27 NOTE — PROGRESS NOTES
0850 The patient arrived for a Dobutamine stress echo.  The procedure, risks and benefits were discussed and the consent was signed.  The patient was prepped for the stress test, an IV was started,  and the echo sonographer did the initial images with Definity for image enhancement.   Dr. Younger arrived, and the Dobutamine protocol was started via multistep infusion.  The patient became quite shaky and did not tolerate attaining target heart rate  Stress images were completed, normal saline was infused post Dobutamine,  the IV was removed. The patient was informed that the test results would be given to them by Becky Anthony.  If you have not received your results within 3 days please call the ordering provider.  The patient was released in stable condition.  Please see the chart for complete test results.

## 2024-03-06 ENCOUNTER — HOSPITAL ENCOUNTER (OUTPATIENT)
Dept: MAMMOGRAPHY | Facility: OTHER | Age: 59
Discharge: HOME | End: 2024-03-06
Attending: PHYSICIAN ASSISTANT | Admitting: PHYSICIAN ASSISTANT
Payer: COMMERCIAL

## 2024-03-06 DIAGNOSIS — Z12.31 ENCOUNTER FOR SCREENING MAMMOGRAM FOR BREAST CANCER: ICD-10-CM

## 2024-03-06 PROCEDURE — 77063 BREAST TOMOSYNTHESIS BI: CPT

## 2024-03-07 ENCOUNTER — TELEPHONE (OUTPATIENT)
Dept: FAMILY MEDICINE | Facility: OTHER | Age: 59
End: 2024-03-07
Payer: COMMERCIAL

## 2024-03-07 DIAGNOSIS — E78.5 HYPERLIPIDEMIA, UNSPECIFIED HYPERLIPIDEMIA TYPE: Primary | ICD-10-CM

## 2024-03-07 RX ORDER — ATORVASTATIN CALCIUM 20 MG/1
20 TABLET, FILM COATED ORAL DAILY
Qty: 90 TABLET | Refills: 3 | Status: SHIPPED | OUTPATIENT
Start: 2024-03-07

## 2024-03-07 NOTE — TELEPHONE ENCOUNTER
Fax received from [facility] pharmacy.    On 12/13/23, an RX was sent to the pharmacy for Atorvastatin 40 mg tabs with Directions to take 1/2  a tab (20 mg) at bedtime.  Pt misunderstood and has been taking a full 40 mg tab instead of breaking them in half.  Please advise pharmacy and pt.    [facility]  Phone:  [phone]  Fax:  [phone]    Daphney Sinclair LPN on 3/7/2024 at 1:50 PM

## 2024-03-07 NOTE — TELEPHONE ENCOUNTER
Please let patient and pharmacy know she should decrease down to 20 mg nightly. I sent this message to her via [employer], but it does not appear she uses mychart regularly.     Sincerely,   Lenore Olvera NP on 3/7/2024 at 4:11 PM          Shelton Gallegos,      I received a message that stated you have been taking a full 40 mg tablet of atorvastatin daily rather than breaking the tablets in half. This is still an appropriate dose and does not pose any major risks or concerns. However, I recommend that you start breaking them in half and taking the 20 mg at night. Recommend follow up lipid panel lab in May (6 months after last draw) to check these levels. I will send more to the pharmacy at the 20 mg nightly dose in case you have run out sooner than anticipated due to the mix up.

## 2024-03-07 NOTE — TELEPHONE ENCOUNTER
Notified patient of providers note.  Mia Cullen LPN ....................  3/7/2024   4:29 PM  EXT [id number]

## 2024-03-21 ENCOUNTER — OFFICE VISIT (OUTPATIENT)
Dept: SURGERY | Facility: OTHER | Age: 59
End: 2024-03-21
Attending: SURGERY
Payer: COMMERCIAL

## 2024-03-21 VITALS
RESPIRATION RATE: 16 BRPM | TEMPERATURE: 98.7 F | DIASTOLIC BLOOD PRESSURE: 88 MMHG | BODY MASS INDEX: 27.8 KG/M2 | HEART RATE: 72 BPM | SYSTOLIC BLOOD PRESSURE: 138 MMHG | WEIGHT: 152 LBS | OXYGEN SATURATION: 97 %

## 2024-03-21 DIAGNOSIS — Z80.3 FAMILY HISTORY OF BREAST CANCER IN MOTHER: Primary | ICD-10-CM

## 2024-03-21 DIAGNOSIS — Z80.3 FAMILY HISTORY OF BREAST CANCER IN SISTER: ICD-10-CM

## 2024-03-21 PROCEDURE — 99203 OFFICE O/P NEW LOW 30 MIN: CPT | Performed by: SURGERY

## 2024-03-21 RX ORDER — TRAMADOL HYDROCHLORIDE 50 MG/1
50 TABLET ORAL EVERY 4 HOURS PRN
COMMUNITY
Start: 2023-12-04

## 2024-03-21 NOTE — PROGRESS NOTES
Primary Care Physician: Lisa Crisostomo PA-C    A copy of this note will be sent to Lisa Crisostomo PA-C    HPI:   The patient is a 58 year old female with a recent screening mammogram showing scattered areas of dense breast tissue. She hasn't noted any new lumps in her breasts or arm pits. She has not had breast pain. She has no nipple drainage bilaterally. She has not noted any new skin lesions on the breasts. She had a breast biopsy on the right around 15 years ago and was told it was benign. She has family history of breast cancer: mother, 2/8 sisters, maternal aunt and multiple maternal cousins. She has no personal history of breast cancer or other cancer.    CONSULTATION ASSESSMENT AND PLAN/RECOMMENDATIONS:   I discussed with the patient that family history can increase her lifetime risk for breast cancer. We discussed current NCCN guidelines for high risk screening and surveillance in patients with a greater than 20 % lifetime risk of breast cancer.  We discussed that her estimated lifetime breast cancer risk is estimated to be 24% by the STACY model. This is elevated. It is recommended that she consider high risk screening strategies. Currently, this would include yearly mammogram and yearly MRI, alternating the studies every 6 months. She wishes to proceed with high risk screening.  We discussed the option of a genetic evaluation for further information about her breast cancer risk and the risks she may have for other cancers. The patient expressed understanding and denies further questions at this time. She would like to pursue the genetic counseling referral. We will watch for this appointment and any testing results. If results will change her screening recommendations or risk, we will see her in the office to discuss. The patient will call with questions or concerns.   She will be due for mammogram in March 2025 and MRI in September 2024.    REVIEW OF SYSTEMS  GENERAL: No fevers or chills. Denies  fatigue, recent weight loss.  HEENT: No sinus drainage. No changes with vision or hearing. No difficulty swallowing.   LYMPHATICS:  No swollen nodes in axilla, neck orgroin.  CARDIOVASCULAR: Denies chest pain, palpitations and dyspnea on exertion.  PULMONARY: No increased shortness of breath or cough. No increase in sputum production.  GI: Denies melena, bright red blood in stools. No hematemesis. No constipation or diarrhea.  : No dysuria or hematuria.  SKIN: No recent rashes or ulcers.   HEMATOLOGY:  No history of easy bruising or bleeding.  ENDOCRINE:  No history of diabetes or thyroid problems.  NEUROLOGY:  No history of seizures or headaches. No motor or sensory changes.  BREASTS: As above.  Past Medical History:   Diagnosis Date    Pain in knee     No Comments Provided    Personal history of other medical treatment (CODE)     G7, P3-0-4-3 (3 vag. 4 spon miscarriages-one in the second trimester and one daughter was twin and lost the twin).    Radiculopathy     2011,Benign hypermobility per [facility]       Past Surgical History:   Procedure Laterality Date    COLONOSCOPY      4/2003,Normal, bleeding hemorrhoids    COLONOSCOPY N/A 11/30/2021    tubular adenomas- f/u 5 years    HYSTERECTOMY VAGINAL      5/2003,Dr Ramirez-[place], chronic pelvic pain/endometriosis    HYSTERECTOMY, PAP NO LONGER INDICATED      OTHER SURGICAL HISTORY      205138,DILATION AND CURETTAGE,After 4 miscarriages    OTHER SURGICAL HISTORY      1986,940807,OTHER,Ovarian cyst    OTHER SURGICAL HISTORY      6/2002,[id number],OTHER,Chronic pelvic pain/hemorrhagic cyst    OTHER SURGICAL HISTORY      2/2009,[id number],OTHER,Dr Ramirez-recurrent ovarian cysts       Current Outpatient Medications   Medication    albuterol (PROAIR HFA/PROVENTIL HFA/VENTOLIN HFA) 108 (90 Base) MCG/ACT inhaler    albuterol (PROVENTIL) (2.5 MG/3ML) 0.083% neb solution    atorvastatin (LIPITOR) 20 MG tablet    atorvastatin (LIPITOR) 40 MG tablet    clobetasol (TEMOVATE) 0.05 % external  solution    cyclobenzaprine (FLEXERIL) 10 MG tablet    esomeprazole (NEXIUM) 40 MG DR capsule    FLUoxetine (PROZAC) 10 MG capsule    FLUoxetine (PROZAC) 40 MG capsule    fluticasone (FLONASE) 50 MCG/ACT nasal spray    HYDROcodone-acetaminophen (NORCO) 5-325 MG tablet    hydrOXYzine HCl (ATARAX) 25 MG tablet    ipratropium - albuterol 0.5 mg/2.5 mg/3 mL (DUONEB) 0.5-2.5 (3) MG/3ML neb solution    nicotine (NICOTROL) 10 MG inhaler    pramipexole (MIRAPEX) 0.125 MG tablet    pregabalin (LYRICA) 150 MG capsule    rizatriptan (MAXALT) 10 MG tablet    traMADol (ULTRAM) 50 MG tablet     No current facility-administered medications for this visit.       Allergies   Allergen Reactions    Amoxicillin      Not effective.     Nsaids      Other reaction(s): Ecchymosis       Family History   Problem Relation Age of Onset    Breast Cancer Mother 68        Cancer-breast    Diabetes Mother         Diabetes    Heart Disease Father         Heart Disease,CHF, pacemaker,MI at 93    Hypertension Father         Hypertension    Aneurysm Sister         Brain     Fibromyalgia Sister     Arthritis Sister     Migraines Sister         Chronic    Arthritis Brother     Aneurysm Sister         Brain    Migraines Sister     Migraines Sister     Breast Cancer Sister         Cancer-breast    Tumor Sister         Brain    Breast Cancer Sister     Ovarian Cancer Sister     Arthritis Sister     Aneurysm Brother         Brain    Arthritis Brother     Arthritis Brother        Social History     Socioeconomic History    Marital status:      Spouse name: None    Number of children: None    Years of education: None    Highest education level: None   Tobacco Use    Smoking status: Every Day     Packs/day: 0.20     Years: 10.00     Additional pack years: 0.00     Total pack years: 2.00     Types: Cigarettes     Start date: 5/1/1984     Passive exposure: Current    Smokeless tobacco: Never    Tobacco comments:     Trying to quit, minimal 0-1 a day about     Vaping Use    Vaping Use: Former    Substances: Nicotine, Flavoring   Substance and Sexual Activity    Alcohol use: Not Currently     Alcohol/week: 0.0 - 1.0 standard drinks of alcohol    Drug use: Not Currently     Types: Marijuana    Sexual activity: Yes     Partners: Male     Birth control/protection: Surgical     Comment: Birth control method: Hysterectomy, vasectomy   Social History Narrative    . Spouse, Adrian.  Children, Pradeep, 02/11/92; moving to [place] for school 2016 and was using heroin but went to treatment, then a suicide attempt fall 2016 and living in [place], then went to treatment, has mental health issues and applying for disability,   Dora, 09/09/97-living in  and expecting baby 2018; Clifford, 02/20/99, will graduate from high school 2017, considering school at [employer] or [employer] [place] [employer].  (Current  is not father of Kyrie).  Working at [employer] in [place] part time.     Social Determinants of Health     Financial Resource Strain: Low Risk  (2/12/2024)    Financial Resource Strain     Within the past 12 months, have you or your family members you live with been unable to get utilities (heat, electricity) when it was really needed?: No   Food Insecurity: Unknown (2/12/2024)    Food Insecurity     Within the past 12 months, did you worry that your food would run out before you got money to buy more?: Patient declined     Within the past 12 months, did the food you bought just not last and you didn t have money to get more?: Patient declined   Transportation Needs: Low Risk  (2/12/2024)    Transportation Needs     Within the past 12 months, has lack of transportation kept you from medical appointments, getting your medicines, non-medical meetings or appointments, work, or from getting things that you need?: No   Physical Activity: Insufficiently Active (2/12/2024)    Exercise Vital Sign     Days of Exercise per Week: 2 days     Minutes of Exercise per Session: 30 min   Stress: Stress  Concern Present (2/12/2024)    [place] - Occupational Stress Questionnaire     Feeling of Stress : Very much   Social Connections: Unknown (2/12/2024)    Social Connection and Isolation Panel [NHANES]     Frequency of Social Gatherings with Friends and Family: Twice a week   Interpersonal Safety: Low Risk  (3/21/2024)    Interpersonal Safety     Do you feel physically and emotionally safe where you currently live?: Yes     Within the past 12 months, have you been hit, slapped, kicked or otherwise physically hurt by someone?: No     Within the past 12 months, have you been humiliated or emotionally abused in other ways by your partner or ex-partner?: No   Housing Stability: Unknown (2/12/2024)    Housing Stability     Do you have housing? : Patient declined     Are you worried about losing your housing?: Patient declined       The above history was reviewed and updated today, 3/21/2024    PHYSICAL EXAM  /88 (BP Location: Right arm, Patient Position: Sitting, Cuff Size: Adult Regular)   Pulse 72   Temp 98.7  F (37.1  C) (Tympanic)   Resp 16   Wt 68.9 kg (152 lb)   LMP  (LMP Unknown)   SpO2 97%   BMI 27.80 kg/m     GENERAL: Healthy appearing patient in no acute distress. Pleasant and cooperative with exam and interview.   HEENT: Head-normocephalic. Eyes-no scleral icterus, pupils equal, round, and reactive to light. Nose-no nasal drainage. No lesions. Mouth-oral mucosapink and moist, no lesions.  NECK: Supple. No thyroid nodules. Trachea midline.  LYMPHATICS:  No cervical, axillary or supraclavicular adenopathy.  BREASTS: The breasts were examined bilaterally. No asymmetry noted. Bilateral areas of dense breast tissue noted on palpation. No nipple discharge or inversion noted. No skin changes noted. No palpable masses noted. No tenderness on exam.  SKIN: Pink, warm and dry. No jaundice. No rash.  NEURO:  Cranial nerves II-XII grossly intact. Alert and oriented.  PSYCH:  Appropriate mood and affect.     IMAGING/LAB  I personally reviewed patient's recent mammogram images and reports.

## 2024-03-21 NOTE — PATIENT INSTRUCTIONS
Your estimated lifetime risk of breast cancer is about 24%. This puts you in the high risk category. I recommend yearly mammogram and yearly MRI. You would be due for MRI of the breasts in September.   You will get a call to schedule a virtual genetics discussion. We will watch for that visit and any testing results. If the results will change you screening recommendations, we will see you in the office for discussion.   If you have questions or concerns let us know!

## 2024-03-21 NOTE — NURSING NOTE
"Chief Complaint   Patient presents with    Consult     Risk eval       Initial /88 (BP Location: Right arm, Patient Position: Sitting, Cuff Size: Adult Regular)   Pulse 72   Temp 98.7  F (37.1  C) (Tympanic)   Resp 16   Wt 68.9 kg (152 lb)   LMP  (LMP Unknown)   SpO2 97%   BMI 27.80 kg/m   Estimated body mass index is 27.8 kg/m  as calculated from the following:    Height as of 2/27/24: 1.575 m (5' 2\").    Weight as of this encounter: 68.9 kg (152 lb).  Medication Reconciliation: complete    At what age did you start menopause? 32  What age did your menstrual cycle start? 17  Are you on or have you ever taken any hormone replacement or birth control? Birth control  How many children do you have? 3  How old were you when your first child was born? 26  Did you breast feed? yes  Do you have a family history of breast cancer? Mom - 80 and 2 sisters. 1 was 40 and the other was 50. Sister cervical cancer - 30  Michelle Marley LPN..........3/21/2024  10:51 AM  "

## 2024-04-02 ENCOUNTER — OFFICE VISIT (OUTPATIENT)
Dept: FAMILY MEDICINE | Facility: OTHER | Age: 59
End: 2024-04-02
Payer: COMMERCIAL

## 2024-04-02 VITALS
HEART RATE: 68 BPM | BODY MASS INDEX: 27.97 KG/M2 | SYSTOLIC BLOOD PRESSURE: 128 MMHG | TEMPERATURE: 97.8 F | OXYGEN SATURATION: 98 % | DIASTOLIC BLOOD PRESSURE: 88 MMHG | RESPIRATION RATE: 16 BRPM | HEIGHT: 62 IN | WEIGHT: 152 LBS

## 2024-04-02 DIAGNOSIS — J44.1 COPD EXACERBATION (H): ICD-10-CM

## 2024-04-02 DIAGNOSIS — J02.9 ACUTE PHARYNGITIS, UNSPECIFIED ETIOLOGY: Primary | ICD-10-CM

## 2024-04-02 LAB
FLUAV RNA SPEC QL NAA+PROBE: NEGATIVE
FLUBV RNA RESP QL NAA+PROBE: NEGATIVE
GROUP A STREP BY PCR: NOT DETECTED
RSV RNA SPEC NAA+PROBE: NEGATIVE
SARS-COV-2 RNA RESP QL NAA+PROBE: NEGATIVE

## 2024-04-02 PROCEDURE — 87651 STREP A DNA AMP PROBE: CPT | Mod: ZL

## 2024-04-02 PROCEDURE — 87637 SARSCOV2&INF A&B&RSV AMP PRB: CPT | Mod: ZL

## 2024-04-02 PROCEDURE — 99214 OFFICE O/P EST MOD 30 MIN: CPT

## 2024-04-02 RX ORDER — PREDNISONE 20 MG/1
40 TABLET ORAL DAILY
Qty: 10 TABLET | Refills: 0 | Status: SHIPPED | OUTPATIENT
Start: 2024-04-02 | End: 2024-04-07

## 2024-04-02 ASSESSMENT — PAIN SCALES - GENERAL: PAINLEVEL: EXTREME PAIN (9)

## 2024-04-02 NOTE — PROGRESS NOTES
ASSESSMENT/PLAN:    (J02.9) Acute pharyngitis, unspecified etiology  (primary encounter diagnosis)  Comment: Patient presents with a 3-day history of sore throat and ear pain.  She has also had increase in headaches.  On exam vital signs are stable, bilateral breath sounds with wheezing at bases as noted below, bilateral TMs are clear, posterior pharynx with erythema, no exudates.  Strep test was obtained and was negative.  Viral multiplex testing was obtained and was negative.  At this time I recommend symptomatic care and treatment of COPD exacerbation as noted below.  Plan: Symptomatic Influenza A/B, RSV, & SARS-CoV2 PCR        (COVID-19) Nose, Group A Streptococcus PCR         Throat Swab  Symptomatic treatment - Encouraged fluids, salt water gargles, honey (only if greater than 1 year in age due to risk of botulism), elevation, humidifier, sinus rinse/netti pot, lozenges, tea, topical vapor rub, popsicles, rest, etc     (J44.1) COPD exacerbation (H)  Comment: Patient presents for sore throat and ear pain as noted above.  She does note she has had a cough and some increased wheezing.  She has also had some dyspnea.  She reports that she does have a history of COPD.  History of tobacco use, however she is working on smoking cessation.  On exam today vital signs are stable, bilateral bases with wheezing.  Will opt to treat this as an exacerbation with the shortness of breath and wheezing.  She has inhalers at home.  I recommend a burst of prednisone.  Plan: predniSONE (DELTASONE) 20 MG tablet  Take prednisone with food.  Take earlier in the day to avoid side effects at bedtime.  Follow-up if symptoms are worsening.    Discussed warning signs/symptoms indicative of need to f/u    Follow up if symptoms persist or worsen or concerns    I have reviewed the nursing notes.  I have reviewed the findings, diagnosis, plan and need for follow up with the patient.    I explained my diagnostic considerations and  recommendations to the patient, who voiced understanding and agreement with the treatment plan. All questions were answered. We discussed potential side effects of any prescribed or recommended therapies, as well as expectations for response to treatments.    REBECCA ALLEN, SHAWN CNP  4/2/2024  1:52 PM    HPI:    El Joseph is a 58 year old female  who presents to [facility] today for concerns of ear pain.     Patient presents with concerns of ear pain and sore throat which started 3 days ago. Patient has right sided otalgia. She has mild rhinorrhea, no nasal congestion.  She has had an increase in headaches.  She does have a cough. She has some dyspnea and wheezing. History of COPD.     Allergy to amoxicillin and NSAIDs.    PCP: YASIR Crisostomo    Past Medical History:   Diagnosis Date    Pain in knee     No Comments Provided    Personal history of other medical treatment (CODE)     G7, P3-0-4-3 (3 vag. 4 spon miscarriages-one in the second trimester and one daughter was twin and lost the twin).    Radiculopathy     2011,Benign hypermobility per [facility]     Past Surgical History:   Procedure Laterality Date    COLONOSCOPY      4/2003,Normal, bleeding hemorrhoids    COLONOSCOPY N/A 11/30/2021    tubular adenomas- f/u 5 years    HYSTERECTOMY VAGINAL      5/2003,Dr Ramirez-[place], chronic pelvic pain/endometriosis    HYSTERECTOMY, PAP NO LONGER INDICATED      OTHER SURGICAL HISTORY      205138,DILATION AND CURETTAGE,After 4 miscarriages    OTHER SURGICAL HISTORY      1986,[id number],OTHER,Ovarian cyst    OTHER SURGICAL HISTORY      6/2002,[id number],OTHER,Chronic pelvic pain/hemorrhagic cyst    OTHER SURGICAL HISTORY      2/2009,222624,OTHER,Dr Ramirez-recurrent ovarian cysts     Social History     Tobacco Use    Smoking status: Every Day     Packs/day: 0.20     Years: 10.00     Additional pack years: 0.00     Total pack years: 2.00     Types: Cigarettes     Start date: 5/1/1984     Passive exposure: Current    Smokeless tobacco:  Never    Tobacco comments:     Trying to quit, minimal 0-1 a day about    Substance Use Topics    Alcohol use: Not Currently     Alcohol/week: 0.0 - 1.0 standard drinks of alcohol     Current Outpatient Medications   Medication Sig Dispense Refill    albuterol (PROAIR HFA/PROVENTIL HFA/VENTOLIN HFA) 108 (90 Base) MCG/ACT inhaler Inhale 2 puffs into the lungs every 6 hours 18 g 11    albuterol (PROVENTIL) (2.5 MG/3ML) 0.083% neb solution Take 1 vial (2.5 mg) by nebulization every 6 hours as needed for shortness of breath, wheezing or cough 25 mL 3    atorvastatin (LIPITOR) 20 MG tablet Take 1 tablet (20 mg) by mouth daily 90 tablet 3    atorvastatin (LIPITOR) 40 MG tablet Take 0.5 tablets (20 mg) by mouth at bedtime 90 tablet 3    clobetasol (TEMOVATE) 0.05 % external solution APPLY TO THE SCALP TWICE DAILY.      cyclobenzaprine (FLEXERIL) 10 MG tablet Take 1 tablet (10 mg) by mouth 3 times daily as needed for muscle spasms 90 tablet 1    esomeprazole (NEXIUM) 40 MG DR capsule TAKE 1 CAPSULE BY MOUTH EVERY DAY - 30-60 MINUTES BEFORE A MEAL 90 capsule 3    FLUoxetine (PROZAC) 10 MG capsule TAKE 1 CAPSULE (10 MG) BY MOUTH DAILY 90 capsule 3    FLUoxetine (PROZAC) 40 MG capsule Take 1 capsule (40 mg) by mouth daily 90 capsule 3    fluticasone (FLONASE) 50 MCG/ACT nasal spray Spray 2 sprays into both nostrils daily 16 g 3    HYDROcodone-acetaminophen (NORCO) 5-325 MG tablet Take 1 tablet by mouth every 8 hours as needed for severe pain * ACETAMINOPHEN SHOULD BE LIMITED TO 4000MG PER DAY* 10 tablet 0    hydrOXYzine HCl (ATARAX) 25 MG tablet Take 1-2 tablets (25-50 mg) by mouth 3 times daily as needed for anxiety 60 tablet 1    ipratropium - albuterol 0.5 mg/2.5 mg/3 mL (DUONEB) 0.5-2.5 (3) MG/3ML neb solution Take 0.5-1 vials (1.5-3 mLs) by nebulization every 2 hours as needed for shortness of breath, wheezing or cough 150 mL 1    nicotine (NICOTROL) 10 MG inhaler Use 1 cartridge as needed for urge to smoke by puffing  "over course of 20min.  Use 6-16 cart/day; reduce number of cart/day over 6-12 weeks. 200 each 0    pramipexole (MIRAPEX) 0.125 MG tablet Take 1 tablet (0.125 mg) by mouth at bedtime 90 tablet 0    predniSONE (DELTASONE) 20 MG tablet Take 2 tablets (40 mg) by mouth daily for 5 days 10 tablet 0    pregabalin (LYRICA) 150 MG capsule Take 1 capsule (150 mg) by mouth 2 times daily 180 capsule 3    rizatriptan (MAXALT) 10 MG tablet Take 1 tablet (10 mg) by mouth at onset of headache for migraine May repeat in 2 hours. Max 3 tablets/24 hours. 10 tablet 3    traMADol (ULTRAM) 50 MG tablet Take 50 mg by mouth every 4 hours as needed       Allergies   Allergen Reactions    Amoxicillin      Not effective.     Nsaids      Other reaction(s): Ecchymosis     Past medical history, past surgical history, current medications and allergies reviewed and accurate to the best of my knowledge.      ROS:  Refer to HPI    /88 (BP Location: Left arm, Patient Position: Sitting, Cuff Size: Adult Regular)   Pulse 68   Temp 97.8  F (36.6  C) (Tympanic)   Resp 16   Ht 1.575 m (5' 2\")   Wt 68.9 kg (152 lb)   LMP  (LMP Unknown)   SpO2 98%   BMI 27.80 kg/m      EXAM:  General Appearance: Well appearing 58 year old female, appropriate appearance for age. No acute distress   Ears: Left TM intact, translucent with bony landmarks appreciated, no erythema, no effusion, no bulging, no purulence.  Right TM intact, translucent with bony landmarks appreciated, no erythema, no effusion, no bulging, no purulence.  Left auditory canal clear.  Right auditory canal clear.  Normal external ears, non tender.  Eyes: conjunctivae normal without erythema or irritation, corneas clear, no drainage or crusting, no eyelid swelling, pupils equal   Oropharynx: moist mucous membranes, posterior pharynx with erythema, no exudates or petechiae, no post nasal drip seen, no trismus, voice clear.    Sinuses:  No sinus tenderness upon palpation of the frontal or " maxillary sinuses  Nose:  Bilateral nares: no erythema, no edema, no drainage or congestion   Neck: supple without adenopathy  Respiratory: normal chest wall and respirations.  Normal effort.  Wheezing bilaterally, no crackles, rales, or rhonchi.  No increased work of breathing.  No cough appreciated.  Cardiac: RRR with no murmurs  Abdomen: soft, nontender, no rigidity, no rebound tenderness or guarding, normal bowel sounds present  Musculoskeletal:  Equal movement of bilateral upper extremities.  Equal movement of bilateral lower extremities.  Normal gait.    Dermatological: no rashes noted of exposed skin  Neuro: Alert and oriented to person, place, and time.  Cranial nerves II-XII grossly intact with no focal or lateralizing deficits.  Muscle tone normal.  Gait normal. No tremor.   Psychological: normal affect, alert, oriented, and pleasant.     Labs:  Results for orders placed or performed in visit on 04/02/24   Symptomatic Influenza A/B, RSV, & SARS-CoV2 PCR (COVID-19) Nose     Status: Normal    Specimen: Nose; Swab   Result Value Ref Range    Influenza A PCR Negative Negative    Influenza B PCR Negative Negative    RSV PCR Negative Negative    SARS CoV2 PCR Negative Negative    Narrative    Testing was performed using the Xpert Xpress CoV2/Flu/RSV Assay on the [employer] GeneXpert Instrument. This test should be ordered for the detection of SARS-CoV-2, influenza, and RSV viruses in individuals who meet clinical and/or epidemiological criteria. Test performance is unknown in asymptomatic patients. This test is for in vitro diagnostic use under the FDA EUA for laboratories certified under CLIA to perform high or moderate complexity testing. This test has not been FDA cleared or approved. A negative result does not rule out the presence of PCR inhibitors in the specimen or target RNA in concentration below the limit of detection for the assay. If only one viral target is positive but coinfection with multiple  targets is suspected, the sample should be re-tested with another FDA cleared, approved, or authorized test, if coinfection would change clinical management. This test was validated by the [facility]. These laboratories are certified under the Clinical Laboratory Improvement Amendments of 1988 (CLIA-88) as qualified to perform high complexity laboratory testing.   Group A Streptococcus PCR Throat Swab     Status: Normal    Specimen: Throat; Swab   Result Value Ref Range    Group A strep by PCR Not Detected Not Detected    Narrative    The Xpert Xpress Strep A test, performed on the [employer], is a rapid, qualitative in vitro diagnostic test for the detection of Streptococcus pyogenes (Group A ß-hemolytic Streptococcus, Strep A) in throat swab specimens from patients with signs and symptoms of pharyngitis. The Xpert Xpress Strep A test can be used as an aid in the diagnosis of Group A Streptococcal pharyngitis. The assay is not intended to monitor treatment for Group A Streptococcus infections. The Xpert Xpress Strep A test utilizes an automated real-time polymerase chain reaction (PCR) to detect Streptococcus pyogenes DNA.

## 2024-04-02 NOTE — NURSING NOTE
"Pt presents to  with her granddaughter. Pt has ear pain and sore throat since Saturday night. Pt also struggling with migraines - has prescribed medications for this.  Pt does struggle with allergies and has had constant itching all over her body.    Chief Complaint   Patient presents with    Otalgia    Pharyngitis       FOOD SECURITY SCREENING QUESTIONS  Hunger Vital Signs:  Within the past 12 months we worried whether our food would run out before we got money to buy more. Sometimes  Within the past 12 months the food we bought just didn't last and we didn't have money to get more. Sometimes  They do have community resources for food, but have not fully taken advantage of those yet. Pt sent home with food bag.  Kezia Saavedra 4/2/2024 1:43 PM      Initial /88 (BP Location: Left arm, Patient Position: Sitting, Cuff Size: Adult Regular)   Pulse 68   Temp 97.8  F (36.6  C) (Tympanic)   Resp 16   Ht 1.575 m (5' 2\")   Wt 68.9 kg (152 lb)   LMP  (LMP Unknown)   SpO2 98%   BMI 27.80 kg/m   Estimated body mass index is 27.8 kg/m  as calculated from the following:    Height as of this encounter: 1.575 m (5' 2\").    Weight as of this encounter: 68.9 kg (152 lb).  Medication Reconciliation: complete    Kezia Saavedra    "

## 2024-04-25 ENCOUNTER — PATIENT OUTREACH (OUTPATIENT)
Dept: ONCOLOGY | Facility: CLINIC | Age: 59
End: 2024-04-25
Payer: COMMERCIAL

## 2024-04-25 DIAGNOSIS — Z80.3 FAMILY HISTORY OF BREAST CANCER IN MOTHER: ICD-10-CM

## 2024-04-25 DIAGNOSIS — Z80.3 FAMILY HISTORY OF BREAST CANCER IN SISTER: Primary | ICD-10-CM

## 2024-04-25 NOTE — PROGRESS NOTES
Writer received referral to Cancer Risk Management/Genetic Counseling.    Referred for: family history of breast cancer      Referral reviewed for appropriate plan, and sent to New Patient Scheduling ([phone]) for completion.    Raven Siddiqi, RN, BSN  Oncology New Patient Nurse Navigator   [facility] [place] [facility]  [phone]

## 2024-04-30 DIAGNOSIS — G25.81 RESTLESS LEG SYNDROME: ICD-10-CM

## 2024-05-02 RX ORDER — PRAMIPEXOLE DIHYDROCHLORIDE 0.12 MG/1
0.12 TABLET ORAL AT BEDTIME
Qty: 90 TABLET | Refills: 0 | Status: SHIPPED | OUTPATIENT
Start: 2024-05-02 | End: 2024-07-25

## 2024-05-02 NOTE — TELEPHONE ENCOUNTER
[facility] sent Rx request for the following:      Requested Prescriptions   Pending Prescriptions Disp Refills    pramipexole (MIRAPEX) 0.125 MG tablet [Pharmacy Med Name: PRAMIPEXOLE 0.125MG TABLET] 90 tablet 0     Sig: TAKE 1 TABLET BY MOUTH NIGHTLY AT BEDTIME     Last Prescription Date:   2/12/24  Last Fill Qty/Refills:         90, R-0    Last Office Visit:              4/2/24   Future Office visit:           none    Prescription approved per [facility] Refill Protocol.  Janice Herrera RN on 5/2/2024 at 1:25 PM

## 2024-07-24 DIAGNOSIS — G25.81 RESTLESS LEG SYNDROME: ICD-10-CM

## 2024-07-25 RX ORDER — PRAMIPEXOLE DIHYDROCHLORIDE 0.12 MG/1
0.12 TABLET ORAL AT BEDTIME
Qty: 90 TABLET | Refills: 1 | Status: SHIPPED | OUTPATIENT
Start: 2024-07-25

## 2024-07-25 NOTE — TELEPHONE ENCOUNTER
[facility] sent Rx request for the following:      Requested Prescriptions   Pending Prescriptions Disp Refills    pramipexole (MIRAPEX) 0.125 MG tablet [Pharmacy Med Name: PRAMIPEXOLE 0.125MG TABLET] 90 tablet 0     Sig: TAKE 1 TABLET BY MOUTH NIGHTLY AT BEDTIME       [employer] Protocol Passed - 7/25/2024 10:56 AM       Last Prescription Date:   5/2/24  Last Fill Qty/Refills:         90, R-0    Last Office Visit:              2/12/24   Future Office visit:                Prescription approved per [facility] Refill Protocol.  Michaela Townsend RN on 7/25/2024 at 10:58 AM

## 2024-07-27 DIAGNOSIS — G89.29 OTHER CHRONIC PAIN: ICD-10-CM

## 2024-07-30 RX ORDER — CYCLOBENZAPRINE HCL 10 MG
10 TABLET ORAL 3 TIMES DAILY PRN
Qty: 90 TABLET | Refills: 1 | Status: SHIPPED | OUTPATIENT
Start: 2024-07-30

## 2024-09-10 ENCOUNTER — HOSPITAL ENCOUNTER (OUTPATIENT)
Dept: MRI IMAGING | Facility: OTHER | Age: 59
Discharge: HOME | End: 2024-09-10
Attending: SURGERY | Admitting: SURGERY
Payer: COMMERCIAL

## 2024-09-10 DIAGNOSIS — Z12.39 SCREENING FOR BREAST CANCER USING NON-MAMMOGRAM MODALITY: ICD-10-CM

## 2024-09-10 DIAGNOSIS — Z80.3 FAMILY HISTORY OF BREAST CANCER: ICD-10-CM

## 2024-09-10 DIAGNOSIS — Z91.89 AT HIGH RISK FOR BREAST CANCER: ICD-10-CM

## 2024-09-10 PROCEDURE — 255N000002 HC RX 255 OP 636: Performed by: SURGERY

## 2024-09-10 PROCEDURE — 77049 MRI BREAST C-+ W/CAD BI: CPT

## 2024-09-10 PROCEDURE — A9575 INJ GADOTERATE MEGLUMI 0.1ML: HCPCS | Performed by: SURGERY

## 2024-09-10 RX ORDER — GADOTERATE MEGLUMINE 376.9 MG/ML
15 INJECTION INTRAVENOUS ONCE
Status: COMPLETED | OUTPATIENT
Start: 2024-09-10 | End: 2024-09-10

## 2024-09-10 RX ADMIN — GADOTERATE MEGLUMINE 15 ML: 376.9 INJECTION INTRAVENOUS at 18:22

## 2024-09-17 ENCOUNTER — HOSPITAL ENCOUNTER (EMERGENCY)
Facility: OTHER | Age: 59
Discharge: HOME | End: 2024-09-17
Attending: STUDENT IN AN ORGANIZED HEALTH CARE EDUCATION/TRAINING PROGRAM | Admitting: STUDENT IN AN ORGANIZED HEALTH CARE EDUCATION/TRAINING PROGRAM
Payer: COMMERCIAL

## 2024-09-17 VITALS
TEMPERATURE: 98.3 F | HEIGHT: 61 IN | DIASTOLIC BLOOD PRESSURE: 78 MMHG | RESPIRATION RATE: 16 BRPM | SYSTOLIC BLOOD PRESSURE: 130 MMHG | HEART RATE: 65 BPM | BODY MASS INDEX: 28.32 KG/M2 | OXYGEN SATURATION: 96 % | WEIGHT: 150 LBS

## 2024-09-17 DIAGNOSIS — G43.911 INTRACTABLE MIGRAINE WITH STATUS MIGRAINOSUS, UNSPECIFIED MIGRAINE TYPE: ICD-10-CM

## 2024-09-17 PROCEDURE — 258N000003 HC RX IP 258 OP 636: Performed by: STUDENT IN AN ORGANIZED HEALTH CARE EDUCATION/TRAINING PROGRAM

## 2024-09-17 PROCEDURE — 96375 TX/PRO/DX INJ NEW DRUG ADDON: CPT | Performed by: STUDENT IN AN ORGANIZED HEALTH CARE EDUCATION/TRAINING PROGRAM

## 2024-09-17 PROCEDURE — 96372 THER/PROPH/DIAG INJ SC/IM: CPT | Performed by: STUDENT IN AN ORGANIZED HEALTH CARE EDUCATION/TRAINING PROGRAM

## 2024-09-17 PROCEDURE — 96361 HYDRATE IV INFUSION ADD-ON: CPT | Performed by: STUDENT IN AN ORGANIZED HEALTH CARE EDUCATION/TRAINING PROGRAM

## 2024-09-17 PROCEDURE — 250N000011 HC RX IP 250 OP 636: Performed by: STUDENT IN AN ORGANIZED HEALTH CARE EDUCATION/TRAINING PROGRAM

## 2024-09-17 PROCEDURE — 250N000012 HC RX MED GY IP 250 OP 636 PS 637: Performed by: STUDENT IN AN ORGANIZED HEALTH CARE EDUCATION/TRAINING PROGRAM

## 2024-09-17 PROCEDURE — 99284 EMERGENCY DEPT VISIT MOD MDM: CPT | Performed by: STUDENT IN AN ORGANIZED HEALTH CARE EDUCATION/TRAINING PROGRAM

## 2024-09-17 PROCEDURE — 96374 THER/PROPH/DIAG INJ IV PUSH: CPT | Performed by: STUDENT IN AN ORGANIZED HEALTH CARE EDUCATION/TRAINING PROGRAM

## 2024-09-17 PROCEDURE — 99284 EMERGENCY DEPT VISIT MOD MDM: CPT | Mod: 25 | Performed by: STUDENT IN AN ORGANIZED HEALTH CARE EDUCATION/TRAINING PROGRAM

## 2024-09-17 RX ORDER — DEXAMETHASONE SODIUM PHOSPHATE 10 MG/ML
10 INJECTION, SOLUTION INTRAMUSCULAR; INTRAVENOUS ONCE
Status: COMPLETED | OUTPATIENT
Start: 2024-09-17 | End: 2024-09-17

## 2024-09-17 RX ORDER — DIPHENHYDRAMINE HYDROCHLORIDE 50 MG/ML
25 INJECTION INTRAMUSCULAR; INTRAVENOUS ONCE
Status: COMPLETED | OUTPATIENT
Start: 2024-09-17 | End: 2024-09-17

## 2024-09-17 RX ORDER — SUMATRIPTAN 6 MG/.5ML
6 INJECTION, SOLUTION SUBCUTANEOUS ONCE
Status: COMPLETED | OUTPATIENT
Start: 2024-09-17 | End: 2024-09-17

## 2024-09-17 RX ORDER — KETOROLAC TROMETHAMINE 15 MG/ML
15 INJECTION, SOLUTION INTRAMUSCULAR; INTRAVENOUS ONCE
Status: COMPLETED | OUTPATIENT
Start: 2024-09-17 | End: 2024-09-17

## 2024-09-17 RX ADMIN — SUMATRIPTAN 6 MG: 6 INJECTION, SOLUTION SUBCUTANEOUS at 18:22

## 2024-09-17 RX ADMIN — KETOROLAC TROMETHAMINE 15 MG: 15 INJECTION, SOLUTION INTRAMUSCULAR; INTRAVENOUS at 17:02

## 2024-09-17 RX ADMIN — SODIUM CHLORIDE 1000 ML: 9 INJECTION, SOLUTION INTRAVENOUS at 17:01

## 2024-09-17 RX ADMIN — DIPHENHYDRAMINE HYDROCHLORIDE 25 MG: 50 INJECTION INTRAMUSCULAR; INTRAVENOUS at 17:02

## 2024-09-17 RX ADMIN — DEXAMETHASONE SODIUM PHOSPHATE 10 MG: 10 INJECTION, SOLUTION INTRAMUSCULAR; INTRAVENOUS at 17:02

## 2024-09-17 RX ADMIN — PROCHLORPERAZINE EDISYLATE 10 MG: 5 INJECTION INTRAMUSCULAR; INTRAVENOUS at 17:02

## 2024-09-17 ASSESSMENT — ACTIVITIES OF DAILY LIVING (ADL)
ADLS_ACUITY_SCORE: 33
ADLS_ACUITY_SCORE: 35

## 2024-09-17 ASSESSMENT — COLUMBIA-SUICIDE SEVERITY RATING SCALE - C-SSRS
2. HAVE YOU ACTUALLY HAD ANY THOUGHTS OF KILLING YOURSELF IN THE PAST MONTH?: NO
1. IN THE PAST MONTH, HAVE YOU WISHED YOU WERE DEAD OR WISHED YOU COULD GO TO SLEEP AND NOT WAKE UP?: NO
6. HAVE YOU EVER DONE ANYTHING, STARTED TO DO ANYTHING, OR PREPARED TO DO ANYTHING TO END YOUR LIFE?: NO

## 2024-09-17 NOTE — ED PROVIDER NOTES
History     Chief Complaint   Patient presents with    Headache       El Joseph is a 58 year old female presenting with headache. Onset was 5 days ago.  Associated symptoms include nausea, vomiting, photophobia, and sonophobia. She does  have history of similar headaches. Home treatment included maxalt. Denies recent illness, head injury, neck pain/stiffness, onset during exertion, fever, chills, vision changes, dysarthria, numbness, weakness, balance issues, sudden or maximum at onset. Not on anticoagulants. Headache located behind her eyes and radiates bilaterally to occipital lobes.    Allergies   Allergen Reactions    Amoxicillin      Not effective.     Nsaids      Other reaction(s): Ecchymosis       Patient Active Problem List    Diagnosis Date Noted    Anxiety state 01/23/2018     Priority: Medium    Osteoarthrosis 01/23/2018     Priority: Medium    Tobacco use 01/23/2018     Priority: Medium    Chronic mixed headache syndrome 06/20/2017     Priority: Medium    Fixed drug eruption 04/21/2015     Priority: Medium    Constipation 07/30/2014     Priority: Medium    Fibromyalgia 07/30/2013     Priority: Medium    Pain medication agreement 07/30/2013     Priority: Medium     Overview:   Updated 3/22/2018  Hydrocodone 5/325 mg #30 every 2-3 months.                                   Lyrica 150mg   # 60 a month       Chronic insomnia 08/10/2012     Priority: Medium    Bruxism 05/24/2012     Priority: Medium    Chronic pain disorder 05/24/2012     Priority: Medium    Depression, major, recurrent, moderate (H) 05/24/2012     Priority: Medium    Malaise and fatigue 05/24/2012     Priority: Medium    Temporomandibular joint disorder 05/24/2012     Priority: Medium       Past Medical History:   Diagnosis Date    Pain in knee     Personal history of other medical treatment (CODE)     Radiculopathy        Past Surgical History:   Procedure Laterality Date    COLONOSCOPY      4/2003,Normal, bleeding hemorrhoids     COLONOSCOPY N/A 11/30/2021    tubular adenomas- f/u 5 years    HYSTERECTOMY VAGINAL      5/2003,Dr Ramirez[place], chronic pelvic pain/endometriosis    HYSTERECTOMY, PAP NO LONGER INDICATED      OTHER SURGICAL HISTORY      [id number],DILATION AND CURETTAGE,After 4 miscarriages    OTHER SURGICAL HISTORY      1986,[id number],OTHER,Ovarian cyst    OTHER SURGICAL HISTORY      6/2002,435617,OTHER,Chronic pelvic pain/hemorrhagic cyst    OTHER SURGICAL HISTORY      2/2009,590438,OTHER,Dr Ramirez-recurrent ovarian cysts       Family History   Problem Relation Age of Onset    Breast Cancer Mother 68        Cancer-breast    Diabetes Mother         Diabetes    Heart Disease Father         Heart Disease,CHF, pacemaker,MI at 93    Hypertension Father         Hypertension    Aneurysm Sister         Brain     Fibromyalgia Sister     Arthritis Sister     Migraines Sister         Chronic    Aneurysm Sister         Brain    Migraines Sister     Migraines Sister     Breast Cancer Sister         Cancer-breast    Tumor Sister         Brain    Breast Cancer Sister     Cervical Cancer Sister     Arthritis Sister     Arthritis Brother     Aneurysm Brother         Brain    Arthritis Brother     Arthritis Brother        Social History     Tobacco Use    Smoking status: Every Day     Current packs/day: 0.20     Average packs/day: 0.2 packs/day for 40.4 years (8.1 ttl pk-yrs)     Types: Cigarettes     Start date: 5/1/1984     Passive exposure: Current    Smokeless tobacco: Never    Tobacco comments:     Trying to quit, minimal 0-1 a day about    Vaping Use    Vaping status: Former    Substances: Nicotine, Flavoring   Substance Use Topics    Alcohol use: Not Currently     Alcohol/week: 0.0 - 1.0 standard drinks of alcohol    Drug use: Not Currently     Types: Marijuana       Medications:    albuterol (PROAIR HFA/PROVENTIL HFA/VENTOLIN HFA) 108 (90 Base) MCG/ACT inhaler  albuterol (PROVENTIL) (2.5 MG/3ML) 0.083% neb solution  atorvastatin (LIPITOR) 20 MG  "tablet  atorvastatin (LIPITOR) 40 MG tablet  clobetasol (TEMOVATE) 0.05 % external solution  cyclobenzaprine (FLEXERIL) 10 MG tablet  esomeprazole (NEXIUM) 40 MG DR capsule  FLUoxetine (PROZAC) 10 MG capsule  FLUoxetine (PROZAC) 40 MG capsule  fluticasone (FLONASE) 50 MCG/ACT nasal spray  HYDROcodone-acetaminophen (NORCO) 5-325 MG tablet  hydrOXYzine HCl (ATARAX) 25 MG tablet  ipratropium - albuterol 0.5 mg/2.5 mg/3 mL (DUONEB) 0.5-2.5 (3) MG/3ML neb solution  nicotine (NICOTROL) 10 MG inhaler  pramipexole (MIRAPEX) 0.125 MG tablet  pregabalin (LYRICA) 150 MG capsule  rizatriptan (MAXALT) 10 MG tablet  traMADol (ULTRAM) 50 MG tablet        Review of Systems: See HPI for pertinent negatives and positives. All other systems reviewed and found to be negative.    Physical Exam   /78   Pulse 65   Temp 98.3  F (36.8  C) (Tympanic)   Resp 16   Ht 1.549 m (5' 1\")   Wt 68 kg (150 lb)   LMP  (LMP Unknown)   SpO2 96%   BMI 28.34 kg/m       General: awake, uncomfortable, wearing sunglasses  HEENT: atraumatic, normal neck ROM  Respiratory: normal effort, clear to auscultation bilaterally  Cardiovascular: regular rate and rhythm, no murmurs  Abdomen: soft, nontender, nondistended  Extremities: no deformities, edema, or tenderness  Skin: warm, dry, no rashes  Neuro: alert, normal interaction, bilateral hand  and plantar/dorsiflexion 5/5, bilateral hand and feet sensation intact, ambulates without issue, normal gross coordination, CN 2-12 grossly intact    ED Course      ED Course as of 09/17/24 2005   Tue Sep 17, 2024   1748 No significant improvement in headache.       No results found for this or any previous visit (from the past 24 hour(s)).    Medications   sodium chloride 0.9% BOLUS 1,000 mL (0 mLs Intravenous Stopped 9/17/24 1815)   ketorolac (TORADOL) injection 15 mg (15 mg Intravenous $Given 9/17/24 1702)   dexAMETHasone PF (DECADRON) injection 10 mg (10 mg Intravenous $Given 9/17/24 6886) "   prochlorperazine (COMPAZINE) injection 10 mg (10 mg Intravenous $Given 9/17/24 1702)   diphenhydrAMINE (BENADRYL) injection 25 mg (25 mg Intravenous $Given 9/17/24 1702)   SUMAtriptan (IMITREX) injection 6 mg (6 mg Subcutaneous $Given 9/17/24 1822)       Assessments & Plan (with Medical Decision Making)     I have reviewed the nursing notes.    Evaluated for headache. Differential includes migraine, tension headache, cluster headache, intracranial hemorrhage, intracranial mass, meningitis, sinusitis, venous sinus thrombosis, CVA, pseudotumor cerebri. Given the patient's history of similar headaches without signs or symptoms suggestive of concerning intracranial pathology, and benign neurologic exam, the etiology of this patient's headache is likely a primary headache disorder and does not require further emergent diagnostic evaluation. This was treated in the emergency department per above with subsequent improvement in symptoms. Discharged home with attached instructions on diagnosis provided including ED return precautions.     Findings, diagnosis, plan and need for follow up reviewed with patient who was comfortable with plan for discharge.      Discharge Medication List as of 9/17/2024  7:15 PM          Final diagnoses:   Intractable migraine with status migrainosus, unspecified migraine type       9/17/2024   [facility]       Eriberto Mann MD  09/17/24 2005

## 2024-09-17 NOTE — ED TRIAGE NOTES
"ED Nursing Triage Note (General)   ________________________________    El Joseph is a 58 year old Female that presents to triage private car  with history of  migraine reported by patient . Symptoms started Friday. Interventions prior to arrival include excedrin, benadryl, maxalt this morning  /85   Pulse 67   Temp 98.3  F (36.8  C) (Tympanic)   Resp 16   Ht 1.549 m (5' 1\")   Wt 68 kg (150 lb)   LMP  (LMP Unknown)   SpO2 95%   BMI 28.34 kg/m  t  Patient appears alert  and oriented    GCS Total = 15    Action taken:  roomed to bay 10      PRE HOSPITAL PRIOR LIVING SITUATION Spouse and Children       Triage Assessment (Adult)       Row Name 09/17/24 1543          Triage Assessment    Airway WDL WDL        Respiratory WDL    Respiratory WDL WDL        Skin Circulation/Temperature WDL    Skin Circulation/Temperature WDL WDL        Cardiac WDL    Cardiac WDL WDL        Peripheral/Neurovascular WDL    Peripheral Neurovascular WDL WDL        Cognitive/Neuro/Behavioral WDL    Cognitive/Neuro/Behavioral WDL WDL                     "

## 2024-09-18 DIAGNOSIS — M79.7 FIBROMYALGIA: ICD-10-CM

## 2024-09-19 RX ORDER — PREGABALIN 150 MG/1
150 CAPSULE ORAL 2 TIMES DAILY
Qty: 180 CAPSULE | Refills: 1 | Status: SHIPPED | OUTPATIENT
Start: 2024-09-19

## 2024-09-19 NOTE — TELEPHONE ENCOUNTER
pregabalin (LYRICA) 150 MG capsule       Last Written Prescription Date:  2/12/24  Last Fill Quantity: 180,   # refills: 3  Last Office Visit: 2/12/24  Future Office visit:       Routing refill request to provider for review/approval because:  Drug not on the [facility], [facility] or [facility] refill protocol or controlled substance Ambika Gallagher RN on 9/19/2024 at 10:48 AM

## 2024-09-19 NOTE — TELEPHONE ENCOUNTER
Refilled Rx.   PDMP Review         Value Time User    State PDMP site checked  Yes 9/19/2024 12:08 PM Charlee Cordero PA-C Jennifer Oja, PA-C ..................9/19/2024 12:08 PM

## 2024-10-18 ENCOUNTER — HOSPITAL ENCOUNTER (EMERGENCY)
Facility: OTHER | Age: 59
Discharge: HOME | End: 2024-10-18
Attending: EMERGENCY MEDICINE | Admitting: EMERGENCY MEDICINE
Payer: COMMERCIAL

## 2024-10-18 ENCOUNTER — HOSPITAL ENCOUNTER (EMERGENCY)
Facility: OTHER | Age: 59
End: 2024-10-18
Payer: COMMERCIAL

## 2024-10-18 VITALS
WEIGHT: 150 LBS | BODY MASS INDEX: 27.6 KG/M2 | HEIGHT: 62 IN | TEMPERATURE: 98 F | SYSTOLIC BLOOD PRESSURE: 115 MMHG | DIASTOLIC BLOOD PRESSURE: 66 MMHG | HEART RATE: 88 BPM | RESPIRATION RATE: 18 BRPM | OXYGEN SATURATION: 98 %

## 2024-10-18 DIAGNOSIS — G43.909 MIGRAINE WITHOUT STATUS MIGRAINOSUS, NOT INTRACTABLE, UNSPECIFIED MIGRAINE TYPE: ICD-10-CM

## 2024-10-18 PROCEDURE — 96375 TX/PRO/DX INJ NEW DRUG ADDON: CPT | Performed by: EMERGENCY MEDICINE

## 2024-10-18 PROCEDURE — 250N000011 HC RX IP 250 OP 636: Performed by: EMERGENCY MEDICINE

## 2024-10-18 PROCEDURE — 96374 THER/PROPH/DIAG INJ IV PUSH: CPT | Performed by: EMERGENCY MEDICINE

## 2024-10-18 PROCEDURE — 99284 EMERGENCY DEPT VISIT MOD MDM: CPT | Mod: 25 | Performed by: EMERGENCY MEDICINE

## 2024-10-18 PROCEDURE — 99284 EMERGENCY DEPT VISIT MOD MDM: CPT | Performed by: EMERGENCY MEDICINE

## 2024-10-18 RX ORDER — MORPHINE SULFATE 2 MG/ML
1 INJECTION, SOLUTION INTRAMUSCULAR; INTRAVENOUS ONCE
Status: DISCONTINUED | OUTPATIENT
Start: 2024-10-18 | End: 2024-10-18

## 2024-10-18 RX ORDER — MORPHINE SULFATE 4 MG/ML
4 INJECTION, SOLUTION INTRAMUSCULAR; INTRAVENOUS ONCE
Status: COMPLETED | OUTPATIENT
Start: 2024-10-18 | End: 2024-10-18

## 2024-10-18 RX ADMIN — MORPHINE SULFATE 4 MG: 4 INJECTION, SOLUTION INTRAMUSCULAR; INTRAVENOUS at 19:32

## 2024-10-18 RX ADMIN — PROCHLORPERAZINE EDISYLATE 10 MG: 5 INJECTION INTRAMUSCULAR; INTRAVENOUS at 19:32

## 2024-10-18 ASSESSMENT — ENCOUNTER SYMPTOMS
GASTROINTESTINAL NEGATIVE: 1
HEADACHES: 1
MUSCULOSKELETAL NEGATIVE: 1
CARDIOVASCULAR NEGATIVE: 1
CHILLS: 0
SHORTNESS OF BREATH: 0
SLEEP DISTURBANCE: 0
CONSTITUTIONAL NEGATIVE: 1
ALLERGIC/IMMUNOLOGIC NEGATIVE: 1
COUGH: 0
NECK STIFFNESS: 0
PSYCHIATRIC NEGATIVE: 1
NERVOUS/ANXIOUS: 0
FATIGUE: 0
RESPIRATORY NEGATIVE: 1
PHOTOPHOBIA: 1
HEMATOLOGIC/LYMPHATIC NEGATIVE: 1
ENDOCRINE NEGATIVE: 1
NECK PAIN: 0
MYALGIAS: 0

## 2024-10-18 ASSESSMENT — COLUMBIA-SUICIDE SEVERITY RATING SCALE - C-SSRS
6. HAVE YOU EVER DONE ANYTHING, STARTED TO DO ANYTHING, OR PREPARED TO DO ANYTHING TO END YOUR LIFE?: NO
2. HAVE YOU ACTUALLY HAD ANY THOUGHTS OF KILLING YOURSELF IN THE PAST MONTH?: NO
1. IN THE PAST MONTH, HAVE YOU WISHED YOU WERE DEAD OR WISHED YOU COULD GO TO SLEEP AND NOT WAKE UP?: NO

## 2024-10-18 ASSESSMENT — ACTIVITIES OF DAILY LIVING (ADL)
ADLS_ACUITY_SCORE: 35
ADLS_ACUITY_SCORE: 35

## 2024-10-18 NOTE — ED TRIAGE NOTES
Pt arrives via private vehicle from home with friend for c/o migraine that started one hour prior to arrival. Pt took a xanax mint at home, denies any tylenol, ibuprofen, or migraine medications PTA. Pt has a migraine history.     Triage Assessment (Adult)       Row Name 10/18/24 1839          Triage Assessment    Airway WDL WDL        Respiratory WDL    Respiratory WDL WDL        Skin Circulation/Temperature WDL    Skin Circulation/Temperature WDL WDL        Cardiac WDL    Cardiac WDL WDL        Peripheral/Neurovascular WDL    Peripheral Neurovascular WDL WDL        Cognitive/Neuro/Behavioral WDL    Cognitive/Neuro/Behavioral WDL WDL

## 2024-10-19 NOTE — ED PROVIDER NOTES
History     Chief Complaint   Patient presents with    Migraine     HPI  El Joseph is a 58 year old female who with migraine headache.  Patient states that she has on average 4 migraine headaches a week that she treats with a combination of Benadryl, sumatriptan and Xanax.  Symptoms gradually worsening today despite medications prompting ER visit.  Patient denies any recent trauma.  Headaches are the same as it has been in the past with no change in frequency, intensity or location    Allergies:  Allergies   Allergen Reactions    Amoxicillin      Not effective.     Nsaids      Other reaction(s): Ecchymosis       Problem List:    Patient Active Problem List    Diagnosis Date Noted    Anxiety state 01/23/2018     Priority: Medium    Osteoarthrosis 01/23/2018     Priority: Medium    Tobacco use 01/23/2018     Priority: Medium    Chronic mixed headache syndrome 06/20/2017     Priority: Medium    Fixed drug eruption 04/21/2015     Priority: Medium    Constipation 07/30/2014     Priority: Medium    Fibromyalgia 07/30/2013     Priority: Medium    Pain medication agreement 07/30/2013     Priority: Medium     Overview:   Updated 3/22/2018  Hydrocodone 5/325 mg #30 every 2-3 months.                                   Lyrica 150mg   # 60 a month       Chronic insomnia 08/10/2012     Priority: Medium    Bruxism 05/24/2012     Priority: Medium    Chronic pain disorder 05/24/2012     Priority: Medium    Depression, major, recurrent, moderate (H) 05/24/2012     Priority: Medium    Malaise and fatigue 05/24/2012     Priority: Medium    Temporomandibular joint disorder 05/24/2012     Priority: Medium        Past Medical History:    Past Medical History:   Diagnosis Date    Pain in knee     Personal history of other medical treatment (CODE)     Radiculopathy        Past Surgical History:    Past Surgical History:   Procedure Laterality Date    COLONOSCOPY      4/2003,Normal, bleeding hemorrhoids    COLONOSCOPY N/A 11/30/2021     tubular adenomas- f/u 5 years    HYSTERECTOMY VAGINAL      5/2003,Dr Ramirez[place], chronic pelvic pain/endometriosis    HYSTERECTOMY, PAP NO LONGER INDICATED      OTHER SURGICAL HISTORY      [id number],DILATION AND CURETTAGE,After 4 miscarriages    OTHER SURGICAL HISTORY      1986,474079,OTHER,Ovarian cyst    OTHER SURGICAL HISTORY      6/2002,263912,OTHER,Chronic pelvic pain/hemorrhagic cyst    OTHER SURGICAL HISTORY      2/2009,193242,OTHER,Dr Ramirez-recurrent ovarian cysts       Family History:    Family History   Problem Relation Age of Onset    Breast Cancer Mother 68        Cancer-breast    Diabetes Mother         Diabetes    Heart Disease Father         Heart Disease,CHF, pacemaker,MI at 93    Hypertension Father         Hypertension    Aneurysm Sister         Brain     Fibromyalgia Sister     Arthritis Sister     Migraines Sister         Chronic    Aneurysm Sister         Brain    Migraines Sister     Migraines Sister     Breast Cancer Sister         Cancer-breast    Tumor Sister         Brain    Breast Cancer Sister     Cervical Cancer Sister     Arthritis Sister     Arthritis Brother     Aneurysm Brother         Brain    Arthritis Brother     Arthritis Brother        Social History:  Marital Status:   [2]  Social History     Tobacco Use    Smoking status: Every Day     Current packs/day: 0.20     Average packs/day: 0.2 packs/day for 40.5 years (8.1 ttl pk-yrs)     Types: Cigarettes     Start date: 5/1/1984     Passive exposure: Current    Smokeless tobacco: Never    Tobacco comments:     Trying to quit, minimal 0-1 a day about    Vaping Use    Vaping status: Former    Substances: Nicotine, Flavoring   Substance Use Topics    Alcohol use: Not Currently     Alcohol/week: 0.0 - 1.0 standard drinks of alcohol    Drug use: Not Currently     Types: Marijuana        Medications:    albuterol (PROAIR HFA/PROVENTIL HFA/VENTOLIN HFA) 108 (90 Base) MCG/ACT inhaler  albuterol (PROVENTIL) (2.5 MG/3ML) 0.083% neb  "solution  atorvastatin (LIPITOR) 20 MG tablet  atorvastatin (LIPITOR) 40 MG tablet  clobetasol (TEMOVATE) 0.05 % external solution  cyclobenzaprine (FLEXERIL) 10 MG tablet  esomeprazole (NEXIUM) 40 MG DR capsule  FLUoxetine (PROZAC) 10 MG capsule  FLUoxetine (PROZAC) 40 MG capsule  fluticasone (FLONASE) 50 MCG/ACT nasal spray  HYDROcodone-acetaminophen (NORCO) 5-325 MG tablet  hydrOXYzine HCl (ATARAX) 25 MG tablet  ipratropium - albuterol 0.5 mg/2.5 mg/3 mL (DUONEB) 0.5-2.5 (3) MG/3ML neb solution  nicotine (NICOTROL) 10 MG inhaler  pramipexole (MIRAPEX) 0.125 MG tablet  pregabalin (LYRICA) 150 MG capsule  rizatriptan (MAXALT) 10 MG tablet  traMADol (ULTRAM) 50 MG tablet          Review of Systems   Constitutional: Negative.  Negative for chills and fatigue.   HENT: Negative.     Eyes:  Positive for photophobia.   Respiratory: Negative.  Negative for cough and shortness of breath.    Cardiovascular: Negative.  Negative for chest pain.   Gastrointestinal: Negative.    Endocrine: Negative.    Genitourinary: Negative.    Musculoskeletal: Negative.  Negative for myalgias, neck pain and neck stiffness.   Allergic/Immunologic: Negative.    Neurological:  Positive for headaches.   Hematological: Negative.    Psychiatric/Behavioral: Negative.  Negative for self-injury, sleep disturbance and suicidal ideas. The patient is not nervous/anxious.        Physical Exam   BP: 130/75  Pulse: 70  Temp: 98  F (36.7  C)  Resp: 16  Height: 156.2 cm (5' 1.5\")  Weight: 68 kg (150 lb)  SpO2: 96 %      Physical Exam  Constitutional:       General: She is not in acute distress.     Appearance: Normal appearance. She is normal weight. She is not toxic-appearing.   HENT:      Head: Normocephalic.   Eyes:      Pupils: Pupils are equal, round, and reactive to light.   Cardiovascular:      Rate and Rhythm: Normal rate and regular rhythm.   Pulmonary:      Effort: Pulmonary effort is normal.   Musculoskeletal:         General: Normal range of " motion.      Cervical back: Normal range of motion.   Skin:     Capillary Refill: Capillary refill takes less than 2 seconds.   Neurological:      General: No focal deficit present.      Mental Status: She is alert.   Psychiatric:         Mood and Affect: Mood normal.         Behavior: Behavior normal.         ED Course     ED Course as of 10/18/24 2029   Fri Oct 18, 2024   2022 Patient feeling better.  Headache significantly decreased     Procedures            No results found for this or any previous visit (from the past 24 hour(s)).    Medications   morphine (PF) injection 4 mg (has no administration in time range)   prochlorperazine (COMPAZINE) injection 10 mg (has no administration in time range)       Assessments & Plan (with Medical Decision Making)     58-year-old female who presents today with complaints of a migraine headache.  Migraine typical in location, intensity and duration to all previous episodes of migraines.  Home medications were not helping prompting ER visit.  No numbness or weakness or any new physical symptoms other than the headache.      Physical exam nonremarkable and nonfocal.  Received morphine and Compazine and feeling better.  Patient is going to be discharged home.  Diagnostic yield of a CT and labs at this time appears of low yield. Has been instructed to continue with her own medications at home.  Patient understands to return if she develops any new or worsening symptoms.        New Prescriptions    No medications on file       Final diagnoses:   Migraine without status migrainosus, not intractable, unspecified migraine type       10/18/2024   [facility]       Bg Donaldson MD  10/18/24 2644

## 2024-10-19 NOTE — DISCHARGE INSTRUCTIONS
1) Follow the aftercare instructions provided.  2) You have been given a medication that can make you drowsy. Do not drink, drive, ride a bicycle or operate any heavy machinery while using this medication.   3) You have been given a prescription for a medication that can cause an allergic reactions. Return to the ER if you develop any itching, tongue swelling, wheezing or shortness of breath.  4) Return to the ER if you develop any fever, vomiting, worsening headache or any new or worsening symptoms.  5) Follow up with your doctor next week.

## 2024-12-09 DIAGNOSIS — G89.29 OTHER CHRONIC PAIN: ICD-10-CM

## 2024-12-09 DIAGNOSIS — G89.4 CHRONIC PAIN DISORDER: ICD-10-CM

## 2024-12-09 DIAGNOSIS — G43.009 MIGRAINE WITHOUT AURA AND WITHOUT STATUS MIGRAINOSUS, NOT INTRACTABLE: ICD-10-CM

## 2024-12-10 RX ORDER — HYDROCODONE BITARTRATE AND ACETAMINOPHEN 5; 325 MG/1; MG/1
TABLET ORAL
Qty: 10 TABLET | Refills: 0 | OUTPATIENT
Start: 2024-12-10

## 2024-12-10 RX ORDER — RIZATRIPTAN BENZOATE 10 MG/1
10 TABLET ORAL
Qty: 10 TABLET | Refills: 3 | Status: SHIPPED | OUTPATIENT
Start: 2024-12-10

## 2024-12-10 RX ORDER — CYCLOBENZAPRINE HCL 10 MG
10 TABLET ORAL 3 TIMES DAILY PRN
Qty: 90 TABLET | Refills: 1 | Status: SHIPPED | OUTPATIENT
Start: 2024-12-10

## 2024-12-10 NOTE — TELEPHONE ENCOUNTER
[id number] sent Rx request for the following:      Requested Prescriptions   Pending Prescriptions Disp Refills    HYDROcodone-acetaminophen (NORCO) 5-325 MG tablet [Pharmacy Med Name: HYDROCODONE/APAP 5MG-325MG TAB] 10 tablet 0     Sig: TAKE 1 TABLET BY MOUTH EVERY 8 HOURS AS NEEDED FORSEVERE PAIN * ACETAMINOPHENSHOULD BE LIMITED TO 4000MGPER DAY*       There is no refill protocol information for this order          Last Prescription Date:   7/6/23  Last Fill Qty/Refills:         10, R-0    Last Office Visit:              2/12/24   Future Office visit:           none    Per OV 7/6/24: Needs to see PCP prior to future refills.     Declined, needs appointment    Caty Roque RN on 12/10/2024 at 11:25 AM

## 2024-12-10 NOTE — TELEPHONE ENCOUNTER
[facility]  sent Rx request for the following:      Requested Prescriptions   Pending Prescriptions Disp Refills    rizatriptan (MAXALT) 10 MG tablet [Pharmacy Med Name: RIZATRIPTAN 10MG TABLET] 10 tablet 3     Sig: TAKE 1 TABLET (10 MG) BY MOUTH AT ONSET OF HEADACHE FOR MIGRAINE MAY REPEAT IN 2 HOURS. MAX 3 TABLETS/24 HOURS.     Last Prescription Date:   02/12/2024  Last Fill Qty/Refills:         10, R-3        cyclobenzaprine (FLEXERIL) 10 MG tablet [Pharmacy Med Name: CYCLOBENZAPRINE 10MG TABLET] 90 tablet 1     Sig: TAKE 1 TABLET (10 MG) BY MOUTH 3 TIMES DAILY AS NEEDED FOR MUSCLE SPASMS       There is no refill protocol information for this order      Last Prescription Date:   07/30/2024  Last Fill Qty/Refills:         90, R-1    Last Office Visit:              02/12/2024   Future Office visit:           None    Prescription approved per [facility] Refill Protocol.   Yas Loja RN on 12/10/2024 at 9:14 AM

## 2025-02-20 ENCOUNTER — TELEPHONE (OUTPATIENT)
Dept: MAMMOGRAPHY | Facility: OTHER | Age: 60
End: 2025-02-20
Payer: COMMERCIAL

## 2025-02-20 ENCOUNTER — TELEPHONE (OUTPATIENT)
Dept: ONCOLOGY | Facility: CLINIC | Age: 60
End: 2025-02-20
Payer: COMMERCIAL

## 2025-02-20 NOTE — TELEPHONE ENCOUNTER
2/20/2025    I received a message from our nursing staff stating that they had a message from El stating that she wants to cancel her genetic test because her insurance denied her testing. I then attempted to contact El and left her a message explaining the new billing  tool that [employer] uses for cost of testing.    I requested a billing estimate for El as this was not available when I first met with her. This estimates her cost of testing to be $195.59 through her insurance. I explained that [employer] will honor this  and so this would be the most that her testing would cost for her. I explained that if she is comfortable with this amount for her genetic testing, she can go ahead and send in her sample for testing. I also provided her with [employer]'s billing phone number in case she wants to talk to them further about this.    I also encouraged her to contact me as well if she has any additional questions that I might be able to assist with for there genetic testing.    Solomon Michael MS, [facility] [place]  Licensed, Certified Genetic Counselor

## 2025-02-20 NOTE — TELEPHONE ENCOUNTER
Patient left a message stating she changed her mind about genetic testing due to insurance not covering.  Solomon, wondering if there is anything on your end to help patient?  Thanks!

## 2025-03-04 ENCOUNTER — OFFICE VISIT (OUTPATIENT)
Dept: FAMILY MEDICINE | Facility: OTHER | Age: 60
End: 2025-03-04
Attending: PHYSICIAN ASSISTANT
Payer: COMMERCIAL

## 2025-03-04 VITALS
BODY MASS INDEX: 27.88 KG/M2 | HEIGHT: 62 IN | TEMPERATURE: 98.7 F | HEART RATE: 69 BPM | SYSTOLIC BLOOD PRESSURE: 130 MMHG | OXYGEN SATURATION: 98 % | RESPIRATION RATE: 18 BRPM | DIASTOLIC BLOOD PRESSURE: 78 MMHG

## 2025-03-04 DIAGNOSIS — J45.901 REACTIVE AIRWAY DISEASE WITH WHEEZING WITH ACUTE EXACERBATION, UNSPECIFIED ASTHMA SEVERITY, UNSPECIFIED WHETHER PERSISTENT: ICD-10-CM

## 2025-03-04 DIAGNOSIS — M79.7 FIBROMYALGIA: ICD-10-CM

## 2025-03-04 DIAGNOSIS — E78.5 HYPERLIPIDEMIA, UNSPECIFIED HYPERLIPIDEMIA TYPE: ICD-10-CM

## 2025-03-04 DIAGNOSIS — G43.719 INTRACTABLE CHRONIC MIGRAINE WITHOUT AURA AND WITHOUT STATUS MIGRAINOSUS: ICD-10-CM

## 2025-03-04 DIAGNOSIS — F33.41 RECURRENT MAJOR DEPRESSIVE DISORDER, IN PARTIAL REMISSION: ICD-10-CM

## 2025-03-04 DIAGNOSIS — Z00.00 ROUTINE HISTORY AND PHYSICAL EXAMINATION OF ADULT: Primary | ICD-10-CM

## 2025-03-04 DIAGNOSIS — K21.9 GASTROESOPHAGEAL REFLUX DISEASE WITHOUT ESOPHAGITIS: ICD-10-CM

## 2025-03-04 DIAGNOSIS — R41.3 MEMORY CHANGES: ICD-10-CM

## 2025-03-04 LAB
ANION GAP SERPL CALCULATED.3IONS-SCNC: 11 MMOL/L (ref 7–15)
BASOPHILS # BLD AUTO: 0 10E3/UL (ref 0–0.2)
BASOPHILS NFR BLD AUTO: 1 %
BUN SERPL-MCNC: 8.2 MG/DL (ref 6–20)
CALCIUM SERPL-MCNC: 9.8 MG/DL (ref 8.8–10.4)
CHLORIDE SERPL-SCNC: 105 MMOL/L (ref 98–107)
CHOLEST SERPL-MCNC: 207 MG/DL
CREAT SERPL-MCNC: 0.66 MG/DL (ref 0.51–0.95)
EGFRCR SERPLBLD CKD-EPI 2021: >90 ML/MIN/1.73M2
EOSINOPHIL # BLD AUTO: 0.4 10E3/UL (ref 0–0.7)
EOSINOPHIL NFR BLD AUTO: 5 %
ERYTHROCYTE [DISTWIDTH] IN BLOOD BY AUTOMATED COUNT: 12.8 % (ref 10–15)
FASTING STATUS PATIENT QL REPORTED: YES
FASTING STATUS PATIENT QL REPORTED: YES
GLUCOSE SERPL-MCNC: 92 MG/DL (ref 70–99)
HCO3 SERPL-SCNC: 26 MMOL/L (ref 22–29)
HCT VFR BLD AUTO: 45.4 % (ref 35–47)
HDLC SERPL-MCNC: 48 MG/DL
HGB BLD-MCNC: 15.8 G/DL (ref 11.7–15.7)
IMM GRANULOCYTES # BLD: 0 10E3/UL
IMM GRANULOCYTES NFR BLD: 0 %
LDLC SERPL CALC-MCNC: 126 MG/DL
LYMPHOCYTES # BLD AUTO: 2.7 10E3/UL (ref 0.8–5.3)
LYMPHOCYTES NFR BLD AUTO: 38 %
MCH RBC QN AUTO: 31.3 PG (ref 26.5–33)
MCHC RBC AUTO-ENTMCNC: 34.8 G/DL (ref 31.5–36.5)
MCV RBC AUTO: 90 FL (ref 78–100)
MONOCYTES # BLD AUTO: 0.4 10E3/UL (ref 0–1.3)
MONOCYTES NFR BLD AUTO: 6 %
NEUTROPHILS # BLD AUTO: 3.5 10E3/UL (ref 1.6–8.3)
NEUTROPHILS NFR BLD AUTO: 50 %
NONHDLC SERPL-MCNC: 159 MG/DL
NRBC # BLD AUTO: 0 10E3/UL
NRBC BLD AUTO-RTO: 0 /100
PLATELET # BLD AUTO: 276 10E3/UL (ref 150–450)
POTASSIUM SERPL-SCNC: 5.1 MMOL/L (ref 3.4–5.3)
RBC # BLD AUTO: 5.05 10E6/UL (ref 3.8–5.2)
SODIUM SERPL-SCNC: 142 MMOL/L (ref 135–145)
TRIGL SERPL-MCNC: 164 MG/DL
WBC # BLD AUTO: 7.1 10E3/UL (ref 4–11)

## 2025-03-04 PROCEDURE — 80061 LIPID PANEL: CPT | Mod: ZL | Performed by: PHYSICIAN ASSISTANT

## 2025-03-04 PROCEDURE — 80048 BASIC METABOLIC PNL TOTAL CA: CPT | Mod: ZL | Performed by: PHYSICIAN ASSISTANT

## 2025-03-04 PROCEDURE — 82374 ASSAY BLOOD CARBON DIOXIDE: CPT | Mod: ZL | Performed by: PHYSICIAN ASSISTANT

## 2025-03-04 PROCEDURE — 85004 AUTOMATED DIFF WBC COUNT: CPT | Mod: ZL | Performed by: PHYSICIAN ASSISTANT

## 2025-03-04 PROCEDURE — 36415 COLL VENOUS BLD VENIPUNCTURE: CPT | Mod: ZL | Performed by: PHYSICIAN ASSISTANT

## 2025-03-04 RX ORDER — ALBUTEROL SULFATE 90 UG/1
2 INHALANT RESPIRATORY (INHALATION) EVERY 6 HOURS
Qty: 18 G | Refills: 11 | Status: SHIPPED | OUTPATIENT
Start: 2025-03-04

## 2025-03-04 RX ORDER — ESOMEPRAZOLE MAGNESIUM 40 MG/1
CAPSULE, DELAYED RELEASE ORAL
Qty: 90 CAPSULE | Refills: 3 | Status: SHIPPED | OUTPATIENT
Start: 2025-03-04

## 2025-03-04 RX ORDER — FLUOXETINE HYDROCHLORIDE 40 MG/1
40 CAPSULE ORAL DAILY
Qty: 90 CAPSULE | Refills: 3 | Status: SHIPPED | OUTPATIENT
Start: 2025-03-04

## 2025-03-04 SDOH — HEALTH STABILITY: PHYSICAL HEALTH: ON AVERAGE, HOW MANY MINUTES DO YOU ENGAGE IN EXERCISE AT THIS LEVEL?: PATIENT DECLINED

## 2025-03-04 SDOH — HEALTH STABILITY: PHYSICAL HEALTH
ON AVERAGE, HOW MANY DAYS PER WEEK DO YOU ENGAGE IN MODERATE TO STRENUOUS EXERCISE (LIKE A BRISK WALK)?: PATIENT DECLINED

## 2025-03-04 ASSESSMENT — PAIN SCALES - GENERAL: PAINLEVEL_OUTOF10: SEVERE PAIN (8)

## 2025-03-04 ASSESSMENT — PATIENT HEALTH QUESTIONNAIRE - PHQ9
SUM OF ALL RESPONSES TO PHQ QUESTIONS 1-9: 13
10. IF YOU CHECKED OFF ANY PROBLEMS, HOW DIFFICULT HAVE THESE PROBLEMS MADE IT FOR YOU TO DO YOUR WORK, TAKE CARE OF THINGS AT HOME, OR GET ALONG WITH OTHER PEOPLE: SOMEWHAT DIFFICULT
SUM OF ALL RESPONSES TO PHQ QUESTIONS 1-9: 13

## 2025-03-04 ASSESSMENT — SOCIAL DETERMINANTS OF HEALTH (SDOH): HOW OFTEN DO YOU GET TOGETHER WITH FRIENDS OR RELATIVES?: MORE THAN THREE TIMES A WEEK

## 2025-03-04 NOTE — NURSING NOTE
"Chief Complaint   Patient presents with    Physical       Initial /78   Pulse 69   Temp 98.7  F (37.1  C) (Tympanic)   Resp 18   Ht 1.562 m (5' 1.5\")   LMP  (LMP Unknown)   SpO2 98%   BMI 27.88 kg/m   Estimated body mass index is 27.88 kg/m  as calculated from the following:    Height as of this encounter: 1.562 m (5' 1.5\").    Weight as of 10/18/24: 68 kg (150 lb).  Medication Review: complete    The next two questions are to help us understand your food security.  If you are feeling you need any assistance in this area, we have resources available to support you today.          3/4/2025   SDOH- Food Insecurity   Within the past 12 months, did you worry that your food would run out before you got money to buy more? Y   Within the past 12 months, did the food you bought just not last and you didn t have money to get more? Y         Health Care Directive:  Patient does not have a Health Care Directive: Discussed advance care planning with patient; however, patient declined at this time.    Marisa Francis LPN      "

## 2025-03-04 NOTE — PROGRESS NOTES
Preventive Care Visit  [facility]  Lisa Crisostomo PA-C, Family Medicine  Mar 4, 2025      Assessment & Plan     Routine history and physical examination of adult  Up-to-date on preventative screenings.  Labs as below.  Discussed immunizations, deferred by patient today.  - CBC and Differential; Future  - Basic Metabolic Panel; Future  - Lipid Panel; Future  - CBC and Differential  - Basic Metabolic Panel  - Lipid Panel    Intractable chronic migraine without aura and without status migrainosus  Chronic, persistent with near daily migrainous headaches refractory to Maxalt.  Had previously been on atenolol from 2017 through 20 20 and topiramate from 2028 2021 without significant prevention/improvement.  Has been managing with heat, cool, essential oils without significant improvement.  Discussed options including trying a course of tricyclic antidepressant for preventative management and/or meeting with neurology.  Patient would like to try amitriptyline as below.  This also may help benefit with fibromyalgia changes as below.  - amitriptyline (ELAVIL) 10 MG tablet; Take 1 tablet (10 mg) by mouth at bedtime.    Memory changes  Recently noticed over the last couple weeks.  Patient has been under increased amount of stress and anxiety.  Discussed this could be related to her recent stress levels or could be related to underlying neuropsychiatric changes.  Patient would like to try to manage her depression as well as her fibromyalgia and migraines as above and below first and if symptoms persist will consider imaging and/or possible neurology referral for evaluation of these changes as well as persistent migraines.    Fibromyalgia  Chronic, has been dealing with increased symptoms.  Continues on Lyrica twice daily.  Will be adding amitriptyline as above for migrainous management as well as possible improvement in fibromyalgia.  Will likely need to be increasing dose but will start at lowest dose  as below to avoid significant side effects.  Patient to follow-up in 1 month for recheck or sooner if needed.  - pregabalin (LYRICA) 150 MG capsule; Take 1 capsule (150 mg) by mouth 2 times daily.  - amitriptyline (ELAVIL) 10 MG tablet; Take 1 tablet (10 mg) by mouth at bedtime.    Hyperlipidemia, unspecified hyperlipidemia type  Chronic, lipids still elevated but improved compared to previous.  Continue with statin and focusing on healthy lifestyle.  - Lipid Panel; Future  - Lipid Panel    Reactive airway disease with wheezing with acute exacerbation, unspecified asthma severity, unspecified whether persistent  Chronic, stable.  Refill as below.  - albuterol (PROAIR HFA/PROVENTIL HFA/VENTOLIN HFA) 108 (90 Base) MCG/ACT inhaler; Inhale 2 puffs into the lungs every 6 hours.    Gastroesophageal reflux disease without esophagitis  Chronic, stable.  Refill as below.  - esomeprazole (NEXIUM) 40 MG DR capsule; TAKE 1 CAPSULE BY MOUTH EVERY DAY - 30-60 MINUTES BEFORE A MEAL    Recurrent major depressive disorder, in partial remission  Chronic with recent increase due to increase in anxiety and stress levels.  Patient would like to consider increasing dose.  Prescription for 40 and 20 mg tablets to take together once daily for total daily dose of 60 mg.  Follow-up in 1 month for recheck or sooner if needed.  - FLUoxetine (PROZAC) 40 MG capsule; Take 1 capsule (40 mg) by mouth daily.  - FLUoxetine (PROZAC) 20 MG capsule; Take 1 capsule (20 mg) by mouth daily. In addition to 40 mg for total of 60 mg daily          Nicotine/Tobacco Cessation  She reports that she has been smoking cigarettes. She started smoking about 40 years ago. She has a 8.2 pack-year smoking history. She has been exposed to tobacco smoke. She has never used smokeless tobacco.  Nicotine/Tobacco Cessation Plan  Information offered: Patient not interested at this time      BMI  Estimated body mass index is 27.88 kg/m  as calculated from the following:     "Height as of this encounter: 1.562 m (5' 1.5\").    Weight as of 10/18/24: 68 kg (150 lb).       Depression Screening Follow Up        3/4/2025     9:52 AM   PHQ   PHQ-9 Total Score 13    Q9: Thoughts of better off dead/self-harm past 2 weeks Several days   F/U: Thoughts of suicide or self-harm No   F/U: Safety concerns No       Patient-reported              No data to display                      Follow Up Actions Taken  Crisis resource information provided in the After Visit Summary    Discussed the following ways the patient can remain in a safe environment:    Counseling  Appropriate preventive services were addressed with this patient via screening, questionnaire, or discussion as appropriate for fall prevention, nutrition, physical activity, Tobacco-use cessation, social engagement, weight loss and cognition.  Checklist reviewing preventive services available has been given to the patient.  Reviewed patient's diet, addressing concerns and/or questions.   She is at risk for psychosocial distress and has been provided with information to reduce risk.   The patient's PHQ-9 score is consistent with moderate depression. She was provided with information regarding depression.           No follow-ups on file.    Oneil Gallegos is a 59 year old, presenting for the following:  Physical        3/4/2025    10:12 AM   Additional Questions   Roomed by Marisa Francis LPN   Accompanied by self          HPI     Here for annual physical.     Migraines:  Patient has chronic history of extensive migraines for many years.  Most recently has been experiencing near daily migrainous headaches that been refractory to prescription Maxalt as well as essential oils, heating, cooling compresses.  Per chart review she had been on atenolol from 1144-7679 and topiramate from 6522-8083 for preventative management without good control.  Patient remembers experiencing possible side effects but does not recall specific details.  She would " like to discuss possible further management of her migraines at this time.  Has not previously met with neurology for further evaluation and treatment considerations.    Memory:  Has been noticing some short-term memory changes over the last couple weeks.  Recalls there was a day where she was driving her son to an appointment in [place] and does not remember most of driving there and back.  She has been forgetting small things here and there throughout her day.  Has been under significantly increased chest and anxiety mainly due to her family dynamics as well as her son's health.  No other significant neurologic symptoms other than chronic migraines as above.  Has not noticed any fever/chills, numbness or tingling, weakness, difficulties with speech or swallowing, facial droop.    Fibromyalgia:  Chronic, has been moderately controlled with Lyrica 150 mg twice daily.  She has been dealing with increased pain more recently due to her increased chest anxiety.  She would like to discuss additional treatment management at this time.    Hyperlipidemia:  Chronic, continues on statin.  Due for updated labs.    Reactive airway disease:  Patient continues on albuterol inhaler as needed.  She does continue to smoke approximately 1 pack/week.  She continues to work on tobacco cessation.    GERD:  Chronic, continues on Nexium with good control.  Needing refills.    Depression:  Chronic, not under good control especially due to increased chest and anxiety.  Continues on Prozac 40 mg once daily.  Patient would like to consider increasing her dose.  Not currently working with a therapist.  Reports passing thoughts of suicide without plans or intention.     Advance Care Planning  Patient does not have a Health Care Directive: Discussed advance care planning with patient; however, patient declined at this time.      3/4/2025   General Health   How would you rate your overall physical health? (!) POOR   Feel stress (tense, anxious, or  unable to sleep) Very much   (!) STRESS CONCERN      3/4/2025   Nutrition   Three or more servings of calcium each day? (!) NO   Diet: I don't know   How many servings of fruit and vegetables per day? (!) 2-3   How many sweetened beverages each day? 0-1         3/4/2025   Exercise   Days per week of moderate/strenous exercise Patient declined   Average minutes spent exercising at this level Patient declined         3/4/2025   Social Factors   Frequency of gathering with friends or relatives More than three times a week   Worry food won't last until get money to buy more Yes   Food not last or not have enough money for food? Yes   Do you have housing? (Housing is defined as stable permanent housing and does not include staying ouside in a car, in a tent, in an abandoned building, in an overnight shelter, or couch-surfing.) Yes   Are you worried about losing your housing? No   Lack of transportation? No   Unable to get utilities (heat,electricity)? No   (!) FOOD SECURITY CONCERN PRESENT      3/4/2025   Fall Risk   Fallen 2 or more times in the past year? No   Trouble with walking or balance? Yes   Gait Speed Test (Document in seconds) 4.39   Gait Speed Test Interpretation Less than or equal to 5.00 seconds - PASS          3/4/2025   Dental   Dentist two times every year? Yes         2/12/2024   TB Screening   Were you born outside of the [place]? No       Today's PHQ-9 Score:       3/4/2025     9:52 AM   PHQ-9 SCORE   PHQ-9 Total Score MyChart 13 (Moderate depression)   PHQ-9 Total Score 13        Patient-reported         3/4/2025   Substance Use   If I could quit smoking, I would Completely agree   I want to quit somking, worry about health affects Completely agree   Willing to make a plan to quit smoking Neutral   Willing to cut down before quitting Somewhat agree   Alcohol more than 3/day or more than 7/wk No   Do you use any other substances recreationally? No     Social History     Tobacco Use    Smoking status: Every  Day     Current packs/day: 0.20     Average packs/day: 0.2 packs/day for 40.8 years (8.2 ttl pk-yrs)     Types: Cigarettes     Start date: 5/1/1984     Passive exposure: Current    Smokeless tobacco: Never    Tobacco comments:     Trying to quit, minimal 0-1 a day about    Vaping Use    Vaping status: Former    Substances: Nicotine, Flavoring   Substance Use Topics    Alcohol use: Not Currently     Alcohol/week: 0.0 - 1.0 standard drinks of alcohol    Drug use: Not Currently     Types: Marijuana           9/10/2024   LAST FHS-7 RESULTS   1st degree relative breast or ovarian cancer Yes   Any relative bilateral breast cancer Yes   Any male have breast cancer Unknown   Any ONE woman have BOTH breast AND ovarian cancer No   Any woman with breast cancer before 50yrs Yes   2 or more relatives with breast AND/OR ovarian cancer Yes   2 or more relatives with breast AND/OR bowel cancer Yes        Mammogram Screening - Mammogram every 1-2 years updated in Health Maintenance based on mutual decision making        3/4/2025   STI Screening   New sexual partner(s) since last STI/HIV test? No     History of abnormal Pap smear: YES - reflected in Problem List and Health Maintenance accordingly       ASCVD Risk   The 10-year ASCVD risk score (Aldair BABCOCK, et al., 2019) is: 7%    Values used to calculate the score:      Age: 59 years      Sex: Female      Is Non- : No      Diabetic: No      Tobacco smoker: Yes      Systolic Blood Pressure: 130 mmHg      Is BP treated: No      HDL Cholesterol: 48 mg/dL      Total Cholesterol: 222 mg/dL           Reviewed and updated as needed this visit by Provider                    Past Medical History:   Diagnosis Date    Fixed drug eruption 04/21/2015    Pain in knee     No Comments Provided    Personal history of other medical treatment (CODE)     G7, P3-0-4-3 (3 vag. 4 spon miscarriages-one in the second trimester and one daughter was twin and lost the twin).     "Radiculopathy     ,Benign hypermobility per [facility]     Past Surgical History:   Procedure Laterality Date    COLONOSCOPY      2003,Normal, bleeding hemorrhoids    COLONOSCOPY N/A 2021    tubular adenomas- f/u 5 years    HYSTERECTOMY VAGINAL      2003,Dr Ramirez-[place], chronic pelvic pain/endometriosis    HYSTERECTOMY, PAP NO LONGER INDICATED      OTHER SURGICAL HISTORY      2051,DILATION AND CURETTAGE,After 4 miscarriages    OTHER SURGICAL HISTORY      ,,OTHER,Ovarian cyst    OTHER SURGICAL HISTORY      2002,,OTHER,Chronic pelvic pain/hemorrhagic cyst    OTHER SURGICAL HISTORY      2009,[id number],OTHER,Dr Ramirez-recurrent ovarian cysts     OB History    Para Term  AB Living   7 3 3 0 4 3   SAB IAB Ectopic Multiple Live Births   0 0 0 0 0            Objective    Exam  /78   Pulse 69   Temp 98.7  F (37.1  C) (Tympanic)   Resp 18   Ht 1.562 m (5' 1.5\")   LMP  (LMP Unknown)   SpO2 98%   BMI 27.88 kg/m     Estimated body mass index is 27.88 kg/m  as calculated from the following:    Height as of this encounter: 1.562 m (5' 1.5\").    Weight as of 10/18/24: 68 kg (150 lb).    Physical Exam  GENERAL: alert and no distress  EYES: Eyes grossly normal to inspection, PERRL and conjunctivae and sclerae normal  HENT: ear canals and TM's normal, nose and mouth without ulcers or lesions  NECK: no adenopathy, no asymmetry, masses, or scars  RESP: lungs clear to auscultation - no rales, rhonchi or wheezes  CV: regular rate and rhythm, normal S1 S2, no S3 or S4, no murmur, click or rub, no peripheral edema  MS: no gross musculoskeletal defects noted, no edema  SKIN: no suspicious lesions or rashes  NEURO: Normal strength and tone, mentation intact and speech normal  PSYCH: mentation appears normal, affect normal/bright        Signed Electronically by: Lisa Crisostomo PA-C    Answers submitted by the patient for this visit:  Patient Health Questionnaire (Submitted on " 3/4/2025)  If you checked off any problems, how difficult have these problems made it for you to do your work, take care of things at home, or get along with other people?: Somewhat difficult  PHQ9 TOTAL SCORE: 13

## 2025-03-04 NOTE — LETTER
March 6, 2025      [facility]  [address] [facility]  [place] [address]        Dear ,    We are writing to inform you of your test results.  Kidney function and electrolytes returned within normal limits.  Cholesterol levels are still elevated but improved compared to last year.  Blood cell counts are stable.  See how things go with the addition of the amitriptyline for management of both your migraines and your increased pain/fibromyalgia symptoms.  Follow-up in 1 month and if symptoms are persistent we can either increase the medication as we started you on a very low dose and/or consider further evaluation with possible imaging.    Resulted Orders   Basic Metabolic Panel   Result Value Ref Range    Sodium 142 135 - 145 mmol/L    Potassium 5.1 3.4 - 5.3 mmol/L    Chloride 105 98 - 107 mmol/L    Carbon Dioxide (CO2) 26 22 - 29 mmol/L    Anion Gap 11 7 - 15 mmol/L    Urea Nitrogen 8.2 6.0 - 20.0 mg/dL    Creatinine 0.66 0.51 - 0.95 mg/dL    GFR Estimate >90 >60 mL/min/1.73m2      Comment:      eGFR calculated using 2021 CKD-EPI equation.    Calcium 9.8 8.8 - 10.4 mg/dL    Glucose 92 70 - 99 mg/dL    Patient Fasting > 8hrs? Yes    Lipid Panel   Result Value Ref Range    Cholesterol 207 (H) <200 mg/dL    Triglycerides 164 (H) <150 mg/dL    Direct Measure HDL 48 (L) >=50 mg/dL    LDL Cholesterol Calculated 126 (H) <100 mg/dL    Non HDL Cholesterol 159 (H) <130 mg/dL    Patient Fasting > 8hrs? Yes     Narrative    Cholesterol  Desirable: < 200 mg/dL  Borderline High: 200 - 239 mg/dL  High: >= 240 mg/dL    Triglycerides  Normal: < 150 mg/dL  Borderline High: 150 - 199 mg/dL  High: 200-499 mg/dL  Very High: >= 500 mg/dL    Direct Measure HDL  Female: >= 50 mg/dL   Male: >= 40 mg/dL    LDL Cholesterol  Desirable: < 100 mg/dL  Above Desirable: 100 - 129 mg/dL   Borderline High: 130 - 159 mg/dL   High:  160 - 189 mg/dL   Very High: >= 190 mg/dL    Non HDL Cholesterol  Desirable: < 130 mg/dL  Above Desirable: 130 -  159 mg/dL  Borderline High: 160 - 189 mg/dL  High: 190 - 219 mg/dL  Very High: >= 220 mg/dL   CBC with platelets and differential   Result Value Ref Range    WBC Count 7.1 4.0 - 11.0 10e3/uL    RBC Count 5.05 3.80 - 5.20 10e6/uL    Hemoglobin 15.8 (H) 11.7 - 15.7 g/dL    Hematocrit 45.4 35.0 - 47.0 %    MCV 90 78 - 100 fL    MCH 31.3 26.5 - 33.0 pg    MCHC 34.8 31.5 - 36.5 g/dL    RDW 12.8 10.0 - 15.0 %    Platelet Count 276 150 - 450 10e3/uL    % Neutrophils 50 %    % Lymphocytes 38 %    % Monocytes 6 %    % Eosinophils 5 %    % Basophils 1 %    % Immature Granulocytes 0 %    NRBCs per 100 WBC 0 <1 /100    Absolute Neutrophils 3.5 1.6 - 8.3 10e3/uL    Absolute Lymphocytes 2.7 0.8 - 5.3 10e3/uL    Absolute Monocytes 0.4 0.0 - 1.3 10e3/uL    Absolute Eosinophils 0.4 0.0 - 0.7 10e3/uL    Absolute Basophils 0.0 0.0 - 0.2 10e3/uL    Absolute Immature Granulocytes 0.0 <=0.4 10e3/uL    Absolute NRBCs 0.0 10e3/uL       If you have any questions or concerns, please call the clinic at the number listed above.       Sincerely,      Lisa Crisostomo PA-C    Electronically signed

## 2025-03-05 ENCOUNTER — TELEPHONE (OUTPATIENT)
Dept: FAMILY MEDICINE | Facility: OTHER | Age: 60
End: 2025-03-05
Payer: COMMERCIAL

## 2025-03-05 RX ORDER — PREGABALIN 150 MG/1
150 CAPSULE ORAL 2 TIMES DAILY
Qty: 180 CAPSULE | Refills: 1 | Status: SHIPPED | OUTPATIENT
Start: 2025-03-05

## 2025-03-05 RX ORDER — AMITRIPTYLINE HYDROCHLORIDE 10 MG/1
10 TABLET ORAL AT BEDTIME
Qty: 90 TABLET | Refills: 3 | Status: SHIPPED | OUTPATIENT
Start: 2025-03-05

## 2025-03-05 NOTE — TELEPHONE ENCOUNTER
Patient informed scripts for Pregabalin, Amitriptyline, and Fluoxetine were sent to [facility] today. She then asked for a refill on Rizatriptan. She was informed a refill for that was sent in December and it had 3 refills attached with it so she will need to contact pharmacy for a refill.     Lenore Campa CMA on 3/5/2025 at 2:09 PM

## 2025-04-21 NOTE — PROGRESS NOTES
Assessment & Plan     COPD exacerbation (H)  Symptoms and exam consistent with current COPD exacerbation secondary to URI. Vitals, exam showing wheezing, otherwise stable. Will cover with abx and steroid burst as below. Refilled neb solution for prn management. Encouraged tobacco cessation.   - ipratropium - albuterol 0.5 mg/2.5 mg/3 mL (DUONEB) 0.5-2.5 (3) MG/3ML neb solution; Take 0.5-1 vials (1.5-3 mLs) by nebulization every 2 hours as needed for shortness of breath, wheezing or cough.  - azithromycin (ZITHROMAX) 250 MG tablet; Take 2 tablets (500 mg) by mouth daily for 1 day, THEN 1 tablet (250 mg) daily for 4 days.  - predniSONE (DELTASONE) 20 MG tablet; Take 1 tablet (20 mg) by mouth 2 times daily.    Tobacco use  - ipratropium - albuterol 0.5 mg/2.5 mg/3 mL (DUONEB) 0.5-2.5 (3) MG/3ML neb solution; Take 0.5-1 vials (1.5-3 mLs) by nebulization every 2 hours as needed for shortness of breath, wheezing or cough.    Depression, major, recurrent, moderate (H)  Chronic, with minimal improvement with increased Prozac dosing.  Patient wanting to increase amitriptyline for migraine management as below which may provide benefit to mood. If minimal improvement, consider alternative selective serotonin reuptake inhibitor or mental health referral.   - amitriptyline (ELAVIL) 25 MG tablet; Take 1 tablet (25 mg) by mouth at bedtime.    Intractable chronic migraine without aura and without status migrainosus  Fibromyalgia  Chronic, minimal improvement with addition of very low dose amitriptyline. Recommend increasing dose in 10-25 mg increments every week until control is obtained or up to 150 mg once daily. Patient will follow up in 4 weeks.   - amitriptyline (ELAVIL) 25 MG tablet; Take 1 tablet (25 mg) by mouth at bedtime.    BMI 29.0-29.9,adult  Chronic, persistent. Patient reporting weight is refractory to lifestyle changes. Discussed diet in depth and discussed options for improvement including ensuring she is  "obtaining enough caloric and macronutrient intake as well as increasing physical activity as tolerated. Reports activity is limited by chronic left pain. Patient is requesting a trial of GLP-1 injectable medication to adjunct lifestyle. Discussed this may not be covered by insurance and healthy lifestyle improvements are also a good option for weight management. Educated on medication, use, and side effects. Follow up in 4 weeks.   - semaglutide-weight management (WEGOVY) 0.25 MG/0.5ML pen; Inject 0.5 mLs (0.25 mg) subcutaneously once a week.          Oneil Gallegos is a 59 year old, presenting for the following health issues:  Cold Symptoms, Follow Up (Medications), and Weight Loss    History of Present Illness       Headaches:   Since the patient's last clinic visit, headaches are: worsened  The patient is getting headaches:  Daily  She is not able to do normal daily activities when she has a migraine.  The patient is taking the following rescue/relief medications:  Tylenol and Maxalt   Patient states \"The relief is inconsistent\" from the rescue/relief medications.   The patient is taking the following medications to prevent migraines:  Amitriptyline  In the past 4 weeks, the patient has gone to an Urgent Care or Emergency Room 0 times times due to headaches.    Reason for visit:  Follow up + illness  Symptom onset:  1-3 days ago  Symptoms include:  Cough, sore throat,ribs hurt,migraine  Symptom intensity:  Severe  Symptom progression:  Worsening  Had these symptoms before:  Yes  Has tried/received treatment for these symptoms:  Yes  Previous treatment was successful:  No  What makes it worse:  Unsure  What makes it better:  No   She is taking medications regularly.        URI/COPD:  Few days of persistent and progressive coughing, sore throat, shortness of breath, chest congestion, nasal drainage.  Patient has known COPD had previously been under good control but is struggling quite a bit with coughing and " breathing.  Continues on albuterol as needed, requesting refills of DuoNebs as she has been out for a while.  No fever/chills.  Did have sick contacts over the weekend but symptoms began prior.    Mood:  Following up on mood.  At her last visit a month and a half ago we had discussed increasing her Prozac dose to 60 mg once daily to try to better improve depression control.  Patient reports she tolerated the change well but did not notice significant improvement in symptoms.  She was also started on amitriptyline for migraine and fibromyalgia management but at a very low dose so minimal improvement from that as well.  She did tolerate that well and would like to consider dose increase as outlined below.    Migraines:  Patient has extensive history of migraines over the course of many years.  Currently experiencing migraines on a near daily basis.  Has previously been refractory to Maxalt, essential, heating, cooling compresses, atenolol, topiramate.  At her most recent visit in 3/2025 we discussed addition of amitriptyline for preventative management.  Patient wanted to start at a very low dose at 10 mg once daily.  She tolerated the medication well but did not increase as we had discussed during her visit.  She would like to consider increasing her dose prior to having to meet with neurology for further evaluation.    Weight:  Patient is wanting to discuss a possible injectable medication for weight loss.  Reports she was at an appointment with her son where the provider at the time had discussed possible GLP-1 injectable medication for weight management.  Patient reports she has been struggling with increasing weight over the last 8+ years.  Tries to remain physically active but is limited due to chronic leg pains.  More recently has been doing chair exercises.  Also reports she tries to focus on a healthier diet however she is often only eating once per day.  Diet mainly consists of salads, pasta, soup, fish,  pork chops.  Is not a huge snacker.  Reports she does not eat much for sweets.  Does have known history of hyperlipidemia that has been improving.  No known diabetes.      PAST MEDICAL HISTORY:   Past Medical History:   Diagnosis Date    Fixed drug eruption 04/21/2015    Pain in knee     No Comments Provided    Personal history of other medical treatment (CODE)     G7, P3-0-4-3 (3 vag. 4 spon miscarriages-one in the second trimester and one daughter was twin and lost the twin).    Radiculopathy     2011,Benign hypermobility per [facility]       PAST SURGICAL HISTORY:   Past Surgical History:   Procedure Laterality Date    COLONOSCOPY      4/2003,Normal, bleeding hemorrhoids    COLONOSCOPY N/A 11/30/2021    tubular adenomas- f/u 5 years    HYSTERECTOMY VAGINAL      5/2003,Dr SpenceClubb, chronic pelvic pain/endometriosis    HYSTERECTOMY, PAP NO LONGER INDICATED      OTHER SURGICAL HISTORY      205138,DILATION AND CURETTAGE,After 4 miscarriages    OTHER SURGICAL HISTORY      1986,[id number],OTHER,Ovarian cyst    OTHER SURGICAL HISTORY      6/2002,205956,OTHER,Chronic pelvic pain/hemorrhagic cyst    OTHER SURGICAL HISTORY      2/2009,[id number],OTHER,Dr Ramirez-recurrent ovarian cysts       FAMILY HISTORY:   Family History   Problem Relation Age of Onset    Breast Cancer Mother 68        Cancer-breast    Diabetes Mother         Diabetes    Heart Disease Father         Heart Disease,CHF, pacemaker,MI at 93    Hypertension Father         Hypertension    Aneurysm Sister         Brain     Fibromyalgia Sister     Arthritis Sister     Migraines Sister         Chronic    Aneurysm Sister         Brain    Migraines Sister     Migraines Sister     Breast Cancer Sister         Cancer-breast    Tumor Sister         Brain    Breast Cancer Sister     Cervical Cancer Sister     Arthritis Sister     Arthritis Brother     Aneurysm Brother         Brain    Arthritis Brother     Arthritis Brother        SOCIAL HISTORY:   Social History     Tobacco Use     Smoking status: Every Day     Current packs/day: 0.20     Average packs/day: 0.2 packs/day for 41.0 years (8.2 ttl pk-yrs)     Types: Cigarettes     Start date: 5/1/1984     Passive exposure: Current    Smokeless tobacco: Never    Tobacco comments:     Trying to quit, minimal 0-1 a day about    Substance Use Topics    Alcohol use: Not Currently     Alcohol/week: 0.0 - 1.0 standard drinks of alcohol        Allergies   Allergen Reactions    Amoxicillin      Not effective.     Nsaids      Other reaction(s): Ecchymosis     Current Outpatient Medications   Medication Sig Dispense Refill    albuterol (PROAIR HFA/PROVENTIL HFA/VENTOLIN HFA) 108 (90 Base) MCG/ACT inhaler Inhale 2 puffs into the lungs every 6 hours. 18 g 11    albuterol (PROVENTIL) (2.5 MG/3ML) 0.083% neb solution Take 1 vial (2.5 mg) by nebulization every 6 hours as needed for shortness of breath, wheezing or cough 25 mL 3    amitriptyline (ELAVIL) 25 MG tablet Take 1 tablet (25 mg) by mouth at bedtime. 90 tablet 0    atorvastatin (LIPITOR) 20 MG tablet TAKE 1 TABLET (20 MG) BY MOUTH DAILY 90 tablet 3    azithromycin (ZITHROMAX) 250 MG tablet Take 2 tablets (500 mg) by mouth daily for 1 day, THEN 1 tablet (250 mg) daily for 4 days. 6 tablet 0    clobetasol (TEMOVATE) 0.05 % external solution APPLY TO THE SCALP TWICE DAILY.      cyclobenzaprine (FLEXERIL) 10 MG tablet TAKE 1 TABLET (10 MG) BY MOUTH 3 TIMES DAILY AS NEEDED FOR MUSCLE SPASMS 90 tablet 1    esomeprazole (NEXIUM) 40 MG DR capsule TAKE 1 CAPSULE BY MOUTH EVERY DAY - 30-60 MINUTES BEFORE A MEAL 90 capsule 3    FLUoxetine (PROZAC) 20 MG capsule Take 1 capsule (20 mg) by mouth daily. In addition to 40 mg for total of 60 mg daily 90 capsule 3    FLUoxetine (PROZAC) 40 MG capsule Take 1 capsule (40 mg) by mouth daily. 90 capsule 3    fluticasone (FLONASE) 50 MCG/ACT nasal spray Spray 2 sprays into both nostrils daily 16 g 3    hydrOXYzine HCl (ATARAX) 25 MG tablet Take 1-2 tablets (25-50 mg) by  mouth 3 times daily as needed for anxiety 60 tablet 1    ipratropium - albuterol 0.5 mg/2.5 mg/3 mL (DUONEB) 0.5-2.5 (3) MG/3ML neb solution Take 0.5-1 vials (1.5-3 mLs) by nebulization every 2 hours as needed for shortness of breath, wheezing or cough. 150 mL 1    nicotine (NICOTROL) 10 MG inhaler Use 1 cartridge as needed for urge to smoke by puffing over course of 20min.  Use 6-16 cart/day; reduce number of cart/day over 6-12 weeks. 200 each 0    predniSONE (DELTASONE) 20 MG tablet Take 1 tablet (20 mg) by mouth 2 times daily. 10 tablet 0    pregabalin (LYRICA) 150 MG capsule Take 1 capsule (150 mg) by mouth 2 times daily. 180 capsule 1    semaglutide-weight management (WEGOVY) 0.25 MG/0.5ML pen Inject 0.5 mLs (0.25 mg) subcutaneously once a week. 2 mL 0    rizatriptan (MAXALT) 10 MG tablet Take 1 tablet (10 mg) by mouth at onset of headache for migraine. May repeat in 2 hours.  Max 3/day, 20/month 30 tablet 3     No current facility-administered medications for this visit.           Objective    /80   Pulse 92   Temp 98.2  F (36.8  C) (Tympanic)   Resp 18   Wt 72.6 kg (160 lb)   LMP  (LMP Unknown)   SpO2 98%   BMI 29.74 kg/m    Body mass index is 29.74 kg/m .  Physical Exam   General: Pleasant, in no apparent distress.  Eyes: Sclera are white and conjunctiva are clear bilaterally. Lacrimal apparatus free of erythema, edema, and discharge bilaterally.  Ears: External ears without erythema or edema. Tympanic membranes are pearly white and without erythema, scarring or perforations bilaterally. External auditory canals are free of foreign bodies, erythema, ulcers, and masses.  Nose: External nose is symmetrical and free of lesions and deformities.   Oropharynx: Oral mucosa is pink and without ulcers, nodules, and white patches. Tongue is symmetrical, pink, and without masses or lesions. Pharynx is pink, symmetrical, and without lesions. Uvula is midline. Tonsils are pink, symmetrical, and without  edema, ulcers, or exudates, and 1+ bilaterally.  Cardiovascular: Regular rate and rhythm with S1 equal to S2. No murmurs, friction rubs, or gallops.   Respiratory: Diffuse wheezing, no crackles or rhonchi.   Skin: No jaundice, pallor, rashes, or lesions.  Psych: Appropriate mood and affect.      Signed Electronically by: Lisa Crisostomo PA-C

## 2025-04-22 ENCOUNTER — TELEPHONE (OUTPATIENT)
Dept: FAMILY MEDICINE | Facility: OTHER | Age: 60
End: 2025-04-22
Payer: COMMERCIAL

## 2025-04-22 ENCOUNTER — OFFICE VISIT (OUTPATIENT)
Dept: FAMILY MEDICINE | Facility: OTHER | Age: 60
End: 2025-04-22
Attending: PHYSICIAN ASSISTANT
Payer: COMMERCIAL

## 2025-04-22 VITALS
SYSTOLIC BLOOD PRESSURE: 136 MMHG | BODY MASS INDEX: 29.74 KG/M2 | DIASTOLIC BLOOD PRESSURE: 80 MMHG | WEIGHT: 160 LBS | OXYGEN SATURATION: 98 % | TEMPERATURE: 98.2 F | HEART RATE: 92 BPM | RESPIRATION RATE: 18 BRPM

## 2025-04-22 DIAGNOSIS — J44.1 COPD EXACERBATION (H): Primary | ICD-10-CM

## 2025-04-22 DIAGNOSIS — Z72.0 TOBACCO USE: ICD-10-CM

## 2025-04-22 DIAGNOSIS — G43.009 MIGRAINE WITHOUT AURA AND WITHOUT STATUS MIGRAINOSUS, NOT INTRACTABLE: ICD-10-CM

## 2025-04-22 DIAGNOSIS — M79.7 FIBROMYALGIA: ICD-10-CM

## 2025-04-22 DIAGNOSIS — G43.719 INTRACTABLE CHRONIC MIGRAINE WITHOUT AURA AND WITHOUT STATUS MIGRAINOSUS: ICD-10-CM

## 2025-04-22 DIAGNOSIS — F33.1 DEPRESSION, MAJOR, RECURRENT, MODERATE (H): ICD-10-CM

## 2025-04-22 RX ORDER — RIZATRIPTAN BENZOATE 10 MG/1
10 TABLET ORAL
Qty: 10 TABLET | Refills: 3 | Status: SHIPPED | OUTPATIENT
Start: 2025-04-22 | End: 2025-04-22

## 2025-04-22 RX ORDER — RIZATRIPTAN BENZOATE 10 MG/1
10 TABLET ORAL
Qty: 30 TABLET | Refills: 3 | Status: SHIPPED | OUTPATIENT
Start: 2025-04-22 | End: 2025-04-22

## 2025-04-22 RX ORDER — IPRATROPIUM BROMIDE AND ALBUTEROL SULFATE 2.5; .5 MG/3ML; MG/3ML
.5-1 SOLUTION RESPIRATORY (INHALATION)
Qty: 150 ML | Refills: 1 | Status: SHIPPED | OUTPATIENT
Start: 2025-04-22

## 2025-04-22 RX ORDER — PREDNISONE 20 MG/1
20 TABLET ORAL 2 TIMES DAILY
Qty: 10 TABLET | Refills: 0 | Status: SHIPPED | OUTPATIENT
Start: 2025-04-22

## 2025-04-22 RX ORDER — RIZATRIPTAN BENZOATE 10 MG/1
10 TABLET ORAL
Qty: 30 TABLET | Refills: 3 | Status: SHIPPED | OUTPATIENT
Start: 2025-04-22

## 2025-04-22 RX ORDER — AZITHROMYCIN 250 MG/1
TABLET, FILM COATED ORAL
Qty: 6 TABLET | Refills: 0 | Status: SHIPPED | OUTPATIENT
Start: 2025-04-22 | End: 2025-04-27

## 2025-04-22 ASSESSMENT — PAIN SCALES - GENERAL: PAINLEVEL_OUTOF10: NO PAIN (0)

## 2025-04-22 NOTE — NURSING NOTE
"Chief Complaint   Patient presents with    Cold Symptoms    Follow Up     Medications    Weight Loss       Initial /80   Pulse 92   Temp 98.2  F (36.8  C) (Tympanic)   Resp 18   Wt 72.6 kg (160 lb)   LMP  (LMP Unknown)   SpO2 98%   BMI 29.74 kg/m   Estimated body mass index is 29.74 kg/m  as calculated from the following:    Height as of 3/4/25: 1.562 m (5' 1.5\").    Weight as of this encounter: 72.6 kg (160 lb).  Medication Review: complete    The next two questions are to help us understand your food security.  If you are feeling you need any assistance in this area, we have resources available to support you today.          3/4/2025   SDOH- Food Insecurity   Within the past 12 months, did you worry that your food would run out before you got money to buy more? Y   Within the past 12 months, did the food you bought just not last and you didn t have money to get more? Y         Health Care Directive:  Patient does not have a Health Care Directive: Discussed advance care planning with patient; however, patient declined at this time.    Marisa Francis LPN      "

## 2025-04-22 NOTE — TELEPHONE ENCOUNTER
"Received a fax from [facility] pharmacy in regards to Rx they received for    Disp Refills Start End MARIN   rizatriptan (MAXALT) 10 MG tablet 30 tablet 3 4/22/2025 -- No   Sig - Route: Take 1 tablet (10 mg) by mouth at onset of headache for migraine. May repeat in 2 hours.      Note from pharmacy:  \"Can we get the max per day/month noted on the prescription for the patient?\"    Michaela Townsend RN on 4/22/2025 at 11:42 AM    "

## 2025-04-30 DIAGNOSIS — G25.81 RESTLESS LEG SYNDROME: Primary | ICD-10-CM

## 2025-05-01 RX ORDER — PRAMIPEXOLE DIHYDROCHLORIDE 0.12 MG/1
0.12 TABLET ORAL AT BEDTIME
Qty: 90 TABLET | Refills: 4 | Status: SHIPPED | OUTPATIENT
Start: 2025-05-01

## 2025-05-01 NOTE — TELEPHONE ENCOUNTER
RX was discontined on 03/04/2025      [facility] sent Rx request for the following:      Requested Prescriptions   Pending Prescriptions Disp Refills    pramipexole (MIRAPEX) 0.125 MG tablet       Sig: Take 1 tablet (0.125 mg) by mouth at bedtime.       Antiparkinson's Agents Protocol Failed - 5/1/2025  9:00 AM      Last Prescription Date:   01/10/2025  Last Fill Qty/Refills:         90, R-1    Last Office Visit:              03/03/2025   Future Office visit:           None  Unable to complete prescription refill per RN Medication Refill Policy.    Yas Loja RN on 5/1/2025 at 9:03 AM

## 2025-05-05 ENCOUNTER — TELEPHONE (OUTPATIENT)
Dept: FAMILY MEDICINE | Facility: OTHER | Age: 60
End: 2025-05-05
Payer: COMMERCIAL

## 2025-05-05 NOTE — TELEPHONE ENCOUNTER
Patient is having scid reflux and what like to know what she should do. Has taken OTC omeprazole, Tums, and Pepto-bismol without any relief in symptoms. States she has had symptoms for two weeks.     Marisa Francis LPN on 5/5/2025 at 3:20 PM Ext [phone]

## 2025-05-05 NOTE — TELEPHONE ENCOUNTER
Left message to call back     Lenore Campa CMA on 5/5/2025 at 3:56 PM  Ext [phone]; may speak with any nurse

## 2025-05-05 NOTE — TELEPHONE ENCOUNTER
Try Pepcid (famotidine)  OTC as well as avoid triggers best as able.   Lisa Crisostomo PA-C on 5/5/2025 at 3:36 PM

## 2025-05-05 NOTE — TELEPHONE ENCOUNTER
[facility]-patient states she is looking for direction for acid reflux, patient states over the counter medications are not working anymore        Please call and advise  Thank You    Polly Chávez on 5/5/2025 at 3:06 PM  ]

## 2025-05-06 NOTE — TELEPHONE ENCOUNTER
Verified name and . Patient given information. No further questions or concerns.    Marisa Francis LPN on 2025 at 8:36 AM Ext [phone]

## 2025-05-27 ENCOUNTER — LAB REQUISITION (OUTPATIENT)
Dept: LAB | Facility: HOSPITAL | Age: 60
End: 2025-05-27

## 2025-05-27 DIAGNOSIS — R07.89 OTHER CHEST PAIN: ICD-10-CM

## 2025-05-27 LAB — TROPONIN T SERPL HS-MCNC: 12 NG/L

## 2025-05-27 PROCEDURE — 84484 ASSAY OF TROPONIN QUANT: CPT

## 2025-08-31 DIAGNOSIS — M79.7 FIBROMYALGIA: ICD-10-CM

## 2025-09-03 RX ORDER — PREGABALIN 150 MG/1
150 CAPSULE ORAL 2 TIMES DAILY
Qty: 180 CAPSULE | Refills: 0 | Status: SHIPPED | OUTPATIENT
Start: 2025-09-03

## (undated) DEVICE — ENDO BRUSH CHANNEL MASTER CLEANING 2-4.2MM BW-412T

## (undated) DEVICE — ENDO KIT COMPLIANCE DYKENDOCMPLY

## (undated) DEVICE — SUCTION MANIFOLD NEPTUNE 2 SYS 4 PORT 0702-020-000

## (undated) DEVICE — ENDO TRAP POLYP E-TRAP 00711099

## (undated) DEVICE — TUBING SUCTION 10'X3/16" N510

## (undated) DEVICE — ENDO SNARE EXACTO COLD 9MM LOOP 2.4MMX230CM 00711115

## (undated) DEVICE — SOL WATER 1500ML

## (undated) RX ORDER — PROPOFOL 10 MG/ML
INJECTION, EMULSION INTRAVENOUS
Status: DISPENSED
Start: 2021-11-30

## (undated) RX ORDER — HYDROCODONE BITARTRATE AND ACETAMINOPHEN 5; 325 MG/1; MG/1
TABLET ORAL
Status: DISPENSED
Start: 2018-04-22

## (undated) RX ORDER — SUMATRIPTAN 6 MG/.5ML
INJECTION, SOLUTION SUBCUTANEOUS
Status: DISPENSED
Start: 2024-09-17

## (undated) RX ORDER — LIDOCAINE HYDROCHLORIDE 20 MG/ML
INJECTION, SOLUTION EPIDURAL; INFILTRATION; INTRACAUDAL; PERINEURAL
Status: DISPENSED
Start: 2021-11-30

## (undated) RX ORDER — DEXAMETHASONE SODIUM PHOSPHATE 4 MG/ML
INJECTION, SOLUTION INTRA-ARTICULAR; INTRALESIONAL; INTRAMUSCULAR; INTRAVENOUS; SOFT TISSUE
Status: DISPENSED
Start: 2022-01-28

## (undated) RX ORDER — KETOROLAC TROMETHAMINE 30 MG/ML
INJECTION, SOLUTION INTRAMUSCULAR; INTRAVENOUS
Status: DISPENSED
Start: 2019-04-23

## (undated) RX ORDER — KETOROLAC TROMETHAMINE 15 MG/ML
INJECTION, SOLUTION INTRAMUSCULAR; INTRAVENOUS
Status: DISPENSED
Start: 2024-09-17

## (undated) RX ORDER — DEXAMETHASONE SODIUM PHOSPHATE 4 MG/ML
INJECTION, SOLUTION INTRA-ARTICULAR; INTRALESIONAL; INTRAMUSCULAR; INTRAVENOUS; SOFT TISSUE
Status: DISPENSED
Start: 2022-01-12

## (undated) RX ORDER — CYCLOBENZAPRINE HCL 10 MG
TABLET ORAL
Status: DISPENSED
Start: 2019-04-23

## (undated) RX ORDER — DIPHENHYDRAMINE HCL 25 MG
CAPSULE ORAL
Status: DISPENSED
Start: 2022-01-12

## (undated) RX ORDER — IPRATROPIUM BROMIDE AND ALBUTEROL SULFATE 2.5; .5 MG/3ML; MG/3ML
SOLUTION RESPIRATORY (INHALATION)
Status: DISPENSED
Start: 2023-08-23

## (undated) RX ORDER — IPRATROPIUM BROMIDE AND ALBUTEROL SULFATE 2.5; .5 MG/3ML; MG/3ML
SOLUTION RESPIRATORY (INHALATION)
Status: DISPENSED
Start: 2023-04-03

## (undated) RX ORDER — MORPHINE SULFATE 4 MG/ML
INJECTION, SOLUTION INTRAMUSCULAR; INTRAVENOUS
Status: DISPENSED
Start: 2018-04-22

## (undated) RX ORDER — AZITHROMYCIN 250 MG/1
TABLET, FILM COATED ORAL
Status: DISPENSED
Start: 2023-08-23

## (undated) RX ORDER — ONDANSETRON 4 MG/1
TABLET, ORALLY DISINTEGRATING ORAL
Status: DISPENSED
Start: 2022-01-28

## (undated) RX ORDER — MORPHINE SULFATE 4 MG/ML
INJECTION, SOLUTION INTRAMUSCULAR; INTRAVENOUS
Status: DISPENSED
Start: 2024-10-18

## (undated) RX ORDER — DIPHENHYDRAMINE HCL 25 MG
CAPSULE ORAL
Status: DISPENSED
Start: 2022-01-28

## (undated) RX ORDER — DIPHENHYDRAMINE HYDROCHLORIDE 50 MG/ML
INJECTION INTRAMUSCULAR; INTRAVENOUS
Status: DISPENSED
Start: 2024-09-17

## (undated) RX ORDER — KETOROLAC TROMETHAMINE 30 MG/ML
INJECTION, SOLUTION INTRAMUSCULAR; INTRAVENOUS
Status: DISPENSED
Start: 2022-01-28

## (undated) RX ORDER — METOCLOPRAMIDE HYDROCHLORIDE 5 MG/ML
INJECTION INTRAMUSCULAR; INTRAVENOUS
Status: DISPENSED
Start: 2023-08-23

## (undated) RX ORDER — KETOROLAC TROMETHAMINE 30 MG/ML
INJECTION, SOLUTION INTRAMUSCULAR; INTRAVENOUS
Status: DISPENSED
Start: 2022-01-12

## (undated) RX ORDER — AZITHROMYCIN 250 MG/1
TABLET, FILM COATED ORAL
Status: DISPENSED
Start: 2023-04-03

## (undated) RX ORDER — METHYLPREDNISOLONE SODIUM SUCCINATE 40 MG/ML
INJECTION, POWDER, LYOPHILIZED, FOR SOLUTION INTRAMUSCULAR; INTRAVENOUS
Status: DISPENSED
Start: 2023-04-03

## (undated) RX ORDER — DEXAMETHASONE SODIUM PHOSPHATE 10 MG/ML
INJECTION, SOLUTION INTRAMUSCULAR; INTRAVENOUS
Status: DISPENSED
Start: 2024-09-17

## (undated) RX ORDER — DIPHENHYDRAMINE HYDROCHLORIDE 50 MG/ML
INJECTION INTRAMUSCULAR; INTRAVENOUS
Status: DISPENSED
Start: 2023-08-23

## (undated) RX ORDER — DEXAMETHASONE SODIUM PHOSPHATE 10 MG/ML
INJECTION, SOLUTION INTRAMUSCULAR; INTRAVENOUS
Status: DISPENSED
Start: 2021-11-10

## (undated) RX ORDER — DEXAMETHASONE SODIUM PHOSPHATE 10 MG/ML
INJECTION, SOLUTION INTRAMUSCULAR; INTRAVENOUS
Status: DISPENSED
Start: 2023-08-23